# Patient Record
Sex: MALE | Race: WHITE | HISPANIC OR LATINO | Employment: OTHER | URBAN - METROPOLITAN AREA
[De-identification: names, ages, dates, MRNs, and addresses within clinical notes are randomized per-mention and may not be internally consistent; named-entity substitution may affect disease eponyms.]

---

## 2017-01-17 RX ORDER — OMEPRAZOLE 20 MG/1
CAPSULE, DELAYED RELEASE ORAL
Qty: 90 CAPSULE | Refills: 0 | Status: SHIPPED | OUTPATIENT
Start: 2017-01-17 | End: 2017-04-25 | Stop reason: SDUPTHER

## 2017-01-20 DIAGNOSIS — E03.9 HYPOTHYROIDISM, UNSPECIFIED TYPE: ICD-10-CM

## 2017-01-20 RX ORDER — LEVOTHYROXINE SODIUM 50 UG/1
50 TABLET ORAL DAILY
Qty: 90 TABLET | Refills: 0 | Status: SHIPPED | OUTPATIENT
Start: 2017-01-20 | End: 2017-05-18 | Stop reason: SDUPTHER

## 2017-02-27 ENCOUNTER — TELEPHONE (OUTPATIENT)
Dept: NEUROLOGY | Facility: CLINIC | Age: 82
End: 2017-02-27

## 2017-02-27 DIAGNOSIS — G40.209 PARTIAL SYMPTOMATIC EPILEPSY WITH COMPLEX PARTIAL SEIZURES, NOT INTRACTABLE, WITHOUT STATUS EPILEPTICUS: ICD-10-CM

## 2017-02-27 RX ORDER — LEVETIRACETAM 1000 MG/1
1000 TABLET ORAL 2 TIMES DAILY
Qty: 180 TABLET | Refills: 0 | Status: SHIPPED | OUTPATIENT
Start: 2017-02-27 | End: 2017-05-22 | Stop reason: SDUPTHER

## 2017-02-27 NOTE — TELEPHONE ENCOUNTER
Prescription for Levetiracetam 1000 mg has been filled for 3 mo. Supply. Called to scheduled  Appointment, and was advised that they would call back to schedule.

## 2017-03-16 DIAGNOSIS — E78.5 HYPERLIPIDEMIA, UNSPECIFIED HYPERLIPIDEMIA TYPE: Primary | ICD-10-CM

## 2017-03-16 RX ORDER — ATORVASTATIN CALCIUM 10 MG/1
10 TABLET, FILM COATED ORAL DAILY
Qty: 90 TABLET | Refills: 0 | Status: SHIPPED | OUTPATIENT
Start: 2017-03-16 | End: 2017-07-19 | Stop reason: SDUPTHER

## 2017-03-30 ENCOUNTER — TELEPHONE (OUTPATIENT)
Dept: ENDOCRINOLOGY | Facility: CLINIC | Age: 82
End: 2017-03-30

## 2017-03-30 NOTE — TELEPHONE ENCOUNTER
----- Message from Sumit Resendiz sent at 3/30/2017 11:57 AM CDT -----  Contact: Aislinn   Requesting to complete labs piror to 7/20/2017 appointment.    Please call [phone]

## 2017-04-13 RX ORDER — FINASTERIDE 5 MG/1
TABLET, FILM COATED ORAL
Qty: 90 TABLET | Refills: 1 | Status: SHIPPED | OUTPATIENT
Start: 2017-04-13 | End: 2017-07-29 | Stop reason: SDUPTHER

## 2017-04-13 RX ORDER — LISINOPRIL 20 MG/1
TABLET ORAL
Qty: 90 TABLET | Refills: 1 | Status: SHIPPED | OUTPATIENT
Start: 2017-04-13 | End: 2017-07-25 | Stop reason: SDUPTHER

## 2017-04-25 RX ORDER — OMEPRAZOLE 20 MG/1
CAPSULE, DELAYED RELEASE ORAL
Qty: 90 CAPSULE | Refills: 0 | Status: SHIPPED | OUTPATIENT
Start: 2017-04-25 | End: 2017-07-24 | Stop reason: SDUPTHER

## 2017-05-15 ENCOUNTER — TELEPHONE (OUTPATIENT)
Dept: FAMILY MEDICINE | Facility: CLINIC | Age: 82
End: 2017-05-15

## 2017-05-15 DIAGNOSIS — E11.59 HYPERTENSION ASSOCIATED WITH DIABETES: ICD-10-CM

## 2017-05-15 DIAGNOSIS — I15.2 HYPERTENSION ASSOCIATED WITH DIABETES: ICD-10-CM

## 2017-05-15 DIAGNOSIS — I10 ESSENTIAL HYPERTENSION: Primary | ICD-10-CM

## 2017-05-15 NOTE — TELEPHONE ENCOUNTER
----- Message from Michelle Jaime sent at 5/15/2017  4:04 PM CDT -----  Contact: ms collins pt's carol/[phone]  Ms collins states she left a message this morning for patient to do labs, please call to confirm orders for labs

## 2017-05-16 DIAGNOSIS — E11.9 TYPE 2 DIABETES MELLITUS WITHOUT COMPLICATION, WITHOUT LONG-TERM CURRENT USE OF INSULIN: Primary | ICD-10-CM

## 2017-05-17 ENCOUNTER — LAB VISIT (OUTPATIENT)
Dept: LAB | Facility: HOSPITAL | Age: 82
End: 2017-05-17
Attending: FAMILY MEDICINE
Payer: MEDICARE

## 2017-05-17 DIAGNOSIS — E11.59 HYPERTENSION ASSOCIATED WITH DIABETES: ICD-10-CM

## 2017-05-17 DIAGNOSIS — I15.2 HYPERTENSION ASSOCIATED WITH DIABETES: ICD-10-CM

## 2017-05-17 DIAGNOSIS — I10 ESSENTIAL HYPERTENSION: ICD-10-CM

## 2017-05-17 LAB
ALBUMIN SERPL BCP-MCNC: 4.3 G/DL
ALP SERPL-CCNC: 85 U/L
ALT SERPL W/O P-5'-P-CCNC: 26 U/L
ANION GAP SERPL CALC-SCNC: 12 MMOL/L
AST SERPL-CCNC: 26 U/L
BASOPHILS # BLD AUTO: 0.02 K/UL
BASOPHILS NFR BLD: 0.3 %
BILIRUB SERPL-MCNC: 0.5 MG/DL
BUN SERPL-MCNC: 23 MG/DL
CALCIUM SERPL-MCNC: 9.6 MG/DL
CHLORIDE SERPL-SCNC: 104 MMOL/L
CHOLEST/HDLC SERPL: 3.6 {RATIO}
CO2 SERPL-SCNC: 24 MMOL/L
CREAT SERPL-MCNC: 0.8 MG/DL
DIFFERENTIAL METHOD: NORMAL
EOSINOPHIL # BLD AUTO: 0.2 K/UL
EOSINOPHIL NFR BLD: 3.2 %
ERYTHROCYTE [DISTWIDTH] IN BLOOD BY AUTOMATED COUNT: 13.4 %
EST. GFR  (AFRICAN AMERICAN): >60 ML/MIN/1.73 M^2
EST. GFR  (NON AFRICAN AMERICAN): >60 ML/MIN/1.73 M^2
GLUCOSE SERPL-MCNC: 131 MG/DL
HCT VFR BLD AUTO: 42.9 %
HDL/CHOLESTEROL RATIO: 28.1 %
HDLC SERPL-MCNC: 128 MG/DL
HDLC SERPL-MCNC: 36 MG/DL
HGB BLD-MCNC: 14 G/DL
LDLC SERPL CALC-MCNC: 48.2 MG/DL
LYMPHOCYTES # BLD AUTO: 2.5 K/UL
LYMPHOCYTES NFR BLD: 41 %
MCH RBC QN AUTO: 30 PG
MCHC RBC AUTO-ENTMCNC: 32.6 %
MCV RBC AUTO: 92 FL
MONOCYTES # BLD AUTO: 0.5 K/UL
MONOCYTES NFR BLD: 7.5 %
NEUTROPHILS # BLD AUTO: 2.9 K/UL
NEUTROPHILS NFR BLD: 47.8 %
NONHDLC SERPL-MCNC: 92 MG/DL
PLATELET # BLD AUTO: 170 K/UL
PMV BLD AUTO: 11.7 FL
POTASSIUM SERPL-SCNC: 4.2 MMOL/L
PROT SERPL-MCNC: 7.4 G/DL
RBC # BLD AUTO: 4.66 M/UL
SODIUM SERPL-SCNC: 140 MMOL/L
T4 FREE SERPL-MCNC: 1.01 NG/DL
TRIGL SERPL-MCNC: 219 MG/DL
TSH SERPL DL<=0.005 MIU/L-ACNC: 7.03 UIU/ML
WBC # BLD AUTO: 5.97 K/UL

## 2017-05-17 PROCEDURE — 80053 COMPREHEN METABOLIC PANEL: CPT

## 2017-05-17 PROCEDURE — 84443 ASSAY THYROID STIM HORMONE: CPT

## 2017-05-17 PROCEDURE — 80061 LIPID PANEL: CPT

## 2017-05-17 PROCEDURE — 36415 COLL VENOUS BLD VENIPUNCTURE: CPT | Mod: PO

## 2017-05-17 PROCEDURE — 84439 ASSAY OF FREE THYROXINE: CPT

## 2017-05-17 PROCEDURE — 85025 COMPLETE CBC W/AUTO DIFF WBC: CPT

## 2017-05-17 PROCEDURE — 83036 HEMOGLOBIN GLYCOSYLATED A1C: CPT

## 2017-05-17 RX ORDER — METFORMIN HYDROCHLORIDE 1000 MG/1
1000 TABLET ORAL 2 TIMES DAILY WITH MEALS
Qty: 180 TABLET | Refills: 0 | Status: SHIPPED | OUTPATIENT
Start: 2017-05-17 | End: 2017-06-21 | Stop reason: SDUPTHER

## 2017-05-18 ENCOUNTER — TELEPHONE (OUTPATIENT)
Dept: INTERNAL MEDICINE | Facility: CLINIC | Age: 82
End: 2017-05-18

## 2017-05-18 DIAGNOSIS — E03.9 HYPOTHYROIDISM, UNSPECIFIED TYPE: ICD-10-CM

## 2017-05-18 LAB
ESTIMATED AVG GLUCOSE: 154 MG/DL
HBA1C MFR BLD HPLC: 7 %

## 2017-05-18 RX ORDER — LEVOTHYROXINE SODIUM 75 UG/1
75 TABLET ORAL
Qty: 60 TABLET | Refills: 0 | Status: SHIPPED | OUTPATIENT
Start: 2017-05-18 | End: 2017-07-16 | Stop reason: SDUPTHER

## 2017-05-19 NOTE — TELEPHONE ENCOUNTER
Spoke to daughter thyroid was elevated, levothyroxine was increased to 75mcg, repeat lab in 8 weeks

## 2017-05-22 ENCOUNTER — OFFICE VISIT (OUTPATIENT)
Dept: FAMILY MEDICINE | Facility: CLINIC | Age: 82
End: 2017-05-22
Payer: MEDICARE

## 2017-05-22 VITALS
WEIGHT: 121.69 LBS | SYSTOLIC BLOOD PRESSURE: 122 MMHG | HEART RATE: 80 BPM | DIASTOLIC BLOOD PRESSURE: 64 MMHG | BODY MASS INDEX: 22.98 KG/M2 | OXYGEN SATURATION: 95 % | HEIGHT: 61 IN

## 2017-05-22 DIAGNOSIS — R42 DIZZINESS: ICD-10-CM

## 2017-05-22 DIAGNOSIS — I10 ESSENTIAL HYPERTENSION: Primary | ICD-10-CM

## 2017-05-22 DIAGNOSIS — Z23 NEED FOR VACCINATION AGAINST STREPTOCOCCUS PNEUMONIAE: ICD-10-CM

## 2017-05-22 DIAGNOSIS — E11.65 TYPE 2 DIABETES MELLITUS WITH HYPERGLYCEMIA, WITHOUT LONG-TERM CURRENT USE OF INSULIN: ICD-10-CM

## 2017-05-22 PROCEDURE — 1126F AMNT PAIN NOTED NONE PRSNT: CPT | Mod: S$GLB,,, | Performed by: FAMILY MEDICINE

## 2017-05-22 PROCEDURE — 99214 OFFICE O/P EST MOD 30 MIN: CPT | Mod: S$GLB,,, | Performed by: FAMILY MEDICINE

## 2017-05-22 PROCEDURE — 1160F RVW MEDS BY RX/DR IN RCRD: CPT | Mod: S$GLB,,, | Performed by: FAMILY MEDICINE

## 2017-05-22 PROCEDURE — 1159F MED LIST DOCD IN RCRD: CPT | Mod: S$GLB,,, | Performed by: FAMILY MEDICINE

## 2017-05-22 PROCEDURE — 3078F DIAST BP <80 MM HG: CPT | Mod: S$GLB,,, | Performed by: FAMILY MEDICINE

## 2017-05-22 PROCEDURE — 99499 UNLISTED E&M SERVICE: CPT | Mod: S$GLB,,, | Performed by: FAMILY MEDICINE

## 2017-05-22 PROCEDURE — 3074F SYST BP LT 130 MM HG: CPT | Mod: S$GLB,,, | Performed by: FAMILY MEDICINE

## 2017-05-22 PROCEDURE — 99999 PR PBB SHADOW E&M-EST. PATIENT-LVL III: CPT | Mod: PBBFAC,,, | Performed by: FAMILY MEDICINE

## 2017-05-22 RX ORDER — LEVETIRACETAM 500 MG/1
500 TABLET ORAL 2 TIMES DAILY
Qty: 180 TABLET | Refills: 0 | Status: SHIPPED | OUTPATIENT
Start: 2017-05-22 | End: 2017-09-05 | Stop reason: SDUPTHER

## 2017-05-22 NOTE — PROGRESS NOTES
Subjective:       Patient ID: Kang Herbert is a 82 y.o. male.    Chief Complaint: Annual Exam    82 years old male came to the clinic for blood pressure check.  Blood pressure today stable.  No chest pain palpitations orthopnea or PND.  Patient last A1c was stable.  No polyuria polydipsia or polyphagia.      Review of Systems   Constitutional: Negative.    HENT: Negative.    Eyes: Negative.    Respiratory: Negative.    Cardiovascular: Negative.  Negative for chest pain, palpitations and leg swelling.   Gastrointestinal: Negative.    Endocrine: Negative for cold intolerance, heat intolerance, polydipsia, polyphagia and polyuria.   Genitourinary: Negative.    Musculoskeletal: Negative.    Skin: Negative.    Neurological: Positive for dizziness.   Psychiatric/Behavioral: Negative.        Objective:      Physical Exam   Constitutional: He is oriented to person, place, and time. He appears well-developed and well-nourished. No distress.   HENT:   Head: Normocephalic and atraumatic.   Right Ear: External ear normal.   Left Ear: External ear normal.   Nose: Nose normal.   Mouth/Throat: Oropharynx is clear and moist. No oropharyngeal exudate.   Eyes: Conjunctivae and EOM are normal. Pupils are equal, round, and reactive to light. Right eye exhibits no discharge. Left eye exhibits no discharge. No scleral icterus.   Neck: Normal range of motion. Neck supple. No JVD present. No tracheal deviation present. No thyromegaly present.   Cardiovascular: Normal rate, regular rhythm, normal heart sounds and intact distal pulses.  Exam reveals no gallop and no friction rub.    No murmur heard.  Pulmonary/Chest: Effort normal and breath sounds normal. No stridor. No respiratory distress. He has no wheezes. He has no rales. He exhibits no tenderness.   Abdominal: Soft. Bowel sounds are normal. He exhibits no distension and no mass. There is no tenderness. There is no rebound and no guarding.   Musculoskeletal: Normal range of  motion. He exhibits no edema or tenderness.   Feet:   Right Foot:   Protective Sensation: 10 sites tested. 10 sites sensed.   Skin Integrity: Negative for ulcer, blister, skin breakdown, erythema, warmth, callus or dry skin.   Left Foot:   Protective Sensation: 10 sites tested. 10 sites sensed.   Skin Integrity: Negative for ulcer, blister, skin breakdown, erythema, warmth, callus or dry skin.   Lymphadenopathy:     He has no cervical adenopathy.   Neurological: He is alert and oriented to person, place, and time. He has normal reflexes. He displays normal reflexes. No cranial nerve deficit. He exhibits normal muscle tone. Coordination normal.   Skin: Skin is warm and dry. No rash noted. He is not diaphoretic. No erythema. No pallor.   Psychiatric: He has a normal mood and affect. His behavior is normal. Judgment and thought content normal.   Nursing note and vitals reviewed.      Assessment:       1. Essential hypertension    2. Dizziness    3. Type 2 diabetes mellitus with hyperglycemia, without long-term current use of insulin    4. Need for vaccination against Streptococcus pneumoniae        Plan:         Kang TRAN was seen today for annual exam.    Diagnoses and all orders for this visit:    Essential hypertension    Dizziness    Type 2 diabetes mellitus with hyperglycemia, without long-term current use of insulin    Need for vaccination against Streptococcus pneumoniae    Other orders  -     levetiracetam (KEPPRA) 500 MG Tab; Take 1 tablet (500 mg total) by mouth 2 (two) times daily.    Continue monitoring blood pressure at home, low sodium diet.  Continue monitoring blood sugar at home,ADA diet.  Decrease Keppra by 50%.  Patient is going to check if the pneumonia shot was done at the pharmacy.

## 2017-05-22 NOTE — PATIENT INSTRUCTIONS
Dieta: Diabetes [Diabetic Diet]  Los alimentos son umberto herramienta importante que puede utilizar para controlar la diabetes y mantenerse skyla. [place] alimentos john equilibrados en las cantidades adecuadas le ayudará a controlar la concentración de azúcar en la dayan (glucemia) y a evitar las reacciones por falta de azúcar en dayan. También le ayudará a reducir los riesgos de la diabetes para la marie.    Un dietista certificado (STEFAN, registered dietitian) le explicará la dieta para la diabetes y le ayudará a planificar comidas y refrigerios que rashi saludables. Si tiene alguna lucille, consulte al dietista.  Pautas Para [place]:  · Consulte con braxton médico antes de iniciar umberto dieta para la diabetes o un programa para adelgazar. Si todavía no ha consultado a un dietista, pídale a braxton médico que lo transfiera.  · Seleccione alimentos de los seis grupos. Braxton dietista lo asesorará sobre las elecciones de alimentos dentro de cada carin, el tamaño de las porciones y la cantidad de porciones que puede comer en cada comida.  ¨ Granos, frijoles y vegetales con almidón  ¨ Vegetales  ¨ Fruta  ¨ Leche o yogur  ¨ Zenia  ¨ Grasas, dulces y alcohol (solamente umberto pequeña cantidad de shannan carin)  · Vigile el azúcar en braxton dayan según lo indique braxton médico. [place] los medicamentos según le indique braxton médico.  · Aprenda a leer las etiquetas de información nutricional y elija las porciones adecuadas.  · Ingiera solamente la cantidad de alimentos de braxton plan de comidas. Coma más o menos la misma cantidad de alimentos en horarios regulares todos los días. No saltee comidas. Deje un intervalo de 4 a 5 horas entre las comidas, con refrigerios intermedios.  · Limite el consumo de alcohol. Aumenta el azúcar en la dayan. Stephanie agua o bebidas dietéticas sin calorías que utilizan edulcorantes seguros.  · Coma menos grasa para ayudar a reducir braxton riesgo de enfermedad del corazón. Consuma productos lácteos sin grasa o con bajo contenido graso y  stella magras. Evite los alimentos fritos. Use aceites de cocina no saturados.  · Consulte a braxton nutricionista sobre los sustitutos seguros del azúcar.  · Evite la sal adicional. Puede contribuir a la presión abeba (hipertensión), que puede provocar enfermedad del corazón. Las personas con diabetes ya tienen riesgo de hipertensión y enfermedad del corazón.  · Mantenga un peso saludable. Si necesita adelgazar, reduzca el tamaño de hi porciones. No se saltee comidas. El ejercicio es umberto parte importante de un programa para el manejo del peso. Pregúntele a braxton médico si un programa de ejercicios es adecuado para usted.  · Si desea obtener más información acerca del mejor plan para usted, consulte con un dietista certificado. Si desea que lo transfieran a un dietista certificado en braxton melinda, comuníquese con:  ¨ Academy of Nutrition and Dietetics: www.eatright.org  ¨ American Diabetes Association: 286.512.3415 www.diabetes.org  Date Last Reviewed: 11/29/2013  © 3668-9663 [employer]. [address] [place] [id number]. Todos los derechos reservados. Esta información no pretende sustituir la atención médica profesional. Sólo braxton médico puede diagnosticar y tratar un problema de marie.

## 2017-06-01 ENCOUNTER — OFFICE VISIT (OUTPATIENT)
Dept: OPTOMETRY | Facility: CLINIC | Age: 82
End: 2017-06-01
Payer: MEDICARE

## 2017-06-01 DIAGNOSIS — H40.013 OPEN ANGLE WITH BORDERLINE FINDINGS AND LOW GLAUCOMA RISK IN BOTH EYES: ICD-10-CM

## 2017-06-01 DIAGNOSIS — H04.123 DRY EYES, BILATERAL: ICD-10-CM

## 2017-06-01 DIAGNOSIS — H52.4 MYOPIA WITH ASTIGMATISM AND PRESBYOPIA, BILATERAL: ICD-10-CM

## 2017-06-01 DIAGNOSIS — H52.13 MYOPIA WITH ASTIGMATISM AND PRESBYOPIA, BILATERAL: ICD-10-CM

## 2017-06-01 DIAGNOSIS — H53.2 DIPLOPIA: ICD-10-CM

## 2017-06-01 DIAGNOSIS — H52.203 MYOPIA WITH ASTIGMATISM AND PRESBYOPIA, BILATERAL: ICD-10-CM

## 2017-06-01 DIAGNOSIS — E11.9 TYPE 2 DIABETES MELLITUS WITHOUT COMPLICATION, WITHOUT LONG-TERM CURRENT USE OF INSULIN: Primary | ICD-10-CM

## 2017-06-01 PROCEDURE — 92015 DETERMINE REFRACTIVE STATE: CPT | Mod: S$GLB,,, | Performed by: OPTOMETRIST

## 2017-06-01 PROCEDURE — 99499 UNLISTED E&M SERVICE: CPT | Mod: S$GLB,,, | Performed by: OPTOMETRIST

## 2017-06-01 PROCEDURE — 92014 COMPRE OPH EXAM EST PT 1/>: CPT | Mod: S$GLB,,, | Performed by: OPTOMETRIST

## 2017-06-01 PROCEDURE — 99999 PR PBB SHADOW E&M-EST. PATIENT-LVL II: CPT | Mod: PBBFAC,,, | Performed by: OPTOMETRIST

## 2017-06-01 NOTE — PROGRESS NOTES
HPI     Blur os at dist, x mos, no assoc pain or red, no relief over time,   constant  Diabetic eye exam  Using tears with relief    Last edited by Victor M Loya, OD on 6/1/2017  3:05 PM. (History)        ROS     Negative for: Constitutional, Gastrointestinal, Neurological, Skin,   Genitourinary, Musculoskeletal, HENT, Endocrine, Cardiovascular, Eyes,   Respiratory, Psychiatric, Allergic/Imm, Heme/Lymph    Last edited by Victor M Loya, OD on 6/1/2017  2:02 PM. (History)        Assessment /Plan     For exam results, see Encounter Report.    Type 2 diabetes mellitus without complication, without long-term current use of insulin    Open angle with borderline findings and low glaucoma risk in both eyes  -     Mohamud Visual Field - OU - Intermediate - Both Eyes; Future  -     OCT - Optic Nerve; Future    Diplopia    Myopia with astigmatism and presbyopia, bilateral    Dry eyes, bilateral      1. No diabetic retinopathy, no csme. Return in 1 year for dilated eye exam.  2. Low risk based on CD ratio, IOP stable, RTC for HVf(24-2)ridge std and OCt of rnfl.   3,4. Spec Rx given. Different lens options discussed with patient. RTC 1 year full exam. Cont with same prism.   5. Recommend continue artificial tears. 1 drop 1-2x per day. Chronicity of disease and treatment discussed.

## 2017-06-21 DIAGNOSIS — E11.9 TYPE 2 DIABETES MELLITUS WITHOUT COMPLICATION, WITHOUT LONG-TERM CURRENT USE OF INSULIN: ICD-10-CM

## 2017-06-22 RX ORDER — METFORMIN HYDROCHLORIDE 1000 MG/1
1000 TABLET ORAL 2 TIMES DAILY WITH MEALS
Qty: 180 TABLET | Refills: 0 | Status: SHIPPED | OUTPATIENT
Start: 2017-06-22 | End: 2017-10-31 | Stop reason: SDUPTHER

## 2017-07-16 DIAGNOSIS — E03.9 HYPOTHYROIDISM, UNSPECIFIED TYPE: ICD-10-CM

## 2017-07-17 ENCOUNTER — LAB VISIT (OUTPATIENT)
Dept: LAB | Facility: HOSPITAL | Age: 82
End: 2017-07-17
Attending: FAMILY MEDICINE
Payer: MEDICARE

## 2017-07-17 DIAGNOSIS — E03.9 HYPOTHYROIDISM, UNSPECIFIED TYPE: ICD-10-CM

## 2017-07-17 LAB
T3 SERPL-MCNC: 89 NG/DL
T4 FREE SERPL-MCNC: 1.26 NG/DL
TSH SERPL DL<=0.005 MIU/L-ACNC: 1.17 UIU/ML

## 2017-07-17 PROCEDURE — 84480 ASSAY TRIIODOTHYRONINE (T3): CPT

## 2017-07-17 PROCEDURE — 84439 ASSAY OF FREE THYROXINE: CPT

## 2017-07-17 PROCEDURE — 84443 ASSAY THYROID STIM HORMONE: CPT

## 2017-07-17 PROCEDURE — 36415 COLL VENOUS BLD VENIPUNCTURE: CPT | Mod: PO

## 2017-07-17 RX ORDER — LEVOTHYROXINE SODIUM 75 UG/1
75 TABLET ORAL
Qty: 60 TABLET | Refills: 0 | Status: SHIPPED | OUTPATIENT
Start: 2017-07-17 | End: 2017-09-12 | Stop reason: SDUPTHER

## 2017-07-19 DIAGNOSIS — E78.5 HYPERLIPIDEMIA, UNSPECIFIED HYPERLIPIDEMIA TYPE: ICD-10-CM

## 2017-07-20 ENCOUNTER — OFFICE VISIT (OUTPATIENT)
Dept: ENDOCRINOLOGY | Facility: CLINIC | Age: 82
End: 2017-07-20
Payer: MEDICARE

## 2017-07-20 VITALS
WEIGHT: 117.94 LBS | HEIGHT: 62 IN | SYSTOLIC BLOOD PRESSURE: 98 MMHG | DIASTOLIC BLOOD PRESSURE: 59 MMHG | HEART RATE: 89 BPM | BODY MASS INDEX: 21.7 KG/M2

## 2017-07-20 DIAGNOSIS — R20.8 DECREASED PERIPHERAL VIBRATORY SENSE: ICD-10-CM

## 2017-07-20 DIAGNOSIS — E03.9 ACQUIRED HYPOTHYROIDISM: ICD-10-CM

## 2017-07-20 DIAGNOSIS — E11.59 HYPERTENSION ASSOCIATED WITH DIABETES: ICD-10-CM

## 2017-07-20 DIAGNOSIS — E55.9 VITAMIN D DEFICIENCY: ICD-10-CM

## 2017-07-20 DIAGNOSIS — I15.2 HYPERTENSION ASSOCIATED WITH DIABETES: ICD-10-CM

## 2017-07-20 DIAGNOSIS — E11.42 DIABETIC PERIPHERAL NEUROPATHY ASSOCIATED WITH TYPE 2 DIABETES MELLITUS: Primary | ICD-10-CM

## 2017-07-20 DIAGNOSIS — E78.2 MIXED DYSLIPIDEMIA: ICD-10-CM

## 2017-07-20 PROCEDURE — 99999 PR PBB SHADOW E&M-EST. PATIENT-LVL III: CPT | Mod: PBBFAC,GC,, | Performed by: INTERNAL MEDICINE

## 2017-07-20 PROCEDURE — 1126F AMNT PAIN NOTED NONE PRSNT: CPT | Mod: GC,S$GLB,, | Performed by: INTERNAL MEDICINE

## 2017-07-20 PROCEDURE — 3074F SYST BP LT 130 MM HG: CPT | Mod: GC,S$GLB,, | Performed by: INTERNAL MEDICINE

## 2017-07-20 PROCEDURE — 1159F MED LIST DOCD IN RCRD: CPT | Mod: GC,S$GLB,, | Performed by: INTERNAL MEDICINE

## 2017-07-20 PROCEDURE — 3078F DIAST BP <80 MM HG: CPT | Mod: GC,S$GLB,, | Performed by: INTERNAL MEDICINE

## 2017-07-20 PROCEDURE — 99214 OFFICE O/P EST MOD 30 MIN: CPT | Mod: GC,S$GLB,, | Performed by: INTERNAL MEDICINE

## 2017-07-20 PROCEDURE — 3008F BODY MASS INDEX DOCD: CPT | Mod: GC,S$GLB,, | Performed by: INTERNAL MEDICINE

## 2017-07-20 RX ORDER — ATORVASTATIN CALCIUM 10 MG/1
10 TABLET, FILM COATED ORAL DAILY
Qty: 90 TABLET | Refills: 1 | Status: SHIPPED | OUTPATIENT
Start: 2017-07-20 | End: 2017-10-12 | Stop reason: SDUPTHER

## 2017-07-20 NOTE — PATIENT INSTRUCTIONS
Start over counter vitamin D 2000 iu daily   We will check vitamin B12 levels and let you know if its low

## 2017-07-20 NOTE — PROGRESS NOTES
Subjective:      Patient ID: Kang Herbert is a 82 y.o. male.    Chief Complaint:  Hypothyroidism      History of Present Illness    F/u visit for T2DM and hypothyroidism:     Complications: neuropathy. A1c controlled, BS log reviewed, ranging 86-160s and averaging 110s. Denies hypoglycemia. Tolerating metformin 1g bid.   utd mary   Reports paresthesias in feet, told to have sciatica.   On statin and lisinopril.     Hypothyroidism, taking lt4 75mcg daily, dose recently changed by pcp, tsh normal.   Denies wt changes, palps, constipation, increased freq BM, cold/heat intolerance    Vit D def, not taking supplements.     Review of Systems   Constitutional: Negative for unexpected weight change.   Eyes: Negative for visual disturbance.   Cardiovascular: Negative for palpitations.   Gastrointestinal: Negative for abdominal pain.   Endocrine: Negative for polyuria.   Skin: Negative for wound.       Objective:   Physical Exam   Neck: No thyromegaly present.   Cardiovascular: Normal rate.    Pulmonary/Chest: Effort normal.   Abdominal: Soft.   Musculoskeletal:        Right foot: There is no deformity.        Left foot: There is no deformity.   Feet:   Right Foot:   Skin Integrity: Negative for ulcer.   Left Foot:   Skin Integrity: Negative for ulcer.   Neurological:   Feet without lesions   Shoes appropriate  sensation intact to monofilament, decreased vibratory sensation        Vitals reviewed.      Lab Review:   Lab Results   Component Value Date    TSH 1.169 07/17/2017     Lab Results   Component Value Date    HGBA1C 7.0 (H) 05/17/2017         Assessment:     1. Diabetic peripheral neuropathy associated with type 2 diabetes mellitus     2. Acquired hypothyroidism     3. Hypertension associated with diabetes     4. Mixed dyslipidemia     5. Vitamin D deficiency            Plan:     Kang TRAN was seen today for hypothyroidism.    Diagnoses and all orders for this visit:    Diabetic peripheral neuropathy  associated with type 2 diabetes mellitus  A1c controlled, cont metformin 1 g bid.   rec routine yearly surveillance of lipid, umab, ophtho and A1c with daily BS logs   B12 normal in 2016, recheck today    Acquired hypothyroidism  Clinically and biochemically euthyroid   Cont lt4 75mcg daily, yearly tsh surveillance     Hypertension associated with diabetes  Controlled, cont lisinopril     Mixed dyslipidemia  Cont lipitor     Vitamin D deficiency  rec starting otc 2000 iu daily     Plan discussed with pt, wife and daughter on phone as interpretor due to language barrier.   No changes to DM or thyroid regimen.   Discussed that he can follow up endocrine prn, has been managed by PCP and doing well and they are agreeable.     Discussed w Dr Jonas   rtc prn

## 2017-07-24 RX ORDER — OMEPRAZOLE 20 MG/1
CAPSULE, DELAYED RELEASE ORAL
Qty: 90 CAPSULE | Refills: 0 | Status: SHIPPED | OUTPATIENT
Start: 2017-07-24 | End: 2017-07-25

## 2017-07-25 RX ORDER — LISINOPRIL 20 MG/1
TABLET ORAL
Qty: 90 TABLET | Refills: 1 | Status: SHIPPED | OUTPATIENT
Start: 2017-07-25 | End: 2018-04-25 | Stop reason: SDUPTHER

## 2017-07-25 RX ORDER — OMEPRAZOLE 20 MG/1
CAPSULE, DELAYED RELEASE ORAL
Qty: 90 CAPSULE | Refills: 0 | Status: SHIPPED | OUTPATIENT
Start: 2017-07-25 | End: 2017-12-08 | Stop reason: SDUPTHER

## 2017-07-26 ENCOUNTER — TELEPHONE (OUTPATIENT)
Dept: ENDOCRINOLOGY | Facility: CLINIC | Age: 82
End: 2017-07-26

## 2017-07-26 NOTE — TELEPHONE ENCOUNTER
Pt is calling for lab results. Please advise.    ----- Message from Sumit Resendiz sent at 7/25/2017  2:44 PM CDT -----  Contact: Aislinn  Requesting a call back in regards to lab results.    Please call [phone].

## 2017-07-29 RX ORDER — FINASTERIDE 5 MG/1
TABLET, FILM COATED ORAL
Qty: 90 TABLET | Refills: 1 | Status: SHIPPED | OUTPATIENT
Start: 2017-07-29 | End: 2017-07-31 | Stop reason: SDUPTHER

## 2017-07-31 RX ORDER — FINASTERIDE 5 MG/1
TABLET, FILM COATED ORAL
Qty: 90 TABLET | Refills: 1 | Status: SHIPPED | OUTPATIENT
Start: 2017-07-31 | End: 2018-01-25 | Stop reason: SDUPTHER

## 2017-07-31 NOTE — TELEPHONE ENCOUNTER
----- Message from Huyen Tanner sent at 7/31/2017  2:14 PM CDT -----  Contact: Self [phone]  Patient is calling to get refills on his prostate medication. Please advice

## 2017-07-31 NOTE — TELEPHONE ENCOUNTER
----- Message from Huyen Tanner sent at 7/31/2017  2:14 PM CDT -----  Contact: Self [phone]  Patient is calling to get refills on his prostate medication. Please advice

## 2017-08-07 NOTE — PROGRESS NOTES
I have reviewed the notes, assessments,  performed by , I concur with her/his documentation of Kang Herbert.

## 2017-09-05 RX ORDER — LEVETIRACETAM 500 MG/1
500 TABLET ORAL 2 TIMES DAILY
Qty: 180 TABLET | Refills: 3 | Status: SHIPPED | OUTPATIENT
Start: 2017-09-05 | End: 2019-08-14

## 2017-09-12 DIAGNOSIS — E03.9 HYPOTHYROIDISM, UNSPECIFIED TYPE: ICD-10-CM

## 2017-09-13 RX ORDER — LEVOTHYROXINE SODIUM 75 UG/1
75 TABLET ORAL
Qty: 60 TABLET | Refills: 0 | Status: SHIPPED | OUTPATIENT
Start: 2017-09-13 | End: 2017-10-12 | Stop reason: SDUPTHER

## 2017-10-12 DIAGNOSIS — E03.9 HYPOTHYROIDISM, UNSPECIFIED TYPE: ICD-10-CM

## 2017-10-12 DIAGNOSIS — E78.5 HYPERLIPIDEMIA, UNSPECIFIED HYPERLIPIDEMIA TYPE: ICD-10-CM

## 2017-10-12 RX ORDER — ATORVASTATIN CALCIUM 10 MG/1
10 TABLET, FILM COATED ORAL DAILY
Qty: 90 TABLET | Refills: 3 | Status: SHIPPED | OUTPATIENT
Start: 2017-10-12 | End: 2018-10-16 | Stop reason: SDUPTHER

## 2017-10-12 RX ORDER — LEVOTHYROXINE SODIUM 75 UG/1
75 TABLET ORAL
Qty: 90 TABLET | Refills: 3 | Status: SHIPPED | OUTPATIENT
Start: 2017-10-12 | End: 2017-11-03

## 2017-10-30 ENCOUNTER — OFFICE VISIT (OUTPATIENT)
Dept: NEUROLOGY | Facility: CLINIC | Age: 82
End: 2017-10-30
Payer: MEDICARE

## 2017-10-30 VITALS
HEIGHT: 62 IN | WEIGHT: 119 LBS | BODY MASS INDEX: 21.9 KG/M2 | DIASTOLIC BLOOD PRESSURE: 70 MMHG | HEART RATE: 88 BPM | SYSTOLIC BLOOD PRESSURE: 122 MMHG

## 2017-10-30 DIAGNOSIS — G40.209 PARTIAL SYMPTOMATIC EPILEPSY WITH COMPLEX PARTIAL SEIZURES, NOT INTRACTABLE, WITHOUT STATUS EPILEPTICUS: Primary | ICD-10-CM

## 2017-10-30 PROCEDURE — 99213 OFFICE O/P EST LOW 20 MIN: CPT | Mod: S$GLB,,, | Performed by: PSYCHIATRY & NEUROLOGY

## 2017-10-30 PROCEDURE — 99999 PR PBB SHADOW E&M-EST. PATIENT-LVL III: CPT | Mod: PBBFAC,,, | Performed by: PSYCHIATRY & NEUROLOGY

## 2017-10-30 PROCEDURE — 99499 UNLISTED E&M SERVICE: CPT | Mod: S$GLB,,, | Performed by: PSYCHIATRY & NEUROLOGY

## 2017-10-30 NOTE — PROGRESS NOTES
[facility] NEUROLOGY  [facility] [place]    Date: October 30, 2017   Patient Name: Kang Herbert   MRN: [id number]   PCP: Zeke Marrufo  Referring Provider: Self, Aaareferral    Assessment:      This is Kang Herbert, 82 y.o. male with HTN, HLD, DM presents for follow up for mild memory troubles consistent with normal aging, MRI brain normal for age (images reviewed), not likely to benefit from cholinergic medication.  He has no clear history of seizures.     Plan:      -  Stop keppra and check EEG, will call with results.       I discussed side effects of the medications. I asked the patient to  stop the medication if he notices serious adverse effects as we discussed and to seek immediate medical attention at an ER.     Chacorta Shrestha MD  [facility]   Department of Neurology    Subjective:      HPI:   Mr. Kang Herbert is a 82 y.o. male who presents with a chief complaint of follow up for MRI     Patient presents today with his wife who follows with me for mild Alzheimer type dementia and his daughter who acts as .  He was evaluated for memory loss 9/2016 with MRI brain at that time showing only age related atrophy and mild vascular disease.  He remains fully independent.  He spends his time caring for his wife, gardening, walking, playing cards with friends.  He continues to drive without difficulty and do his own shopping.  He has been on keppra since 2009 when he had an episode of decreased responsiveness in the setting of hyperglycemia.    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  1    atorvastatin (LIPITOR) 10 MG tablet Take 1 tablet (10 mg total) by mouth once daily. 90 tablet 3    blood sugar diagnostic (ACCU-CHEK ARUNA PLUS TEST STRP) Strp 1 strip by Other route 2 (two) times daily. 200 strip 3    finasteride (PROSCAR) 5 mg tablet TAKE 1 TABLET (5 MG TOTAL) BY MOUTH ONCE DAILY. 90 tablet 1    gabapentin (NEURONTIN) 800 MG tablet TAKE 1 TABLET IN THE MORNING AND EVENING AND 2 TABLETS AT BEDTIME 360 tablet 1    levetiracetam (KEPPRA) 500 MG Tab Take 1 tablet (500 mg total) by mouth 2 (two) times daily. 180 tablet 3    levothyroxine (SYNTHROID) 75 MCG tablet Take 1 tablet (75 mcg total) by mouth before breakfast. 90 tablet 3    lidocaine HCl 2% (XYLOCAINE) 2 % Soln 5ml every 4 hours as needed for pain 120 mL 0    lisinopril (PRINIVIL,ZESTRIL) 20 MG tablet TAKE 1 TABLET BY MOUTH EVERY DAY 90 tablet 1    meloxicam (MOBIC) 15 MG tablet TAKE 1 TABLET EVERY DAY AS NEEDED FOR PAIN MAY TAKE 1/2 TABLET 2 TIMES A DAY 90 tablet 2    metformin (GLUCOPHAGE) 1000 MG tablet Take 1 tablet (1,000 mg total) by mouth 2 (two) times daily with meals. 180 tablet 0    omeprazole (PRILOSEC) 20 MG capsule TAKE 1 CAPSULE BY MOUTH EVERY DAY 90 capsule 0     No current facility-administered medications for this visit.        ALLERGIES:  Review of patient's allergies indicates:  No Known Allergies    FAMILY HISTORY:  Family History   Problem Relation Age of Onset    No Known Problems Mother     No Known Problems Father     Glaucoma Sister     Diabetes Sister     Diabetes Brother     No Known Problems Maternal Aunt     No Known Problems Maternal Uncle     No Known Problems Paternal Aunt     No Known Problems Paternal Uncle     No Known Problems Maternal  "Grandmother     No Known Problems Maternal Grandfather     No Known Problems Paternal Grandmother     No Known Problems Paternal Grandfather     Amblyopia Neg Hx     Blindness Neg Hx     Cancer Neg Hx     Cataracts Neg Hx     Hypertension Neg Hx     Macular degeneration Neg Hx     Retinal detachment Neg Hx     Strabismus Neg Hx     Stroke Neg Hx     Thyroid disease Neg Hx        SOCIAL HISTORY:  Social History   Substance Use Topics    Smoking status: Never Smoker    Smokeless tobacco: Never Used    Alcohol use No       Review of Systems:  12 review of systems is negative except for the symptoms mentioned in HPI.        Objective:     Vitals:    10/30/17 1221   BP: 122/70   Pulse: 88   Weight: 54 kg (119 lb)   Height: 5' 2" (1.575 m)       General: NAD, well nourished   Eyes: no tearing, discharge, no erythema   ENT: moist mucous membranes of the oral cavity, nares patent    Neck: Supple, full range of motion  Cardiovascular: Warm and well perfused, pulses equal and symmetrical  Lungs: Normal work of breathing, normal chest wall excursions  Skin: No rash, lesions, or breakdown on exposed skin  Psychiatry: Mood and affect are appropriate   Abdomen: soft, non tender, non distended  Extremeties: No cyanosis, clubbing or edema.    Neurological   MENTAL STATUS: Alert and oriented to person, place, and time. Attention and concentration within normal limits. Speech without dysarthria, able to name and repeat without difficulty. Recent and remote memory within normal limits   CRANIAL NERVES: Visual fields intact. PERRL. EOMI. Facial sensation intact. Face symmetrical. Hearing grossly intact. Full shoulder shrug bilaterally. Tongue protrudes midline   MOTOR: Normal bulk and tone. No pronator drift.  5/5 deltoid, biceps, triceps, interosseous, hand  bilaterally. 5/5 iliopsoas, knee extension/flexion, foot dorsi/plantarflexion bilaterally.    CEREBELLAR/COORDINATION/GAIT: Gait steady with normal arm swing " and stride length.

## 2017-10-31 DIAGNOSIS — E03.9 HYPOTHYROIDISM, UNSPECIFIED TYPE: ICD-10-CM

## 2017-10-31 DIAGNOSIS — E11.9 TYPE 2 DIABETES MELLITUS WITHOUT COMPLICATION, WITHOUT LONG-TERM CURRENT USE OF INSULIN: ICD-10-CM

## 2017-11-03 RX ORDER — LEVOTHYROXINE SODIUM 75 UG/1
75 TABLET ORAL
Qty: 60 TABLET | Refills: 0 | Status: SHIPPED | OUTPATIENT
Start: 2017-11-03 | End: 2018-07-30 | Stop reason: SDUPTHER

## 2017-11-06 RX ORDER — METFORMIN HYDROCHLORIDE 1000 MG/1
1000 TABLET ORAL 2 TIMES DAILY WITH MEALS
Qty: 180 TABLET | Refills: 0 | Status: SHIPPED | OUTPATIENT
Start: 2017-11-06 | End: 2018-04-01 | Stop reason: SDUPTHER

## 2017-11-08 ENCOUNTER — HOSPITAL ENCOUNTER (OUTPATIENT)
Dept: NEUROLOGY | Facility: CLINIC | Age: 82
Discharge: HOME | End: 2017-11-08
Payer: MEDICARE

## 2017-11-08 DIAGNOSIS — G40.209 PARTIAL SYMPTOMATIC EPILEPSY WITH COMPLEX PARTIAL SEIZURES, NOT INTRACTABLE, WITHOUT STATUS EPILEPTICUS: Primary | ICD-10-CM

## 2017-11-08 PROCEDURE — 95819 PR EEG,W/AWAKE & ASLEEP RECORD: ICD-10-PCS | Mod: S$GLB,,, | Performed by: PSYCHIATRY & NEUROLOGY

## 2017-11-08 PROCEDURE — 95819 EEG AWAKE AND ASLEEP: CPT | Mod: S$GLB,,, | Performed by: PSYCHIATRY & NEUROLOGY

## 2017-11-09 NOTE — PROCEDURES
DATE OF SERVICE:  11/08/2017    EEG NUMBER:  OC-.    REFERRING PHYSICIAN:  Dr. Shrestha.    This EEG was performed to assess for evidence of underlying epilepsy.    ELECTROENCEPHALOGRAM REPORT    METHODOLOGY:  Electroencephalographic (EEG) recording is recorded with   electrodes placed according to the International 10-20 placement system.  Thirty   two (32) channels of digital signal (sampling rate of 512/sec), including T1   and T2, were simultaneously recorded from the scalp and may include EKG, EMG,   and/or eye monitors.  Recording band pass was 0.1 to 512 Hz.  Digital video   recording of the patient is simultaneously recorded with the EEG.  The patient   is instructed to report clinical symptoms which may occur during the recording   session.  EEG and video recording are stored and archived in digital format.    Activation procedures, which include photic stimulation, hyperventilation and   instructing patients to perform simple tasks, are done in selected patients.    The EEG is displayed on a monitor screen and can be reviewed using different   montages.  Computer assisted-analysis is employed to detect spike and   electrographic seizure activity.  The entire record is submitted for computer   analysis.  The entire recording is visually reviewed, and the times identified   by computer analysis as being spikes or seizures are reviewed again.    Compressed spectral analysis (CSA) is also performed on the activity recorded   from each individual channel.  This is displayed as a power display of   frequencies from 0 to 30 Hz over time.  The CSA is reviewed looking for   asymmetries in power between homologous areas of the scalp, then compared with   the original EEG recording.    TalkSession software was also utilized in the review of this study.  This software   suite analyzes the EEG recording in multiple domains.  Coherence and rhythmicity   are computed to identify EEG sections which may contain organized  seizures.    Each channel undergoes analysis to detect the presence of spike and sharp waves   which have special and morphological characteristics of epileptic activity.  The   routine EEG recording is converted from special into frequency domain.  This is   then displayed comparing homologous areas to identify areas of significant   asymmetry.  Algorithm to identify non-cortically generated artifact is used to   separate artifact from the EEG.    EEG FINDINGS:  The recording was obtained with a number of standard bipolar and   referential montages during wakefulness, drowsiness and sleep.  In the alert   state, the posterior background rhythm was a symmetric, well-modulated 8.5 to 9   Hz alpha rhythm, which reacted symmetrically to eye opening.  Intermittent   photic stimulation failed to evoke symmetric driving responses.    Hyperventilation was not performed.  During drowsiness, the background rhythm   waxed and waned and there were periods of slowing.  During stage II sleep,   symmetric V waves and sleep spindles were noted.  There were no focal   abnormalities.  There were no interictal epileptiform abnormalities and no   clinical or electrographic seizures were recorded.    The EKG channel revealed a sinus rhythm.    IMPRESSION:  This is a normal EEG during wakefulness, drowsiness and sleep.    CLINICAL CORRELATION:  The patient is an 82-year-old male with a history of   memory dysfunction who is currently not maintained on any anti-seizure   medications.  This is a normal EEG during wakefulness, drowsiness and sleep.    There is no evidence for either cortical dysfunction nor an epileptic process on   this recording.  No seizures were recorded during this study.      FAK/HN  dd: 11/08/2017 13:01:49 (CST)  td: 11/09/2017 00:17:16 (CST)  Doc ID   #[id number]  Job ID #[id number]    CC:

## 2017-11-15 ENCOUNTER — TELEPHONE (OUTPATIENT)
Dept: NEUROLOGY | Facility: CLINIC | Age: 82
End: 2017-11-15

## 2017-11-15 NOTE — TELEPHONE ENCOUNTER
Called [phone] and left message regarding results of EEG.  Will plan to remain off LEV.    ----- Message from Mayra Winters sent at 11/15/2017  1:26 PM CST -----  Contact: Pt can be reached at [phone]    Pt is calling for lab test  results.      Thank you!

## 2017-12-08 RX ORDER — OMEPRAZOLE 20 MG/1
20 CAPSULE, DELAYED RELEASE ORAL DAILY
Qty: 90 CAPSULE | Refills: 1 | Status: SHIPPED | OUTPATIENT
Start: 2017-12-08 | End: 2018-03-18 | Stop reason: SDUPTHER

## 2017-12-20 ENCOUNTER — OFFICE VISIT (OUTPATIENT)
Dept: FAMILY MEDICINE | Facility: CLINIC | Age: 82
End: 2017-12-20
Payer: MEDICARE

## 2017-12-20 ENCOUNTER — LAB VISIT (OUTPATIENT)
Dept: LAB | Facility: HOSPITAL | Age: 82
End: 2017-12-20
Attending: FAMILY MEDICINE
Payer: MEDICARE

## 2017-12-20 VITALS
OXYGEN SATURATION: 97 % | DIASTOLIC BLOOD PRESSURE: 60 MMHG | WEIGHT: 119.25 LBS | SYSTOLIC BLOOD PRESSURE: 130 MMHG | HEIGHT: 62 IN | HEART RATE: 71 BPM | BODY MASS INDEX: 21.94 KG/M2

## 2017-12-20 DIAGNOSIS — E11.42 DIABETIC PERIPHERAL NEUROPATHY ASSOCIATED WITH TYPE 2 DIABETES MELLITUS: ICD-10-CM

## 2017-12-20 DIAGNOSIS — F43.20 ADJUSTMENT DISORDER, UNSPECIFIED TYPE: ICD-10-CM

## 2017-12-20 DIAGNOSIS — R30.0 DYSURIA: Primary | ICD-10-CM

## 2017-12-20 LAB
ALBUMIN SERPL BCP-MCNC: 3.9 G/DL
ALP SERPL-CCNC: 80 U/L
ALT SERPL W/O P-5'-P-CCNC: 23 U/L
ANION GAP SERPL CALC-SCNC: 7 MMOL/L
AST SERPL-CCNC: 27 U/L
BILIRUB SERPL-MCNC: 0.3 MG/DL
BILIRUB UR QL STRIP: NEGATIVE
BUN SERPL-MCNC: 19 MG/DL
CALCIUM SERPL-MCNC: 9.2 MG/DL
CHLORIDE SERPL-SCNC: 102 MMOL/L
CLARITY UR: CLEAR
CO2 SERPL-SCNC: 29 MMOL/L
COLOR UR: YELLOW
CREAT SERPL-MCNC: 0.8 MG/DL
EST. GFR  (AFRICAN AMERICAN): >60 ML/MIN/1.73 M^2
EST. GFR  (NON AFRICAN AMERICAN): >60 ML/MIN/1.73 M^2
ESTIMATED AVG GLUCOSE: 134 MG/DL
GLUCOSE SERPL-MCNC: 161 MG/DL
GLUCOSE UR QL STRIP: NEGATIVE
HBA1C MFR BLD HPLC: 6.3 %
HGB UR QL STRIP: ABNORMAL
KETONES UR QL STRIP: NEGATIVE
LEUKOCYTE ESTERASE UR QL STRIP: NEGATIVE
NITRITE UR QL STRIP: NEGATIVE
PH UR STRIP: 6 [PH] (ref 5–8)
POTASSIUM SERPL-SCNC: 4.4 MMOL/L
PROT SERPL-MCNC: 7.1 G/DL
PROT UR QL STRIP: NEGATIVE
SODIUM SERPL-SCNC: 138 MMOL/L
SP GR UR STRIP: 1.01 (ref 1–1.03)
URN SPEC COLLECT METH UR: ABNORMAL
UROBILINOGEN UR STRIP-ACNC: NEGATIVE EU/DL

## 2017-12-20 PROCEDURE — 99214 OFFICE O/P EST MOD 30 MIN: CPT | Mod: S$GLB,,, | Performed by: FAMILY MEDICINE

## 2017-12-20 PROCEDURE — 99499 UNLISTED E&M SERVICE: CPT | Mod: S$GLB,,, | Performed by: FAMILY MEDICINE

## 2017-12-20 PROCEDURE — 80053 COMPREHEN METABOLIC PANEL: CPT

## 2017-12-20 PROCEDURE — 81002 URINALYSIS NONAUTO W/O SCOPE: CPT | Mod: PO

## 2017-12-20 PROCEDURE — 99999 PR PBB SHADOW E&M-EST. PATIENT-LVL III: CPT | Mod: PBBFAC,,, | Performed by: FAMILY MEDICINE

## 2017-12-20 PROCEDURE — 83036 HEMOGLOBIN GLYCOSYLATED A1C: CPT

## 2017-12-20 PROCEDURE — 36415 COLL VENOUS BLD VENIPUNCTURE: CPT | Mod: PO

## 2017-12-20 NOTE — PROGRESS NOTES
Subjective:       Patient ID: Kang Herbert is a 82 y.o. male.    Chief Complaint: Urinary Tract Infection    HPI 81 yo Panamanian-speaking male patient of Dr. Pritchett with DM, Type 2, hyperlipidemia, and HTN presents for urinary complaints. Pt c/o penile irritation x 2 days. Pt denies urinary frequency or urinary urgency. No discharge from the penis or hematuria.  Pt denies fever or chills. Denies abdominal pain. Pt is accompanied to the visit by his visit. Spoke with pt's daughter by phone per pt's request. Daughter states that pt notes one episode of urinary incontinence. No enuresis.  Pt notes that he has some stress from caring for his wife. Denies persistent depressed mood. No SI or HI.  Review of Systems   Constitutional: Negative for chills and fever.   Gastrointestinal: Negative for abdominal pain.   Genitourinary:        See HPI   Musculoskeletal: Negative for back pain.   Skin: Negative for rash.       Objective:      Physical Exam   Constitutional: He appears well-developed and well-nourished.   HENT:   Head: Normocephalic and atraumatic.   Neck: Normal range of motion. Neck supple. No JVD present. No thyromegaly present.   Cardiovascular: Normal rate, regular rhythm and normal heart sounds.    Pulmonary/Chest: Effort normal and breath sounds normal. He has no wheezes. He has no rales.   Abdominal: Soft. Bowel sounds are normal. He exhibits no mass. There is no tenderness.   Musculoskeletal:        Cervical back: He exhibits no tenderness and no bony tenderness.        Thoracic back: He exhibits no tenderness and no bony tenderness.        Lumbar back: He exhibits no tenderness and no bony tenderness.   Lymphadenopathy:     He has no cervical adenopathy.   Skin: Skin is warm and dry.   Vitals reviewed.      Assessment:         See plan  Plan:       Kang TRAN was seen today for urinary tract infection.    Diagnoses and all orders for this visit:    Dysuria  -     URINALYSIS: Negative for infection  -      Ambulatory referral to Urology    Diabetic peripheral neuropathy associated with type 2 diabetes mellitus: Stable  -     Comprehensive metabolic panel; Future  -     Hemoglobin A1c; Future    Adjustment disorder, unspecified type: Stable    F/U in 4 weeks with Dr. Pritchett.

## 2017-12-21 ENCOUNTER — TELEPHONE (OUTPATIENT)
Dept: UROLOGY | Facility: CLINIC | Age: 82
End: 2017-12-21

## 2017-12-21 ENCOUNTER — OFFICE VISIT (OUTPATIENT)
Dept: UROLOGY | Facility: CLINIC | Age: 82
End: 2017-12-21
Payer: MEDICARE

## 2017-12-21 VITALS
HEIGHT: 62 IN | HEART RATE: 82 BPM | SYSTOLIC BLOOD PRESSURE: 152 MMHG | DIASTOLIC BLOOD PRESSURE: 84 MMHG | BODY MASS INDEX: 21.9 KG/M2 | WEIGHT: 119 LBS

## 2017-12-21 DIAGNOSIS — N13.8 BPH WITH OBSTRUCTION/LOWER URINARY TRACT SYMPTOMS: ICD-10-CM

## 2017-12-21 DIAGNOSIS — N28.1 RENAL CYST: ICD-10-CM

## 2017-12-21 DIAGNOSIS — I15.2 HYPERTENSION ASSOCIATED WITH DIABETES: ICD-10-CM

## 2017-12-21 DIAGNOSIS — N50.0 TESTICULAR ATROPHY: ICD-10-CM

## 2017-12-21 DIAGNOSIS — E11.59 HYPERTENSION ASSOCIATED WITH DIABETES: ICD-10-CM

## 2017-12-21 DIAGNOSIS — N40.1 BPH WITH OBSTRUCTION/LOWER URINARY TRACT SYMPTOMS: ICD-10-CM

## 2017-12-21 DIAGNOSIS — Z80.42 FAMILY HISTORY OF PROSTATE CANCER: ICD-10-CM

## 2017-12-21 DIAGNOSIS — R30.0 DYSURIA: Primary | ICD-10-CM

## 2017-12-21 PROCEDURE — 99999 PR PBB SHADOW E&M-EST. PATIENT-LVL III: CPT | Mod: PBBFAC,,, | Performed by: UROLOGY

## 2017-12-21 PROCEDURE — 99499 UNLISTED E&M SERVICE: CPT | Mod: S$GLB,,, | Performed by: UROLOGY

## 2017-12-21 PROCEDURE — 81001 URINALYSIS AUTO W/SCOPE: CPT | Mod: S$GLB,,, | Performed by: UROLOGY

## 2017-12-21 PROCEDURE — 99204 OFFICE O/P NEW MOD 45 MIN: CPT | Mod: S$GLB,,, | Performed by: UROLOGY

## 2017-12-21 PROCEDURE — 87086 URINE CULTURE/COLONY COUNT: CPT

## 2017-12-21 RX ORDER — PHENAZOPYRIDINE HYDROCHLORIDE 200 MG/1
200 TABLET, FILM COATED ORAL 3 TIMES DAILY PRN
Qty: 30 TABLET | Refills: 1 | Status: SHIPPED | OUTPATIENT
Start: 2017-12-21 | End: 2017-12-31

## 2017-12-21 NOTE — PROGRESS NOTES
HPI:  Kang Herbert is a 82 y.o. year old male that  presents with No chief complaint on file.  .  This patient referred by his PCP for dysuria.  Patient states this been going on for 3 months.  No associated fever.  Patient does have chronic low back pain and has no nausea vomiting.    Patient has an okay strength of stream with nocturia ×2-3 and no straining to void.  He is been on finasteride for BPH for many years.    Patient speaks minimal English and medication done with the help of  Elida Garcia, [facility] .    Patient's brother had prostate cancer.  There is no family history of kidney or bladder cancer.  Patient is never had urologic surgery.    Chart review shows no from PCP yesterday showing possible urinary tract infection with referral to urology.  Yesterday GFR is greater than 60 and DIP urine was negative.  In 2008 serum PSA was 0.75.  In 2015 CT scan shows no kidney stones.  Renal cysts seen.  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     Social History     Social History    Marital status:      Spouse name: N/A    Number of children: N/A    Years of education: N/A     Occupational History    retired      Social History Main Topics    Smoking status: Never Smoker    Smokeless tobacco: Never Used    Alcohol use No    Drug use: No    Sexual activity: Not on file     Other Topics Concern    Not on file     Social History Narrative    No narrative on file     Past Surgical History:   Procedure Laterality Date    CATARACT EXTRACTION  11-/    od/os    CHOLECYSTECTOMY      HERNIA REPAIR       Family History   Problem  "Relation Age of Onset    No Known Problems Mother     No Known Problems Father     Glaucoma Sister     Diabetes Sister     Diabetes Brother     No Known Problems Maternal Aunt     No Known Problems Maternal Uncle     No Known Problems Paternal Aunt     No Known Problems Paternal Uncle     No Known Problems Maternal Grandmother     No Known Problems Maternal Grandfather     No Known Problems Paternal Grandmother     No Known Problems Paternal Grandfather     Amblyopia Neg Hx     Blindness Neg Hx     Cancer Neg Hx     Cataracts Neg Hx     Hypertension Neg Hx     Macular degeneration Neg Hx     Retinal detachment Neg Hx     Strabismus Neg Hx     Stroke Neg Hx     Thyroid disease Neg Hx     Prostate cancer Neg Hx     Kidney disease Neg Hx            Review of Systems  The patient has no chest pains.  The patient has no shortness of breath  Patient wears        glasses.  All other review of systems are negative.      Physical Exam:  BP (!) 152/84   Pulse 82   Ht 5' 2" (1.575 m)   Wt 54 kg (119 lb)   BMI 21.77 kg/m²   General appearance: alert, cooperative, no distress  Constitutional:Oriented to person, place, and time.appears well-developed and well-nourished.   HEENT: Normocephalic, atraumatic, neck symmetrical, no nasal discharge   Eyes: conjunctivae/corneas clear, PERRL, EOM's intact  Lungs: clear to auscultation bilaterally, no dullness to percussion bilaterally  Heart: regular rate and rhythm without rub; no displacement of the PMI   Abdomen: soft, non-tender; bowel sounds normoactive; no organomegaly  :Penis/perineum without lesions, scrotum without rash/cysts, epididymis nontender bilaterally, urethral meatus in normal location normal size, no penile plaques palpated, prostate:    Smooth and atrophic feeling                   seminal vesicles not palpated.  No rectal masses, sphincter tone normal.  Testes equal in size without masses  Extremities: extremities symmetric; no clubbing, " cyanosis, or edema  Integument: Skin color, texture, turgor normal; no rashes; hair distrubution normal  Neurologic: Alert and oriented X 3, normal strength, normal coordination and gait  Psychiatric: no pressured speech; normal affect; no evidence of impaired cognition     LABS:    Complete Blood Count  Lab Results   Component Value Date    RBC 4.66 05/17/2017    HGB 14.0 05/17/2017    HCT 42.9 05/17/2017    MCV 92 05/17/2017    MCH 30.0 05/17/2017    MCHC 32.6 05/17/2017    RDW 13.4 05/17/2017     05/17/2017    MPV 11.7 05/17/2017    GRAN 2.9 05/17/2017    GRAN 47.8 05/17/2017    LYMPH 2.5 05/17/2017    LYMPH 41.0 05/17/2017    MONO 0.5 05/17/2017    MONO 7.5 05/17/2017    EOS 0.2 05/17/2017    BASO 0.02 05/17/2017    EOSINOPHIL 3.2 05/17/2017    BASOPHIL 0.3 05/17/2017    DIFFMETHOD Automated 05/17/2017       Comprehensive Metabolic Panel  Lab Results   Component Value Date     (H) 12/20/2017    BUN 19 12/20/2017    CREATININE 0.8 12/20/2017     12/20/2017    K 4.4 12/20/2017     12/20/2017    PROT 7.1 12/20/2017    ALBUMIN 3.9 12/20/2017    BILITOT 0.3 12/20/2017    AST 27 12/20/2017    ALKPHOS 80 12/20/2017    CO2 29 12/20/2017    ALT 23 12/20/2017    ANIONGAP 7 (L) 12/20/2017    EGFRNONAA >60.0 12/20/2017    ESTGFRAFRICA >60.0 12/20/2017       PSA  Lab Results   Component Value Date    PSA 0.75 12/09/2008         Assessment:    ICD-10-CM ICD-9-CM    1. Dysuria R30.0 788.1 POCT urinalysis, dipstick or tablet reag      Urine culture   2. BPH with obstruction/lower urinary tract symptoms N40.1 600.01 Cystoscopy    N13.8 599.69    3. Hypertension associated with diabetes E11.59 250.80     I10 401.9    4. Testicular atrophy N50.0 608.3    5. Renal cyst N28.1 753.10    6. Family history of prostate cancer Z80.42 V16.42      The primary encounter diagnosis was Dysuria. Diagnoses of BPH with obstruction/lower urinary tract symptoms, Hypertension associated with diabetes, Testicular atrophy,  Renal cyst, and Family history of prostate cancer were also pertinent to this visit.      Plan: 1.Dysuria.  We will culture the patient's urine and contact the patient if antibiotics are indicated.  Prescription for Pyridium written.  Given persistent symptoms, I recommend cystoscopy for further evaluation to which the patient agrees.    #2.  BPH.  Plan.  Continue finasteride.  Further evaluation with cystoscopy as detailed above    #3.   Elevated blood pressure.  Plan.  This finding pointed out to the patient.  I recommend that the patient follow up with the patient's PCP for this.      #4.  Testicular atrophy.  Plan.  Related to advanced age and no therapy or evaluation needed    #5.Renal cyst.  I discussed the benign nature of renal cysts and that no intervention and no further evaluation is needed.    #6.  .  Family history of prostate cancer.  I discussed with the patient that this places him at increased risk for developing prostate cancer.  Once urine tract infections rules out, I think it reasonable to check serum PSA.  Patient and wife voice understanding and agree  Orders Placed This Encounter   Procedures    Cystoscopy    Urine culture    POCT urinalysis, dipstick or tablet reag           Naeem Desai MD    PLEASE NOTE:  Please be advised that portions of this note were dictated using voice recognition software and may contain dictation related errors in spelling/grammar/appropriate pronouns/syntax or other errors that might have not been found and or corrected on text review.

## 2017-12-21 NOTE — LETTER
December 21, 2017      Makeda Roberts MD  [address]  [place] [address]           [facility]  [address]  [place] [address]  Phone: [phone]          Patient: Kang Herbert   MR Number: [id number]   YOB: 1935   Date of Visit: 12/21/2017       Dear Dr. Makeda Roberts:    Thank you for referring Kang Herbert to me for evaluation. Attached you will find relevant portions of my assessment and plan of care.    If you have questions, please do not hesitate to call me. I look forward to following Kang Herbert along with you.    Sincerely,    Naeem Desai MD    Enclosure  CC:  No Recipients    If you would like to receive this communication electronically, please contact [email or web address] or [phone] to request more information on [employer] access.    For providers and/or their staff who would like to refer a patient to [facility], please contact us through our one-stop-shop provider referral line, [facility] , at [phone].    If you feel you have received this communication in error or would no longer like to receive these types of communications, please e-mail [email or web address]

## 2017-12-21 NOTE — TELEPHONE ENCOUNTER
----- Message from Ebony Solorio sent at 12/21/2017  3:48 PM CST -----  Contact: daughter Aislinn [phone]  Patient's daughter requesting to speak with you regarding father's doctor's visit today. Please advise.

## 2017-12-21 NOTE — TELEPHONE ENCOUNTER
Spoke with patient's daughter, Aislinn.  Informed of visit and recommendations.  Reiterated pyridium was sent to pharmacy.  Voiced understanding.  Informed of involvement of care to be signed by patient for continuance of communication due to patient's privacy, she understood.  Will mail to daughter at [address] [place] [address] [place] [address], will in return bring with him on date of procedure.   Daughter had no further questions.

## 2017-12-23 LAB — BACTERIA UR CULT: NO GROWTH

## 2017-12-26 ENCOUNTER — TELEPHONE (OUTPATIENT)
Dept: UROLOGY | Facility: CLINIC | Age: 82
End: 2017-12-26

## 2017-12-26 NOTE — TELEPHONE ENCOUNTER
----- Message from Aliyah Majano MD sent at 12/26/2017  8:06 AM CST -----  I am checking messages on behalf of Dr. Desai who is out of the office.   Please call patient and notify of negative urine culture. Thank you.

## 2018-01-05 DIAGNOSIS — M15.8 OTHER OSTEOARTHRITIS INVOLVING MULTIPLE JOINTS: ICD-10-CM

## 2018-01-05 RX ORDER — GABAPENTIN 800 MG/1
800 TABLET ORAL 2 TIMES DAILY
Qty: 180 TABLET | Refills: 1 | Status: SHIPPED | OUTPATIENT
Start: 2018-01-05 | End: 2018-06-24 | Stop reason: SDUPTHER

## 2018-01-05 RX ORDER — MELOXICAM 15 MG/1
TABLET ORAL
Qty: 90 TABLET | Refills: 0 | Status: SHIPPED | OUTPATIENT
Start: 2018-01-05 | End: 2018-03-14 | Stop reason: SDUPTHER

## 2018-01-08 ENCOUNTER — TELEPHONE (OUTPATIENT)
Dept: UROLOGY | Facility: CLINIC | Age: 83
End: 2018-01-08

## 2018-01-08 ENCOUNTER — PROCEDURE VISIT (OUTPATIENT)
Dept: UROLOGY | Facility: CLINIC | Age: 83
End: 2018-01-08
Payer: MEDICARE

## 2018-01-08 VITALS
WEIGHT: 119 LBS | BODY MASS INDEX: 21.9 KG/M2 | DIASTOLIC BLOOD PRESSURE: 75 MMHG | SYSTOLIC BLOOD PRESSURE: 138 MMHG | HEART RATE: 112 BPM | HEIGHT: 62 IN

## 2018-01-08 DIAGNOSIS — Z80.42 FAMILY HISTORY OF PROSTATE CANCER: ICD-10-CM

## 2018-01-08 DIAGNOSIS — Z12.5 PROSTATE CANCER SCREENING: ICD-10-CM

## 2018-01-08 DIAGNOSIS — N40.1 BPH WITH OBSTRUCTION/LOWER URINARY TRACT SYMPTOMS: Primary | ICD-10-CM

## 2018-01-08 DIAGNOSIS — R30.0 DYSURIA: ICD-10-CM

## 2018-01-08 DIAGNOSIS — N13.8 BPH WITH OBSTRUCTION/LOWER URINARY TRACT SYMPTOMS: Primary | ICD-10-CM

## 2018-01-08 LAB
BILIRUB SERPL-MCNC: ABNORMAL MG/DL
BILIRUB SERPL-MCNC: NORMAL MG/DL
BLOOD URINE, POC: ABNORMAL
BLOOD URINE, POC: NORMAL
COLOR, POC UA: ABNORMAL
COLOR, POC UA: YELLOW
GLUCOSE UR QL STRIP: 100
GLUCOSE UR QL STRIP: NORMAL
KETONES UR QL STRIP: ABNORMAL
KETONES UR QL STRIP: NORMAL
LEUKOCYTE ESTERASE URINE, POC: ABNORMAL
LEUKOCYTE ESTERASE URINE, POC: NORMAL
NITRITE, POC UA: ABNORMAL
NITRITE, POC UA: NORMAL
PH, POC UA: 5
PH, POC UA: 6
PROTEIN, POC: ABNORMAL
PROTEIN, POC: NORMAL
SPECIFIC GRAVITY, POC UA: 1.01
SPECIFIC GRAVITY, POC UA: 1.01
UROBILINOGEN, POC UA: ABNORMAL
UROBILINOGEN, POC UA: NORMAL

## 2018-01-08 PROCEDURE — 99213 OFFICE O/P EST LOW 20 MIN: CPT | Mod: 25,S$GLB,, | Performed by: UROLOGY

## 2018-01-08 PROCEDURE — 81001 URINALYSIS AUTO W/SCOPE: CPT | Mod: S$GLB,,, | Performed by: UROLOGY

## 2018-01-08 PROCEDURE — 52000 CYSTOURETHROSCOPY: CPT | Mod: S$GLB,,, | Performed by: UROLOGY

## 2018-01-08 NOTE — PROCEDURES
Procedures     PLEASE NOTE:  Please be advised that portions of this note were dictated using voice recognition software and may contain dictation related errors in spelling/grammar/appropriate pronouns/syntax or other errors that might have not been found and or corrected on text review.      Procedures: Flexible cystourethroscopy    Pre Procedure Diagnosis: BPH and dysuria    Post Procedure Diagnosis: Same    Date of Procedure. 01/08/2018    Indications.  This gentleman with BPH and dysuria who presents now for evaluation.  Recent urine culture was negative.    Surgeon: Naeem Desai MD    Anesthesia: 2% uro-jet lidocaine jelly for local analgesia    Flexible cysto-urethroscopy was performed after consent was obtained.    2% lidocaine urojet was used for local analgesia.  The genitalia were prepped and draped in the usual sterile fashion.    The flexible scope was advanced into the urethra and into the bladder.  Bilateral ureteral orifices were evaluated and noted to be normal with clear efflux.  The bladder was completely surveyed in a systematic fashion.   No bladder tumors or lesions were seen.  No strictures were noted.  The prostate showed 10-15 g of bilobar hyperplasia    The patient tolerated the procedure well without complication.      Patient speaks minimal English and communication is done with the help of Elida Garcia, [facility]     Impression: 1.  BPH.  Plan.  Patient currently satisfied with his voiding pattern on finasteride which he should continue.    Numbers 2 and 3.  Family history of prostate cancer and prostate cancer screening.  Plan.  Order written for screening PSA.  We will contact the patient with results.    #4.  Dysuria.  Plan.  Patient states that this symptom has resolved.  Recent urine culture negative    Physical exam reveals reveals a well-developed well-nourished patient  in no acute distress.  Patient is alert and oriented ×3 with normal mood and affect.   "Respiratory effort is normal and there is no peripheral edema.  Skin is normal to inspection and palpation.Penis/perineum without lesions, scrotum without rash/cysts, epididymis nontender bilaterally, urethral meatus in normal location normal size, no penile plaques palpated.   Testes equal in size without masses    /75   Pulse (!) 112   Ht 5' 2" (1.575 m)   Wt 54 kg (119 lb)   BMI 21.77 kg/m²   Review of Systems  General ROS: negative for chills, fever or weight loss  Respiratory ROS: no cough, shortness of breath, or wheezing  Cardiovascular ROS: no chest pain or dyspnea on exertion  Musculoskeletal ROS: negative for gait disturbance or muscular weakness    Family History   Problem Relation Age of Onset    No Known Problems Mother     No Known Problems Father     Glaucoma Sister     Diabetes Sister     Diabetes Brother     No Known Problems Maternal Aunt     No Known Problems Maternal Uncle     No Known Problems Paternal Aunt     No Known Problems Paternal Uncle     No Known Problems Maternal Grandmother     No Known Problems Maternal Grandfather     No Known Problems Paternal Grandmother     No Known Problems Paternal Grandfather     Amblyopia Neg Hx     Blindness Neg Hx     Cancer Neg Hx     Cataracts Neg Hx     Hypertension Neg Hx     Macular degeneration Neg Hx     Retinal detachment Neg Hx     Strabismus Neg Hx     Stroke Neg Hx     Thyroid disease Neg Hx     Prostate cancer Neg Hx     Kidney disease Neg Hx      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     Family History   Problem Relation Age of Onset    No Known Problems Mother     No Known Problems Father     Glaucoma Sister     Diabetes Sister     Diabetes Brother  "    No Known Problems Maternal Aunt     No Known Problems Maternal Uncle     No Known Problems Paternal Aunt     No Known Problems Paternal Uncle     No Known Problems Maternal Grandmother     No Known Problems Maternal Grandfather     No Known Problems Paternal Grandmother     No Known Problems Paternal Grandfather     Amblyopia Neg Hx     Blindness Neg Hx     Cancer Neg Hx     Cataracts Neg Hx     Hypertension Neg Hx     Macular degeneration Neg Hx     Retinal detachment Neg Hx     Strabismus Neg Hx     Stroke Neg Hx     Thyroid disease Neg Hx     Prostate cancer Neg Hx     Kidney disease Neg Hx      Social History   Substance Use Topics    Smoking status: Never Smoker    Smokeless tobacco: Never Used    Alcohol use No       Impression:      ICD-10-CM ICD-9-CM    1. BPH with obstruction/lower urinary tract symptoms N40.1 600.01 Cystoscopy    N13.8 599.69 POCT urinalysis, dipstick or tablet reag   2. Family history of prostate cancer Z80.42 V16.42    3. Prostate cancer screening Z12.5 V76.44 PSA, Screening   4. Dysuria R30.0 788.1

## 2018-01-08 NOTE — TELEPHONE ENCOUNTER
----- Message from Lelia Carrillo sent at 1/8/2018  2:49 PM CST -----  Contact: daughter/Aislinn  [phone]  Pt daughter requesting to speak with you concerning the pt previous appointment.  Please call and advise

## 2018-01-08 NOTE — TELEPHONE ENCOUNTER
Returned call, spoke with Aislinn, asked if involvement of care was signed.  She informed me she did not get to it as of today due to her being on vacation.  She is questioning if patient needs an appointment in 2 weeks, informed follow up was not recommended.  At time of call note was not completed.  If it is recommended for follow up will contact patient to schedule.

## 2018-01-08 NOTE — TELEPHONE ENCOUNTER
----- Message from Ava Kohler sent at 1/8/2018 11:45 AM CST -----  Contact: Aislinn, daughter, [phone]  Called in regards to today's testing and what you recommend.

## 2018-01-11 ENCOUNTER — TELEPHONE (OUTPATIENT)
Dept: FAMILY MEDICINE | Facility: CLINIC | Age: 83
End: 2018-01-11

## 2018-01-11 NOTE — TELEPHONE ENCOUNTER
----- Message from Huyen Tanner sent at 1/11/2018  1:44 PM CST -----  Contact: Anna [phone]  Patient's daughter is calling to talk to you concerning his visit with Dr. Desai. Please advice

## 2018-01-22 ENCOUNTER — LAB VISIT (OUTPATIENT)
Dept: LAB | Facility: HOSPITAL | Age: 83
End: 2018-01-22
Attending: UROLOGY
Payer: MEDICARE

## 2018-01-22 DIAGNOSIS — Z12.5 PROSTATE CANCER SCREENING: ICD-10-CM

## 2018-01-22 LAB — COMPLEXED PSA SERPL-MCNC: 0.13 NG/ML

## 2018-01-22 PROCEDURE — 36415 COLL VENOUS BLD VENIPUNCTURE: CPT

## 2018-01-22 PROCEDURE — 84153 ASSAY OF PSA TOTAL: CPT

## 2018-01-24 ENCOUNTER — TELEPHONE (OUTPATIENT)
Dept: UROLOGY | Facility: CLINIC | Age: 83
End: 2018-01-24

## 2018-01-24 NOTE — TELEPHONE ENCOUNTER
----- Message from Naeem Desai MD sent at 1/24/2018  1:18 PM CST -----  Please contact the patient and let him know that his serum PSA was normal.

## 2018-01-24 NOTE — TELEPHONE ENCOUNTER
Call pt.to give test result,pt.ask to call his daughter(Aislinn)to give results ,cause he don't understanding English that well.Call pt.daughter left message for her to call office for test result.

## 2018-01-24 NOTE — TELEPHONE ENCOUNTER
----- Message from Ava Ediat sent at 1/24/2018  1:48 PM CST -----  Contact: Aislinn, daughter, [phone]  Called in returning your call. Please advise.

## 2018-01-25 RX ORDER — FINASTERIDE 5 MG/1
TABLET, FILM COATED ORAL
Qty: 90 TABLET | Refills: 1 | Status: SHIPPED | OUTPATIENT
Start: 2018-01-25 | End: 2018-10-16 | Stop reason: SDUPTHER

## 2018-01-26 ENCOUNTER — OFFICE VISIT (OUTPATIENT)
Dept: OTOLARYNGOLOGY | Facility: CLINIC | Age: 83
End: 2018-01-26
Payer: MEDICARE

## 2018-01-26 ENCOUNTER — CLINICAL SUPPORT (OUTPATIENT)
Dept: OTOLARYNGOLOGY | Facility: CLINIC | Age: 83
End: 2018-01-26
Payer: MEDICARE

## 2018-01-26 VITALS
WEIGHT: 119.69 LBS | HEART RATE: 86 BPM | TEMPERATURE: 98 F | DIASTOLIC BLOOD PRESSURE: 71 MMHG | SYSTOLIC BLOOD PRESSURE: 130 MMHG | HEIGHT: 62 IN | BODY MASS INDEX: 22.03 KG/M2

## 2018-01-26 DIAGNOSIS — H69.93 ETD (EUSTACHIAN TUBE DYSFUNCTION), BILATERAL: ICD-10-CM

## 2018-01-26 DIAGNOSIS — H90.3 SENSORINEURAL HEARING LOSS, BILATERAL: Primary | ICD-10-CM

## 2018-01-26 DIAGNOSIS — H93.13 TINNITUS AURIUM, BILATERAL: ICD-10-CM

## 2018-01-26 DIAGNOSIS — H90.3 SENSORINEURAL HEARING LOSS (SNHL), BILATERAL: Primary | ICD-10-CM

## 2018-01-26 PROCEDURE — 99999 PR PBB SHADOW E&M-EST. PATIENT-LVL I: CPT | Mod: PBBFAC,,, | Performed by: AUDIOLOGIST-HEARING AID FITTER

## 2018-01-26 PROCEDURE — 92553 AUDIOMETRY AIR & BONE: CPT | Mod: S$GLB,,, | Performed by: AUDIOLOGIST-HEARING AID FITTER

## 2018-01-26 PROCEDURE — 92567 TYMPANOMETRY: CPT | Mod: S$GLB,,, | Performed by: AUDIOLOGIST-HEARING AID FITTER

## 2018-01-26 PROCEDURE — 99214 OFFICE O/P EST MOD 30 MIN: CPT | Mod: S$GLB,,, | Performed by: OTOLARYNGOLOGY

## 2018-01-26 PROCEDURE — 99999 PR PBB SHADOW E&M-EST. PATIENT-LVL III: CPT | Mod: PBBFAC,,, | Performed by: OTOLARYNGOLOGY

## 2018-01-26 NOTE — PROGRESS NOTES
Chief Complaint   Patient presents with    Hearing Loss     bilateral    Cerumen Impaction     bilateral    .     HPI:  Kang Herbert is a very pleasant 82 y.o. male here to see me today for the first time for evaluation of hearing loss.  He reports hearing loss that has been gradually progressing over the last several years.  He has not noted any difference in hearing between the ears, with either ear being the worse hearing ear.  He has noted any tinnitus in both ears but worse in the left.  He has not had any recent issues with ear pain or ear drainage.  He denies a family history of hearing loss, and has not had any previous otologic surgery.  He denies any history of significant loud noise exposure.He denies issues with dizziness. He denies any alleviating factors.         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     Social History     Social History    Marital status:      Spouse name: N/A    Number of children: N/A    Years of education: N/A     Occupational History    retired      Social History Main Topics    Smoking status: Never Smoker    Smokeless tobacco: Never Used    Alcohol use No    Drug use: No    Sexual activity: Not on file     Other Topics Concern    Not on file     Social History Narrative    No narrative on file     Past Surgical History:   Procedure Laterality Date    CATARACT EXTRACTION  11-/    od/os    CHOLECYSTECTOMY      HERNIA REPAIR       Family History   Problem Relation Age of Onset    No Known Problems Mother     No Known Problems Father     Glaucoma Sister     Diabetes Sister     Diabetes Brother     No Known Problems Maternal Aunt     No Known Problems Maternal Uncle     No Known  Problems Paternal Aunt     No Known Problems Paternal Uncle     No Known Problems Maternal Grandmother     No Known Problems Maternal Grandfather     No Known Problems Paternal Grandmother     No Known Problems Paternal Grandfather     Amblyopia Neg Hx     Blindness Neg Hx     Cancer Neg Hx     Cataracts Neg Hx     Hypertension Neg Hx     Macular degeneration Neg Hx     Retinal detachment Neg Hx     Strabismus Neg Hx     Stroke Neg Hx     Thyroid disease Neg Hx     Prostate cancer Neg Hx     Kidney disease Neg Hx          Review of Systems  General: negative for chills, fever or weight loss  Psychological: negative for mood changes or depression  Ophthalmic: negative for blurry vision, photophobia or eye pain  ENT: see HPI  Respiratory: no cough, shortness of breath, or wheezing  Cardiovascular: no chest pain or dyspnea on exertion  Gastrointestinal: no abdominal pain, change in bowel habits, or black/ bloody stools  Musculoskeletal: negative for gait disturbance or muscular weakness  Neurological: no syncope or seizures; no ataxia  Dermatological: negative for puritis,  rash and jaundice  Hematologic/lymphatic: no easy bruising, no new lumps or bumps      Physical Exam:    Vitals:    01/26/18 1458   BP: 130/71   Pulse: 86   Temp: 97.5 °F (36.4 °C)       Constitutional: Well appearing / communicating without difficutly.  NAD.  Eyes: EOM I Bilaterally  Head/Face: Normocephalic.  Negative paranasal sinus pressure/tenderness.  Salivary glands WNL.  House Brackmann I Bilaterally.    Right Ear: Auricle normal appearance. External Auditory Canal within normal limits no lesions or masses,TM w/o masses/lesions/perforations. TM mobility noted.   Left Ear: Auricle normal appearance. External Auditory Canal within normal limits no lesions or masses,TM w/o masses/lesions/perforations. TM mobility noted.  Rinne Air conduction >bone conduction bilaterally, Torres midline.   Nose: No gross nasal septal deviation.  Inferior Turbinates 3+ bilaterally. No septal perforation. No masses/lesions. External nasal skin appears normal without masses/lesions.  Oral Cavity: Gingiva/lips within normal limits.  Dentition/gingiva healthy appearing. Mucus membranes moist. Floor of mouth soft, no masses palpated. Oral Tongue mobile. Hard Palate appears normal.    Oropharynx: Base of tongue appears normal. No masses/lesions noted. Tonsillar fossa/pharyngeal wall without lesions. Posterior oropharynx WNL.  Soft palate without masses. Midline uvula.   Neck/Lymphatic: No LAD I-VI bilaterally.  No thyromegaly.  No masses noted on exam.    Mirror laryngoscopy/nasopharyngoscopy: Active gag reflex.  Unable to perform.    Neuro/Psychiatric: AOx3.  Normal mood and affect.   Cardiovascular: Normal carotid pulses bilaterally, no increasing jugular venous distention noted at cervical region bilaterally.    Respiratory: Normal respiratory effort, no stridor, no retractions noted.    Diagnostic studies:  Audiogram reviewed personally by myself and in detail with the patient today. Findings: mild sloping to severe sensorineural hearing loss in both ears.         Assessment:    ICD-10-CM ICD-9-CM    1. Sensorineural hearing loss (SNHL), bilateral H90.3 389.18    2. Tinnitus aurium, bilateral H93.13 388.31    3. ETD (Eustachian tube dysfunction), bilateral H69.83 381.81      The primary encounter diagnosis was Sensorineural hearing loss (SNHL), bilateral. Diagnoses of Tinnitus aurium, bilateral and ETD (Eustachian tube dysfunction), bilateral were also pertinent to this visit.      Plan:  No orders of the defined types were placed in this encounter.      1.  I discussed the documented hearing loss in this setting, as well as candidacy for amplification.   2.  The patient is interested in discussing with audiologist and appointment as well as contact information was given.  3.   Annual audiograms recommended, as well as ear protection when exposed to loud  sounds.  4. We also discussed that tinnitus is most often caused by a hearing loss, and that as the hair cells are damaged, either genetic or as a result of loud noise exposure, they then cause tinnitus.  Some patients find that restricting the salt or caffeine in their diet helps.  Tinnitus tends to be louder in times of stress and fatigue, and may decrease with time.  Sound machines may also be an effective masking technique if needed at night.      Thank you kindly for allowing me to participate in the patient's care.       Oly Michelle MD

## 2018-01-26 NOTE — PROGRESS NOTES
Alvin Ch, F-AAA  [facility] [address]  [address]  [place] [address]      Patient: aKng Herbert   MRN: [id number]  : 1935  AVENDANO: 2018       AUDIOLOGICAL EVALUATION    CASE HISTORY:    Kang Herbert, 82 y.o., was seen on the above date for an audiological evaluation. Patient denied difficulties hearing. No further history significant to hearing loss was reported.    TEST RESULTS:    Otoscopy was unremarkable for both ears. Imittance testing revealed stiff middle ear compliance (Type B) in both ears. Could not test speech as only speaks Armenian.    IMPRESSION:   Pure tone testing indicated that Kang Herbert has a mild sloping to severe sensorineural hearing loss in both ears.    RECOMMENDATIONS:   It is recommended that he:  Continue to receive audiological monitoring annually.  Receive binaural hearing aids to improve speech understanding.    If you should have any questions or concerns regarding the above information, please do not hesitate to contact me at [phone].      _______________________________  Maddi Ch, CCC-A  Clinical Audiologist    enclosure: audiogram

## 2018-02-12 ENCOUNTER — TELEPHONE (OUTPATIENT)
Dept: OTOLARYNGOLOGY | Facility: CLINIC | Age: 83
End: 2018-02-12

## 2018-02-14 NOTE — NURSING
Spoke to patient's daughter. She would like to reschedule once she gets back from being out of town.

## 2018-03-14 DIAGNOSIS — M15.8 OTHER OSTEOARTHRITIS INVOLVING MULTIPLE JOINTS: ICD-10-CM

## 2018-03-14 RX ORDER — MELOXICAM 15 MG/1
TABLET ORAL
Qty: 90 TABLET | Refills: 0 | Status: SHIPPED | OUTPATIENT
Start: 2018-03-14 | End: 2018-06-23 | Stop reason: SDUPTHER

## 2018-03-19 RX ORDER — OMEPRAZOLE 20 MG/1
20 CAPSULE, DELAYED RELEASE ORAL DAILY
Qty: 90 CAPSULE | Refills: 1 | Status: SHIPPED | OUTPATIENT
Start: 2018-03-19 | End: 2018-06-18

## 2018-04-01 DIAGNOSIS — E11.9 TYPE 2 DIABETES MELLITUS WITHOUT COMPLICATION, WITHOUT LONG-TERM CURRENT USE OF INSULIN: ICD-10-CM

## 2018-04-02 RX ORDER — METFORMIN HYDROCHLORIDE 1000 MG/1
1000 TABLET ORAL 2 TIMES DAILY WITH MEALS
Qty: 180 TABLET | Refills: 0 | Status: SHIPPED | OUTPATIENT
Start: 2018-04-02 | End: 2018-06-25

## 2018-04-12 ENCOUNTER — TELEPHONE (OUTPATIENT)
Dept: ENDOCRINOLOGY | Facility: CLINIC | Age: 83
End: 2018-04-12

## 2018-04-12 NOTE — TELEPHONE ENCOUNTER
----- Message from Torie Morales sent at 4/9/2018 11:21 AM CDT -----  Contact: PTs Daughter  Daughter is calling regarding a letter stating patient is due for his annual endo appointment  Advised daughter the last visit was 7/20/17 July Scheduling isn't opened yet, advised daughter  Daughter is asking for someone to call her once it opens.     Callback: [phone]

## 2018-04-12 NOTE — TELEPHONE ENCOUNTER
Attempted to return call to daughterAislinn.  No answer, left detailed voice message asking for a return call and see if we can get this patient scheduled for an appointment.

## 2018-04-25 RX ORDER — LISINOPRIL 20 MG/1
TABLET ORAL
Qty: 90 TABLET | Refills: 1 | Status: SHIPPED | OUTPATIENT
Start: 2018-04-25 | End: 2018-10-22 | Stop reason: SDUPTHER

## 2018-05-14 DIAGNOSIS — E11.9 TYPE 2 DIABETES MELLITUS WITHOUT COMPLICATION, WITHOUT LONG-TERM CURRENT USE OF INSULIN: ICD-10-CM

## 2018-05-14 RX ORDER — METFORMIN HYDROCHLORIDE 1000 MG/1
1000 TABLET ORAL 2 TIMES DAILY WITH MEALS
Qty: 180 TABLET | Refills: 0 | Status: SHIPPED | OUTPATIENT
Start: 2018-05-14 | End: 2018-07-30 | Stop reason: SDUPTHER

## 2018-05-23 ENCOUNTER — TELEPHONE (OUTPATIENT)
Dept: ENDOCRINOLOGY | Facility: CLINIC | Age: 83
End: 2018-05-23

## 2018-05-23 ENCOUNTER — TELEPHONE (OUTPATIENT)
Dept: FAMILY MEDICINE | Facility: CLINIC | Age: 83
End: 2018-05-23

## 2018-05-23 DIAGNOSIS — E03.9 HYPOTHYROIDISM, UNSPECIFIED TYPE: ICD-10-CM

## 2018-05-23 DIAGNOSIS — I10 ESSENTIAL HYPERTENSION: Primary | ICD-10-CM

## 2018-05-23 DIAGNOSIS — E11.65 TYPE 2 DIABETES MELLITUS WITH HYPERGLYCEMIA, WITHOUT LONG-TERM CURRENT USE OF INSULIN: ICD-10-CM

## 2018-05-23 NOTE — TELEPHONE ENCOUNTER
----- Message from Kami Andrea sent at 5/23/2018  1:16 PM CDT -----  Contact: [phone]  Patient is requesting orders for lab work. Please advise.

## 2018-05-23 NOTE — TELEPHONE ENCOUNTER
----- Message from Keiry Gutierrez sent at 5/23/2018  2:14 PM CDT -----  Contact: Pt's daughter Aislinn   Pt's daughter is calling in regards to scheduling an annual appt. Books aren't open to schedule this appt, would llikke a call back to do so.    Aislinn can be reached at [phone].    Thank you

## 2018-05-25 ENCOUNTER — TELEPHONE (OUTPATIENT)
Dept: ENDOCRINOLOGY | Facility: CLINIC | Age: 83
End: 2018-05-25

## 2018-05-25 NOTE — TELEPHONE ENCOUNTER
Called and spoke with patient's daughter.  Follow up appointment scheduled with  for 7/30/2018.  Appointment reminder letter mailed as per daughter's request.

## 2018-06-11 RX ORDER — INSULIN PUMP SYRINGE, 3 ML
1 EACH MISCELLANEOUS DAILY
Qty: 1 EACH | Refills: 0 | Status: SHIPPED | OUTPATIENT
Start: 2018-06-11 | End: 2018-06-18 | Stop reason: SDUPTHER

## 2018-06-12 DIAGNOSIS — E11.42 DIABETIC POLYNEUROPATHY ASSOCIATED WITH TYPE 2 DIABETES MELLITUS: Primary | ICD-10-CM

## 2018-06-12 NOTE — TELEPHONE ENCOUNTER
----- Message from Yudelka Esteves sent at 6/12/2018  4:18 PM CDT -----  Contact: Daughter - Aislinn  [phone]  Patient  Daughter Aislinn  is requesting a call back regarding,    needs a new prescription for the below because it was missing a diagnosis code.    blood sugar diagnostic (BLOOD GLUCOSE TEST) [facility]      Pharmacy       [id number] - [place], [place] - [address] AT [facility]

## 2018-06-15 ENCOUNTER — LAB VISIT (OUTPATIENT)
Dept: LAB | Facility: HOSPITAL | Age: 83
End: 2018-06-15
Attending: FAMILY MEDICINE
Payer: MEDICARE

## 2018-06-15 DIAGNOSIS — E03.9 HYPOTHYROIDISM, UNSPECIFIED TYPE: ICD-10-CM

## 2018-06-15 DIAGNOSIS — E11.65 TYPE 2 DIABETES MELLITUS WITH HYPERGLYCEMIA, WITHOUT LONG-TERM CURRENT USE OF INSULIN: ICD-10-CM

## 2018-06-15 DIAGNOSIS — I10 ESSENTIAL HYPERTENSION: ICD-10-CM

## 2018-06-15 LAB
ALBUMIN SERPL BCP-MCNC: 4.1 G/DL
ALP SERPL-CCNC: 77 U/L
ALT SERPL W/O P-5'-P-CCNC: 22 U/L
ANION GAP SERPL CALC-SCNC: 9 MMOL/L
AST SERPL-CCNC: 23 U/L
BASOPHILS # BLD AUTO: 0.02 K/UL
BASOPHILS NFR BLD: 0.4 %
BILIRUB SERPL-MCNC: 0.6 MG/DL
BUN SERPL-MCNC: 17 MG/DL
CALCIUM SERPL-MCNC: 9.2 MG/DL
CHLORIDE SERPL-SCNC: 104 MMOL/L
CHOLEST SERPL-MCNC: 161 MG/DL
CHOLEST/HDLC SERPL: 5 {RATIO}
CO2 SERPL-SCNC: 26 MMOL/L
CREAT SERPL-MCNC: 0.7 MG/DL
DIFFERENTIAL METHOD: ABNORMAL
EOSINOPHIL # BLD AUTO: 0.2 K/UL
EOSINOPHIL NFR BLD: 3.1 %
ERYTHROCYTE [DISTWIDTH] IN BLOOD BY AUTOMATED COUNT: 13.1 %
EST. GFR  (AFRICAN AMERICAN): >60 ML/MIN/1.73 M^2
EST. GFR  (NON AFRICAN AMERICAN): >60 ML/MIN/1.73 M^2
ESTIMATED AVG GLUCOSE: 157 MG/DL
GLUCOSE SERPL-MCNC: 120 MG/DL
HBA1C MFR BLD HPLC: 7.1 %
HCT VFR BLD AUTO: 41.1 %
HDLC SERPL-MCNC: 32 MG/DL
HDLC SERPL: 19.9 %
HGB BLD-MCNC: 13.6 G/DL
IMM GRANULOCYTES # BLD AUTO: 0.02 K/UL
IMM GRANULOCYTES NFR BLD AUTO: 0.4 %
LDLC SERPL CALC-MCNC: 49.2 MG/DL
LYMPHOCYTES # BLD AUTO: 2.1 K/UL
LYMPHOCYTES NFR BLD: 38 %
MCH RBC QN AUTO: 30.3 PG
MCHC RBC AUTO-ENTMCNC: 33.1 G/DL
MCV RBC AUTO: 92 FL
MONOCYTES # BLD AUTO: 0.5 K/UL
MONOCYTES NFR BLD: 8.9 %
NEUTROPHILS # BLD AUTO: 2.7 K/UL
NEUTROPHILS NFR BLD: 49.2 %
NONHDLC SERPL-MCNC: 129 MG/DL
NRBC BLD-RTO: 0 /100 WBC
PLATELET # BLD AUTO: 153 K/UL
PMV BLD AUTO: 12 FL
POTASSIUM SERPL-SCNC: 3.8 MMOL/L
PROT SERPL-MCNC: 6.7 G/DL
RBC # BLD AUTO: 4.49 M/UL
SODIUM SERPL-SCNC: 139 MMOL/L
TRIGL SERPL-MCNC: 399 MG/DL
TSH SERPL DL<=0.005 MIU/L-ACNC: 1.93 UIU/ML
WBC # BLD AUTO: 5.52 K/UL

## 2018-06-15 PROCEDURE — 80053 COMPREHEN METABOLIC PANEL: CPT

## 2018-06-15 PROCEDURE — 85025 COMPLETE CBC W/AUTO DIFF WBC: CPT

## 2018-06-15 PROCEDURE — 36415 COLL VENOUS BLD VENIPUNCTURE: CPT | Mod: PO

## 2018-06-15 PROCEDURE — 84443 ASSAY THYROID STIM HORMONE: CPT

## 2018-06-15 PROCEDURE — 83036 HEMOGLOBIN GLYCOSYLATED A1C: CPT

## 2018-06-15 PROCEDURE — 80061 LIPID PANEL: CPT

## 2018-06-18 RX ORDER — BLOOD SUGAR DIAGNOSTIC
STRIP MISCELLANEOUS
Qty: 200 STRIP | Refills: 3 | Status: SHIPPED | OUTPATIENT
Start: 2018-06-18 | End: 2019-09-03

## 2018-06-18 RX ORDER — OMEPRAZOLE 20 MG/1
20 CAPSULE, DELAYED RELEASE ORAL DAILY
Qty: 90 CAPSULE | Refills: 1 | Status: SHIPPED | OUTPATIENT
Start: 2018-06-18 | End: 2019-03-16 | Stop reason: SDUPTHER

## 2018-06-18 RX ORDER — INSULIN PUMP SYRINGE, 3 ML
1 EACH MISCELLANEOUS DAILY
Qty: 1 EACH | Refills: 0 | Status: SHIPPED | OUTPATIENT
Start: 2018-06-18 | End: 2019-08-27 | Stop reason: SDUPTHER

## 2018-06-23 DIAGNOSIS — M15.8 OTHER OSTEOARTHRITIS INVOLVING MULTIPLE JOINTS: ICD-10-CM

## 2018-06-25 ENCOUNTER — OFFICE VISIT (OUTPATIENT)
Dept: FAMILY MEDICINE | Facility: CLINIC | Age: 83
End: 2018-06-25
Payer: MEDICARE

## 2018-06-25 VITALS
BODY MASS INDEX: 22.23 KG/M2 | DIASTOLIC BLOOD PRESSURE: 60 MMHG | SYSTOLIC BLOOD PRESSURE: 124 MMHG | HEIGHT: 62 IN | WEIGHT: 120.81 LBS | OXYGEN SATURATION: 95 % | HEART RATE: 92 BPM

## 2018-06-25 DIAGNOSIS — I10 ESSENTIAL HYPERTENSION: ICD-10-CM

## 2018-06-25 DIAGNOSIS — R26.89 BALANCE DISORDER: ICD-10-CM

## 2018-06-25 DIAGNOSIS — Z23 NEED FOR VACCINATION AGAINST STREPTOCOCCUS PNEUMONIAE: ICD-10-CM

## 2018-06-25 DIAGNOSIS — F51.01 PRIMARY INSOMNIA: ICD-10-CM

## 2018-06-25 DIAGNOSIS — I70.0 AORTIC ATHEROSCLEROSIS: ICD-10-CM

## 2018-06-25 DIAGNOSIS — H81.10 BENIGN PAROXYSMAL POSITIONAL VERTIGO, UNSPECIFIED LATERALITY: ICD-10-CM

## 2018-06-25 DIAGNOSIS — E11.43 TYPE 2 DIABETES MELLITUS WITH DIABETIC AUTONOMIC NEUROPATHY, WITHOUT LONG-TERM CURRENT USE OF INSULIN: ICD-10-CM

## 2018-06-25 DIAGNOSIS — Z00.00 ANNUAL PHYSICAL EXAM: Primary | ICD-10-CM

## 2018-06-25 DIAGNOSIS — E11.65 TYPE 2 DIABETES MELLITUS WITH HYPERGLYCEMIA, WITHOUT LONG-TERM CURRENT USE OF INSULIN: ICD-10-CM

## 2018-06-25 DIAGNOSIS — E03.9 HYPOTHYROIDISM, UNSPECIFIED TYPE: ICD-10-CM

## 2018-06-25 DIAGNOSIS — F33.0 MILD EPISODE OF RECURRENT MAJOR DEPRESSIVE DISORDER: ICD-10-CM

## 2018-06-25 PROCEDURE — 90732 PPSV23 VACC 2 YRS+ SUBQ/IM: CPT | Mod: S$GLB,,, | Performed by: FAMILY MEDICINE

## 2018-06-25 PROCEDURE — G0009 ADMIN PNEUMOCOCCAL VACCINE: HCPCS | Mod: S$GLB,,, | Performed by: FAMILY MEDICINE

## 2018-06-25 PROCEDURE — 99499 UNLISTED E&M SERVICE: CPT | Mod: S$GLB,,, | Performed by: FAMILY MEDICINE

## 2018-06-25 PROCEDURE — 3078F DIAST BP <80 MM HG: CPT | Mod: CPTII,S$GLB,, | Performed by: FAMILY MEDICINE

## 2018-06-25 PROCEDURE — 99999 PR PBB SHADOW E&M-EST. PATIENT-LVL IV: CPT | Mod: PBBFAC,,, | Performed by: FAMILY MEDICINE

## 2018-06-25 PROCEDURE — 3074F SYST BP LT 130 MM HG: CPT | Mod: CPTII,S$GLB,, | Performed by: FAMILY MEDICINE

## 2018-06-25 PROCEDURE — 99214 OFFICE O/P EST MOD 30 MIN: CPT | Mod: S$GLB,,, | Performed by: FAMILY MEDICINE

## 2018-06-25 RX ORDER — MELOXICAM 15 MG/1
TABLET ORAL
Qty: 90 TABLET | Refills: 0 | Status: SHIPPED | OUTPATIENT
Start: 2018-06-25 | End: 2018-09-22 | Stop reason: SDUPTHER

## 2018-06-25 RX ORDER — ESCITALOPRAM OXALATE 10 MG/1
10 TABLET ORAL DAILY
Qty: 90 TABLET | Refills: 3 | Status: SHIPPED | OUTPATIENT
Start: 2018-06-25 | End: 2019-07-04 | Stop reason: SDUPTHER

## 2018-06-25 RX ORDER — HYDROXYZINE PAMOATE 50 MG/1
50 CAPSULE ORAL NIGHTLY PRN
Qty: 90 CAPSULE | Refills: 3 | Status: SHIPPED | OUTPATIENT
Start: 2018-06-25 | End: 2019-06-25

## 2018-06-25 RX ORDER — GABAPENTIN 800 MG/1
800 TABLET ORAL 2 TIMES DAILY
Qty: 180 TABLET | Refills: 1 | Status: SHIPPED | OUTPATIENT
Start: 2018-06-25 | End: 2019-01-15 | Stop reason: SDUPTHER

## 2018-06-25 NOTE — PROGRESS NOTES
Subjective:       Patient ID: Kang Herebrt is a 83 y.o. male.    Chief Complaint: Annual Exam    83 years old male came to the clinic for his physical examination.  Patient blood pressure today stable.  No chest pain, palpitation orthopnea or PND.  Patient with mild depression related with personal problems with her wife.  No suicidal or homicidal ideations.  Patient requests some medicine to help him to sleep and improve his depression.  Patient sometimes with balance problems related to benign positional vertigo.  No recent flare-ups.  Patient with good compliance with cholesterol medicine.  Patient previously diagnosed with aortic atherosclerosis.  Last cholesterol was normal.  Elevated triglycerides.  Patient was advised to use Omega 3 acids twice a day.      Review of Systems   Constitutional: Negative.    HENT: Negative.    Eyes: Negative.    Respiratory: Negative.    Cardiovascular: Negative.  Negative for chest pain, palpitations and leg swelling.   Gastrointestinal: Negative.    Genitourinary: Negative.    Musculoskeletal: Negative.    Skin: Negative.    Neurological: Positive for dizziness.   Psychiatric/Behavioral: The patient is nervous/anxious.        Objective:      Physical Exam   Constitutional: He is oriented to person, place, and time. He appears well-developed and well-nourished. No distress.   HENT:   Head: Normocephalic and atraumatic.   Right Ear: External ear normal.   Left Ear: External ear normal.   Nose: Nose normal.   Mouth/Throat: Oropharynx is clear and moist. No oropharyngeal exudate.   Eyes: Conjunctivae and EOM are normal. Pupils are equal, round, and reactive to light. Right eye exhibits no discharge. Left eye exhibits no discharge. No scleral icterus.   Neck: Normal range of motion. Neck supple. No JVD present. No tracheal deviation present. No thyromegaly present.   Cardiovascular: Normal rate, regular rhythm, normal heart sounds and intact distal pulses.  Exam reveals no  gallop and no friction rub.    No murmur heard.  Pulmonary/Chest: Effort normal and breath sounds normal. No stridor. No respiratory distress. He has no wheezes. He has no rales. He exhibits no tenderness.   Abdominal: Soft. Bowel sounds are normal. He exhibits no distension and no mass. There is no tenderness. There is no rebound and no guarding.   Musculoskeletal: Normal range of motion. He exhibits no edema or tenderness.   Lymphadenopathy:     He has no cervical adenopathy.   Neurological: He is alert and oriented to person, place, and time. He has normal reflexes. He displays normal reflexes. No cranial nerve deficit. He exhibits normal muscle tone. Coordination normal.   Skin: Skin is warm and dry. No rash noted. He is not diaphoretic. No erythema. No pallor.   Psychiatric: He has a normal mood and affect. His behavior is normal. Judgment and thought content normal.   Nursing note and vitals reviewed.      Assessment:       1. Annual physical exam    2. Essential hypertension    3. Type 2 diabetes mellitus with hyperglycemia, without long-term current use of insulin    4. Hypothyroidism, unspecified type    5. Balance disorder    6. Benign paroxysmal positional vertigo, unspecified laterality    7. Aortic atherosclerosis    8. Need for vaccination against Streptococcus pneumoniae    9. Mild episode of recurrent major depressive disorder    10. Type 2 diabetes mellitus with diabetic autonomic neuropathy, without long-term current use of insulin    11. Primary insomnia        Plan:         Kang TRAN was seen today for annual exam.    Diagnoses and all orders for this visit:    Annual physical exam    Essential hypertension  -     TSH; Future  -     Comprehensive metabolic panel; Future  -     Lipid panel; Future    Type 2 diabetes mellitus with hyperglycemia, without long-term current use of insulin  -     Ambulatory referral to Optometry  -     Comprehensive metabolic panel; Future  -     Lipid panel;  Future  -     Hemoglobin A1c; Future  -     Microalbumin/creatinine urine ratio; Future    Hypothyroidism, unspecified type  -     TSH; Future    Balance disorder  -     TSH; Future    Benign paroxysmal positional vertigo, unspecified laterality  -     TSH; Future    Aortic atherosclerosis    Need for vaccination against Streptococcus pneumoniae  -     Pneumococcal Polysaccharide Vaccine (23 Valent) (SQ/IM)    Mild episode of recurrent major depressive disorder  -     escitalopram oxalate (LEXAPRO) 10 MG tablet; Take 1 tablet (10 mg total) by mouth once daily.  -     TSH; Future    Type 2 diabetes mellitus with diabetic autonomic neuropathy, without long-term current use of insulin    Primary insomnia  -     hydrOXYzine pamoate (VISTARIL) 50 MG Cap; Take 1 capsule (50 mg total) by mouth nightly as needed (insomnia).     Sleep hygiene.

## 2018-06-25 NOTE — PATIENT INSTRUCTIONS
La diabetes: Consejos para hacer actividades físicas    Aumentar braxton nivel de actividad física puede ayudarle a controlar la diabetes. Los consejos de esta página le ayudarán a aprovechar braxton ejercicio al ashlee y a proteger braxton seguridad.  Benefíciese con brío  Las actividades enérgicas aceleran el ritmo cardíaco, lo que puede ayudarle a mejorar braxton forma física, perder el peso excesivo y controlar braxton nivel de azúcar en la dayan. Pruebe a caminar con energía y brío. Si tiene problemas en los pies o las piernas, pruebe a hacer natación o montar en bicicleta. Puede dividir hi sesiones de ejercicio en varios intervalos a lo nimco del día. Avance gradualmente hasta hacer por lo menos 30 minutos de ejercicio continuo y enérgico azalia todos los richard.  Juvencio ejercicios de calentamiento y enfriamiento  Los ejercicios de calentamiento y enfriamiento reducen el riesgo de lesiones y el dolor muscular. Antes y después de braxton rutina de ejercicios, juvencio umberto versión suave de braxton actividad benito 5 minutos. También puede aprender a hacer estiramientos para mantener los músculos relajados. Braxton proveedor de atención médica puede enseñarle buenos ejercicios de calentamiento y estiramiento.  Juvencio la prueba del habla-josafat  La prueba del habla-josafat es umberto manera sencilla de medir el esfuerzo que usted hace benito braxton ejercicio. Si puede hablar mientras hace ejercicios, significa que braxton actividad tiene el ritmo adecuado; bakari si le falta el aire, disminuya la marcha. Si puede tararear umberto canción, significa que debe acelerar el ritmo. Suba umberto jorge a, aumente la resistencia de braxton bicicleta estacionaria o nade más rápido.  ¿Qué whitley hacer respecto a las comidas?  Es posible que le indiquen que planifique braxton actividad física para 1-2 horas después de umberto comida. En la mayoría de los casos, no es necesario que coma mientras hace ejercicio. Si usted se pone insulina o lucinda medicamentos que pueden provocar la disminución de los niveles de  azúcar en la dayan, hágase el examen antes de hacer ejercicios. Lleve consigo un azúcar de acción rápida, mae tabletas de glucosa o caramelos duros, que le elevará rápidamente el nivel de azúcar en la dayan. Si tiene síntomas de hipoglucemia (bajo nivel de azúcar en la dayan), use shannan azúcar.  Consejos de seguridad  Estos consejos le ayudarán a mantener braxton seguridad mientras mejora braxton forma física:  · Juvencio ejercicios con un amigo o lleve consigo un teléfono celular, si tiene gonzalo.  · Lleve o póngase umberto identificación, mae umberto pulsera o umberto el, que indique que usted tiene diabetes.  · Use zapatos y equipo de seguridad apropiados para braxton actividad.  · [place] agua antes, benito y después del ejercicio.  · Póngase ropa adecuada para el clima.  · No juvencio ejercicio a la intemperie si hace demasiado calor o demasiado frío.  · No juvencio ejercicio si está enfermo.  · Si le indican que lo juvencio, mídase el azúcar en la dayan antes y después de hacer ejercicio. Coma un bocadillo de carbohidrato si braxton nivel de azúcar es bajo antes de iniciar el ejercicio.  Cuándo debe parar y llamar al proveedor de atención médica  Deje de hacer ejercicios y llame a braxton proveedor de atención médica de inmediato si tiene cualquiera de estos síntomas:  · Dolor, opresión, sensación de tirantez o pesadez en el pecho.  · Dolor o pesadez en el paola, los hombros, la espalda, los brazos, las piernas o los pies  · Falta de aire excesiva  · Mareos o aturdimiento  · Pulso excesivamente rápido o lento  · Mayor dolor en las articulaciones o los músculos  · Náuseas o vómito  Date Last Reviewed: 5/1/2016  © 1717-1140 [email or web address]. [address] [place] [id number]. Todos los derechos reservados. Esta información no pretende sustituir la atención médica profesional. Sólo braxton médico puede diagnosticar y tratar un problema de marie.

## 2018-07-30 ENCOUNTER — OFFICE VISIT (OUTPATIENT)
Dept: ENDOCRINOLOGY | Facility: CLINIC | Age: 83
End: 2018-07-30
Payer: MEDICARE

## 2018-07-30 VITALS
WEIGHT: 120.81 LBS | BODY MASS INDEX: 22.23 KG/M2 | HEIGHT: 62 IN | HEART RATE: 73 BPM | DIASTOLIC BLOOD PRESSURE: 58 MMHG | SYSTOLIC BLOOD PRESSURE: 110 MMHG

## 2018-07-30 DIAGNOSIS — E11.9 TYPE 2 DIABETES MELLITUS WITHOUT COMPLICATION, WITHOUT LONG-TERM CURRENT USE OF INSULIN: Primary | ICD-10-CM

## 2018-07-30 DIAGNOSIS — E78.5 HYPERLIPIDEMIA, UNSPECIFIED HYPERLIPIDEMIA TYPE: ICD-10-CM

## 2018-07-30 DIAGNOSIS — E11.8 CONTROLLED TYPE 2 DIABETES MELLITUS WITH COMPLICATION, WITHOUT LONG-TERM CURRENT USE OF INSULIN: ICD-10-CM

## 2018-07-30 DIAGNOSIS — E03.9 HYPOTHYROIDISM, UNSPECIFIED TYPE: ICD-10-CM

## 2018-07-30 PROCEDURE — 3078F DIAST BP <80 MM HG: CPT | Mod: CPTII,S$GLB,, | Performed by: INTERNAL MEDICINE

## 2018-07-30 PROCEDURE — 3074F SYST BP LT 130 MM HG: CPT | Mod: CPTII,S$GLB,, | Performed by: INTERNAL MEDICINE

## 2018-07-30 PROCEDURE — 99499 UNLISTED E&M SERVICE: CPT | Mod: S$GLB,,, | Performed by: INTERNAL MEDICINE

## 2018-07-30 PROCEDURE — 99214 OFFICE O/P EST MOD 30 MIN: CPT | Mod: S$GLB,,, | Performed by: INTERNAL MEDICINE

## 2018-07-30 PROCEDURE — 99999 PR PBB SHADOW E&M-EST. PATIENT-LVL III: CPT | Mod: PBBFAC,,, | Performed by: INTERNAL MEDICINE

## 2018-07-30 RX ORDER — LEVOTHYROXINE SODIUM 75 UG/1
75 TABLET ORAL
Qty: 90 TABLET | Refills: 2 | Status: SHIPPED | OUTPATIENT
Start: 2018-07-30 | End: 2019-02-06 | Stop reason: SDUPTHER

## 2018-07-30 RX ORDER — METFORMIN HYDROCHLORIDE 1000 MG/1
1000 TABLET ORAL 2 TIMES DAILY WITH MEALS
Qty: 180 TABLET | Refills: 3 | Status: SHIPPED | OUTPATIENT
Start: 2018-07-30 | End: 2018-08-22 | Stop reason: SDUPTHER

## 2018-07-30 NOTE — PROGRESS NOTES
Mr. Herbert is a 83 y.o. M with history of DM2, hypothyroidism, hyperlipidemia, here for followup.    Pt is new to me, previously seen by other providers in our clinic.     Forgot log, but relays fasting FS in high 100s, later in the day 100s.     On metformin 1g BID.     A1c June 2018 7.1, stable as in the past.     Reports paresthesias in feet, told to have sciatica.     On statin and lisinopril.      Ophtho coming up.     Regarding Hypothyroidism, taking lt4 75mcg daily.     No palpitations or tremors or diarrhea.     No alcohol use.       Assessment and Plan:  Mr. Herbert is a 83 y.o. M  M with history of DM2, hypothyroidism, hyperlipidemia, here for followup.    DM2: controlled, continue metformin; pt is elderly however functional and without significant heart or kidney disease    Hypothyroidism: euthyroid, continue 75mcg lt4    HL: TG somewhat elevated compared with past, LDL at goal, continue atorvastatin, repeat w next set of labs, consider fibrate if continues to elevate to keep <400    RTC in 1 year  Component      Latest Ref Rng & Units 6/15/2018   Cholesterol      120 - 199 mg/dL 161   Triglycerides      30 - 150 mg/dL 399 (H)   HDL      40 - 75 mg/dL 32 (L)   LDL Cholesterol      63.0 - 159.0 mg/dL 49.2 (L)   HDL/Chol Ratio      20.0 - 50.0 % 19.9 (L)   Total Cholesterol/HDL Ratio      2.0 - 5.0 5.0   Non-HDL Cholesterol      mg/dL 129   Hemoglobin A1C      4.0 - 5.6 % 7.1 (H)   Estimated Avg Glucose      68 - 131 mg/dL 157 (H)   TSH      0.400 - 4.000 uIU/mL 1.931       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 He has never used smokeless tobacco. He reports that he does not drink alcohol or use drugs.    Family  History   Problem Relation Age of Onset    No Known Problems Mother     No Known Problems Father     Glaucoma Sister     Diabetes Sister     Diabetes Brother     No Known Problems Maternal Aunt     No Known Problems Maternal Uncle     No Known Problems Paternal Aunt     No Known Problems Paternal Uncle     No Known Problems Maternal Grandmother     No Known Problems Maternal Grandfather     No Known Problems Paternal Grandmother     No Known Problems Paternal Grandfather     Amblyopia Neg Hx     Blindness Neg Hx     Cancer Neg Hx     Cataracts Neg Hx     Hypertension Neg Hx     Macular degeneration Neg Hx     Retinal detachment Neg Hx     Strabismus Neg Hx     Stroke Neg Hx     Thyroid disease Neg Hx     Prostate cancer Neg Hx     Kidney disease Neg Hx        Past Surgical History:   Procedure Laterality Date    CATARACT EXTRACTION  11-/    od/os    CHOLECYSTECTOMY      HERNIA REPAIR         Patient's Medications   New Prescriptions    No medications on file   Previous Medications    ACCU-CHEK ARUNA PLUS TEST STRP STRP    TEST TWICE A DAY    ATORVASTATIN (LIPITOR) 10 MG TABLET    Take 1 tablet (10 mg total) by mouth once daily.    BLOOD-GLUCOSE METER KIT    1 each by Other route once daily. Dx code e11.65    ESCITALOPRAM OXALATE (LEXAPRO) 10 MG TABLET    Take 1 tablet (10 mg total) by mouth once daily.    FINASTERIDE (PROSCAR) 5 MG TABLET    TAKE 1 TABLET (5 MG TOTAL) BY MOUTH ONCE DAILY.    GABAPENTIN (NEURONTIN) 800 MG TABLET    TAKE 1 TABLET (800 MG TOTAL) BY MOUTH 2 (TWO) TIMES DAILY.    HYDROXYZINE PAMOATE (VISTARIL) 50 MG CAP    Take 1 capsule (50 mg total) by mouth nightly as needed (insomnia).    LEVETIRACETAM (KEPPRA) 500 MG TAB    Take 1 tablet (500 mg total) by mouth 2 (two) times daily.    LEVOTHYROXINE (SYNTHROID) 75 MCG TABLET    TAKE 1 TABLET (75 MCG TOTAL) BY MOUTH BEFORE BREAKFAST.    LIDOCAINE HCL 2% (XYLOCAINE) 2 % SOLN    5ml every 4 hours as needed for pain     "LISINOPRIL (PRINIVIL,ZESTRIL) 20 MG TABLET    TAKE 1 TABLET BY MOUTH EVERY DAY    MELOXICAM (MOBIC) 15 MG TABLET    TAKE 1 TABLET EVERY DAY AS NEEDED FOR PAIN MAY TAKE 1/2 TABLET 2 TIMES A DAY    METFORMIN (GLUCOPHAGE) 1000 MG TABLET    TAKE 1 TABLET (1,000 MG TOTAL) BY MOUTH 2 (TWO) TIMES DAILY WITH MEALS.    OMEPRAZOLE (PRILOSEC) 20 MG CAPSULE    TAKE 1 CAPSULE (20 MG TOTAL) BY MOUTH ONCE DAILY.   Modified Medications    No medications on file   Discontinued Medications    No medications on file         Review of Systems:  General: no fevers  Eyes: no vision changes  ENT: no sore throat  Lung: no sob  CV: no palpitations  GI: no nausea   : no dysuria   Endo: no heat intolerance  Heme: no easy bruising   MSK: no joint swelling        BP (!) 110/58   Pulse 73   Ht 5' 2" (1.575 m)   Wt 54.8 kg (120 lb 13 oz)   BMI 22.10 kg/m²     Physical Exam:  General: normal body habitus, not in acute distress   Eyes: anicteric, no proptosis   ENT: no facial lesions, no oral lesions   Neck: no masses, no LAD   Lung; ctabl, no wheezing or stridor   GI: normal bowel sounds, nondistended   CV: RRR, no rubs or murmurs   Skin: exposed skin without bruising or bleeding   Ext: no peripheral edema or erythema, monofilament bilateral mildly dec  Neuro: AOx3, moving all extremities, normal gait     Labs:    Chemistry        Component Value Date/Time     06/15/2018 0800    K 3.8 06/15/2018 0800     06/15/2018 0800    CO2 26 06/15/2018 0800    BUN 17 06/15/2018 0800    CREATININE 0.7 06/15/2018 0800     (H) 06/15/2018 0800        Component Value Date/Time    CALCIUM 9.2 06/15/2018 0800    ALKPHOS 77 06/15/2018 0800    AST 23 06/15/2018 0800    ALT 22 06/15/2018 0800    BILITOT 0.6 06/15/2018 0800    ESTGFRAFRICA >60.0 06/15/2018 0800    EGFRNONAA >60.0 06/15/2018 0800          Lab Results   Component Value Date    WBC 5.52 06/15/2018    HGB 13.6 (L) 06/15/2018    HCT 41.1 06/15/2018    MCV 92 06/15/2018     " 06/15/2018        Lab Results   Component Value Date    HDL 32 (L) 06/15/2018    HDL 36 (L) 05/17/2017    HDL 43 09/27/2016     Lab Results   Component Value Date    LDLCALC 49.2 (L) 06/15/2018    LDLCALC 48.2 (L) 05/17/2017    LDLCALC 56.8 (L) 09/27/2016     Lab Results   Component Value Date    TRIG 399 (H) 06/15/2018    TRIG 219 (H) 05/17/2017    TRIG 246 (H) 09/27/2016     Lab Results   Component Value Date    CHOLHDL 19.9 (L) 06/15/2018    CHOLHDL 28.1 05/17/2017    CHOLHDL 28.9 09/27/2016       Hemoglobin A1C   Date Value Ref Range Status   06/15/2018 7.1 (H) 4.0 - 5.6 % Final     Comment:     ADA Screening Guidelines:  5.7-6.4%  Consistent with prediabetes  >or=6.5%  Consistent with diabetes  High levels of fetal hemoglobin interfere with the HbA1C  assay. Heterozygous hemoglobin variants (HbS, HgC, etc)do  not significantly interfere with this assay.   However, presence of multiple variants may affect accuracy.     12/20/2017 6.3 (H) 4.0 - 5.6 % Final     Comment:     According to ADA guidelines, hemoglobin A1c <7.0% represents  optimal control in non-pregnant diabetic patients. Different  metrics may apply to specific patient populations.   Standards of Medical Care in Diabetes-2016.  For the purpose of screening for the presence of diabetes:  <5.7%     Consistent with the absence of diabetes  5.7-6.4%  Consistent with increasing risk for diabetes   (prediabetes)  >or=6.5%  Consistent with diabetes  Currently, no consensus exists for use of hemoglobin A1c  for diagnosis of diabetes for children.  This Hemoglobin A1c assay has significant interference with fetal   hemoglobin   (HbF). The results are invalid for patients with abnormal amounts of   HbF,   including those with known Hereditary Persistence   of Fetal Hemoglobin. Heterozygous hemoglobin variants (HbAS, HbAC,   HbAD, HbAE, HbA2) do not significantly interfere with this assay;   however, presence of multiple variants in a sample may impact the %    interference.     05/17/2017 7.0 (H) 4.5 - 6.2 % Final     Comment:     According to ADA guidelines, hemoglobin A1C <7.0% represents  optimal control in non-pregnant diabetic patients.  Different  metrics may apply to specific populations.   Standards of Medical Care in Diabetes - 2016.  For the purpose of screening for the presence of diabetes:  <5.7%     Consistent with the absence of diabetes  5.7-6.4%  Consistent with increasing risk for diabetes   (prediabetes)  >or=6.5%  Consistent with diabetes  Currently no consensus exists for use of hemoglobin A1C  for diagnosis of diabetes for children.         Lab Results   Component Value Date    TSH 1.931 06/15/2018    R1YREHQ 89 07/17/2017

## 2018-08-03 ENCOUNTER — LAB VISIT (OUTPATIENT)
Dept: LAB | Facility: HOSPITAL | Age: 83
End: 2018-08-03
Attending: INTERNAL MEDICINE
Payer: MEDICARE

## 2018-08-03 DIAGNOSIS — E11.8 CONTROLLED TYPE 2 DIABETES MELLITUS WITH COMPLICATION, WITHOUT LONG-TERM CURRENT USE OF INSULIN: ICD-10-CM

## 2018-08-03 DIAGNOSIS — E78.5 HYPERLIPIDEMIA, UNSPECIFIED HYPERLIPIDEMIA TYPE: ICD-10-CM

## 2018-08-03 LAB
CHOLEST SERPL-MCNC: 145 MG/DL
CHOLEST/HDLC SERPL: 3.9 {RATIO}
HDLC SERPL-MCNC: 37 MG/DL
HDLC SERPL: 25.5 %
LDLC SERPL CALC-MCNC: 53.2 MG/DL
NONHDLC SERPL-MCNC: 108 MG/DL
TRIGL SERPL-MCNC: 274 MG/DL

## 2018-08-03 PROCEDURE — 36415 COLL VENOUS BLD VENIPUNCTURE: CPT | Mod: PO

## 2018-08-03 PROCEDURE — 80061 LIPID PANEL: CPT

## 2018-08-06 ENCOUNTER — TELEPHONE (OUTPATIENT)
Dept: ENDOCRINOLOGY | Facility: CLINIC | Age: 83
End: 2018-08-06

## 2018-08-06 NOTE — TELEPHONE ENCOUNTER
Talk to pt daughter she understand her dad labs results. She ask for the different ranges Triglycerides and cholesterol.

## 2018-08-06 NOTE — TELEPHONE ENCOUNTER
Left message for pt daughter to discuss his lab results can continue his atorvastin dose. Gave her clinic number to call back.

## 2018-08-06 NOTE — TELEPHONE ENCOUNTER
**Hi - could we let pt's daughter know: repeat cholesterol is better - Triglycerides only 274.  Can stay on current dose of atorvastatin for now.  Does not need additional medication.     Thank you.     Component      Latest Ref Rng & Units 8/3/2018   Cholesterol      120 - 199 mg/dL 145   Triglycerides      30 - 150 mg/dL 274 (H)   HDL      40 - 75 mg/dL 37 (L)   LDL Cholesterol      63.0 - 159.0 mg/dL 53.2 (L)   HDL/Chol Ratio      20.0 - 50.0 % 25.5   Total Cholesterol/HDL Ratio      2.0 - 5.0 3.9   Non-HDL Cholesterol      mg/dL 108

## 2018-08-15 ENCOUNTER — OFFICE VISIT (OUTPATIENT)
Dept: FAMILY MEDICINE | Facility: CLINIC | Age: 83
End: 2018-08-15
Payer: MEDICARE

## 2018-08-15 VITALS
HEART RATE: 80 BPM | HEIGHT: 62 IN | BODY MASS INDEX: 21.91 KG/M2 | WEIGHT: 119.06 LBS | DIASTOLIC BLOOD PRESSURE: 60 MMHG | SYSTOLIC BLOOD PRESSURE: 110 MMHG

## 2018-08-15 DIAGNOSIS — I10 ESSENTIAL HYPERTENSION: Primary | ICD-10-CM

## 2018-08-15 DIAGNOSIS — G89.29 CHRONIC BILATERAL LOW BACK PAIN WITHOUT SCIATICA: ICD-10-CM

## 2018-08-15 DIAGNOSIS — M54.50 CHRONIC BILATERAL LOW BACK PAIN WITHOUT SCIATICA: ICD-10-CM

## 2018-08-15 DIAGNOSIS — B00.2 ORAL HERPES: ICD-10-CM

## 2018-08-15 DIAGNOSIS — L29.9 SCALP ITCH: ICD-10-CM

## 2018-08-15 PROCEDURE — 3074F SYST BP LT 130 MM HG: CPT | Mod: CPTII,S$GLB,, | Performed by: FAMILY MEDICINE

## 2018-08-15 PROCEDURE — 3078F DIAST BP <80 MM HG: CPT | Mod: CPTII,S$GLB,, | Performed by: FAMILY MEDICINE

## 2018-08-15 PROCEDURE — 99214 OFFICE O/P EST MOD 30 MIN: CPT | Mod: S$GLB,,, | Performed by: FAMILY MEDICINE

## 2018-08-15 PROCEDURE — 99999 PR PBB SHADOW E&M-EST. PATIENT-LVL III: CPT | Mod: PBBFAC,,, | Performed by: FAMILY MEDICINE

## 2018-08-15 RX ORDER — VALACYCLOVIR HYDROCHLORIDE 500 MG/1
500 TABLET, FILM COATED ORAL 2 TIMES DAILY
Qty: 14 TABLET | Refills: 0 | Status: SHIPPED | OUTPATIENT
Start: 2018-08-15 | End: 2019-08-14

## 2018-08-15 RX ORDER — TIZANIDINE 2 MG/1
2 TABLET ORAL EVERY 8 HOURS PRN
Qty: 30 TABLET | Refills: 0 | Status: SHIPPED | OUTPATIENT
Start: 2018-08-15 | End: 2018-08-25

## 2018-08-15 RX ORDER — BETAMETHASONE VALERATE 0.1 %
LOTION (ML) TOPICAL 2 TIMES DAILY
Qty: 60 ML | Refills: 0 | Status: SHIPPED | OUTPATIENT
Start: 2018-08-15 | End: 2019-02-20 | Stop reason: SDUPTHER

## 2018-08-15 NOTE — PATIENT INSTRUCTIONS
Consejos Para El Cuidado De La Espalda [Back Care Tips]    Hay ciertas cosas que usted puede hacer para prevenir la recurrencia del dolor de espalda jono y reducir los síntomas del dolor de espalda crónico:  · Mantenga un peso saludable. Si tiene sobrepeso, perder peso le ayudará a aliviar la mayoría de los mariama de espalda.  · El ejercicio es umberto parte importante de la recuperación de los mariama de espalda. La espalda está soportada por los músculos que se encuentran detrás y brenda de la columna vertebral. [place] significa que los músculos de la espalda y del abdomen deben fortalecerse para gavino mejor soporte a la columna vertebral.   · Nadar y caminar a paso rápido son buenas formas de ejercicio para mejorar braxton nivel de forma física.  · Practique técnicas seguras para levantar objetos (david la información que se presenta más abajo).  · Adopte posturas correctas al estar sentado, parado o caminando. Evite permanecer sentado benito períodos largos, ya que esto implica más esfuerzo en la parte inferior de la espalda que cuando está de pie o caminando.  · Póngase zapatos de buena calidad con suficiente soporte del arco. La alineación de los pies y de los tobillos puede afectar los síntomas de la espalda. Las mujeres deben evitar los zapatos de tacón alto.  · Los masajes terapéuticos pueden ayudar a aliviar el dolor de espalda crónico.  · Benito los primeros dos días después de umberto lesión severa o un ataque de dolor crónico de espalda, aplique umberto bolsa de hielo sobre la melinda dolorida benito 20 minutos cada 2-4 horas. [place] reducirá la hinchazón y el dolor. El calor (duchas o shanelle en [place] o umberto almohadilla calefactora) ayudan a reducir los espasmos musculares. Puede comenzar aplicando hielo y luego aplicar calor al cabo de dos días. Algunos pacientes se sienten mejor alternando los tratamientos con hielo y con calor. Utilice el método que le dé mejor resultado.  · Puede usar acetaminofén (Tylenol) o  ibuprofeno (Motrin o Advil) para controlar el dolor, a menos que le hayan recetado otro medicamento.[NOTA: Si tiene umberto enfermedad hepática o renal crónica, o ha tenido alguna vez umberto úlcera estomacal o sangrado gastrointestinal, consulte con braxton médico antes de mercedes estos medicamentos.]     Estiramiento Lumbar  Eula es un ejercicio simple de estiramiento que le ayudará a relajar los espasmos musculares y a mantener la espalda más flexible. Si el ejercicio le empeora el dolor de espalda, deje de hacerlo.  · Acuéstese boca arriba con las rodillas flexionadas y ambos pies apoyados en el piso.  · Levante lentamente la rodilla izquierda hacia el pecho, con la espalda plana contra el piso. Sostenga la posición benito 5 segundos.  · Relájese y repita el ejercicio con la rodilla derecha.  · Repita el ejercicio 10 veces con cada pierna.  Técnica Para Levantar Objetos De Forma Hi  · No doble la cintura para levantar objetos del suelo. En vez de hacer eso, agáchese doblando las rodillas y las caderas.   · Mantenga la espalda recta y la josephine erguida.   · Agarre el objeto cerca del cuerpo, directamente frente a usted.  · Enderece las piernas para levantar el objeto.   · Para bajar el objeto, siga la misma técnica en orden inverso.  · Si tiene que arrastrar un objeto por el suelo, empújelo.  Consejos Para Mantener Umberto [place] Postura  SENTADO  Siéntese en chi con respaldos rectos o con soporte para la parte inferior de la espalda. Mantenga las rodillas un poco más elevadas que las caderas. En jossie necesario, use un banquito o taburete bajo para mantener los pies apoyados sobre umberto superficie sólida.  Siéntese con la espalda recta mientras maneja. Ajuste el asiento hacia adelante para no tener que inclinarse hacia el volante. Umberto almohada pequeña o umberto toalla enrollada detrás de la parte inferior de la espalda podría ser útil para conducir en viajes largos.   DE PIE  Cuando tenga que estar parado (de pie) benito largos  períodos apoye la mayor parte del peso sobre umberto pierna, cambiando de pierna cada pocos minutos.   AL DORMIR  La mejor manera de dormir es de lado, con las rodillas dobladas. Apoye la josephine en umberto almohada baja para gavino soporte al paola de forma que la columna vertebral quede en posición neutral. Evite las almohadas demasiado gruesas que doblan el paola hacia un lado. Ponga umberto almohada entre las piernas para relajar más la parte inferior de la espalda. Si duerme boca arriba, ponga almohadas debajo de las rodillas para mantener las piernas ligeramente flexionadas. Use un colchón firme. Si braxton colchón se hunde en la parte donde duerme, cámbielo o use umberto tabla de pearson contrachapada de media pulgada de espesor debajo del colchón para darle más soporte.  Programe umberto VISITA DE CONTROL con braxton médico, o según le indique nuestro personal médico.  [NOTA: Si le hicieron radiografías, tomografías computarizadas o resonancias magnéticas, estas serán examinadas por un radiólogo y le informarán de los nuevos hallazgos que puedan afectar la atención médica que necesita.]  Regrese Sin Demora  o llame al médico si nota alguno de los siguientes síntomas:  · El dolor empeora o se extiende a los brazos o a las piernas  · Debilidad o entumecimiento en gonzalo o ambos brazos o piernas.  · Pérdida de control del intestino o de la vejiga  · Entumecimiento en la melinda de las ingles  Date Last Reviewed: 6/1/2016  © 7534-0389 [employer]. [address] [place] [address], [place] [address]. Todos los derechos reservados. Esta información no pretende sustituir la atención médica profesional. Sólo braxton médico puede diagnosticar y tratar un problema de amrie.        El virus del herpes  El herpes es un virus que puede producir lesiones (llagas) en la piel. Existen dos tipos de shannan virus. Según la manera mae usted entre en contacto con el virus, ambos tipos pueden provocar brotes cerca de la boca o en los órganos genitales.  Entienda el  virus del herpes  El herpes se reproduce sólo cuando se encuentra dentro del cuerpo. [place] lo logra haciendo que umberto célula jaqui produzca copias del virus del herpes. Cada copia puede infectar las células cercanas. Sin embargo, muy pronto las defensas del cuerpo se juntan para detener el ataque. El sistema inmunológico obliga al virus a retirarse. En algunos casos, nunca vuelve a producirse un brote jono. En otros casos, la menstruación, las enfermedades, umberto dieta deficiente, la fatiga o el estrés aumentan la probabilidad de que vuelvan a producirse brotes.    Cómo ataca el virus del herpes  1. El virus del herpes entra en el cuerpo a través de umberto pequeña abertura de la piel. También puede entrar mediante el contacto directo con las membranas mucosas, tales mae las de los labios, la vagina o el ano.  2. Umberto vez en el cuerpo, el herpes se aferra a un sitio especial de umberto célula de la piel. Luego, parte del virus se aloja en la célula.  3. Dentro de la célula de la piel, el virus emite umberto serie de instrucciones. Estas órdenes hacen que la célula comience a crear copias del virus del herpes.  4. Las ampollas del herpes aparecen en la piel. Estas ampollas también pueden aparecer en las membranas mucosas que se encuentran alrededor de la boca, la vagina o el ano.  Date Last Reviewed: 10/27/2014  © 2072-7149 [employer]. [address] [place] [address], [place] [address]. Todos los derechos reservados. Esta información no pretende sustituir la atención médica profesional. Sólo braxton médico puede diagnosticar y tratar un problema de marie.        Vivir con herpes  A fin de acelerar el proceso de curación, cuide john las lesiones del herpes que estén abiertas. Para reducir los brotes, cuide braxton marie; y para evitar contagiar a las demás personas, aprenda a limitar la propagación del virus del herpes.  Para aliviar los síntomas  · Comience el tratamiento de cada brote tan pronto mae presente los primeros síntomas,  tales mae picazón u hormigueo.  · [place] iboprufeno (acetaminofén) para limitar cualquier dolor que presente.  · Siéntese en umberto bañera llena de agua tibia o fría. También puede utilizar umberto compresa húmeda para aliviar la picazón de las lesiones. En algunas mujeres, los brotes genitales provocan ardor al orinar. En estos casos, orinar en umberto bañera llena de agua tibia ayuda a disminuir el ardor.  · Póngase ropa interior de algodón joel y ropa holgada benito los brotes. No se ponga ropa interior de nailon ni ropa ajustada ya que éstas pueden impedir que sanen las lesiones.  Para acelerar la curación  · Lávese las lesiones con jabón suave y agua. Séquese las llagas dando palmaditas hasta que estén completamente secas.  · Lávese siempre las geno después de tocar umberto lesión.  · No se cubra las lesiones con vendas. El aire las ayuda a sanar.  · Evite utilizar cualquier ungüento (crema) a menos que se le prescriba. Aplicar umberto crema o ungüento equivocado puede mantener la humedad y lentificar el proceso de curación.  · No se rasque las lesiones. [place] puede disminuir el proceso de curación y hacer que se le infecten las lesiones.  · Si utiliza lentes de contacto, lávese john las geno antes de ponérselos.  Para reducir los brotes  · Siga umberto dieta equilibrada. Paulo vez braxton proveedor de atención médica le sugiera que tome suplementos alimenticios. Estos ayudan a asegurar que reciba todos los nutrientes que necesita.  · Duerma john. [place] hará que braxton sistema inmunitario funcione al ashlee.  · Limite el estrés y la tensión. Ambos pueden debilitar las defensas del cuerpo.  · Limite la exposición al sol, al viento y a las condiciones de calor o frío extremas. Utilice bloqueador solar y bálsamo labial para evitar los brotes.  Para proteger a los demás  · Dígale a braxton dre sexual actual y a todas las parejas futuras que usted tiene herpes. Si no sabe qué decir, pídale a braxton proveedor de atención médica que le ayude.  · Utilice  condones de látex que cubran las zonas afectadas todas las veces que tenga relaciones sexuales. [place] reduce el riesgo de transmitir el herpes a braxton dre.  · Evite gavino besos cuando tenga umberto lesión oral.  · No tenga relaciones sexuales cuando tenga lesiones genitales. Asimismo, tenga presente que el herpes puede transmitirse benito el sexo oral y el contacto anal.  · No comparta hi toallas, cepillos dentales, bálsamo labial ni lápiz de labios cuando tenga umberto llaga.  · Los resultados de algunas investigaciones indican que mercedes todos los días ciertos tipos de medicamentos contra virus puede ayudar a reducir la probabilidad de contagiar el virus a braxton dre.  Date Last Reviewed: 10/28/2014  © 3808-1541 [employer]. [address] [place] [address]. Todos los derechos reservados. Esta información no pretende sustituir la atención médica profesional. Sólo braxton médico puede diagnosticar y tratar un problema de marie.

## 2018-08-15 NOTE — PROGRESS NOTES
Subjective:       Patient ID: Kang Herbert is a 83 y.o. male.    Chief Complaint: Rash    83 years old male came to the clinic for blood pressure check.  Blood pressure today stable .  No chest pain, palpitation, orthopnea or PND.  Patient with good compliance with medical regimen.  Patient also with rash over the lower lip for the last week.  No fever or chills.  Patient also reports itching around the scalp with no visible rash.  Patient with lower back pain for the last week.  The pain is 3/10 of intensity on and off aggravated with activity and better with rest.      Review of Systems   Constitutional: Negative.    HENT: Negative.    Eyes: Negative.    Respiratory: Negative.    Cardiovascular: Negative.    Gastrointestinal: Negative.    Genitourinary: Negative.    Musculoskeletal: Positive for back pain.   Skin: Positive for rash.   Neurological: Negative.    Psychiatric/Behavioral: Negative.        Objective:      Physical Exam   Constitutional: He is oriented to person, place, and time. He appears well-developed and well-nourished. No distress.   HENT:   Head: Normocephalic and atraumatic.   Right Ear: External ear normal.   Left Ear: External ear normal.   Nose: Nose normal.   Mouth/Throat: Oropharynx is clear and moist. No oropharyngeal exudate.   Eyes: Conjunctivae and EOM are normal. Pupils are equal, round, and reactive to light. Right eye exhibits no discharge. Left eye exhibits no discharge. No scleral icterus.   Neck: Normal range of motion. Neck supple. No JVD present. No tracheal deviation present. No thyromegaly present.   Cardiovascular: Normal rate, regular rhythm, normal heart sounds and intact distal pulses. Exam reveals no gallop and no friction rub.   No murmur heard.  Pulmonary/Chest: Effort normal and breath sounds normal. No stridor. No respiratory distress. He has no wheezes. He has no rales. He exhibits no tenderness.   Abdominal: Soft. Bowel sounds are normal. He exhibits no  distension and no mass. There is no tenderness. There is no rebound and no guarding.   Musculoskeletal: He exhibits no edema.        Lumbar back: He exhibits decreased range of motion, tenderness, pain and spasm.   Lymphadenopathy:     He has no cervical adenopathy.   Neurological: He is alert and oriented to person, place, and time. He has normal reflexes. He displays normal reflexes. No cranial nerve deficit. He exhibits normal muscle tone. Coordination normal.   Skin: Skin is warm and dry. Rash noted. Rash is vesicular (Left lower lip). He is not diaphoretic. No erythema. No pallor.   Psychiatric: He has a normal mood and affect. His behavior is normal. Judgment and thought content normal.   Nursing note and vitals reviewed.      Assessment:       1. Essential hypertension    2. Scalp itch    3. Oral herpes    4. Chronic bilateral low back pain without sciatica        Plan:         Kang TRAN was seen today for rash.    Diagnoses and all orders for this visit:    Essential hypertension    Scalp itch  -     betamethasone valerate 0.1% (VALISONE) 0.1 % Lotn; Apply topically 2 (two) times daily. for 10 days    Oral herpes  -     valACYclovir (VALTREX) 500 MG tablet; Take 1 tablet (500 mg total) by mouth 2 (two) times daily. for 7 days    Chronic bilateral low back pain without sciatica  -     tiZANidine (ZANAFLEX) 2 MG tablet; Take 1 tablet (2 mg total) by mouth every 8 (eight) hours as needed.    Continue monitoring blood pressure at home, low sodium diet.

## 2018-08-20 RX ORDER — BETAMETHASONE DIPROPIONATE 0.5 MG/ML
LOTION, AUGMENTED TOPICAL 2 TIMES DAILY
Qty: 60 ML | Refills: 1 | Status: SHIPPED | OUTPATIENT
Start: 2018-08-20 | End: 2019-02-19 | Stop reason: SDUPTHER

## 2018-08-22 DIAGNOSIS — E11.9 TYPE 2 DIABETES MELLITUS WITHOUT COMPLICATION, WITHOUT LONG-TERM CURRENT USE OF INSULIN: Primary | ICD-10-CM

## 2018-08-22 RX ORDER — METFORMIN HYDROCHLORIDE 1000 MG/1
1000 TABLET ORAL 2 TIMES DAILY WITH MEALS
Qty: 180 TABLET | Refills: 3 | Status: SHIPPED | OUTPATIENT
Start: 2018-08-22 | End: 2019-10-31 | Stop reason: SDUPTHER

## 2018-08-27 ENCOUNTER — OFFICE VISIT (OUTPATIENT)
Dept: OPTOMETRY | Facility: CLINIC | Age: 83
End: 2018-08-27
Payer: MEDICARE

## 2018-08-27 DIAGNOSIS — H40.013 OPEN ANGLE WITH BORDERLINE FINDINGS AND LOW GLAUCOMA RISK IN BOTH EYES: ICD-10-CM

## 2018-08-27 DIAGNOSIS — E11.9 TYPE 2 DIABETES MELLITUS WITHOUT COMPLICATION, WITHOUT LONG-TERM CURRENT USE OF INSULIN: Primary | ICD-10-CM

## 2018-08-27 DIAGNOSIS — H52.4 MYOPIA WITH ASTIGMATISM AND PRESBYOPIA, BILATERAL: ICD-10-CM

## 2018-08-27 DIAGNOSIS — H53.2 DIPLOPIA: ICD-10-CM

## 2018-08-27 DIAGNOSIS — H04.123 DRY EYES, BILATERAL: ICD-10-CM

## 2018-08-27 DIAGNOSIS — H52.203 MYOPIA WITH ASTIGMATISM AND PRESBYOPIA, BILATERAL: ICD-10-CM

## 2018-08-27 DIAGNOSIS — H52.13 MYOPIA WITH ASTIGMATISM AND PRESBYOPIA, BILATERAL: ICD-10-CM

## 2018-08-27 PROCEDURE — 92015 DETERMINE REFRACTIVE STATE: CPT | Mod: S$GLB,,, | Performed by: OPTOMETRIST

## 2018-08-27 PROCEDURE — 92014 COMPRE OPH EXAM EST PT 1/>: CPT | Mod: S$GLB,,, | Performed by: OPTOMETRIST

## 2018-08-27 PROCEDURE — 92133 CPTRZD OPH DX IMG PST SGM ON: CPT | Mod: S$GLB,,, | Performed by: OPTOMETRIST

## 2018-08-27 PROCEDURE — 99999 PR PBB SHADOW E&M-EST. PATIENT-LVL II: CPT | Mod: PBBFAC,,, | Performed by: OPTOMETRIST

## 2018-08-27 NOTE — PROGRESS NOTES
HPI     No problems or concerns  No changes  Uses tear drops    Last edited by Victor M Loya, OD on 8/27/2018  1:57 PM. (History)            Assessment /Plan     For exam results, see Encounter Report.    Type 2 diabetes mellitus without complication, without long-term current use of insulin    Open angle with borderline findings and low glaucoma risk in both eyes    Diplopia    Myopia with astigmatism and presbyopia, bilateral    Dry eyes, bilateral      1. No diabetic retinopathy, no csme. Return in 1 year for dilated eye exam.  2. IOP low, nerve eval stable, OCT with thinning, RTC for HVf(24-2)ridge std amnd OCT, gonio.   3. Prism in specs working well.  4. Spec Rx given. Different lens options discussed with patient. RTC 1 year full exam.       5. Recommend artificial tears. 1 drop 1-2x per day. Chronicity of disease and treatment discussed.

## 2018-08-27 NOTE — LETTER
August 27, 2018      Zeke Marrufo MD  [address] [facility] [place] [address]           [place] [facility]  [address] [facility]  [place] [address]  Phone: [phone]  Fax: [phone]          Patient: Kang Herbert   MR Number: [id number]   YOB: 1935   Date of Visit: 8/27/2018       Dear Dr. Zeke Marrufo:    Thank you for referring Kang Herbert to me for evaluation. Attached you will find relevant portions of my assessment and plan of care.    If you have questions, please do not hesitate to call me. I look forward to following Kang Herbert along with you.    Sincerely,    Victor M Loya, OD    Enclosure  CC:  No Recipients    If you would like to receive this communication electronically, please contact [email or web address] or [phone] to request more information on [employer] access.    For providers and/or their staff who would like to refer a patient to [facility], please contact us through our one-stop-shop provider referral line, [facility] , at [phone].    If you feel you have received this communication in error or would no longer like to receive these types of communications, please e-mail [email or web address]

## 2018-09-17 ENCOUNTER — CLINICAL SUPPORT (OUTPATIENT)
Dept: OPHTHALMOLOGY | Facility: CLINIC | Age: 83
End: 2018-09-17
Payer: MEDICARE

## 2018-09-17 ENCOUNTER — OFFICE VISIT (OUTPATIENT)
Dept: OPTOMETRY | Facility: CLINIC | Age: 83
End: 2018-09-17
Payer: MEDICARE

## 2018-09-17 DIAGNOSIS — H40.013 OPEN ANGLE WITH BORDERLINE FINDINGS AND LOW GLAUCOMA RISK IN BOTH EYES: Primary | ICD-10-CM

## 2018-09-17 PROCEDURE — 92012 INTRM OPH EXAM EST PATIENT: CPT | Mod: S$PBB,,, | Performed by: OPTOMETRIST

## 2018-09-17 PROCEDURE — 92020 GONIOSCOPY: CPT | Mod: S$PBB,,, | Performed by: OPTOMETRIST

## 2018-09-17 PROCEDURE — 92082 INTERMEDIATE VISUAL FIELD XM: CPT | Mod: PBBFAC,PO | Performed by: OPTOMETRIST

## 2018-09-17 PROCEDURE — 99999 PR PBB SHADOW E&M-EST. PATIENT-LVL II: CPT | Mod: PBBFAC,,, | Performed by: OPTOMETRIST

## 2018-09-17 PROCEDURE — 92020 GONIOSCOPY: CPT | Mod: PBBFAC,PO | Performed by: OPTOMETRIST

## 2018-09-17 PROCEDURE — 99212 OFFICE O/P EST SF 10 MIN: CPT | Mod: PBBFAC,PO | Performed by: OPTOMETRIST

## 2018-09-17 NOTE — PROGRESS NOTES
HPI     Here for IOp ck, gonio and Vf review  Suspect based on nerve and fam history    Last edited by Victor M Loya, OD on 9/17/2018  4:16 PM. (History)            Assessment /Plan     For exam results, see Encounter Report.    Open angle with borderline findings and low glaucoma risk in both eyes  -     [facility] - OU - Intermediate - Both Eyes      1. Low tension suspect, IOP low, OCt with thinning Ou, Hvf with some sup defects Ou, may be lid elated, pachy thick, gonio open, monitor and repeat OCt in 6 mos. With IOP ck. Discussed with daughter on phone.

## 2018-09-21 ENCOUNTER — PES CALL (OUTPATIENT)
Dept: ADMINISTRATIVE | Facility: CLINIC | Age: 83
End: 2018-09-21

## 2018-09-22 DIAGNOSIS — M15.8 OTHER OSTEOARTHRITIS INVOLVING MULTIPLE JOINTS: ICD-10-CM

## 2018-09-24 RX ORDER — MELOXICAM 15 MG/1
TABLET ORAL
Qty: 90 TABLET | Refills: 0 | Status: SHIPPED | OUTPATIENT
Start: 2018-09-24 | End: 2018-12-17 | Stop reason: SDUPTHER

## 2018-10-16 DIAGNOSIS — E78.5 HYPERLIPIDEMIA, UNSPECIFIED HYPERLIPIDEMIA TYPE: ICD-10-CM

## 2018-10-16 RX ORDER — ATORVASTATIN CALCIUM 10 MG/1
10 TABLET, FILM COATED ORAL DAILY
Qty: 90 TABLET | Refills: 3 | Status: SHIPPED | OUTPATIENT
Start: 2018-10-16 | End: 2019-08-22 | Stop reason: SDUPTHER

## 2018-10-16 RX ORDER — FINASTERIDE 5 MG/1
TABLET, FILM COATED ORAL
Qty: 90 TABLET | Refills: 3 | Status: SHIPPED | OUTPATIENT
Start: 2018-10-16 | End: 2019-10-17 | Stop reason: SDUPTHER

## 2018-10-22 RX ORDER — LISINOPRIL 20 MG/1
TABLET ORAL
Qty: 90 TABLET | Refills: 3 | Status: SHIPPED | OUTPATIENT
Start: 2018-10-22 | End: 2018-11-05

## 2018-11-05 RX ORDER — LISINOPRIL 20 MG/1
TABLET ORAL
Qty: 90 TABLET | Refills: 3 | Status: SHIPPED | OUTPATIENT
Start: 2018-11-05 | End: 2019-04-05

## 2018-12-17 DIAGNOSIS — M15.8 OTHER OSTEOARTHRITIS INVOLVING MULTIPLE JOINTS: ICD-10-CM

## 2018-12-17 RX ORDER — MELOXICAM 15 MG/1
TABLET ORAL
Qty: 90 TABLET | Refills: 0 | Status: SHIPPED | OUTPATIENT
Start: 2018-12-17 | End: 2019-03-16 | Stop reason: SDUPTHER

## 2019-01-15 RX ORDER — GABAPENTIN 800 MG/1
800 TABLET ORAL 2 TIMES DAILY
Qty: 180 TABLET | Refills: 3 | Status: SHIPPED | OUTPATIENT
Start: 2019-01-15 | End: 2020-06-09 | Stop reason: SDUPTHER

## 2019-02-06 DIAGNOSIS — E03.9 HYPOTHYROIDISM, UNSPECIFIED TYPE: ICD-10-CM

## 2019-02-06 RX ORDER — LEVOTHYROXINE SODIUM 75 UG/1
75 TABLET ORAL
Qty: 90 TABLET | Refills: 3 | Status: SHIPPED | OUTPATIENT
Start: 2019-02-06 | End: 2020-05-04

## 2019-02-19 DIAGNOSIS — L29.9 SCALP ITCH: ICD-10-CM

## 2019-02-19 RX ORDER — BETAMETHASONE DIPROPIONATE 0.5 MG/ML
LOTION, AUGMENTED TOPICAL 2 TIMES DAILY
Qty: 60 ML | Refills: 1 | Status: SHIPPED | OUTPATIENT
Start: 2019-02-19 | End: 2020-06-01

## 2019-02-20 RX ORDER — BETAMETHASONE VALERATE 0.1 %
LOTION (ML) TOPICAL 2 TIMES DAILY
Qty: 60 ML | Refills: 0 | Status: SHIPPED | OUTPATIENT
Start: 2019-02-20 | End: 2020-06-01

## 2019-02-20 NOTE — TELEPHONE ENCOUNTER
----- Message from Ofelia Marrero sent at 2/20/2019 12:47 PM CST -----  Contact: Daughter [phone]  Patient would like to speak with you about needing stronger medication for itchy scalp. Please advise

## 2019-02-26 ENCOUNTER — TELEPHONE (OUTPATIENT)
Dept: OTOLARYNGOLOGY | Facility: CLINIC | Age: 84
End: 2019-02-26

## 2019-02-26 NOTE — TELEPHONE ENCOUNTER
Called patient's daughter and set up HAC for 3/18.    ----- Message from Amanda Covarrubias LPN sent at 2/25/2019 11:33 AM CST -----  Contact: Daughter [phone]      ----- Message -----  From: Ofelia Marrero  Sent: 2/25/2019  11:18 AM  To: [facility] Staff    Patient would like to speak with you about checking his hearing aid. Please advise

## 2019-03-14 ENCOUNTER — TELEPHONE (OUTPATIENT)
Dept: FAMILY MEDICINE | Facility: CLINIC | Age: 84
End: 2019-03-14

## 2019-03-14 DIAGNOSIS — E11.65 TYPE 2 DIABETES MELLITUS WITH HYPERGLYCEMIA, WITHOUT LONG-TERM CURRENT USE OF INSULIN: ICD-10-CM

## 2019-03-14 DIAGNOSIS — I10 ESSENTIAL HYPERTENSION: Primary | ICD-10-CM

## 2019-03-14 NOTE — TELEPHONE ENCOUNTER
----- Message from Ava Kohler sent at 3/14/2019 10:00 AM CDT -----  Contact: Aislinn, daughter, [phone]  Called to cancel today's appointment. Requests to reschedule along with labs that were not scheduled. Please advise.

## 2019-03-16 DIAGNOSIS — M15.8 OTHER OSTEOARTHRITIS INVOLVING MULTIPLE JOINTS: ICD-10-CM

## 2019-03-18 ENCOUNTER — CLINICAL SUPPORT (OUTPATIENT)
Dept: OTOLARYNGOLOGY | Facility: CLINIC | Age: 84
End: 2019-03-18
Payer: MEDICARE

## 2019-03-18 ENCOUNTER — TELEPHONE (OUTPATIENT)
Dept: OTOLARYNGOLOGY | Facility: CLINIC | Age: 84
End: 2019-03-18

## 2019-03-18 DIAGNOSIS — H90.3 SENSORINEURAL HEARING LOSS (SNHL), BILATERAL: Primary | ICD-10-CM

## 2019-03-18 PROCEDURE — 99499 UNLISTED E&M SERVICE: CPT | Mod: HCNC,S$GLB,, | Performed by: AUDIOLOGIST-HEARING AID FITTER

## 2019-03-18 PROCEDURE — 99499 NO LOS: ICD-10-PCS | Mod: HCNC,S$GLB,, | Performed by: AUDIOLOGIST-HEARING AID FITTER

## 2019-03-18 RX ORDER — OMEPRAZOLE 20 MG/1
CAPSULE, DELAYED RELEASE ORAL
Qty: 90 CAPSULE | Refills: 1 | Status: SHIPPED | OUTPATIENT
Start: 2019-03-18 | End: 2019-12-16 | Stop reason: SDUPTHER

## 2019-03-18 RX ORDER — MELOXICAM 15 MG/1
TABLET ORAL
Qty: 90 TABLET | Refills: 0 | Status: SHIPPED | OUTPATIENT
Start: 2019-03-18 | End: 2019-07-04 | Stop reason: SDUPTHER

## 2019-03-18 NOTE — TELEPHONE ENCOUNTER
----- Message from Ava Kohler sent at 3/18/2019  3:53 PM CDT -----  Contact: Aislinn, daughter, [phone]  Requests for patient to be seen sooner than the next available appointment on 4/5. States he has wax in ears and MARIAM Black suggested he sees you. Please advise.

## 2019-03-19 NOTE — PROGRESS NOTES
Alvin Ch, CCC-A  [facility] [address]  [address]  [place] [address]      Patient: Kang Herbert   MRN: [id number]    : 1935  AVENDANO: 2019    HEARING AID CONSULTATION      Kang Herbert was seen on the above date for a hearing aid consultation. At today's visit, hearing aid styles and pricing were discussed with the patient. He would like to [facility] to see what his benefits are and if it is worth going to one of their vendors.    If you should have any questions or concerns regarding the above information, please do not hesitate to contact me at [phone].      _______________________________  Maddi Ch, CCC-A  Clinical Audiologist

## 2019-04-02 ENCOUNTER — LAB VISIT (OUTPATIENT)
Dept: LAB | Facility: HOSPITAL | Age: 84
End: 2019-04-02
Attending: FAMILY MEDICINE
Payer: MEDICARE

## 2019-04-02 DIAGNOSIS — I10 ESSENTIAL HYPERTENSION: ICD-10-CM

## 2019-04-02 DIAGNOSIS — E11.65 TYPE 2 DIABETES MELLITUS WITH HYPERGLYCEMIA, WITHOUT LONG-TERM CURRENT USE OF INSULIN: ICD-10-CM

## 2019-04-02 LAB
ALBUMIN SERPL BCP-MCNC: 3.9 G/DL (ref 3.5–5.2)
ALP SERPL-CCNC: 97 U/L (ref 55–135)
ALT SERPL W/O P-5'-P-CCNC: 19 U/L (ref 10–44)
ANION GAP SERPL CALC-SCNC: 6 MMOL/L (ref 8–16)
AST SERPL-CCNC: 20 U/L (ref 10–40)
BASOPHILS # BLD AUTO: 0.03 K/UL (ref 0–0.2)
BASOPHILS NFR BLD: 0.5 % (ref 0–1.9)
BILIRUB SERPL-MCNC: 0.4 MG/DL (ref 0.1–1)
BUN SERPL-MCNC: 23 MG/DL (ref 8–23)
CALCIUM SERPL-MCNC: 9.1 MG/DL (ref 8.7–10.5)
CHLORIDE SERPL-SCNC: 104 MMOL/L (ref 95–110)
CHOLEST SERPL-MCNC: 148 MG/DL (ref 120–199)
CHOLEST/HDLC SERPL: 4.6 {RATIO} (ref 2–5)
CO2 SERPL-SCNC: 29 MMOL/L (ref 23–29)
CREAT SERPL-MCNC: 0.7 MG/DL (ref 0.5–1.4)
DIFFERENTIAL METHOD: ABNORMAL
EOSINOPHIL # BLD AUTO: 0.2 K/UL (ref 0–0.5)
EOSINOPHIL NFR BLD: 3.7 % (ref 0–8)
ERYTHROCYTE [DISTWIDTH] IN BLOOD BY AUTOMATED COUNT: 13.1 % (ref 11.5–14.5)
EST. GFR  (AFRICAN AMERICAN): >60 ML/MIN/1.73 M^2
EST. GFR  (NON AFRICAN AMERICAN): >60 ML/MIN/1.73 M^2
ESTIMATED AVG GLUCOSE: 154 MG/DL (ref 68–131)
GLUCOSE SERPL-MCNC: 123 MG/DL (ref 70–110)
HBA1C MFR BLD HPLC: 7 % (ref 4–5.6)
HCT VFR BLD AUTO: 39.4 % (ref 40–54)
HDLC SERPL-MCNC: 32 MG/DL (ref 40–75)
HDLC SERPL: 21.6 % (ref 20–50)
HGB BLD-MCNC: 13.1 G/DL (ref 14–18)
IMM GRANULOCYTES # BLD AUTO: 0.01 K/UL (ref 0–0.04)
IMM GRANULOCYTES NFR BLD AUTO: 0.2 % (ref 0–0.5)
LDLC SERPL CALC-MCNC: ABNORMAL MG/DL (ref 63–159)
LYMPHOCYTES # BLD AUTO: 2.1 K/UL (ref 1–4.8)
LYMPHOCYTES NFR BLD: 33.5 % (ref 18–48)
MCH RBC QN AUTO: 30.5 PG (ref 27–31)
MCHC RBC AUTO-ENTMCNC: 33.2 G/DL (ref 32–36)
MCV RBC AUTO: 92 FL (ref 82–98)
MONOCYTES # BLD AUTO: 0.6 K/UL (ref 0.3–1)
MONOCYTES NFR BLD: 9 % (ref 4–15)
NEUTROPHILS # BLD AUTO: 3.3 K/UL (ref 1.8–7.7)
NEUTROPHILS NFR BLD: 53.1 % (ref 38–73)
NONHDLC SERPL-MCNC: 116 MG/DL
NRBC BLD-RTO: 0 /100 WBC
PLATELET # BLD AUTO: 146 K/UL (ref 150–350)
PMV BLD AUTO: 12.2 FL (ref 9.2–12.9)
POTASSIUM SERPL-SCNC: 4 MMOL/L (ref 3.5–5.1)
PROT SERPL-MCNC: 6.9 G/DL (ref 6–8.4)
RBC # BLD AUTO: 4.29 M/UL (ref 4.6–6.2)
SODIUM SERPL-SCNC: 139 MMOL/L (ref 136–145)
TRIGL SERPL-MCNC: 416 MG/DL (ref 30–150)
TSH SERPL DL<=0.005 MIU/L-ACNC: 0.86 UIU/ML (ref 0.4–4)
WBC # BLD AUTO: 6.2 K/UL (ref 3.9–12.7)

## 2019-04-02 PROCEDURE — 83036 HEMOGLOBIN GLYCOSYLATED A1C: CPT | Mod: HCNC

## 2019-04-02 PROCEDURE — 36415 COLL VENOUS BLD VENIPUNCTURE: CPT | Mod: HCNC,PO

## 2019-04-02 PROCEDURE — 84443 ASSAY THYROID STIM HORMONE: CPT | Mod: HCNC

## 2019-04-02 PROCEDURE — 80053 COMPREHEN METABOLIC PANEL: CPT | Mod: HCNC

## 2019-04-02 PROCEDURE — 85025 COMPLETE CBC W/AUTO DIFF WBC: CPT | Mod: HCNC

## 2019-04-02 PROCEDURE — 80061 LIPID PANEL: CPT | Mod: HCNC

## 2019-04-05 ENCOUNTER — OFFICE VISIT (OUTPATIENT)
Dept: FAMILY MEDICINE | Facility: CLINIC | Age: 84
End: 2019-04-05
Payer: MEDICARE

## 2019-04-05 VITALS
WEIGHT: 119.69 LBS | HEIGHT: 61 IN | DIASTOLIC BLOOD PRESSURE: 64 MMHG | BODY MASS INDEX: 22.6 KG/M2 | HEART RATE: 93 BPM | OXYGEN SATURATION: 98 % | SYSTOLIC BLOOD PRESSURE: 120 MMHG

## 2019-04-05 DIAGNOSIS — I10 ESSENTIAL HYPERTENSION: Primary | ICD-10-CM

## 2019-04-05 DIAGNOSIS — E03.9 HYPOTHYROIDISM, UNSPECIFIED TYPE: ICD-10-CM

## 2019-04-05 DIAGNOSIS — E78.5 DYSLIPIDEMIA: ICD-10-CM

## 2019-04-05 DIAGNOSIS — E11.65 TYPE 2 DIABETES MELLITUS WITH HYPERGLYCEMIA, WITHOUT LONG-TERM CURRENT USE OF INSULIN: ICD-10-CM

## 2019-04-05 PROCEDURE — 99214 OFFICE O/P EST MOD 30 MIN: CPT | Mod: HCNC,S$GLB,, | Performed by: FAMILY MEDICINE

## 2019-04-05 PROCEDURE — 1101F PR PT FALLS ASSESS DOC 0-1 FALLS W/OUT INJ PAST YR: ICD-10-PCS | Mod: HCNC,CPTII,S$GLB, | Performed by: FAMILY MEDICINE

## 2019-04-05 PROCEDURE — 1101F PT FALLS ASSESS-DOCD LE1/YR: CPT | Mod: HCNC,CPTII,S$GLB, | Performed by: FAMILY MEDICINE

## 2019-04-05 PROCEDURE — 99214 PR OFFICE/OUTPT VISIT, EST, LEVL IV, 30-39 MIN: ICD-10-PCS | Mod: HCNC,S$GLB,, | Performed by: FAMILY MEDICINE

## 2019-04-05 PROCEDURE — 3078F PR MOST RECENT DIASTOLIC BLOOD PRESSURE < 80 MM HG: ICD-10-PCS | Mod: HCNC,CPTII,S$GLB, | Performed by: FAMILY MEDICINE

## 2019-04-05 PROCEDURE — 99999 PR PBB SHADOW E&M-EST. PATIENT-LVL III: ICD-10-PCS | Mod: PBBFAC,HCNC,, | Performed by: FAMILY MEDICINE

## 2019-04-05 PROCEDURE — 3074F SYST BP LT 130 MM HG: CPT | Mod: HCNC,CPTII,S$GLB, | Performed by: FAMILY MEDICINE

## 2019-04-05 PROCEDURE — 3078F DIAST BP <80 MM HG: CPT | Mod: HCNC,CPTII,S$GLB, | Performed by: FAMILY MEDICINE

## 2019-04-05 PROCEDURE — 3074F PR MOST RECENT SYSTOLIC BLOOD PRESSURE < 130 MM HG: ICD-10-PCS | Mod: HCNC,CPTII,S$GLB, | Performed by: FAMILY MEDICINE

## 2019-04-05 PROCEDURE — 99999 PR PBB SHADOW E&M-EST. PATIENT-LVL III: CPT | Mod: PBBFAC,HCNC,, | Performed by: FAMILY MEDICINE

## 2019-04-05 RX ORDER — LOSARTAN POTASSIUM 50 MG/1
50 TABLET ORAL DAILY
Qty: 90 TABLET | Refills: 3 | Status: SHIPPED | OUTPATIENT
Start: 2019-04-05 | End: 2019-08-14 | Stop reason: SDUPTHER

## 2019-04-05 NOTE — PATIENT INSTRUCTIONS
La diabetes: Consejos para hacer actividades físicas    Aumentar braxton nivel de actividad física puede ayudarle a controlar la diabetes. Los consejos de esta página le ayudarán a aprovechar braxton ejercicio al ashlee y a proteger braxton seguridad.  Benefíciese con brío  Las actividades enérgicas aceleran el ritmo cardíaco, lo que puede ayudarle a mejorar braxton forma física, perder el peso excesivo y controlar braxton nivel de azúcar en la dayan. Pruebe a caminar con energía y brío. Si tiene problemas en los pies o las piernas, pruebe a hacer natación o montar en bicicleta. Puede dividir hi sesiones de ejercicio en varios intervalos a lo nimco del día. Avance gradualmente hasta hacer por lo menos 30 minutos de ejercicio continuo y enérgico azalia todos los richard.  Juvencio ejercicios de calentamiento y enfriamiento  Los ejercicios de calentamiento y enfriamiento reducen el riesgo de lesiones y el dolor muscular. Antes y después de braxton rutina de ejercicios, juvencio umberto versión suave de braxton actividad benito 5 minutos. También puede aprender a hacer estiramientos para mantener los músculos relajados. Braxton proveedor de atención médica puede enseñarle buenos ejercicios de calentamiento y estiramiento.  Juvencio la prueba del habla-josafat  La prueba del habla-josafat es umberto manera sencilla de medir el esfuerzo que usted hace benito braxton ejercicio. Si puede hablar mientras hace ejercicios, significa que braxton actividad tiene el ritmo adecuado; bakari si le falta el aire, disminuya la marcha. Si puede tararear umberto canción, significa que debe acelerar el ritmo. Suba umberto jorge a, aumente la resistencia de braxton bicicleta estacionaria o nade más rápido.  ¿Qué whitley hacer respecto a las comidas?  Es posible que le indiquen que planifique braxton actividad física para 1-2 horas después de umberto comida. En la mayoría de los casos, no es necesario que coma mientras hace ejercicio. Si usted se pone insulina o lucinda medicamentos que pueden provocar la disminución de los niveles de  azúcar en la dayan, hágase el examen antes de hacer ejercicios. Lleve consigo un azúcar de acción rápida, mae tabletas de glucosa o caramelos duros, que le elevará rápidamente el nivel de azúcar en la dayan. Si tiene síntomas de hipoglucemia (bajo nivel de azúcar en la dayan), use shannan azúcar.  Consejos de seguridad  Estos consejos le ayudarán a mantener braxton seguridad mientras mejora braxton forma física:  · Juvencio ejercicios con un amigo o lleve consigo un teléfono celular, si tiene gonzalo.  · Lleve o póngase umberto identificación, mae umberto pulsera o umberto el, que indique que usted tiene diabetes.  · Use zapatos y equipo de seguridad apropiados para braxton actividad.  · Seadrift agua antes, benito y después del ejercicio.  · Póngase ropa adecuada para el clima.  · No juvencio ejercicio a la intemperie si hace demasiado calor o demasiado frío.  · No juvencio ejercicio si está enfermo.  · Si le indican que lo juvencio, mídase el azúcar en la dayan antes y después de hacer ejercicio. Coma un bocadillo de carbohidrato si braxton nivel de azúcar es bajo antes de iniciar el ejercicio.  Cuándo debe parar y llamar al proveedor de atención médica  Deje de hacer ejercicios y llame a braxton proveedor de atención médica de inmediato si tiene cualquiera de estos síntomas:  · Dolor, opresión, sensación de tirantez o pesadez en el pecho.  · Dolor o pesadez en el paola, los hombros, la espalda, los brazos, las piernas o los pies  · Falta de aire excesiva  · Mareos o aturdimiento  · Pulso excesivamente rápido o lento  · Mayor dolor en las articulaciones o los músculos  · Náuseas o vómito  Date Last Reviewed: 5/1/2016  © 9757-7601 [employer]. [address] [place] [address]. Todos los derechos reservados. Esta información no pretende sustituir la atención médica profesional. Sólo braxton médico puede diagnosticar y tratar un problema de marie.

## 2019-04-05 NOTE — PROGRESS NOTES
Subjective:       Patient ID: Kang Herbert is a 83 y.o. male.    Chief Complaint: Follow-up and Diabetes    83 years old male came to the clinic for blood pressure check.  Blood pressure today stable.  No chest pain, palpitation orthopnea or PND.  Last A1c was slightly elevated.  No polyuria, polydipsia or polyphagia.  Last thyroid test was normal.  Patient with good compliance with medical regimen.    Review of Systems   Constitutional: Negative.    HENT: Negative.    Eyes: Negative.    Respiratory: Negative.    Cardiovascular: Negative.  Negative for chest pain, palpitations and leg swelling.   Gastrointestinal: Negative.    Endocrine: Negative for polydipsia, polyphagia and polyuria.   Genitourinary: Negative.    Musculoskeletal: Negative.    Skin: Negative.    Neurological: Negative.    Psychiatric/Behavioral: Negative.        Objective:      Physical Exam   Constitutional: He is oriented to person, place, and time. He appears well-developed and well-nourished. No distress.   HENT:   Head: Normocephalic and atraumatic.   Right Ear: External ear normal.   Left Ear: External ear normal.   Nose: Nose normal.   Mouth/Throat: Oropharynx is clear and moist. No oropharyngeal exudate.   Eyes: Pupils are equal, round, and reactive to light. Conjunctivae and EOM are normal. Right eye exhibits no discharge. Left eye exhibits no discharge. No scleral icterus.   Neck: Normal range of motion. Neck supple. No JVD present. No tracheal deviation present. No thyromegaly present.   Cardiovascular: Normal rate, regular rhythm, normal heart sounds and intact distal pulses. Exam reveals no gallop and no friction rub.   No murmur heard.  Pulmonary/Chest: Effort normal and breath sounds normal. No stridor. No respiratory distress. He has no wheezes. He has no rales. He exhibits no tenderness.   Abdominal: Soft. Bowel sounds are normal. He exhibits no distension and no mass. There is no tenderness. There is no rebound and no  guarding.   Musculoskeletal: Normal range of motion. He exhibits no edema or tenderness.   Lymphadenopathy:     He has no cervical adenopathy.   Neurological: He is alert and oriented to person, place, and time. He has normal reflexes. He displays normal reflexes. No cranial nerve deficit. He exhibits normal muscle tone. Coordination normal.   Skin: Skin is warm and dry. No rash noted. He is not diaphoretic. No erythema. No pallor.   Psychiatric: He has a normal mood and affect. His behavior is normal. Judgment and thought content normal.   Nursing note and vitals reviewed.      Assessment:       1. Essential hypertension    2. Type 2 diabetes mellitus with hyperglycemia, without long-term current use of insulin    3. Dyslipidemia    4. Hypothyroidism, unspecified type        Plan:         Kang TRAN was seen today for follow-up and diabetes.    Diagnoses and all orders for this visit:    Essential hypertension  -     losartan (COZAAR) 50 MG tablet; Take 1 tablet (50 mg total) by mouth once daily.  -     TSH; Future  -     Comprehensive metabolic panel; Future  -     Lipid panel; Future    Type 2 diabetes mellitus with hyperglycemia, without long-term current use of insulin  -     Comprehensive metabolic panel; Future  -     Hemoglobin A1c; Future    Dyslipidemia  -     Lipid panel; Future    Hypothyroidism, unspecified type  -     TSH; Future    Continue monitoring blood pressure at home, low sodium diet.  Continue monitoring blood sugar at home,ADA diet.

## 2019-07-04 DIAGNOSIS — M15.8 OTHER OSTEOARTHRITIS INVOLVING MULTIPLE JOINTS: ICD-10-CM

## 2019-07-04 DIAGNOSIS — F33.0 MILD EPISODE OF RECURRENT MAJOR DEPRESSIVE DISORDER: ICD-10-CM

## 2019-07-05 RX ORDER — ESCITALOPRAM OXALATE 10 MG/1
TABLET ORAL
Qty: 90 TABLET | Refills: 3 | Status: SHIPPED | OUTPATIENT
Start: 2019-07-05 | End: 2020-08-02

## 2019-07-05 RX ORDER — MELOXICAM 15 MG/1
TABLET ORAL
Qty: 90 TABLET | Refills: 0 | Status: SHIPPED | OUTPATIENT
Start: 2019-07-05 | End: 2020-02-06

## 2019-07-15 ENCOUNTER — TELEPHONE (OUTPATIENT)
Dept: OTOLARYNGOLOGY | Facility: CLINIC | Age: 84
End: 2019-07-15

## 2019-08-14 ENCOUNTER — LAB VISIT (OUTPATIENT)
Dept: LAB | Facility: HOSPITAL | Age: 84
End: 2019-08-14
Attending: FAMILY MEDICINE
Payer: MEDICARE

## 2019-08-14 ENCOUNTER — OFFICE VISIT (OUTPATIENT)
Dept: FAMILY MEDICINE | Facility: CLINIC | Age: 84
End: 2019-08-14
Payer: MEDICARE

## 2019-08-14 VITALS
SYSTOLIC BLOOD PRESSURE: 100 MMHG | OXYGEN SATURATION: 97 % | HEART RATE: 70 BPM | WEIGHT: 120.81 LBS | BODY MASS INDEX: 22.81 KG/M2 | DIASTOLIC BLOOD PRESSURE: 60 MMHG | HEIGHT: 61 IN

## 2019-08-14 DIAGNOSIS — D63.8 CHRONIC DISEASE ANEMIA: ICD-10-CM

## 2019-08-14 DIAGNOSIS — I10 ESSENTIAL HYPERTENSION: Primary | ICD-10-CM

## 2019-08-14 DIAGNOSIS — E11.65 TYPE 2 DIABETES MELLITUS WITH HYPERGLYCEMIA, WITHOUT LONG-TERM CURRENT USE OF INSULIN: ICD-10-CM

## 2019-08-14 DIAGNOSIS — I95.9 HYPOTENSION, UNSPECIFIED HYPOTENSION TYPE: ICD-10-CM

## 2019-08-14 DIAGNOSIS — I10 ESSENTIAL HYPERTENSION: ICD-10-CM

## 2019-08-14 LAB
ANION GAP SERPL CALC-SCNC: 8 MMOL/L (ref 8–16)
BASOPHILS # BLD AUTO: 0.03 K/UL (ref 0–0.2)
BASOPHILS NFR BLD: 0.5 % (ref 0–1.9)
BUN SERPL-MCNC: 20 MG/DL (ref 8–23)
CALCIUM SERPL-MCNC: 8.7 MG/DL (ref 8.7–10.5)
CHLORIDE SERPL-SCNC: 103 MMOL/L (ref 95–110)
CO2 SERPL-SCNC: 26 MMOL/L (ref 23–29)
CREAT SERPL-MCNC: 0.7 MG/DL (ref 0.5–1.4)
DIFFERENTIAL METHOD: ABNORMAL
EOSINOPHIL # BLD AUTO: 0.2 K/UL (ref 0–0.5)
EOSINOPHIL NFR BLD: 2.6 % (ref 0–8)
ERYTHROCYTE [DISTWIDTH] IN BLOOD BY AUTOMATED COUNT: 13.2 % (ref 11.5–14.5)
EST. GFR  (AFRICAN AMERICAN): >60 ML/MIN/1.73 M^2
EST. GFR  (NON AFRICAN AMERICAN): >60 ML/MIN/1.73 M^2
GLUCOSE SERPL-MCNC: 116 MG/DL (ref 70–110)
HCT VFR BLD AUTO: 36.3 % (ref 40–54)
HGB BLD-MCNC: 11.9 G/DL (ref 14–18)
IMM GRANULOCYTES # BLD AUTO: 0.01 K/UL (ref 0–0.04)
IMM GRANULOCYTES NFR BLD AUTO: 0.2 % (ref 0–0.5)
LYMPHOCYTES # BLD AUTO: 2.1 K/UL (ref 1–4.8)
LYMPHOCYTES NFR BLD: 34.5 % (ref 18–48)
MCH RBC QN AUTO: 30.3 PG (ref 27–31)
MCHC RBC AUTO-ENTMCNC: 32.8 G/DL (ref 32–36)
MCV RBC AUTO: 92 FL (ref 82–98)
MONOCYTES # BLD AUTO: 0.5 K/UL (ref 0.3–1)
MONOCYTES NFR BLD: 8 % (ref 4–15)
NEUTROPHILS # BLD AUTO: 3.3 K/UL (ref 1.8–7.7)
NEUTROPHILS NFR BLD: 54.2 % (ref 38–73)
NRBC BLD-RTO: 0 /100 WBC
PLATELET # BLD AUTO: 155 K/UL (ref 150–350)
PMV BLD AUTO: 12 FL (ref 9.2–12.9)
POTASSIUM SERPL-SCNC: 4.2 MMOL/L (ref 3.5–5.1)
RBC # BLD AUTO: 3.93 M/UL (ref 4.6–6.2)
SODIUM SERPL-SCNC: 137 MMOL/L (ref 136–145)
WBC # BLD AUTO: 6.09 K/UL (ref 3.9–12.7)

## 2019-08-14 PROCEDURE — 99499 RISK ADDL DX/OHS AUDIT: ICD-10-PCS | Mod: HCNC,S$GLB,, | Performed by: FAMILY MEDICINE

## 2019-08-14 PROCEDURE — 80048 BASIC METABOLIC PNL TOTAL CA: CPT | Mod: HCNC

## 2019-08-14 PROCEDURE — 99214 OFFICE O/P EST MOD 30 MIN: CPT | Mod: HCNC,S$GLB,, | Performed by: FAMILY MEDICINE

## 2019-08-14 PROCEDURE — 1101F PR PT FALLS ASSESS DOC 0-1 FALLS W/OUT INJ PAST YR: ICD-10-PCS | Mod: HCNC,CPTII,S$GLB, | Performed by: FAMILY MEDICINE

## 2019-08-14 PROCEDURE — 99999 PR PBB SHADOW E&M-EST. PATIENT-LVL III: CPT | Mod: PBBFAC,HCNC,, | Performed by: FAMILY MEDICINE

## 2019-08-14 PROCEDURE — 1101F PT FALLS ASSESS-DOCD LE1/YR: CPT | Mod: HCNC,CPTII,S$GLB, | Performed by: FAMILY MEDICINE

## 2019-08-14 PROCEDURE — 99499 UNLISTED E&M SERVICE: CPT | Mod: HCNC,S$GLB,, | Performed by: FAMILY MEDICINE

## 2019-08-14 PROCEDURE — 3078F PR MOST RECENT DIASTOLIC BLOOD PRESSURE < 80 MM HG: ICD-10-PCS | Mod: HCNC,CPTII,S$GLB, | Performed by: FAMILY MEDICINE

## 2019-08-14 PROCEDURE — 99214 PR OFFICE/OUTPT VISIT, EST, LEVL IV, 30-39 MIN: ICD-10-PCS | Mod: HCNC,S$GLB,, | Performed by: FAMILY MEDICINE

## 2019-08-14 PROCEDURE — 3074F PR MOST RECENT SYSTOLIC BLOOD PRESSURE < 130 MM HG: ICD-10-PCS | Mod: HCNC,CPTII,S$GLB, | Performed by: FAMILY MEDICINE

## 2019-08-14 PROCEDURE — 83036 HEMOGLOBIN GLYCOSYLATED A1C: CPT | Mod: HCNC

## 2019-08-14 PROCEDURE — 3074F SYST BP LT 130 MM HG: CPT | Mod: HCNC,CPTII,S$GLB, | Performed by: FAMILY MEDICINE

## 2019-08-14 PROCEDURE — 85025 COMPLETE CBC W/AUTO DIFF WBC: CPT | Mod: HCNC

## 2019-08-14 PROCEDURE — 3078F DIAST BP <80 MM HG: CPT | Mod: HCNC,CPTII,S$GLB, | Performed by: FAMILY MEDICINE

## 2019-08-14 PROCEDURE — 99999 PR PBB SHADOW E&M-EST. PATIENT-LVL III: ICD-10-PCS | Mod: PBBFAC,HCNC,, | Performed by: FAMILY MEDICINE

## 2019-08-14 PROCEDURE — 36415 COLL VENOUS BLD VENIPUNCTURE: CPT | Mod: HCNC,PO

## 2019-08-14 RX ORDER — LOSARTAN POTASSIUM 25 MG/1
25 TABLET ORAL DAILY
Qty: 90 TABLET | Refills: 3 | Status: SHIPPED | OUTPATIENT
Start: 2019-08-14 | End: 2020-09-23

## 2019-08-14 NOTE — PROGRESS NOTES
Subjective:       Patient ID: Kang Herbert is a 84 y.o. male.    Chief Complaint: Dizziness    84 years old male came to the clinic only poor balance for the last month.  Patient was evaluated with ENT with normal workup.  ENT recommends possible MRI of the brain.  Patient was taking lisinopril together with losartan.  Patient with low blood pressure sometimes.  Blood pressure today stable.  No chest pain, palpitation, orthopnea or PND.  Patient with mild anemia but stable in comparison with previous reports .    Review of Systems   Constitutional: Negative.    HENT: Negative.    Eyes: Negative.    Respiratory: Negative.    Cardiovascular: Negative.  Negative for chest pain, palpitations and leg swelling.   Gastrointestinal: Negative.    Genitourinary: Negative.    Musculoskeletal: Negative.    Skin: Negative.    Neurological: Positive for light-headedness.   Psychiatric/Behavioral: Negative.        Objective:      Physical Exam   Constitutional: He is oriented to person, place, and time. He appears well-developed and well-nourished. No distress.   HENT:   Head: Normocephalic and atraumatic.   Right Ear: External ear normal.   Left Ear: External ear normal.   Nose: Nose normal.   Mouth/Throat: Oropharynx is clear and moist. No oropharyngeal exudate.   Eyes: Pupils are equal, round, and reactive to light. Conjunctivae and EOM are normal. Right eye exhibits no discharge. Left eye exhibits no discharge. No scleral icterus.   Neck: Normal range of motion. Neck supple. No JVD present. No tracheal deviation present. No thyromegaly present.   Cardiovascular: Normal rate, regular rhythm, normal heart sounds and intact distal pulses. Exam reveals no gallop and no friction rub.   No murmur heard.  Pulmonary/Chest: Effort normal and breath sounds normal. No stridor. No respiratory distress. He has no wheezes. He has no rales. He exhibits no tenderness.   Abdominal: Soft. Bowel sounds are normal. He exhibits no  distension and no mass. There is no tenderness. There is no rebound and no guarding.   Musculoskeletal: Normal range of motion. He exhibits no edema or tenderness.   Lymphadenopathy:     He has no cervical adenopathy.   Neurological: He is alert and oriented to person, place, and time. He has normal reflexes. He displays normal reflexes. No cranial nerve deficit. He exhibits normal muscle tone. Coordination and gait abnormal.   Skin: Skin is warm and dry. No rash noted. He is not diaphoretic. No erythema. No pallor.   Psychiatric: He has a normal mood and affect. His behavior is normal. Judgment and thought content normal.   Nursing note and vitals reviewed.      Assessment:       1. Essential hypertension    2. Type 2 diabetes mellitus with hyperglycemia, without long-term current use of insulin    3. Hypotension, unspecified hypotension type    4. Chronic disease anemia        Plan:         Kang TRAN was seen today for dizziness.    Diagnoses and all orders for this visit:    Essential hypertension  -     losartan (COZAAR) 25 MG tablet; Take 1 tablet (25 mg total) by mouth once daily.  -     Basic metabolic panel; Future    Type 2 diabetes mellitus with hyperglycemia, without long-term current use of insulin  -     Hemoglobin A1c; Future  -     Basic metabolic panel; Future    Hypotension, unspecified hypotension type    Chronic disease anemia  -     CBC auto differential; Future    Continue monitoring blood pressure at home, low sodium diet.  Continue monitoring blood sugar at home,ADA diet.  Decreased losartan to 25 mg.  Reduce gabapentin for 1 week to 400 mg twice a day

## 2019-08-15 LAB
ESTIMATED AVG GLUCOSE: 157 MG/DL (ref 68–131)
HBA1C MFR BLD HPLC: 7.1 % (ref 4–5.6)

## 2019-08-22 DIAGNOSIS — E78.5 HYPERLIPIDEMIA, UNSPECIFIED HYPERLIPIDEMIA TYPE: ICD-10-CM

## 2019-08-23 RX ORDER — ATORVASTATIN CALCIUM 10 MG/1
TABLET, FILM COATED ORAL
Qty: 90 TABLET | Refills: 3 | Status: SHIPPED | OUTPATIENT
Start: 2019-08-23 | End: 2020-12-21

## 2019-08-27 RX ORDER — BLOOD-GLUCOSE METER
EACH MISCELLANEOUS
Qty: 1 EACH | Refills: 0 | Status: SHIPPED | OUTPATIENT
Start: 2019-08-27 | End: 2019-09-17

## 2019-09-02 DIAGNOSIS — E11.42 DIABETIC POLYNEUROPATHY ASSOCIATED WITH TYPE 2 DIABETES MELLITUS: ICD-10-CM

## 2019-09-03 RX ORDER — CALCIUM CITRATE/VITAMIN D3 200MG-6.25
TABLET ORAL
Qty: 200 STRIP | Refills: 3 | Status: SHIPPED | OUTPATIENT
Start: 2019-09-03 | End: 2019-09-17

## 2019-09-05 ENCOUNTER — TELEPHONE (OUTPATIENT)
Dept: FAMILY MEDICINE | Facility: CLINIC | Age: 84
End: 2019-09-05

## 2019-09-05 DIAGNOSIS — E11.65 TYPE 2 DIABETES MELLITUS WITH HYPERGLYCEMIA, WITHOUT LONG-TERM CURRENT USE OF INSULIN: Primary | ICD-10-CM

## 2019-09-17 RX ORDER — DEXTROSE 4 G
1 TABLET,CHEWABLE ORAL DAILY
Qty: 1 EACH | Refills: 0 | Status: SHIPPED | OUTPATIENT
Start: 2019-09-17 | End: 2020-03-13 | Stop reason: SDUPTHER

## 2019-09-17 NOTE — TELEPHONE ENCOUNTER
----- Message from Yesika Singh sent at 9/17/2019 10:24 AM CDT -----  Contact: Nancy= daughter  Pt would like to be called back regarding her father appts     Pt can be reached at [phone]

## 2019-10-17 RX ORDER — FINASTERIDE 5 MG/1
TABLET, FILM COATED ORAL
Qty: 90 TABLET | Refills: 3 | Status: SHIPPED | OUTPATIENT
Start: 2019-10-17 | End: 2020-12-02

## 2019-10-31 DIAGNOSIS — E11.9 TYPE 2 DIABETES MELLITUS WITHOUT COMPLICATION, WITHOUT LONG-TERM CURRENT USE OF INSULIN: ICD-10-CM

## 2019-10-31 RX ORDER — METFORMIN HYDROCHLORIDE 1000 MG/1
1000 TABLET ORAL 2 TIMES DAILY WITH MEALS
Qty: 180 TABLET | Refills: 1 | Status: SHIPPED | OUTPATIENT
Start: 2019-10-31 | End: 2020-05-01

## 2019-11-14 ENCOUNTER — TELEPHONE (OUTPATIENT)
Dept: FAMILY MEDICINE | Facility: CLINIC | Age: 84
End: 2019-11-14

## 2019-11-14 DIAGNOSIS — L30.9 DERMATITIS: Primary | ICD-10-CM

## 2019-12-16 RX ORDER — OMEPRAZOLE 20 MG/1
CAPSULE, DELAYED RELEASE ORAL
Qty: 90 CAPSULE | Refills: 3 | Status: SHIPPED | OUTPATIENT
Start: 2019-12-16 | End: 2020-12-17

## 2019-12-20 ENCOUNTER — PATIENT OUTREACH (OUTPATIENT)
Dept: ADMINISTRATIVE | Facility: OTHER | Age: 84
End: 2019-12-20

## 2020-01-08 ENCOUNTER — PATIENT OUTREACH (OUTPATIENT)
Dept: ADMINISTRATIVE | Facility: OTHER | Age: 85
End: 2020-01-08

## 2020-01-08 NOTE — PROGRESS NOTES
CRS Office Visit History and Physical    Referring Md:   Aaareferral Self  No address on file    SUBJECTIVE:     Chief Complaint: rectal bleeding    History of Present Illness:  The patient is new patient to this practice.   Course is as follows:  Patient is a 84 y.o. male presents with rectal bleeding.  Three days ago, he noticed bright red blood per rectum on 2 bowel movements.  No pain. No protrusion.  No change in his bowel movements.  He has had normal bowel movements since that time.  No prior similar episodes.  No family history of colon rectal cancer inflammatory bowel disease.  No past anorectal surgeries.  He is not on therapeutic anticoagulation.  Functionally, he is active and performs all his activities of daily living.  He has normal bowel movements daily.  He spends 5 min on the toilet without straining for each bowel movement.    Last Colonoscopy: 2014: normal.    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     Past Surgical History:   Procedure Laterality Date    CATARACT EXTRACTION  11-/    od/os    CHOLECYSTECTOMY      HERNIA REPAIR       Family History   Problem Relation Age of Onset    No Known Problems Mother     No Known Problems Father     Glaucoma Sister     Diabetes Sister     Diabetes Brother     No Known Problems Maternal Aunt     No Known Problems Maternal Uncle     No Known Problems Paternal Aunt     No Known Problems Paternal Uncle     No Known Problems Maternal Grandmother     No Known Problems Maternal Grandfather     No Known Problems Paternal Grandmother     No Known Problems Paternal Grandfather     Amblyopia Neg Hx     Blindness Neg Hx     Cancer  Neg Hx     Cataracts Neg Hx     Hypertension Neg Hx     Macular degeneration Neg Hx     Retinal detachment Neg Hx     Strabismus Neg Hx     Stroke Neg Hx     Thyroid disease Neg Hx     Prostate cancer Neg Hx     Kidney disease Neg Hx      Social History     Tobacco Use    Smoking status: Never Smoker    Smokeless tobacco: Never Used   Substance Use Topics    Alcohol use: No    Drug use: No        Review of Systems:  Review of Systems   Constitutional: Negative for chills, diaphoresis, fever, malaise/fatigue and weight loss.   HENT: Positive for hearing loss. Negative for congestion.    Respiratory: Negative for shortness of breath.    Cardiovascular: Negative for chest pain and leg swelling.   Gastrointestinal: Positive for blood in stool. Negative for abdominal pain, constipation, nausea and vomiting.   Genitourinary: Negative for dysuria.   Musculoskeletal: Negative for back pain and myalgias.   Skin: Negative for rash.   Neurological: Negative for dizziness and weakness.   Endo/Heme/Allergies: Does not bruise/bleed easily.   Psychiatric/Behavioral: Negative for depression.       OBJECTIVE:     Vital Signs (Most Recent)  BP (!) 140/78 (BP Location: Right arm, Patient Position: Sitting, BP Method: Medium (Manual))   Wt 54.9 kg (120 lb 14.8 oz)   BMI 22.85 kg/m²     Physical Exam:  General: White male in no distress   Neuro: alert and oriented x 4.  Moves all extremities.     HEENT: no icterus.  Trachea midline  Respiratory: respirations are even and unlabored  Cardiac: regular rate  Abdomen:  Soft, nontender, no masses  Extremities: Warm dry and intact  Skin: no rashes  Anorectal:  External exam was normal. Digital exam performed.  And normal tone. No masses palpated. Normal prostate.  Anoscopy was then performed. Grade 1 internal hemorrhoids with no signs of active bleeding. These were seen in all 3 quadrants.    Labs: H&H 12/36    Imaging: none      ASSESSMENT/PLAN:     Diagnoses and all orders for  this visit:    Grade I hemorrhoids        84-year-old gentleman with 1 episode rectal bleeding that resolved spontaneously.  Grade 1 hemorrhoids seen on today's exam.  Reassurance was given.  I encouraged him to avoid straining and spends less than 2-3 minutes on the toilet for each bowel movement.  He will follow up with me with recurrent bleeding.  If he continues to have recurrent bleeding or passage of clots, we will plan for further evaluation with repeat colonoscopy.    GABY Charles MD, FACS  Staff Surgeon  Colon & Rectal Surgery

## 2020-01-09 ENCOUNTER — OFFICE VISIT (OUTPATIENT)
Dept: SURGERY | Facility: CLINIC | Age: 85
End: 2020-01-09
Payer: MEDICARE

## 2020-01-09 VITALS
DIASTOLIC BLOOD PRESSURE: 78 MMHG | BODY MASS INDEX: 22.85 KG/M2 | SYSTOLIC BLOOD PRESSURE: 140 MMHG | WEIGHT: 120.94 LBS

## 2020-01-09 DIAGNOSIS — K64.0 GRADE I HEMORRHOIDS: Primary | ICD-10-CM

## 2020-01-09 PROCEDURE — 3077F PR MOST RECENT SYSTOLIC BLOOD PRESSURE >= 140 MM HG: ICD-10-PCS | Mod: HCNC,CPTII,S$GLB, | Performed by: COLON & RECTAL SURGERY

## 2020-01-09 PROCEDURE — 3077F SYST BP >= 140 MM HG: CPT | Mod: HCNC,CPTII,S$GLB, | Performed by: COLON & RECTAL SURGERY

## 2020-01-09 PROCEDURE — 99999 PR PBB SHADOW E&M-EST. PATIENT-LVL II: ICD-10-PCS | Mod: PBBFAC,HCNC,, | Performed by: COLON & RECTAL SURGERY

## 2020-01-09 PROCEDURE — 1101F PR PT FALLS ASSESS DOC 0-1 FALLS W/OUT INJ PAST YR: ICD-10-PCS | Mod: HCNC,CPTII,S$GLB, | Performed by: COLON & RECTAL SURGERY

## 2020-01-09 PROCEDURE — 46600 DIAGNOSTIC ANOSCOPY SPX: CPT | Mod: HCNC,S$GLB,, | Performed by: COLON & RECTAL SURGERY

## 2020-01-09 PROCEDURE — 3078F PR MOST RECENT DIASTOLIC BLOOD PRESSURE < 80 MM HG: ICD-10-PCS | Mod: HCNC,CPTII,S$GLB, | Performed by: COLON & RECTAL SURGERY

## 2020-01-09 PROCEDURE — 99203 PR OFFICE/OUTPT VISIT, NEW, LEVL III, 30-44 MIN: ICD-10-PCS | Mod: 25,HCNC,S$GLB, | Performed by: COLON & RECTAL SURGERY

## 2020-01-09 PROCEDURE — 1101F PT FALLS ASSESS-DOCD LE1/YR: CPT | Mod: HCNC,CPTII,S$GLB, | Performed by: COLON & RECTAL SURGERY

## 2020-01-09 PROCEDURE — 99999 PR PBB SHADOW E&M-EST. PATIENT-LVL II: CPT | Mod: PBBFAC,HCNC,, | Performed by: COLON & RECTAL SURGERY

## 2020-01-09 PROCEDURE — 1159F MED LIST DOCD IN RCRD: CPT | Mod: HCNC,S$GLB,, | Performed by: COLON & RECTAL SURGERY

## 2020-01-09 PROCEDURE — 46600 PR DIAG2STIC A2SCOPY: ICD-10-PCS | Mod: HCNC,S$GLB,, | Performed by: COLON & RECTAL SURGERY

## 2020-01-09 PROCEDURE — 3078F DIAST BP <80 MM HG: CPT | Mod: HCNC,CPTII,S$GLB, | Performed by: COLON & RECTAL SURGERY

## 2020-01-09 PROCEDURE — 1159F PR MEDICATION LIST DOCUMENTED IN MEDICAL RECORD: ICD-10-PCS | Mod: HCNC,S$GLB,, | Performed by: COLON & RECTAL SURGERY

## 2020-01-09 PROCEDURE — 99203 OFFICE O/P NEW LOW 30 MIN: CPT | Mod: 25,HCNC,S$GLB, | Performed by: COLON & RECTAL SURGERY

## 2020-01-11 ENCOUNTER — OFFICE VISIT (OUTPATIENT)
Dept: URGENT CARE | Facility: CLINIC | Age: 85
End: 2020-01-11
Payer: MEDICARE

## 2020-01-11 VITALS
SYSTOLIC BLOOD PRESSURE: 115 MMHG | RESPIRATION RATE: 18 BRPM | OXYGEN SATURATION: 95 % | DIASTOLIC BLOOD PRESSURE: 64 MMHG | BODY MASS INDEX: 22.66 KG/M2 | TEMPERATURE: 98 F | HEART RATE: 78 BPM | HEIGHT: 61 IN | WEIGHT: 120 LBS

## 2020-01-11 DIAGNOSIS — R31.9 HEMATURIA, UNSPECIFIED TYPE: Primary | ICD-10-CM

## 2020-01-11 LAB
BILIRUB UR QL STRIP: NEGATIVE
GLUCOSE UR QL STRIP: NEGATIVE
KETONES UR QL STRIP: NEGATIVE
LEUKOCYTE ESTERASE UR QL STRIP: NEGATIVE
PH, POC UA: 7.5 (ref 5–8)
POC BLOOD, URINE: POSITIVE
POC NITRATES, URINE: NEGATIVE
PROT UR QL STRIP: NEGATIVE
SP GR UR STRIP: 1.01 (ref 1–1.03)
UROBILINOGEN UR STRIP-ACNC: NORMAL (ref 0.3–2.2)

## 2020-01-11 PROCEDURE — 81003 POCT URINALYSIS, DIPSTICK, AUTOMATED, W/O SCOPE: ICD-10-PCS | Mod: QW,S$GLB,, | Performed by: FAMILY MEDICINE

## 2020-01-11 PROCEDURE — 81003 URINALYSIS AUTO W/O SCOPE: CPT | Mod: QW,S$GLB,, | Performed by: FAMILY MEDICINE

## 2020-01-11 PROCEDURE — 99213 OFFICE O/P EST LOW 20 MIN: CPT | Mod: 25,S$GLB,, | Performed by: FAMILY MEDICINE

## 2020-01-11 PROCEDURE — 99213 PR OFFICE/OUTPT VISIT, EST, LEVL III, 20-29 MIN: ICD-10-PCS | Mod: 25,S$GLB,, | Performed by: FAMILY MEDICINE

## 2020-01-11 NOTE — PATIENT INSTRUCTIONS
La hematuria: Causas posibles    Hay numerosos problemas que pueden causar la aparición de dayan en la orina. Algunas de las causas más comunes, tales mae cálculos renales o en la vejiga, el agrandamiento de la próstata y las infecciones, suelen ser fáciles de tratar. Otras causas, tales mae el cáncer, son más graves. A continuación se describen algunas de las causas más comunes de hematuria.  · Los cálculos son acumulaciones de luis alberto que se genevieve en la orina. Pueden encontrarse en cualquier parte del tracto urinario, especialmente en los riñones o la vejiga, y a veces causan dolor intenso.  · La hiperplasia prostática benigna (HPB) es un agrandamiento de la próstata, que puede suceder con el paso de la edad en los hombres. La HPB puede dificultar el acto de orinar y, ocasionalmente, causar dolor.  · Anabella infección del tracto urinario, causada por bacterias, puede producir la inflamación de la vejiga y de la uretra. Éstas suelen ir acompañadas de ardor, fiebre, dolor y la necesidad de orinar con frecuencia.  · Los daños en el tracto urinario pueden surgir a causa de un golpe o accidente, o debido al uso de un catéter. En ocasiones, el ejercicio muy enérgico puede irritar el tracto urinario y causar sangrado.  · El cáncer puede afectar a cualquier parte del tracto urinario. A veces, el único síntoma de un tumor es el sangrado en el tracto urinario.  Otras causas  Entre otras causas de sangrado del tracto urinario se encuentran:  · La prostatitis (infección de la próstata)  · El uso de medicamentos anticoagulantes  · Un bloqueo en el tracto urinario  · Enfermedad o inflamación del riñón  · Enfermedades quísticas del riñón  · La anemia drepanocítica  · A menudo, no se encuentra la causa del sangrado. En shannan jossie, se habla de anabella hematuria idiopática.  · En shannan jossie, se habla de anabella hematuria idiopática.  · Los tumores del tracto urinario  Date Last Reviewed: 9/12/2014  © 7409-5635 [employer]  [address] [place] [address], [place] [address]. Todos los derechos reservados. Esta información no pretende sustituir la atención médica profesional. Sólo braxton médico puede diagnosticar y tratar un problema de marie.

## 2020-01-11 NOTE — PROGRESS NOTES
"Subjective:       Patient ID: Kang Herbert is a 84 y.o. male.    Vitals:  height is 5' 1" (1.549 m) and weight is 54.4 kg (120 lb). His temperature is 98 °F (36.7 °C). His blood pressure is 115/64 and his pulse is 78. His respiration is 18 and oxygen saturation is 95%.     Chief Complaint: Hematuria    84-year-old male with complaint of noticed some blood on urination just in the initial urine stream it clears up as he urinates finishes his urination no history of kidney stones no back pain no burning or urgency otherwise    Hematuria   This is a new problem. The current episode started yesterday. The problem has been gradually improving since onset. He describes the hematuria as microscopic hematuria. The hematuria occurs during the initial portion of his urinary stream. He reports no clotting in his urine stream. His pain is at a severity of 0/10. He is experiencing no pain. He describes his urine color as clear. Irritative symptoms do not include frequency or urgency. Pertinent negatives include no abdominal pain, chills, dysuria, fever, nausea or vomiting. He is sexually active.       Constitution: Negative for chills and fever.   Neck: Negative for painful lymph nodes.   Gastrointestinal: Negative for abdominal pain, nausea and vomiting.   Genitourinary: Positive for hematuria. Negative for dysuria, frequency, urgency, urine decreased, history of kidney stones, genital trauma, painful intercourse, genital sore, penile discharge, painful ejaculation, penile pain, penile swelling, scrotal swelling and testicular pain.   Musculoskeletal: Negative for back pain.   Skin: Negative for rash and lesion.   Hematologic/Lymphatic: Negative for swollen lymph nodes.       Objective:      Physical Exam   Constitutional: He is oriented to person, place, and time. He appears well-developed and well-nourished. He is cooperative.  Non-toxic appearance. He does not appear ill.   HENT:   Head: Normocephalic and atraumatic. "   Right Ear: Hearing, tympanic membrane, external ear and ear canal normal.   Left Ear: Hearing, tympanic membrane, external ear and ear canal normal.   Nose: Nose normal. No mucosal edema, rhinorrhea or nasal deformity. No epistaxis. Right sinus exhibits no maxillary sinus tenderness and no frontal sinus tenderness. Left sinus exhibits no maxillary sinus tenderness and no frontal sinus tenderness.   Mouth/Throat: Uvula is midline, oropharynx is clear and moist and mucous membranes are normal. No trismus in the jaw. Normal dentition. No uvula swelling. No posterior oropharyngeal erythema.   Eyes: Conjunctivae and lids are normal. Right eye exhibits no discharge. Left eye exhibits no discharge. No scleral icterus.   Neck: Trachea normal, normal range of motion, full passive range of motion without pain and phonation normal. Neck supple. No JVD present.   Cardiovascular: Normal rate, regular rhythm, normal heart sounds, intact distal pulses and normal pulses. Exam reveals no gallop and no friction rub.   No murmur heard.  Pulmonary/Chest: Effort normal and breath sounds normal. No stridor. No respiratory distress. He has no wheezes.   Abdominal: Soft. Normal appearance and bowel sounds are normal. He exhibits no distension, no pulsatile midline mass and no mass. There is no tenderness.   Musculoskeletal: Normal range of motion. He exhibits no edema or deformity.   No CVA tenderness   Lymphadenopathy:     He has no cervical adenopathy.   Neurological: He is alert and oriented to person, place, and time. He exhibits normal muscle tone. Coordination normal.   Skin: Skin is warm, dry, intact, not diaphoretic and not pale.   Psychiatric: He has a normal mood and affect. His speech is normal and behavior is normal. Judgment and thought content normal. Cognition and memory are normal.   Nursing note and vitals reviewed.        Assessment:       1. Hematuria, unspecified type        Plan:         Hematuria, unspecified  type  -     POCT Urinalysis, Dipstick, Automated, W/O Scope  -     Ambulatory referral to Urology         If persistent and gross hematuria son advised to take him to the emergency room

## 2020-01-14 ENCOUNTER — PATIENT OUTREACH (OUTPATIENT)
Dept: ADMINISTRATIVE | Facility: OTHER | Age: 85
End: 2020-01-14

## 2020-01-14 ENCOUNTER — OFFICE VISIT (OUTPATIENT)
Dept: UROLOGY | Facility: CLINIC | Age: 85
End: 2020-01-14
Payer: MEDICARE

## 2020-01-14 VITALS
HEART RATE: 86 BPM | BODY MASS INDEX: 22.51 KG/M2 | DIASTOLIC BLOOD PRESSURE: 61 MMHG | SYSTOLIC BLOOD PRESSURE: 104 MMHG | HEIGHT: 61 IN | WEIGHT: 119.25 LBS

## 2020-01-14 DIAGNOSIS — R31.0 GROSS HEMATURIA: Primary | ICD-10-CM

## 2020-01-14 PROCEDURE — 88112 CYTOPATH CELL ENHANCE TECH: CPT | Mod: HCNC | Performed by: PATHOLOGY

## 2020-01-14 PROCEDURE — 99215 PR OFFICE/OUTPT VISIT, EST, LEVL V, 40-54 MIN: ICD-10-PCS | Mod: HCNC,S$GLB,, | Performed by: PHYSICIAN ASSISTANT

## 2020-01-14 PROCEDURE — 3074F SYST BP LT 130 MM HG: CPT | Mod: HCNC,CPTII,S$GLB, | Performed by: PHYSICIAN ASSISTANT

## 2020-01-14 PROCEDURE — 1126F PR PAIN SEVERITY QUANTIFIED, NO PAIN PRESENT: ICD-10-PCS | Mod: HCNC,S$GLB,, | Performed by: PHYSICIAN ASSISTANT

## 2020-01-14 PROCEDURE — 1126F AMNT PAIN NOTED NONE PRSNT: CPT | Mod: HCNC,S$GLB,, | Performed by: PHYSICIAN ASSISTANT

## 2020-01-14 PROCEDURE — 1159F PR MEDICATION LIST DOCUMENTED IN MEDICAL RECORD: ICD-10-PCS | Mod: HCNC,S$GLB,, | Performed by: PHYSICIAN ASSISTANT

## 2020-01-14 PROCEDURE — 87086 URINE CULTURE/COLONY COUNT: CPT | Mod: HCNC

## 2020-01-14 PROCEDURE — 3078F DIAST BP <80 MM HG: CPT | Mod: HCNC,CPTII,S$GLB, | Performed by: PHYSICIAN ASSISTANT

## 2020-01-14 PROCEDURE — 1101F PR PT FALLS ASSESS DOC 0-1 FALLS W/OUT INJ PAST YR: ICD-10-PCS | Mod: HCNC,CPTII,S$GLB, | Performed by: PHYSICIAN ASSISTANT

## 2020-01-14 PROCEDURE — 88112 CYTOPATH CELL ENHANCE TECH: CPT | Mod: 26,HCNC,, | Performed by: PATHOLOGY

## 2020-01-14 PROCEDURE — 99215 OFFICE O/P EST HI 40 MIN: CPT | Mod: HCNC,S$GLB,, | Performed by: PHYSICIAN ASSISTANT

## 2020-01-14 PROCEDURE — 88112 PR  CYTOPATH, CELL ENHANCE TECH: ICD-10-PCS | Mod: 26,HCNC,, | Performed by: PATHOLOGY

## 2020-01-14 PROCEDURE — 3078F PR MOST RECENT DIASTOLIC BLOOD PRESSURE < 80 MM HG: ICD-10-PCS | Mod: HCNC,CPTII,S$GLB, | Performed by: PHYSICIAN ASSISTANT

## 2020-01-14 PROCEDURE — 3074F PR MOST RECENT SYSTOLIC BLOOD PRESSURE < 130 MM HG: ICD-10-PCS | Mod: HCNC,CPTII,S$GLB, | Performed by: PHYSICIAN ASSISTANT

## 2020-01-14 PROCEDURE — 99999 PR PBB SHADOW E&M-EST. PATIENT-LVL V: CPT | Mod: PBBFAC,HCNC,, | Performed by: PHYSICIAN ASSISTANT

## 2020-01-14 PROCEDURE — 99999 PR PBB SHADOW E&M-EST. PATIENT-LVL V: ICD-10-PCS | Mod: PBBFAC,HCNC,, | Performed by: PHYSICIAN ASSISTANT

## 2020-01-14 PROCEDURE — 1159F MED LIST DOCD IN RCRD: CPT | Mod: HCNC,S$GLB,, | Performed by: PHYSICIAN ASSISTANT

## 2020-01-14 PROCEDURE — 1101F PT FALLS ASSESS-DOCD LE1/YR: CPT | Mod: HCNC,CPTII,S$GLB, | Performed by: PHYSICIAN ASSISTANT

## 2020-01-14 RX ORDER — LIDOCAINE HYDROCHLORIDE 20 MG/ML
JELLY TOPICAL ONCE
Status: CANCELLED | OUTPATIENT
Start: 2020-01-14 | End: 2020-01-14

## 2020-01-14 RX ORDER — DOXYCYCLINE HYCLATE 100 MG
100 TABLET ORAL ONCE
Status: CANCELLED | OUTPATIENT
Start: 2020-01-14 | End: 2020-01-14

## 2020-01-14 NOTE — LETTER
January 14, 2020      Kendall Goldstein MD  [address] [place] [address]           [facility] - Urology 4th Floor  [address]  [facility] [address]  Phone: [phone]          Patient: Kang Herbert   MR Number: [id number]   YOB: 1935   Date of Visit: 1/14/2020       Dear Dr. Kendall Goldstein:    Thank you for referring Kang Herbert to me for evaluation. Attached you will find relevant portions of my assessment and plan of care.    If you have questions, please do not hesitate to call me. I look forward to following Kang Herbert along with you.    Sincerely,    Tierra Vasquez PA-C    Enclosure  CC:  No Recipients    If you would like to receive this communication electronically, please contact [email or web address] or [phone] to request more information on [employer] Link access.    For providers and/or their staff who would like to refer a patient to [facility], please contact us through our one-stop-shop provider referral line, [facility], at [phone].    If you feel you have received this communication in error or would no longer like to receive these types of communications, please e-mail [email or web address]

## 2020-01-14 NOTE — PATIENT INSTRUCTIONS
Stop metformin for 48hours after the CT scan.  Return 48hours after CT scan to have creatinine checked . He will be contacted with results and instructed on when to resume metformin.

## 2020-01-14 NOTE — PROGRESS NOTES
"CHIEF COMPLAINT:    Mr. Herbert is a 84 y.o. male presenting for "urinary problem and problem with anus".  PRESENTING ILLNESS:    Kang Herbert is a 84 y.o. male with a PMH of HTN, DM who presents for  "urinary problem and problem with anus"    He was seen by urgent care on 3 days ago for gross hematuria.   He was referred to urology for further evaluation.  Urinalysis was negative.  He reports 2 episodes.  He denies associated dysuria, frequency.  He does reports urgency.  He denies a prior history of gross hematuria.     Previous/Current Smoker: quit at least 50 years ago.  Smoked for 2 years.  Smoked once a week (on Saturdays)  Radiation therapy to pelvis: no  Chemotherapy: no  Personal/ family history of bladder/ kidney cancer: brother possibly had urological cancer  Exposure to harmful chemicals: no  History of kidney stones: not sure    He is an established patient but new to me.  He is taking finasteride.     He also reports blood with wiping and saw a few drops in the toilet from his anus.      Visit translated using an [facility] provided .   REVIEW OF SYSTEMS:    Constitutional: Negative for fever and chills.   HENT: Positive for hearing loss.  (wears hearing aids)  Eyes: Negative for visual disturbance.   Respiratory: Negative for shortness of breath.   Cardiovascular: Negative for chest pain.   Gastrointestinal: Negative for vomiting, and constipation.   Genitourinary: See HPI  Neurological: Negative for dizziness.   Hematological: Does not bruise/bleed easily.   Psychiatric/Behavioral: Negative for confusion.       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       Past Surgical History:   Procedure Laterality Date    " CATARACT EXTRACTION  11-/    od/os    CHOLECYSTECTOMY      HERNIA REPAIR         Family History   Problem Relation Age of Onset    No Known Problems Mother     No Known Problems Father     Glaucoma Sister     Diabetes Sister     Diabetes Brother     No Known Problems Maternal Aunt     No Known Problems Maternal Uncle     No Known Problems Paternal Aunt     No Known Problems Paternal Uncle     No Known Problems Maternal Grandmother     No Known Problems Maternal Grandfather     No Known Problems Paternal Grandmother     No Known Problems Paternal Grandfather     Amblyopia Neg Hx     Blindness Neg Hx     Cancer Neg Hx     Cataracts Neg Hx     Hypertension Neg Hx     Macular degeneration Neg Hx     Retinal detachment Neg Hx     Strabismus Neg Hx     Stroke Neg Hx     Thyroid disease Neg Hx     Prostate cancer Neg Hx     Kidney disease Neg Hx        Social History     Socioeconomic History    Marital status:      Spouse name: Not on file    Number of children: Not on file    Years of education: Not on file    Highest education level: Not on file   Occupational History    Occupation: retired   Social Needs    Financial resource strain: Not on file    Food insecurity:     Worry: Not on file     Inability: Not on file    Transportation needs:     Medical: Not on file     Non-medical: Not on file   Tobacco Use    Smoking status: Never Smoker    Smokeless tobacco: Never Used   Substance and Sexual Activity    Alcohol use: No    Drug use: No    Sexual activity: Not on file   Lifestyle    Physical activity:     Days per week: Not on file     Minutes per session: Not on file    Stress: Not on file   Relationships    Social connections:     Talks on phone: Not on file     Gets together: Not on file     Attends Baptism service: Not on file     Active member of club or organization: Not on file     Attends meetings of clubs or organizations: Not on file     Relationship  status: Not on file   Other Topics Concern    Not on file   Social History Narrative    Not on file       Allergies:  Patient has no known allergies.    Medications:    Current Outpatient Medications:     atorvastatin (LIPITOR) 10 MG tablet, TAKE 1 TABLET BY MOUTH EVERY DAY, Disp: 90 tablet, Rfl: 3    blood sugar diagnostic Strp, 1 strip by Misc.(Non-Drug; Combo Route) route once daily., Disp: 100 strip, Rfl: 1    blood-glucose meter (ACCU-CHEK ARUNA PLUS METER) Misc, 1 Units by Misc.(Non-Drug; Combo Route) route once daily., Disp: 1 each, Rfl: 0    escitalopram oxalate (LEXAPRO) 10 MG tablet, TAKE 1 TABLET BY MOUTH EVERY DAY, Disp: 90 tablet, Rfl: 3    finasteride (PROSCAR) 5 mg tablet, TAKE 1 TABLET BY MOUTH EVERY DAY, Disp: 90 tablet, Rfl: 3    gabapentin (NEURONTIN) 800 MG tablet, TAKE 1 TABLET (800 MG TOTAL) BY MOUTH 2 (TWO) TIMES DAILY., Disp: 180 tablet, Rfl: 3    levothyroxine (SYNTHROID) 75 MCG tablet, TAKE 1 TABLET (75 MCG TOTAL) BY MOUTH BEFORE BREAKFAST., Disp: 90 tablet, Rfl: 3    losartan (COZAAR) 25 MG tablet, Take 1 tablet (25 mg total) by mouth once daily., Disp: 90 tablet, Rfl: 3    metFORMIN (GLUCOPHAGE) 1000 MG tablet, Take 1 tablet (1,000 mg total) by mouth 2 (two) times daily with meals. Appointment needed for additional refills., Disp: 180 tablet, Rfl: 1    omeprazole (PRILOSEC) 20 MG capsule, TAKE 1 CAPSULE BY MOUTH EVERY DAY, Disp: 90 capsule, Rfl: 3    augmented betamethasone (DIPROLENE) 0.05 % lotion, APPLY TOPICALLY 2 (TWO) TIMES DAILY. FOR 10 DAYS, Disp: 60 mL, Rfl: 1    betamethasone valerate 0.1% (VALISONE) 0.1 % Lotn, Apply topically 2 (two) times daily. for 10 days, Disp: 60 mL, Rfl: 0    meloxicam (MOBIC) 15 MG tablet, TAKE 1 TABLET EVERY DAY AS NEEDED FOR PAIN MAY TAKE 1/2 TABLET 2 TIMES A DAY, Disp: 90 tablet, Rfl: 0    PHYSICAL EXAMINATION:    Constitutional: He appears well-developed and well-nourished.  He is in no apparent distress.    Eyes: No scleral icterus  noted bilaterally. No discharge bilaterally.    Nose: No rhinorrhea    Cardiovascular: Normal rate.  No pitting edema noted in lower extremities bilaterally    Pulmonary/Chest: Effort normal. No respiratory distress.     Abdominal:  He exhibits no distension.  There is no CVA tenderness.     Lymphadenopathy:        Right: No supraclavicular adenopathy present.        Left: No supraclavicular adenopathy present.     Neurological: He is alert and oriented to person, place, and time.     Skin: Skin is warm and dry.     Psych: Cooperative with normal affect.    Genitourinary:     Normal anal sphincter tone. No rectal mass.    The prostate is 30 g.  Prostate is smooth, non tender with no nodules noted.    Physical Exam      LABS:    U/a: 1.015, pH 5, tr protein, 50 glucose, tr blood otherwise negative  Lab Results   Component Value Date    PSA 0.13 01/22/2018    PSA 0.75 12/09/2008    PSA 0.8 02/08/2008    PSA 0.7 02/16/2007    PSA 0.7 01/06/2006    PSA 0.7 11/26/2004       IMPRESSION:    No diagnosis found.      PLAN:    I explained to the patient that the causes of hematuria, whether it be gross hematuria or microhematuria, are many.  In patients with gross hematuria, the chances of an underlying  malignancy are low\.    Nevertheless, I explained to the patient that the evaluation in both cases consists of upper tract imaging followed by flexible cystoscopy. I described the rationale and procedure for both and answered all questions.    Hematuria work up discussed in detail.  Will proceed with hematuria work up:  Creatinine prior to CT urogram to ensure adequate kidney function.   Cysto  CT urogram   Urine cytology  Urine culture    Patient instructed to hold metformin for 48hrs after the scan.  The patient will return to have his creatinine checked 48hr after scan. He will be contacted with results and instructed on when to resume metformin.      Recommend he follow up with colorectal for blood per rectum.   He  states that he needs a referral from his pcp.  Recommend he follow up with his pcp.      I spent 45 minutes with the patient of which more than half was spent in direct consultation with the patient in regards to our treatment and plan.    Tierra Vasquez PA-C

## 2020-01-15 ENCOUNTER — TELEPHONE (OUTPATIENT)
Dept: UROLOGY | Facility: CLINIC | Age: 85
End: 2020-01-15

## 2020-01-15 LAB
BACTERIA UR CULT: NO GROWTH
FINAL PATHOLOGIC DIAGNOSIS: NORMAL

## 2020-01-16 ENCOUNTER — TELEPHONE (OUTPATIENT)
Dept: UROLOGY | Facility: CLINIC | Age: 85
End: 2020-01-16

## 2020-01-21 ENCOUNTER — HOSPITAL ENCOUNTER (OUTPATIENT)
Dept: RADIOLOGY | Facility: HOSPITAL | Age: 85
Discharge: HOME | End: 2020-01-21
Attending: PHYSICIAN ASSISTANT
Payer: MEDICARE

## 2020-01-21 DIAGNOSIS — R31.0 GROSS HEMATURIA: ICD-10-CM

## 2020-01-21 PROCEDURE — 74178 CT ABD&PLV WO CNTR FLWD CNTR: CPT | Mod: 26,HCNC,, | Performed by: RADIOLOGY

## 2020-01-21 PROCEDURE — 74178 CT ABD&PLV WO CNTR FLWD CNTR: CPT | Mod: TC,HCNC

## 2020-01-21 PROCEDURE — 74178 CT UROGRAM ABD PELVIS W WO: ICD-10-PCS | Mod: 26,HCNC,, | Performed by: RADIOLOGY

## 2020-01-21 PROCEDURE — 25500020 PHARM REV CODE 255: Mod: HCNC | Performed by: PHYSICIAN ASSISTANT

## 2020-01-21 RX ADMIN — IOHEXOL 75 ML: 350 INJECTION, SOLUTION INTRAVENOUS at 06:01

## 2020-01-22 ENCOUNTER — TELEPHONE (OUTPATIENT)
Dept: UROLOGY | Facility: CLINIC | Age: 85
End: 2020-01-22

## 2020-01-22 NOTE — TELEPHONE ENCOUNTER
Returned called left voicemail to have her call us back.     ----- Message from Mary Fajardo sent at 1/21/2020  3:56 PM CST -----  Contact: pt daughter   Pt daughter Aislinn would like for ZAID Gonzalez to give her a call.  Call back [phone]

## 2020-01-22 NOTE — TELEPHONE ENCOUNTER
Discussed CT results with patient.    He will follow up next week for cysto.   Repeat creatinine tomorrow.  I will notify her when patient can start his metformin again.

## 2020-01-23 ENCOUNTER — LAB VISIT (OUTPATIENT)
Dept: LAB | Facility: HOSPITAL | Age: 85
End: 2020-01-23
Attending: PHYSICIAN ASSISTANT
Payer: MEDICARE

## 2020-01-23 DIAGNOSIS — R31.0 GROSS HEMATURIA: ICD-10-CM

## 2020-01-23 PROCEDURE — 36415 COLL VENOUS BLD VENIPUNCTURE: CPT | Mod: HCNC,PO

## 2020-01-23 PROCEDURE — 82565 ASSAY OF CREATININE: CPT | Mod: HCNC

## 2020-01-24 ENCOUNTER — TELEPHONE (OUTPATIENT)
Dept: UROLOGY | Facility: CLINIC | Age: 85
End: 2020-01-24

## 2020-01-24 LAB
CREAT SERPL-MCNC: 0.8 MG/DL (ref 0.5–1.4)
EST. GFR  (AFRICAN AMERICAN): >60 ML/MIN/1.73 M^2
EST. GFR  (NON AFRICAN AMERICAN): >60 ML/MIN/1.73 M^2

## 2020-01-24 NOTE — TELEPHONE ENCOUNTER
Daughter, Aislinn was informed of his creatinine results and he was instructed to restart his metformin.

## 2020-01-28 ENCOUNTER — HOSPITAL ENCOUNTER (OUTPATIENT)
Dept: UROLOGY | Facility: HOSPITAL | Age: 85
Discharge: HOME | End: 2020-01-28
Attending: FAMILY MEDICINE
Payer: MEDICARE

## 2020-01-28 VITALS
TEMPERATURE: 98 F | HEIGHT: 61 IN | DIASTOLIC BLOOD PRESSURE: 74 MMHG | SYSTOLIC BLOOD PRESSURE: 130 MMHG | RESPIRATION RATE: 18 BRPM | HEART RATE: 85 BPM | BODY MASS INDEX: 23.02 KG/M2 | WEIGHT: 121.94 LBS

## 2020-01-28 DIAGNOSIS — R31.0 GROSS HEMATURIA: ICD-10-CM

## 2020-01-28 PROCEDURE — 52000 CYSTOURETHROSCOPY: CPT | Mod: HCNC

## 2020-01-28 RX ORDER — LIDOCAINE HYDROCHLORIDE 20 MG/ML
JELLY TOPICAL ONCE
Status: COMPLETED | OUTPATIENT
Start: 2020-01-28 | End: 2020-01-28

## 2020-01-28 RX ORDER — CIPROFLOXACIN 500 MG/1
500 TABLET ORAL ONCE
Status: COMPLETED | OUTPATIENT
Start: 2020-01-28 | End: 2020-01-28

## 2020-01-28 RX ADMIN — CIPROFLOXACIN 500 MG: 500 TABLET ORAL at 04:01

## 2020-01-28 RX ADMIN — LIDOCAINE HYDROCHLORIDE: 20 JELLY TOPICAL at 04:01

## 2020-01-28 NOTE — PATIENT INSTRUCTIONS
What to Expect After a Cystoscopy  For the next 24-48 hours, you may feel a mild burning when you urinate. This burning is normal and expected. Drink plenty of water to dilute the urine to help relieve the burning sensation. You may also see a small amount of blood in your urine after the procedure.    Unless you are already taking antibiotics, you may be given an antibiotic after the test to prevent infection.    Signs and Symptoms to Report  Call the [facility] at [phone] if you develop any of the following:  · Fever of 101 degrees or higher  · Chills or persistent bleeding  · Inability to urinate

## 2020-01-28 NOTE — PROCEDURES
Cysto note      Procedures: Flexible cystourethroscopy      Pre Procedure Diagnosis: hematuria      Post Procedure Diagnosis mild bph      Surgeon: Renato Plasencia MD    Anesthesia: 2% uro-jet lidocaine jelly for local analgesia     Flexible cysto-urethroscopy was performed after consent was obtained.  The risks and benefits were explained.    2% lidocaine urojet was used for local analgesia.  The genitalia was prepped and draped in the sterile fashion.  The flexible scope was advanced into the urethra and into the bladder.  Bilateral ureteral orifice were evaluated and noted to be normal with clear efflux.  The bladder was completely surveyed in a systematic fashion.   No bladder tumors or lesions were seen.  No strictures were noted.  The prostate showed Mild hypertrophy.  There was No median lobe present.    The patient tolerated the procedure well without complication.    They will follow up in 5 month(s) with Tierra ANDRADE for ua

## 2020-02-06 DIAGNOSIS — M15.8 OTHER OSTEOARTHRITIS INVOLVING MULTIPLE JOINTS: ICD-10-CM

## 2020-02-06 RX ORDER — MELOXICAM 15 MG/1
TABLET ORAL
Qty: 90 TABLET | Refills: 0 | Status: SHIPPED | OUTPATIENT
Start: 2020-02-06 | End: 2020-04-30

## 2020-02-10 ENCOUNTER — PATIENT OUTREACH (OUTPATIENT)
Dept: ADMINISTRATIVE | Facility: OTHER | Age: 85
End: 2020-02-10

## 2020-02-10 DIAGNOSIS — E11.43 TYPE 2 DIABETES MELLITUS WITH DIABETIC AUTONOMIC NEUROPATHY, WITHOUT LONG-TERM CURRENT USE OF INSULIN: Primary | ICD-10-CM

## 2020-02-10 NOTE — PROGRESS NOTES
Care Everywhere: updated  Immunization: updated  Health Maintenance: updated  Media Review: n/a  Legacy Review: n/a  Order placed: a1c  Upcoming appts:optometry 2/12/2020

## 2020-02-12 ENCOUNTER — OFFICE VISIT (OUTPATIENT)
Dept: OPTOMETRY | Facility: CLINIC | Age: 85
End: 2020-02-12
Payer: COMMERCIAL

## 2020-02-12 DIAGNOSIS — H53.2 DIPLOPIA: ICD-10-CM

## 2020-02-12 DIAGNOSIS — H40.013 OPEN ANGLE WITH BORDERLINE FINDINGS AND LOW GLAUCOMA RISK IN BOTH EYES: ICD-10-CM

## 2020-02-12 DIAGNOSIS — E11.9 TYPE 2 DIABETES MELLITUS WITHOUT RETINOPATHY: Primary | ICD-10-CM

## 2020-02-12 PROCEDURE — 92015 PR REFRACTION: ICD-10-PCS | Mod: S$GLB,,, | Performed by: OPTOMETRIST

## 2020-02-12 PROCEDURE — 99499 UNLISTED E&M SERVICE: CPT | Mod: S$GLB,,, | Performed by: OPTOMETRIST

## 2020-02-12 PROCEDURE — 92133 POSTERIOR SEGMENT OCT OPTIC NERVE(OCULAR COHERENCE TOMOGRAPHY) - OU - BOTH EYES: ICD-10-PCS | Mod: S$GLB,,, | Performed by: OPTOMETRIST

## 2020-02-12 PROCEDURE — 92014 PR EYE EXAM, EST PATIENT,COMPREHESV: ICD-10-PCS | Mod: S$GLB,,, | Performed by: OPTOMETRIST

## 2020-02-12 PROCEDURE — 99999 PR PBB SHADOW E&M-EST. PATIENT-LVL II: CPT | Mod: PBBFAC,,, | Performed by: OPTOMETRIST

## 2020-02-12 PROCEDURE — 92015 DETERMINE REFRACTIVE STATE: CPT | Mod: S$GLB,,, | Performed by: OPTOMETRIST

## 2020-02-12 PROCEDURE — 99999 PR PBB SHADOW E&M-EST. PATIENT-LVL II: ICD-10-PCS | Mod: PBBFAC,,, | Performed by: OPTOMETRIST

## 2020-02-12 PROCEDURE — 92014 COMPRE OPH EXAM EST PT 1/>: CPT | Mod: S$GLB,,, | Performed by: OPTOMETRIST

## 2020-02-12 PROCEDURE — 99499 RISK ADDL DX/OHS AUDIT: ICD-10-PCS | Mod: S$GLB,,, | Performed by: OPTOMETRIST

## 2020-02-12 PROCEDURE — 92133 CPTRZD OPH DX IMG PST SGM ON: CPT | Mod: S$GLB,,, | Performed by: OPTOMETRIST

## 2020-02-12 NOTE — PROGRESS NOTES
HPI     Wants glasses rx updated  Uses drops at home OTC, unsure of name  Glaucoma suspect      Last edited by Victor M Loya, OD on 2/12/2020  4:17 PM. (History)            Assessment /Plan     For exam results, see Encounter Report.    Type 2 diabetes mellitus without retinopathy    Open angle with borderline findings and low glaucoma risk in both eyes  -     OCT - Optic Nerve    Diplopia      1. No diabetic retinopathy, no csme. Return in 1 year for dilated eye exam.  2. IOP normal and stable, nerve eval stable, OCT with some sup/inf loss but stable from prev,  Low tension suspect, IOP low, OCt with thinning Ou, Prev Hvf with some sup defects Ou, may be lid related, pachy thick, gonio open RTC HVf(24-2)ridge fast and consider treat.  3. Fine with prism.

## 2020-02-12 NOTE — MEDICAL/APP STUDENT
TIAL: 8/2018   Pt is here for routine exam.   Blurry vision OU at distance and near.   He would like an updated rx for distance and reading.   He uses drops 2 times a day OU (artificial tears?)   No pain, discomfort, or other irritation at this time.

## 2020-03-03 ENCOUNTER — OFFICE VISIT (OUTPATIENT)
Dept: URGENT CARE | Facility: CLINIC | Age: 85
End: 2020-03-03
Payer: MEDICARE

## 2020-03-03 VITALS
DIASTOLIC BLOOD PRESSURE: 74 MMHG | RESPIRATION RATE: 16 BRPM | WEIGHT: 121 LBS | SYSTOLIC BLOOD PRESSURE: 136 MMHG | TEMPERATURE: 100 F | BODY MASS INDEX: 22.84 KG/M2 | HEART RATE: 112 BPM | OXYGEN SATURATION: 95 % | HEIGHT: 61 IN

## 2020-03-03 DIAGNOSIS — J02.9 SORE THROAT: ICD-10-CM

## 2020-03-03 DIAGNOSIS — J11.1 FLU SYNDROME: ICD-10-CM

## 2020-03-03 DIAGNOSIS — R50.9 FEVER, UNSPECIFIED FEVER CAUSE: Primary | ICD-10-CM

## 2020-03-03 LAB
CTP QC/QA: YES
CTP QC/QA: YES
FLUAV AG NPH QL: NEGATIVE
FLUBV AG NPH QL: NEGATIVE
MOLECULAR STREP A: NEGATIVE

## 2020-03-03 PROCEDURE — 87651 STREP A DNA AMP PROBE: CPT | Mod: QW,S$GLB,, | Performed by: FAMILY MEDICINE

## 2020-03-03 PROCEDURE — 87651 POCT STREP A MOLECULAR: ICD-10-PCS | Mod: QW,S$GLB,, | Performed by: FAMILY MEDICINE

## 2020-03-03 PROCEDURE — 87804 POCT INFLUENZA A/B: ICD-10-PCS | Mod: 59,QW,S$GLB, | Performed by: FAMILY MEDICINE

## 2020-03-03 PROCEDURE — 99214 OFFICE O/P EST MOD 30 MIN: CPT | Mod: 25,S$GLB,, | Performed by: FAMILY MEDICINE

## 2020-03-03 PROCEDURE — 99214 PR OFFICE/OUTPT VISIT, EST, LEVL IV, 30-39 MIN: ICD-10-PCS | Mod: 25,S$GLB,, | Performed by: FAMILY MEDICINE

## 2020-03-03 PROCEDURE — 87804 INFLUENZA ASSAY W/OPTIC: CPT | Mod: 59,QW,S$GLB, | Performed by: FAMILY MEDICINE

## 2020-03-03 RX ORDER — PROMETHAZINE HYDROCHLORIDE AND DEXTROMETHORPHAN HYDROBROMIDE 6.25; 15 MG/5ML; MG/5ML
SYRUP ORAL
Qty: 180 ML | Refills: 0 | Status: SHIPPED | OUTPATIENT
Start: 2020-03-03 | End: 2020-06-01

## 2020-03-03 RX ORDER — LORATADINE 10 MG/1
10 TABLET ORAL DAILY
Qty: 30 TABLET | Refills: 2 | Status: SHIPPED | OUTPATIENT
Start: 2020-03-03 | End: 2021-08-22

## 2020-03-03 RX ORDER — OSELTAMIVIR PHOSPHATE 75 MG/1
75 CAPSULE ORAL 2 TIMES DAILY
Qty: 10 CAPSULE | Refills: 0 | Status: SHIPPED | OUTPATIENT
Start: 2020-03-03 | End: 2020-03-08

## 2020-03-04 NOTE — PROGRESS NOTES
"Subjective:       Patient ID: Kang Herbert is a 84 y.o. male.    Vitals:  height is 5' 1" (1.549 m) and weight is 54.9 kg (121 lb). His oral temperature is 100.2 °F (37.9 °C). His blood pressure is 136/74 and his pulse is 112 (abnormal). His respiration is 16 and oxygen saturation is 95%.     Chief Complaint: Sore Throat    CC 84-year-old male with the spouse with complaint of fever achy sore throat since this morning no nausea vomiting diarrhea feels body ache    Sore Throat    This is a new problem. The current episode started today. The problem has been unchanged. Neither side of throat is experiencing more pain than the other. The maximum temperature recorded prior to his arrival was 102 - 102.9 F. The pain is at a severity of 8/10. The pain is mild. Pertinent negatives include no congestion, coughing, diarrhea, headaches, shortness of breath or vomiting. He has tried nothing for the symptoms.       Constitution: Positive for generalized weakness. Negative for chills, fatigue and fever.   HENT: Positive for sore throat. Negative for congestion.    Neck: Negative for painful lymph nodes.   Cardiovascular: Negative for chest pain and leg swelling.   Eyes: Negative for double vision and blurred vision.   Respiratory: Negative for cough and shortness of breath.    Gastrointestinal: Negative for nausea, vomiting and diarrhea.   Genitourinary: Negative for dysuria, frequency and urgency.   Musculoskeletal: Negative for joint pain, joint swelling, muscle cramps and muscle ache.   Skin: Negative for color change, pale and rash.   Allergic/Immunologic: Negative for seasonal allergies.   Neurological: Negative for dizziness, history of vertigo, light-headedness, passing out and headaches.   Hematologic/Lymphatic: Negative for swollen lymph nodes, easy bruising/bleeding and history of blood clots. Does not bruise/bleed easily.   Psychiatric/Behavioral: Negative for nervous/anxious, sleep disturbance and depression. " The patient is not nervous/anxious.        Objective:      Physical Exam   Constitutional: He is oriented to person, place, and time. He appears well-developed and well-nourished. He is cooperative.  Non-toxic appearance. He does not appear ill. No distress.   HENT:   Head: Normocephalic and atraumatic.   Right Ear: Hearing, tympanic membrane, external ear and ear canal normal.   Left Ear: Hearing, tympanic membrane, external ear and ear canal normal.   Nose: Nose normal. No mucosal edema, rhinorrhea or nasal deformity. No epistaxis. Right sinus exhibits no maxillary sinus tenderness and no frontal sinus tenderness. Left sinus exhibits no maxillary sinus tenderness and no frontal sinus tenderness.   Mouth/Throat: Uvula is midline, oropharynx is clear and moist and mucous membranes are normal. No trismus in the jaw. Normal dentition. No uvula swelling. No oropharyngeal exudate or posterior oropharyngeal erythema.   Throat is clear TMs normal no lymphadenopathy   Eyes: Conjunctivae and lids are normal. Right eye exhibits no discharge. Left eye exhibits no discharge. No scleral icterus.   Neck: Trachea normal, normal range of motion, full passive range of motion without pain and phonation normal. Neck supple. No JVD present. No tracheal deviation present.   Cardiovascular: Normal rate, regular rhythm, normal heart sounds, intact distal pulses and normal pulses. Exam reveals no friction rub.   No murmur heard.  Pulmonary/Chest: Effort normal and breath sounds normal. No stridor. No respiratory distress. He has no wheezes. He has no rales.   Abdominal: Soft. Normal appearance and bowel sounds are normal. He exhibits no distension, no pulsatile midline mass and no mass. There is no tenderness.   Musculoskeletal: Normal range of motion. He exhibits no edema or deformity.   Lymphadenopathy:     He has no cervical adenopathy.   Neurological: He is alert and oriented to person, place, and time. He exhibits normal muscle tone.  Coordination normal.   Skin: Skin is warm, dry, intact, not diaphoretic and not pale.   Psychiatric: He has a normal mood and affect. His speech is normal and behavior is normal. Judgment and thought content normal. Cognition and memory are normal.   Nursing note and vitals reviewed.        Assessment:       1. Fever, unspecified fever cause    2. Sore throat    3. Flu syndrome        Plan:         Fever, unspecified fever cause  -     POCT Influenza A/B  -     POCT Strep A, Molecular    Sore throat  -     POCT Strep A, Molecular    Flu syndrome    Other orders  -     oseltamivir (TAMIFLU) 75 MG capsule; Take 1 capsule (75 mg total) by mouth 2 (two) times daily. for 5 days  Dispense: 10 capsule; Refill: 0  -     loratadine (CLARITIN) 10 mg tablet; Take 1 tablet (10 mg total) by mouth once daily.  Dispense: 30 tablet; Refill: 2  -     promethazine-dextromethorphan (PROMETHAZINE-DM) 6.25-15 mg/5 mL Syrp; Take one tsp po q 6 hrs prn cough  Dispense: 180 mL; Refill: 0        patient advised to take Tylenol alternate with Motrin 2 p.o. q.6 p.r.n. fever achiness

## 2020-03-13 RX ORDER — DEXTROSE 4 G
1 TABLET,CHEWABLE ORAL DAILY
Qty: 1 EACH | Refills: 0 | Status: SHIPPED | OUTPATIENT
Start: 2020-03-13 | End: 2021-11-04 | Stop reason: SDUPTHER

## 2020-03-13 NOTE — TELEPHONE ENCOUNTER
Daughter Aislinn requested a new glucometer, they're other one is broken. Please send to [facility]. Awaiting approval or denial.

## 2020-03-13 NOTE — TELEPHONE ENCOUNTER
----- Message from Ebony Del Real sent at 3/13/2020 12:10 PM CDT -----  Contact: Aislinn Pt's daughter [phone]  Pt's daughter is requesting new diabetic supply for Pt's. Please advise.

## 2020-03-15 ENCOUNTER — PATIENT OUTREACH (OUTPATIENT)
Dept: ADMINISTRATIVE | Facility: OTHER | Age: 85
End: 2020-03-15

## 2020-03-16 NOTE — PROGRESS NOTES
Care Everywhere: updated  Immunization: updated  Health Maintenance: updated  Media Review: n/a  Legacy Review: n/a  Order placed: n/a  Upcoming appts:n/a

## 2020-04-30 DIAGNOSIS — M15.8 OTHER OSTEOARTHRITIS INVOLVING MULTIPLE JOINTS: ICD-10-CM

## 2020-04-30 DIAGNOSIS — E11.9 TYPE 2 DIABETES MELLITUS WITHOUT COMPLICATION, WITHOUT LONG-TERM CURRENT USE OF INSULIN: ICD-10-CM

## 2020-04-30 RX ORDER — MELOXICAM 15 MG/1
TABLET ORAL
Qty: 90 TABLET | Refills: 0 | Status: SHIPPED | OUTPATIENT
Start: 2020-04-30 | End: 2020-09-28

## 2020-05-01 RX ORDER — METFORMIN HYDROCHLORIDE 1000 MG/1
1000 TABLET ORAL 2 TIMES DAILY WITH MEALS
Qty: 180 TABLET | Refills: 3 | Status: SHIPPED | OUTPATIENT
Start: 2020-05-01 | End: 2021-05-27 | Stop reason: SDUPTHER

## 2020-05-03 DIAGNOSIS — E03.9 HYPOTHYROIDISM, UNSPECIFIED TYPE: ICD-10-CM

## 2020-05-04 RX ORDER — LEVOTHYROXINE SODIUM 75 UG/1
75 TABLET ORAL
Qty: 90 TABLET | Refills: 3 | Status: SHIPPED | OUTPATIENT
Start: 2020-05-04 | End: 2021-04-26

## 2020-05-20 ENCOUNTER — TELEPHONE (OUTPATIENT)
Dept: FAMILY MEDICINE | Facility: CLINIC | Age: 85
End: 2020-05-20

## 2020-05-20 NOTE — TELEPHONE ENCOUNTER
----- Message from Carol Rojo sent at 5/20/2020  2:21 PM CDT -----  Contact: Aislinn (daughter)/[phone]  Type:  Needs Medical Advice    Who Called:  Aislinn  Symptoms (please be specific):  Right foot strange sensations  How long has patient had these symptoms:  A month now  Pharmacy name and phone #:   n/a  Would the patient rather a call back or a response via [facility]?  Call back  Best Call Back Number:  [phone]  Additional Information:  Please call her to discuss his options in regard to this

## 2020-05-20 NOTE — TELEPHONE ENCOUNTER
Spoke to Aislinn, she reported mr Herbert has been having tingling sensations and numbness, she reported he is diabetic.attempted to schedule an appt but Mrs. jenkins refused, prefers  your recommendation. Please advise. I informed her that Dr. Pritchett will be back in the office on Monday. She verbalized an understanding.

## 2020-05-26 ENCOUNTER — TELEPHONE (OUTPATIENT)
Dept: FAMILY MEDICINE | Facility: CLINIC | Age: 85
End: 2020-05-26

## 2020-05-26 NOTE — TELEPHONE ENCOUNTER
----- Message from Ingrid Singletary sent at 5/26/2020  9:31 AM CDT -----  Contact: daughter  Type:  Needs Medical Advice    Who Called: daughter  Reason:Needs to schedule a virtual visit. Father is having weird sensations in his feet  Would the patient rather a call back or a response via [facility]? callback  Best Call Back Number: [phone]  Additional Information: none

## 2020-06-01 ENCOUNTER — OFFICE VISIT (OUTPATIENT)
Dept: FAMILY MEDICINE | Facility: CLINIC | Age: 85
End: 2020-06-01
Payer: MEDICARE

## 2020-06-01 DIAGNOSIS — M25.511 CHRONIC PAIN OF BOTH SHOULDERS: ICD-10-CM

## 2020-06-01 DIAGNOSIS — E11.65 TYPE 2 DIABETES MELLITUS WITH HYPERGLYCEMIA, WITHOUT LONG-TERM CURRENT USE OF INSULIN: ICD-10-CM

## 2020-06-01 DIAGNOSIS — I10 ESSENTIAL HYPERTENSION: ICD-10-CM

## 2020-06-01 DIAGNOSIS — M25.552 CHRONIC PAIN OF BOTH HIPS: ICD-10-CM

## 2020-06-01 DIAGNOSIS — G89.29 CHRONIC PAIN OF BOTH SHOULDERS: ICD-10-CM

## 2020-06-01 DIAGNOSIS — G89.29 CHRONIC PAIN OF BOTH HIPS: ICD-10-CM

## 2020-06-01 DIAGNOSIS — G89.29 CHRONIC NECK PAIN: ICD-10-CM

## 2020-06-01 DIAGNOSIS — M25.551 CHRONIC PAIN OF BOTH HIPS: ICD-10-CM

## 2020-06-01 DIAGNOSIS — M25.512 CHRONIC PAIN OF BOTH SHOULDERS: ICD-10-CM

## 2020-06-01 DIAGNOSIS — G62.9 NEUROPATHY: Primary | ICD-10-CM

## 2020-06-01 DIAGNOSIS — M54.2 CHRONIC NECK PAIN: ICD-10-CM

## 2020-06-01 DIAGNOSIS — M25.50 ARTHRALGIA, UNSPECIFIED JOINT: ICD-10-CM

## 2020-06-01 PROCEDURE — 99442 PR PHYSICIAN TELEPHONE EVALUATION 11-20 MIN: CPT | Mod: 95,,, | Performed by: FAMILY MEDICINE

## 2020-06-01 PROCEDURE — 99442 PR PHYSICIAN TELEPHONE EVALUATION 11-20 MIN: ICD-10-PCS | Mod: 95,,, | Performed by: FAMILY MEDICINE

## 2020-06-01 NOTE — PROGRESS NOTES
Established Patient - Audio Only Telehealth Visit     The patient location is:  [place].  The chief complaint leading to consultation is:  Multiple joints pain.  Numbness and tingling  Visit type: Virtual visit with audio only (telephone)  Total time spent with patient: 15 min       The reason for the audio only service rather than synchronous audio and video virtual visit was related to technical difficulties or patient preference/necessity.     Each patient to whom I provide medical services by telemedicine is:  (1) informed of the relationship between the physician and patient and the respective role of any other health care provider with respect to management of the patient; and (2) notified that they may decline to receive medical services by telemedicine and may withdraw from such care at any time. Patient verbally consented to receive this service via voice-only telephone call.       HPI:  85 years old male who was evaluated by telemedicine.  Patient with multiple joints pain the last year.  The pain is 6/10 of intensity on and off aggravated with activity and better with rest.  Patient with pain around the neck bilateral shoulders and hips.  Patient was using meloxicam with partial improvement of the symptoms.  Patient also with numbness and tingling in both lower extremities.  He is taking gabapentin with partial improvement of the symptoms.  No recent A1c.  Patient with good compliance with the losartan .  No chest pain, palpitation, orthopnea or PND.     Assessment and plan:  Diagnoses and all orders for this visit:    Neuropathy  -     CDBCB cascade diclofenac 3%- baclofen 2%- cyclobenzaprine 2%- bupivacaine 2% in a lipoderm cream; Apply 1 Pump (1.6 g total) topically 2 (two) times daily as needed. Apply 1 to 2 grams topically to affected area 3 to 4 times daily    Essential hypertension  -     Comprehensive metabolic panel; Future    Chronic neck pain  -     X-Ray Cervical Spine AP And Lateral;  Future    Chronic pain of both hips  -     X-Ray Hip 2 View Left; Future  -     X-Ray Hip 2 View Right; Future    Chronic pain of both shoulders  -     X-Ray Shoulder 2 or More Views Left; Future  -     X-ray Shoulder 2 or More Views Right; Future    Type 2 diabetes mellitus with hyperglycemia, without long-term current use of insulin  -     Comprehensive metabolic panel; Future  -     Hemoglobin A1C; Future    Arthralgia, unspecified joint  -     Sedimentation rate; Future  -     C-Reactive Protein; Future  -     BONIFACIO Screen w/Reflex; Future  -     Rheumatoid factor; Future  -     Uric acid; Future       Continue monitoring blood pressure at home, low sodium diet.  Continue monitoring blood sugar at home,ADA diet.                     This service was not originating from a related E/M service provided within the previous 7 days nor will  to an E/M service or procedure within the next 24 hours or my soonest available appointment.  Prevailing standard of care was able to be met in this audio-only visit.

## 2020-06-02 ENCOUNTER — HOSPITAL ENCOUNTER (OUTPATIENT)
Dept: RADIOLOGY | Facility: HOSPITAL | Age: 85
Discharge: HOME | End: 2020-06-02
Attending: FAMILY MEDICINE
Payer: MEDICARE

## 2020-06-02 DIAGNOSIS — G89.29 CHRONIC PAIN OF BOTH SHOULDERS: ICD-10-CM

## 2020-06-02 DIAGNOSIS — M25.552 CHRONIC PAIN OF BOTH HIPS: ICD-10-CM

## 2020-06-02 DIAGNOSIS — M25.511 CHRONIC PAIN OF BOTH SHOULDERS: ICD-10-CM

## 2020-06-02 DIAGNOSIS — M25.551 CHRONIC PAIN OF BOTH HIPS: ICD-10-CM

## 2020-06-02 DIAGNOSIS — M25.512 CHRONIC PAIN OF BOTH SHOULDERS: ICD-10-CM

## 2020-06-02 DIAGNOSIS — G89.29 CHRONIC PAIN OF BOTH HIPS: ICD-10-CM

## 2020-06-02 DIAGNOSIS — G89.29 CHRONIC NECK PAIN: ICD-10-CM

## 2020-06-02 DIAGNOSIS — M54.2 CHRONIC NECK PAIN: ICD-10-CM

## 2020-06-02 PROCEDURE — 72040 X-RAY EXAM NECK SPINE 2-3 VW: CPT | Mod: TC,FY,PO

## 2020-06-02 PROCEDURE — 73502 XR HIP 2 VIEW LEFT: ICD-10-PCS | Mod: 26,LT,, | Performed by: RADIOLOGY

## 2020-06-02 PROCEDURE — 72040 XR CERVICAL SPINE AP LATERAL: ICD-10-PCS | Mod: 26,,, | Performed by: RADIOLOGY

## 2020-06-02 PROCEDURE — 73502 X-RAY EXAM HIP UNI 2-3 VIEWS: CPT | Mod: TC,FY,PO,RT

## 2020-06-02 PROCEDURE — 73030 X-RAY EXAM OF SHOULDER: CPT | Mod: TC,FY,PO,RT

## 2020-06-02 PROCEDURE — 73030 X-RAY EXAM OF SHOULDER: CPT | Mod: 26,LT,, | Performed by: RADIOLOGY

## 2020-06-02 PROCEDURE — 73030 XR SHOULDER COMPLETE 2 OR MORE VIEWS RIGHT: ICD-10-PCS | Mod: 26,RT,, | Performed by: RADIOLOGY

## 2020-06-02 PROCEDURE — 73502 X-RAY EXAM HIP UNI 2-3 VIEWS: CPT | Mod: 26,RT,, | Performed by: RADIOLOGY

## 2020-06-02 PROCEDURE — 73502 X-RAY EXAM HIP UNI 2-3 VIEWS: CPT | Mod: 26,LT,, | Performed by: RADIOLOGY

## 2020-06-02 PROCEDURE — 73502 X-RAY EXAM HIP UNI 2-3 VIEWS: CPT | Mod: TC,FY,PO,LT

## 2020-06-02 PROCEDURE — 73030 X-RAY EXAM OF SHOULDER: CPT | Mod: 26,RT,, | Performed by: RADIOLOGY

## 2020-06-02 PROCEDURE — 73030 X-RAY EXAM OF SHOULDER: CPT | Mod: TC,FY,PO,LT

## 2020-06-02 PROCEDURE — 73030 XR SHOULDER COMPLETE 2 OR MORE VIEWS LEFT: ICD-10-PCS | Mod: 26,LT,, | Performed by: RADIOLOGY

## 2020-06-02 PROCEDURE — 72040 X-RAY EXAM NECK SPINE 2-3 VW: CPT | Mod: 26,,, | Performed by: RADIOLOGY

## 2020-06-03 ENCOUNTER — TELEPHONE (OUTPATIENT)
Dept: FAMILY MEDICINE | Facility: CLINIC | Age: 85
End: 2020-06-03

## 2020-06-03 NOTE — TELEPHONE ENCOUNTER
----- Message from Carol Rojo sent at 6/3/2020 12:56 PM CDT -----  Contact: Aislinn (daughter)  Type:  Test Results    Who Called:  Aislinn  Name of Test (Lab/Mammo/Etc):  Xrays. Blood work  Date of Test:  06/02/2020  Ordering Provider:  Shreyas  Where the test was performed:  [facility]  Would the patient rather a call back or a response via [facility]?  Call back  Best Call Back Number:  [phone]  Additional Information:   Please call today

## 2020-06-03 NOTE — TELEPHONE ENCOUNTER
attempted to return, left detailed voicemail that once Dr. Pritchett reviews lab work the results will be released to them.

## 2020-06-08 DIAGNOSIS — G62.9 NEUROPATHY: ICD-10-CM

## 2020-06-08 NOTE — TELEPHONE ENCOUNTER
Please check if [facility] is doing compounding.    Cream to be filled in the pharmacy that do compounding.

## 2020-06-08 NOTE — TELEPHONE ENCOUNTER
----- Message from Babatunde Ricardo sent at 6/8/2020 11:44 AM CDT -----  Contact: Pt daughter Nanette  Pt daughter Nanette called and said that the pt was prescribed a cream and [facility] and [facility] don't have the prescription    Nanette can be reached [phone]

## 2020-06-09 ENCOUNTER — TELEPHONE (OUTPATIENT)
Dept: FAMILY MEDICINE | Facility: CLINIC | Age: 85
End: 2020-06-09

## 2020-06-09 DIAGNOSIS — R76.8 ANA POSITIVE: Primary | ICD-10-CM

## 2020-06-09 RX ORDER — GABAPENTIN 800 MG/1
800 TABLET ORAL 2 TIMES DAILY
Qty: 180 TABLET | Refills: 3 | Status: SHIPPED | OUTPATIENT
Start: 2020-06-09 | End: 2021-09-02 | Stop reason: SDUPTHER

## 2020-06-09 NOTE — TELEPHONE ENCOUNTER
Test for possible lupus was positive.      I ordered evaluation with rheumatology.        Please contact the patient with appointment.

## 2020-06-09 NOTE — TELEPHONE ENCOUNTER
Spoke with daughter in great detail regarding results. Daughter is upset because cream (for pain)was never followed thru. Informed daughter that the rx originally went to [facility] pharmacy. Informed new rx was sent to [facility] last night because this is a compound drug and [facility] could not fill it. . Spoke with compound pharmacist Osiris today and they are filling rx. Daughter voices understanding.

## 2020-06-09 NOTE — TELEPHONE ENCOUNTER
----- Message from Gale Edwards sent at 6/9/2020  8:39 AM CDT -----  Contact: Pt DAUGHTER   PT daughter Damon ,is calling in regards to lab results and prescription for neuropathy , please contact damon at [phone]    Thanks

## 2020-06-09 NOTE — TELEPHONE ENCOUNTER
----- Message from Gale Edwards sent at 6/9/2020  8:39 AM CDT -----  Contact: Pt DAUGHTER   PT daughter Damon ,is calling in regards to lab results and prescription for neuropathy , please contact damon at [phone]    Thanks

## 2020-06-09 NOTE — TELEPHONE ENCOUNTER
I inform patients daughter that patient has lupus and that he was referred to a rheumatologist for an evaluation. Patients daughter voices understanding. Also that a  will call him to schedule rheumatologist appointment.

## 2020-06-11 ENCOUNTER — OFFICE VISIT (OUTPATIENT)
Dept: RHEUMATOLOGY | Facility: CLINIC | Age: 85
End: 2020-06-11
Payer: MEDICARE

## 2020-06-11 ENCOUNTER — LAB VISIT (OUTPATIENT)
Dept: LAB | Facility: HOSPITAL | Age: 85
End: 2020-06-11
Attending: INTERNAL MEDICINE
Payer: MEDICARE

## 2020-06-11 VITALS
SYSTOLIC BLOOD PRESSURE: 144 MMHG | HEART RATE: 93 BPM | WEIGHT: 117.5 LBS | DIASTOLIC BLOOD PRESSURE: 87 MMHG | BODY MASS INDEX: 22.2 KG/M2

## 2020-06-11 DIAGNOSIS — R76.8 ANA POSITIVE: ICD-10-CM

## 2020-06-11 DIAGNOSIS — G25.81 RESTLESS LEG SYNDROME: Primary | ICD-10-CM

## 2020-06-11 LAB
BASOPHILS # BLD AUTO: 0.04 K/UL (ref 0–0.2)
BASOPHILS NFR BLD: 0.6 % (ref 0–1.9)
C3 SERPL-MCNC: 101 MG/DL (ref 50–180)
C4 SERPL-MCNC: 20 MG/DL (ref 11–44)
DIFFERENTIAL METHOD: ABNORMAL
EOSINOPHIL # BLD AUTO: 0.2 K/UL (ref 0–0.5)
EOSINOPHIL NFR BLD: 2.7 % (ref 0–8)
ERYTHROCYTE [DISTWIDTH] IN BLOOD BY AUTOMATED COUNT: 13.3 % (ref 11.5–14.5)
HCT VFR BLD AUTO: 42.7 % (ref 40–54)
HGB BLD-MCNC: 13.7 G/DL (ref 14–18)
IMM GRANULOCYTES # BLD AUTO: 0.02 K/UL (ref 0–0.04)
IMM GRANULOCYTES NFR BLD AUTO: 0.3 % (ref 0–0.5)
LYMPHOCYTES # BLD AUTO: 1.9 K/UL (ref 1–4.8)
LYMPHOCYTES NFR BLD: 30.3 % (ref 18–48)
MCH RBC QN AUTO: 29.7 PG (ref 27–31)
MCHC RBC AUTO-ENTMCNC: 32.1 G/DL (ref 32–36)
MCV RBC AUTO: 92 FL (ref 82–98)
MONOCYTES # BLD AUTO: 0.6 K/UL (ref 0.3–1)
MONOCYTES NFR BLD: 9.4 % (ref 4–15)
NEUTROPHILS # BLD AUTO: 3.6 K/UL (ref 1.8–7.7)
NEUTROPHILS NFR BLD: 57 % (ref 38–73)
NRBC BLD-RTO: 0 /100 WBC
PLATELET # BLD AUTO: 158 K/UL (ref 150–350)
PMV BLD AUTO: 11.1 FL (ref 9.2–12.9)
RBC # BLD AUTO: 4.62 M/UL (ref 4.6–6.2)
WBC # BLD AUTO: 6.28 K/UL (ref 3.9–12.7)

## 2020-06-11 PROCEDURE — 1101F PT FALLS ASSESS-DOCD LE1/YR: CPT | Mod: CPTII,S$GLB,, | Performed by: INTERNAL MEDICINE

## 2020-06-11 PROCEDURE — 86376 MICROSOMAL ANTIBODY EACH: CPT

## 2020-06-11 PROCEDURE — 3077F PR MOST RECENT SYSTOLIC BLOOD PRESSURE >= 140 MM HG: ICD-10-PCS | Mod: CPTII,S$GLB,, | Performed by: INTERNAL MEDICINE

## 2020-06-11 PROCEDURE — 1159F PR MEDICATION LIST DOCUMENTED IN MEDICAL RECORD: ICD-10-PCS | Mod: S$GLB,,, | Performed by: INTERNAL MEDICINE

## 2020-06-11 PROCEDURE — 36415 COLL VENOUS BLD VENIPUNCTURE: CPT

## 2020-06-11 PROCEDURE — 86160 COMPLEMENT ANTIGEN: CPT | Mod: 59

## 2020-06-11 PROCEDURE — 3079F PR MOST RECENT DIASTOLIC BLOOD PRESSURE 80-89 MM HG: ICD-10-PCS | Mod: CPTII,S$GLB,, | Performed by: INTERNAL MEDICINE

## 2020-06-11 PROCEDURE — 99205 OFFICE O/P NEW HI 60 MIN: CPT | Mod: S$GLB,,, | Performed by: INTERNAL MEDICINE

## 2020-06-11 PROCEDURE — 86160 COMPLEMENT ANTIGEN: CPT

## 2020-06-11 PROCEDURE — 99205 PR OFFICE/OUTPT VISIT, NEW, LEVL V, 60-74 MIN: ICD-10-PCS | Mod: S$GLB,,, | Performed by: INTERNAL MEDICINE

## 2020-06-11 PROCEDURE — 86147 CARDIOLIPIN ANTIBODY EA IG: CPT | Mod: 59

## 2020-06-11 PROCEDURE — 99999 PR PBB SHADOW E&M-EST. PATIENT-LVL III: ICD-10-PCS | Mod: PBBFAC,,, | Performed by: INTERNAL MEDICINE

## 2020-06-11 PROCEDURE — 85613 RUSSELL VIPER VENOM DILUTED: CPT

## 2020-06-11 PROCEDURE — 1159F MED LIST DOCD IN RCRD: CPT | Mod: S$GLB,,, | Performed by: INTERNAL MEDICINE

## 2020-06-11 PROCEDURE — 83516 IMMUNOASSAY NONANTIBODY: CPT

## 2020-06-11 PROCEDURE — 86146 BETA-2 GLYCOPROTEIN ANTIBODY: CPT | Mod: 59

## 2020-06-11 PROCEDURE — 3077F SYST BP >= 140 MM HG: CPT | Mod: CPTII,S$GLB,, | Performed by: INTERNAL MEDICINE

## 2020-06-11 PROCEDURE — 1125F PR PAIN SEVERITY QUANTIFIED, PAIN PRESENT: ICD-10-PCS | Mod: S$GLB,,, | Performed by: INTERNAL MEDICINE

## 2020-06-11 PROCEDURE — 1125F AMNT PAIN NOTED PAIN PRSNT: CPT | Mod: S$GLB,,, | Performed by: INTERNAL MEDICINE

## 2020-06-11 PROCEDURE — 99999 PR PBB SHADOW E&M-EST. PATIENT-LVL III: CPT | Mod: PBBFAC,,, | Performed by: INTERNAL MEDICINE

## 2020-06-11 PROCEDURE — 1101F PR PT FALLS ASSESS DOC 0-1 FALLS W/OUT INJ PAST YR: ICD-10-PCS | Mod: CPTII,S$GLB,, | Performed by: INTERNAL MEDICINE

## 2020-06-11 PROCEDURE — 3079F DIAST BP 80-89 MM HG: CPT | Mod: CPTII,S$GLB,, | Performed by: INTERNAL MEDICINE

## 2020-06-11 PROCEDURE — 85025 COMPLETE CBC W/AUTO DIFF WBC: CPT

## 2020-06-11 RX ORDER — ROPINIROLE 0.25 MG/1
0.25 TABLET, FILM COATED ORAL NIGHTLY
Qty: 90 TABLET | Refills: 3 | Status: SHIPPED | OUTPATIENT
Start: 2020-06-11 | End: 2022-08-11 | Stop reason: SDUPTHER

## 2020-06-11 NOTE — LETTER
June 11, 2020      Zeke Marrufo MD  [address] [address]           [facility]  [address] [place] [address]  Phone: [phone]  Fax: [phone]          Patient: Kang Herbert   MR Number: [id number]   YOB: 1935   Date of Visit: 6/11/2020       Dear Dr. Zeke Marrufo:    Thank you for referring Kang Herbert to me for evaluation. Attached you will find relevant portions of my assessment and plan of care.    If you have questions, please do not hesitate to call me. I look forward to following Kang Herbert along with you.    Sincerely,    Deonna Chino MD    Enclosure  CC:  No Recipients    If you would like to receive this communication electronically, please contact [email or web address] or [phone] to request more information on [facility] access.    For providers and/or their staff who would like to refer a patient to [facility], please contact us through our one-stop-shop provider referral line, [facility], at [phone].    If you feel you have received this communication in error or would no longer like to receive these types of communications, please e-mail [email or web address]

## 2020-06-11 NOTE — PROGRESS NOTES
CC:  Chief Complaint   Patient presents with    New patient     Lupus??- pain back of  legs lately     Referred by Zeke Marrufo MD      History of Present Illness:  Kang Anderson a 85 y.o.yo male presents for further evaluation of a positive BONIFACIO and leg cramps  Was recently evaluated by his PCP for bilateral leg cramps noted at night  Further lab testing suggested a positive BONIFACIO 1 in 80, homogeneous with negative profile  He denies any dry eyes, dry mouth, oral ulcers  Denies any hand pain or early morning stiffness  No other joint swelling    No other symptoms noted  He has history of hypothyroidism on supplements  Other medications include gabapentin and statins  He has history of type 2 diabetes    He does not speak good English so his son helps me with  history    Review of Systems:  Constitutional: Denies fever, chills. No recent weight changes.   Fatigue: no  Muscle weakness: no  Headaches: no new headaches  Eyes: No redness .  No recent visual changes.  ENT:  No oral or nasal ulcers.  Card: No chest pain.  Resp: No cough or sob.   Gastro: No nausea or vomiting.  No heartburn.  Constipation: no  Diarrhea: no  Genito:  No dysuria.  No genital ulcers.  Skin: No rash.  Raynauds:no  Neuro: No numbness / tingling.   Psych: No depression, anxiety  Endo:  no excess thirst.  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       Past Surgical History:   Procedure Laterality Date    CATARACT EXTRACTION  11-/    od/os    CHOLECYSTECTOMY      HERNIA REPAIR           Social History     Tobacco Use    Smoking status: Never Smoker    Smokeless tobacco: Never Used   Substance Use Topics    Alcohol  use: No    Drug use: No       Family History   Problem Relation Age of Onset    No Known Problems Mother     No Known Problems Father     Glaucoma Sister     Diabetes Sister     Diabetes Brother     No Known Problems Maternal Aunt     No Known Problems Maternal Uncle     No Known Problems Paternal Aunt     No Known Problems Paternal Uncle     No Known Problems Maternal Grandmother     No Known Problems Maternal Grandfather     No Known Problems Paternal Grandmother     No Known Problems Paternal Grandfather     Amblyopia Neg Hx     Blindness Neg Hx     Cancer Neg Hx     Cataracts Neg Hx     Hypertension Neg Hx     Macular degeneration Neg Hx     Retinal detachment Neg Hx     Strabismus Neg Hx     Stroke Neg Hx     Thyroid disease Neg Hx     Prostate cancer Neg Hx     Kidney disease Neg Hx        Review of patient's allergies indicates:  No Known Allergies      Statin use:  Yes      OBJECTIVE:     Vital Signs   Vitals:    06/11/20 1210   BP: (!) 144/87   Pulse: 93     Physical Exam:  General Appearance:  NAD.   Gait: not favoring.  HEENT: PERRL.  Eyes not dry or injected.  No nasal ulcers.  Mouth not dry, no oral lesions.  Lymph: cervical, supraclavicular or axillary nodes: none abnormal   Cardio: no irregularity of S1 or S2.  No gallops or rubs.   Resp: Normal respiratory motion. Clear to auscultation bilaterally.   No abnormal chest conformation.  Abd: Soft, non-tender, nondistended.  No masses.   Skin: Head and neck,  and extremities examined. No rashes.   Neuro: Ox3.   Cranial nerves II-XII grossly intact.   Sensation intact  in both distal LE and upper extremities to light touch.  Musculoskeletal Exam:    B/l hands  With osteoarthritic changes, + herbedens nodes     Muscle strength:Equal and full in all mm groups of the upper and lower ext.    Laboratory: I have reviewed all of the patient's relevant lab work available in the medical record and have utilized this in my evaluation and  management recommendations today    Imaging : I have reviewed all of the patient's diagnostic/imaging results available in the medical record and have utilized this in my evaluation and management recommendations today.    Notes reviewed  Other procedures:    ASSESSMENT/PLAN:     Restless leg syndrome  -     rOPINIRole (REQUIP) 0.25 MG tablet; Take 1 tablet (0.25 mg total) by mouth every evening.  Dispense: 90 tablet; Refill: 3    BONIFACIO positive  -     Ambulatory referral/consult to Rheumatology  -     C3 complement; Standing; Expected date: 06/11/2020  -     C4 complement; Standing; Expected date: 06/11/2020  -     Urinalysis; Standing  -     DRVVT; Future; Expected date: 06/11/2020  -     Cardiolipin antibody; Future; Expected date: 06/11/2020  -     Beta-2 glycoprotein Abs (IgA, IgG, IgM); Future; Expected date: 06/11/2020  -     THYROID PEROXIDASE ANTIBODY; Future; Expected date: 06/11/2020  -     Anti-Histone Antibody; Future; Expected date: 06/11/2020  -     CBC auto differential; Standing      BONIFACIO positive 1:80, homogeneous with negative profile  No other signs or symptoms suggestive of active connective tissue disease  Check labs as above to assess disease activity    Restless leg syndrome:  Start Requip 0.25 mg at bedtime and if symptoms persist increase to 0.5 mg at bedtime  Stay well-hydrated    Try vitamin-D and E supplements    Risks vs Benefits and potential side effects of medication prescribed today were discussed with patient. Medication literature given to patient up discharge  Went over uptodate information /literature on the meds prescribed today    Patient advised to hold DMARD and/or biologic therapy if any for signs of infection or for surgery. If you are unsure what to do please call our office for instruction. [facility] [phone]    Thank you for allowing us to participate in the care of this patient    Disclaimer: This note was prepared using voice recognition system and  is likely to have sound alike errors and is not proof read.  Please call me with any questions.

## 2020-06-12 LAB — THYROPEROXIDASE IGG SERPL-ACNC: <6 IU/ML

## 2020-06-14 LAB — HISTONE IGG SER IA-ACNC: 0.4 UNITS (ref 0–0.9)

## 2020-06-15 LAB
B2 GLYCOPROT1 IGA SER QL: <9 SAU
B2 GLYCOPROT1 IGG SER QL: <9 SGU
B2 GLYCOPROT1 IGM SER QL: <9 SMU

## 2020-06-16 DIAGNOSIS — H40.023 OPEN ANGLE WITH BORDERLINE FINDINGS AND HIGH GLAUCOMA RISK IN BOTH EYES: Primary | ICD-10-CM

## 2020-06-16 LAB
CARDIOLIPIN IGG SER IA-ACNC: <9.4 GPL (ref 0–14.99)
CARDIOLIPIN IGM SER IA-ACNC: <9.4 MPL (ref 0–12.49)
LA PPP-IMP: NEGATIVE

## 2020-06-17 ENCOUNTER — OFFICE VISIT (OUTPATIENT)
Dept: OPTOMETRY | Facility: CLINIC | Age: 85
End: 2020-06-17
Payer: COMMERCIAL

## 2020-06-17 ENCOUNTER — CLINICAL SUPPORT (OUTPATIENT)
Dept: OPHTHALMOLOGY | Facility: CLINIC | Age: 85
End: 2020-06-17
Payer: MEDICARE

## 2020-06-17 DIAGNOSIS — H26.492 LEFT POSTERIOR CAPSULAR OPACIFICATION: ICD-10-CM

## 2020-06-17 DIAGNOSIS — H40.023 OPEN ANGLE WITH BORDERLINE FINDINGS AND HIGH GLAUCOMA RISK IN BOTH EYES: ICD-10-CM

## 2020-06-17 DIAGNOSIS — H40.023 OPEN ANGLE WITH BORDERLINE FINDINGS AND HIGH GLAUCOMA RISK IN BOTH EYES: Primary | ICD-10-CM

## 2020-06-17 PROCEDURE — 99999 PR PBB SHADOW E&M-EST. PATIENT-LVL III: ICD-10-PCS | Mod: PBBFAC,,, | Performed by: OPTOMETRIST

## 2020-06-17 PROCEDURE — 92082 INTERMEDIATE VISUAL FIELD XM: CPT | Mod: S$GLB,,, | Performed by: OPTOMETRIST

## 2020-06-17 PROCEDURE — 99999 PR PBB SHADOW E&M-EST. PATIENT-LVL III: CPT | Mod: PBBFAC,,, | Performed by: OPTOMETRIST

## 2020-06-17 PROCEDURE — 92082 HUMPHREY VISUAL FIELD-OU-INTERMEDIATE-BOTH EYES: ICD-10-PCS | Mod: S$GLB,,, | Performed by: OPTOMETRIST

## 2020-06-17 PROCEDURE — 92012 PR EYE EXAM, EST PATIENT,INTERMED: ICD-10-PCS | Mod: S$GLB,,, | Performed by: OPTOMETRIST

## 2020-06-17 PROCEDURE — 92012 INTRM OPH EXAM EST PATIENT: CPT | Mod: S$GLB,,, | Performed by: OPTOMETRIST

## 2020-06-17 NOTE — PROGRESS NOTES
HPI     DLS 02/12/2020  HX; Diabetic eye exam,  Glaucoma suspect.        Last edited by Juan M Ballard on 6/17/2020 10:46 AM. (History)            Assessment /Plan     For exam results, see Encounter Report.    Open angle with borderline findings and high glaucoma risk in both eyes    Left posterior capsular opacification      1. IOP fine for nerve, nerve eval stable, HVf without defects, OCT with some sup/inf loss but stable from prev,  Low tension suspect, IOP low, OCt with thinning Ou, pachy thick, gonio open RTC yearly eye exam.   2. Monitor condition. Patient to report any changes. RTC 1 year recheck.

## 2020-06-17 NOTE — PROGRESS NOTES
Rel/fix/ good od fair os. Coop. Good. /patient requested an  , called international dept. spoke with Katya, she translated HVF test for patient./chart checked for latex allergy.-[facility]

## 2020-07-29 RX ORDER — GABAPENTIN 800 MG/1
800 TABLET ORAL 2 TIMES DAILY
Qty: 180 TABLET | Refills: 3 | Status: CANCELLED | OUTPATIENT
Start: 2020-07-29

## 2020-07-29 NOTE — TELEPHONE ENCOUNTER
Spoke with daughter and informed spoke with Mechelle with [facility] pharmacy and Gabapentin was not denied just needed refills. Informed okayed 3 refills. Daughter voices understanding.

## 2020-07-29 NOTE — TELEPHONE ENCOUNTER
----- Message from Osiris Perez sent at 7/29/2020 11:07 AM CDT -----  Contact: Daughter Aislinn [phone]  Patient's daughter is requesting to speak with nurse to ask why pt's medication gabapentin (NEURONTIN) 800 MG tablet was denied. Please advise.

## 2020-08-27 ENCOUNTER — PATIENT OUTREACH (OUTPATIENT)
Dept: ADMINISTRATIVE | Facility: HOSPITAL | Age: 85
End: 2020-08-27

## 2020-08-27 DIAGNOSIS — E11.43 TYPE 2 DIABETES MELLITUS WITH DIABETIC AUTONOMIC NEUROPATHY, WITHOUT LONG-TERM CURRENT USE OF INSULIN: Primary | ICD-10-CM

## 2020-09-08 ENCOUNTER — LAB VISIT (OUTPATIENT)
Dept: LAB | Facility: HOSPITAL | Age: 85
End: 2020-09-08
Attending: FAMILY MEDICINE
Payer: MEDICARE

## 2020-09-08 DIAGNOSIS — E11.43 TYPE 2 DIABETES MELLITUS WITH DIABETIC AUTONOMIC NEUROPATHY, WITHOUT LONG-TERM CURRENT USE OF INSULIN: ICD-10-CM

## 2020-09-08 LAB
CHOLEST SERPL-MCNC: 170 MG/DL (ref 120–199)
CHOLEST/HDLC SERPL: 5.2 {RATIO} (ref 2–5)
HDLC SERPL-MCNC: 33 MG/DL (ref 40–75)
HDLC SERPL: 19.4 % (ref 20–50)
LDLC SERPL CALC-MCNC: ABNORMAL MG/DL (ref 63–159)
NONHDLC SERPL-MCNC: 137 MG/DL
TRIGL SERPL-MCNC: 571 MG/DL (ref 30–150)

## 2020-09-08 PROCEDURE — 80061 LIPID PANEL: CPT | Mod: HCNC

## 2020-09-08 PROCEDURE — 36415 COLL VENOUS BLD VENIPUNCTURE: CPT | Mod: HCNC,PO

## 2020-09-10 ENCOUNTER — OFFICE VISIT (OUTPATIENT)
Dept: FAMILY MEDICINE | Facility: CLINIC | Age: 85
End: 2020-09-10
Payer: MEDICARE

## 2020-09-10 ENCOUNTER — LAB VISIT (OUTPATIENT)
Dept: LAB | Facility: HOSPITAL | Age: 85
End: 2020-09-10
Attending: FAMILY MEDICINE
Payer: MEDICARE

## 2020-09-10 ENCOUNTER — TELEPHONE (OUTPATIENT)
Dept: FAMILY MEDICINE | Facility: CLINIC | Age: 85
End: 2020-09-10

## 2020-09-10 VITALS
OXYGEN SATURATION: 96 % | TEMPERATURE: 98 F | BODY MASS INDEX: 22.43 KG/M2 | DIASTOLIC BLOOD PRESSURE: 70 MMHG | HEART RATE: 80 BPM | HEIGHT: 61 IN | WEIGHT: 118.81 LBS | SYSTOLIC BLOOD PRESSURE: 126 MMHG

## 2020-09-10 DIAGNOSIS — R39.89 ABNORMAL URINE COLOR: ICD-10-CM

## 2020-09-10 DIAGNOSIS — I70.0 AORTIC ATHEROSCLEROSIS: Primary | ICD-10-CM

## 2020-09-10 DIAGNOSIS — E11.65 TYPE 2 DIABETES MELLITUS WITH HYPERGLYCEMIA, WITHOUT LONG-TERM CURRENT USE OF INSULIN: ICD-10-CM

## 2020-09-10 DIAGNOSIS — E78.5 DYSLIPIDEMIA: ICD-10-CM

## 2020-09-10 DIAGNOSIS — J11.1 FLU: ICD-10-CM

## 2020-09-10 DIAGNOSIS — E03.9 HYPOTHYROIDISM, UNSPECIFIED TYPE: ICD-10-CM

## 2020-09-10 DIAGNOSIS — M25.552 PAIN OF LEFT HIP JOINT: ICD-10-CM

## 2020-09-10 PROCEDURE — 1125F PR PAIN SEVERITY QUANTIFIED, PAIN PRESENT: ICD-10-PCS | Mod: HCNC,S$GLB,, | Performed by: FAMILY MEDICINE

## 2020-09-10 PROCEDURE — 3051F HG A1C>EQUAL 7.0%<8.0%: CPT | Mod: HCNC,CPTII,S$GLB, | Performed by: FAMILY MEDICINE

## 2020-09-10 PROCEDURE — 81003 URINALYSIS AUTO W/O SCOPE: CPT | Mod: HCNC

## 2020-09-10 PROCEDURE — 99999 PR PBB SHADOW E&M-EST. PATIENT-LVL III: ICD-10-PCS | Mod: PBBFAC,HCNC,, | Performed by: FAMILY MEDICINE

## 2020-09-10 PROCEDURE — 99499 RISK ADDL DX/OHS AUDIT: ICD-10-PCS | Mod: HCNC,S$GLB,, | Performed by: FAMILY MEDICINE

## 2020-09-10 PROCEDURE — 3078F PR MOST RECENT DIASTOLIC BLOOD PRESSURE < 80 MM HG: ICD-10-PCS | Mod: HCNC,CPTII,S$GLB, | Performed by: FAMILY MEDICINE

## 2020-09-10 PROCEDURE — G0008 PR ADMIN INFLUENZA VIRUS VAC: ICD-10-PCS | Mod: HCNC,59,S$GLB, | Performed by: FAMILY MEDICINE

## 2020-09-10 PROCEDURE — 1125F AMNT PAIN NOTED PAIN PRSNT: CPT | Mod: HCNC,S$GLB,, | Performed by: FAMILY MEDICINE

## 2020-09-10 PROCEDURE — 99499 UNLISTED E&M SERVICE: CPT | Mod: HCNC,S$GLB,, | Performed by: FAMILY MEDICINE

## 2020-09-10 PROCEDURE — 3074F SYST BP LT 130 MM HG: CPT | Mod: HCNC,CPTII,S$GLB, | Performed by: FAMILY MEDICINE

## 2020-09-10 PROCEDURE — 87086 URINE CULTURE/COLONY COUNT: CPT | Mod: HCNC

## 2020-09-10 PROCEDURE — 96372 THER/PROPH/DIAG INJ SC/IM: CPT | Mod: HCNC,S$GLB,, | Performed by: FAMILY MEDICINE

## 2020-09-10 PROCEDURE — 3074F PR MOST RECENT SYSTOLIC BLOOD PRESSURE < 130 MM HG: ICD-10-PCS | Mod: HCNC,CPTII,S$GLB, | Performed by: FAMILY MEDICINE

## 2020-09-10 PROCEDURE — 3051F PR MOST RECENT HEMOGLOBIN A1C LEVEL 7.0 - < 8.0%: ICD-10-PCS | Mod: HCNC,CPTII,S$GLB, | Performed by: FAMILY MEDICINE

## 2020-09-10 PROCEDURE — G0008 ADMIN INFLUENZA VIRUS VAC: HCPCS | Mod: HCNC,59,S$GLB, | Performed by: FAMILY MEDICINE

## 2020-09-10 PROCEDURE — 90694 FLU VACCINE - QUADRIVALENT - ADJUVANTED: ICD-10-PCS | Mod: HCNC,S$GLB,, | Performed by: FAMILY MEDICINE

## 2020-09-10 PROCEDURE — 99999 PR PBB SHADOW E&M-EST. PATIENT-LVL III: CPT | Mod: PBBFAC,HCNC,, | Performed by: FAMILY MEDICINE

## 2020-09-10 PROCEDURE — 1101F PR PT FALLS ASSESS DOC 0-1 FALLS W/OUT INJ PAST YR: ICD-10-PCS | Mod: HCNC,CPTII,S$GLB, | Performed by: FAMILY MEDICINE

## 2020-09-10 PROCEDURE — 96372 PR INJECTION,THERAP/PROPH/DIAG2ST, IM OR SUBCUT: ICD-10-PCS | Mod: HCNC,S$GLB,, | Performed by: FAMILY MEDICINE

## 2020-09-10 PROCEDURE — 1159F MED LIST DOCD IN RCRD: CPT | Mod: HCNC,S$GLB,, | Performed by: FAMILY MEDICINE

## 2020-09-10 PROCEDURE — 90694 VACC AIIV4 NO PRSRV 0.5ML IM: CPT | Mod: HCNC,S$GLB,, | Performed by: FAMILY MEDICINE

## 2020-09-10 PROCEDURE — 3078F DIAST BP <80 MM HG: CPT | Mod: HCNC,CPTII,S$GLB, | Performed by: FAMILY MEDICINE

## 2020-09-10 PROCEDURE — 99214 PR OFFICE/OUTPT VISIT, EST, LEVL IV, 30-39 MIN: ICD-10-PCS | Mod: HCNC,25,S$GLB, | Performed by: FAMILY MEDICINE

## 2020-09-10 PROCEDURE — 1101F PT FALLS ASSESS-DOCD LE1/YR: CPT | Mod: HCNC,CPTII,S$GLB, | Performed by: FAMILY MEDICINE

## 2020-09-10 PROCEDURE — 99214 OFFICE O/P EST MOD 30 MIN: CPT | Mod: HCNC,25,S$GLB, | Performed by: FAMILY MEDICINE

## 2020-09-10 PROCEDURE — 1159F PR MEDICATION LIST DOCUMENTED IN MEDICAL RECORD: ICD-10-PCS | Mod: HCNC,S$GLB,, | Performed by: FAMILY MEDICINE

## 2020-09-10 RX ORDER — KETOROLAC TROMETHAMINE 30 MG/ML
15 INJECTION, SOLUTION INTRAMUSCULAR; INTRAVENOUS
Status: COMPLETED | OUTPATIENT
Start: 2020-09-10 | End: 2020-09-10

## 2020-09-10 RX ADMIN — KETOROLAC TROMETHAMINE 15 MG: 30 INJECTION, SOLUTION INTRAMUSCULAR; INTRAVENOUS at 12:09

## 2020-09-10 NOTE — TELEPHONE ENCOUNTER
Spoke to daughter Aislinn, who asked for HA1C results. Results were given, Aislinn (daughter) verbalized an understanding.

## 2020-09-10 NOTE — PROGRESS NOTES
Subjective:       Patient ID: Kang Herbert is a 85 y.o. male.    Chief Complaint: Follow-up, Diabetes, and Hypertension    85 years old male who came to the clinic for diabetes check.  Last A1c was stable .  No chest pain, palpitation orthopnea or PND.  He reports sometimes abnormal color of the urine.  No urinary symptoms .  No fever or chills.  Patient previously diagnosed with aortic atherosclerosis..  Patient with left hip pain for the last year.  The pain is 3/10 of intensity on and off aggravated with activity and better with rest.  He is requesting a medicine injectable for the pain.  Patient due for his flu vaccine.  Patient with good compliance with his thyroid medicine.    Review of Systems   Constitutional: Negative.    HENT: Negative.    Eyes: Negative.    Respiratory: Negative.    Cardiovascular: Negative.  Negative for chest pain, palpitations, leg swelling and claudication.   Gastrointestinal: Negative.    Endocrine: Negative for polydipsia, polyphagia and polyuria.   Genitourinary: Negative.  Negative for dysuria, flank pain and frequency.   Musculoskeletal: Positive for arthralgias.   Integumentary:  Negative.   Neurological: Negative.    Psychiatric/Behavioral: Negative.          Objective:      Physical Exam  Vitals signs and nursing note reviewed.   Constitutional:       General: He is not in acute distress.     Appearance: He is well-developed. He is not diaphoretic.   HENT:      Head: Normocephalic and atraumatic.      Right Ear: External ear normal.      Left Ear: External ear normal.      Nose: Nose normal.      Mouth/Throat:      Pharynx: No oropharyngeal exudate.   Eyes:      General: No scleral icterus.        Right eye: No discharge.         Left eye: No discharge.      Conjunctiva/sclera: Conjunctivae normal.      Pupils: Pupils are equal, round, and reactive to light.   Neck:      Musculoskeletal: Normal range of motion and neck supple.      Thyroid: No thyromegaly.       Vascular: No JVD.      Trachea: No tracheal deviation.   Cardiovascular:      Rate and Rhythm: Normal rate and regular rhythm.      Heart sounds: Normal heart sounds. No murmur. No friction rub. No gallop.    Pulmonary:      Effort: Pulmonary effort is normal. No respiratory distress.      Breath sounds: Normal breath sounds. No stridor. No wheezing or rales.   Chest:      Chest wall: No tenderness.   Abdominal:      General: Bowel sounds are normal. There is no distension.      Palpations: Abdomen is soft. There is no mass.      Tenderness: There is no abdominal tenderness. There is no guarding or rebound.   Musculoskeletal: Normal range of motion.      Left hip: He exhibits tenderness.   Lymphadenopathy:      Cervical: No cervical adenopathy.   Skin:     General: Skin is warm and dry.      Coloration: Skin is not pale.      Findings: No erythema or rash.   Neurological:      Mental Status: He is alert and oriented to person, place, and time.      Cranial Nerves: No cranial nerve deficit.      Motor: Weakness and abnormal muscle tone present.      Coordination: Coordination abnormal.      Deep Tendon Reflexes: Reflexes are normal and symmetric. Reflexes normal.   Psychiatric:         Behavior: Behavior normal.         Thought Content: Thought content normal.         Judgment: Judgment normal.         Assessment:       1. Aortic atherosclerosis    2. Type 2 diabetes mellitus with hyperglycemia, without long-term current use of insulin    3. Dyslipidemia    4. Flu    5. Hypothyroidism, unspecified type    6. Abnormal urine color    7. Pain of left hip joint        Plan:         [place] was seen today for follow-up, diabetes and hypertension.    Diagnoses and all orders for this visit:    Aortic atherosclerosis  -     Lipid Panel; Future    Type 2 diabetes mellitus with hyperglycemia, without long-term current use of insulin  -     Comprehensive metabolic panel; Future  -     Hemoglobin A1C; Future  -     Lipid  Panel; Future    Dyslipidemia  -     Comprehensive metabolic panel; Future  -     Lipid Panel; Future    Flu  -     Influenza - Quadrivalent (Adjuvanted)    Hypothyroidism, unspecified type  -     TSH; Future    Abnormal urine color  -     Urinalysis; Future  -     Urine culture; Future    Pain of left hip joint  -     ketorolac injection 15 mg

## 2020-09-10 NOTE — TELEPHONE ENCOUNTER
----- Message from Ebony Solorio sent at 9/10/2020  1:47 PM CDT -----  Type:  Needs Medical Advice    Who Called: pt's daughter  Advice Regarding: results  Would the patient rather a call back or a response via [facility]? call  Best Call Back Number: [phone]  Additional Information: n/a

## 2020-09-11 ENCOUNTER — TELEPHONE (OUTPATIENT)
Dept: FAMILY MEDICINE | Facility: CLINIC | Age: 85
End: 2020-09-11

## 2020-09-11 DIAGNOSIS — R26.89 LOSS OF BALANCE: ICD-10-CM

## 2020-09-11 DIAGNOSIS — R42 DIZZINESS: Primary | ICD-10-CM

## 2020-09-11 LAB
BILIRUB UR QL STRIP: NEGATIVE
CLARITY UR REFRACT.AUTO: ABNORMAL
COLOR UR AUTO: YELLOW
GLUCOSE UR QL STRIP: ABNORMAL
HGB UR QL STRIP: NEGATIVE
KETONES UR QL STRIP: NEGATIVE
LEUKOCYTE ESTERASE UR QL STRIP: NEGATIVE
NITRITE UR QL STRIP: NEGATIVE
PH UR STRIP: 6 [PH] (ref 5–8)
PROT UR QL STRIP: NEGATIVE
SP GR UR STRIP: 1.02 (ref 1–1.03)
URN SPEC COLLECT METH UR: ABNORMAL

## 2020-09-11 NOTE — TELEPHONE ENCOUNTER
----- Message from Huyen Barron sent at 9/11/2020  8:11 AM CDT -----  Regarding: call back  Contact: Aislinn [phone]  Patients daughter is calling to talk to nurse in regards to her fathers office visit from yesterday, she also wanted to mention that her father is loosing his balance and wants to see what Dr advice or if a medication can be called in or any test that can be done like an audiogram.

## 2020-09-11 NOTE — TELEPHONE ENCOUNTER
Left a message for [employer] to [facility] patients daughter that Dr. Pritchett send a referral for an Ent for patient to be evaluated for his off balance and that the  would call her to set up an appointment.

## 2020-09-11 NOTE — TELEPHONE ENCOUNTER
Patient's daughter Aislinn called wanted to let you know that her dad is losing his balance and wanted to know if any test can be done. Please advice.

## 2020-09-12 LAB — BACTERIA UR CULT: NO GROWTH

## 2020-10-23 ENCOUNTER — TELEPHONE (OUTPATIENT)
Dept: FAMILY MEDICINE | Facility: CLINIC | Age: 85
End: 2020-10-23

## 2020-12-01 ENCOUNTER — TELEPHONE (OUTPATIENT)
Dept: FAMILY MEDICINE | Facility: CLINIC | Age: 85
End: 2020-12-01

## 2020-12-09 ENCOUNTER — TELEPHONE (OUTPATIENT)
Dept: OPTOMETRY | Facility: CLINIC | Age: 85
End: 2020-12-09

## 2020-12-09 ENCOUNTER — TELEPHONE (OUTPATIENT)
Dept: FAMILY MEDICINE | Facility: CLINIC | Age: 85
End: 2020-12-09

## 2020-12-09 DIAGNOSIS — R82.90 ABNORMAL URINE: Primary | ICD-10-CM

## 2020-12-09 NOTE — TELEPHONE ENCOUNTER
----- Message from Lelia Carrillo sent at 12/9/2020 10:03 AM CST -----  Contact: Aislinn/ daughter  [phone]  Patient daughter calling requesting to speak with you regarding test results and she's requesting urine orders be put in the system.  Please advise

## 2020-12-09 NOTE — TELEPHONE ENCOUNTER
Daughter of patient wanted for you to order blood work and urine because urine is brown. Please advice.

## 2020-12-09 NOTE — TELEPHONE ENCOUNTER
Spoke to patient's daughter and inform that labs were schedule for tomorrow. Daughter voices understanding.

## 2020-12-09 NOTE — TELEPHONE ENCOUNTER
----- Message from Manuela Hdez sent at 12/9/2020  9:55 AM CST -----  Regarding: sooner appointment  Contact: Aislinn(daughter)  Ms. Tao would like to know if she get an appointment before February. She says that her dad is a diabetic and he is having problems. She can be reached at [phone].

## 2020-12-10 ENCOUNTER — LAB VISIT (OUTPATIENT)
Dept: LAB | Facility: HOSPITAL | Age: 85
End: 2020-12-10
Attending: FAMILY MEDICINE
Payer: MEDICARE

## 2020-12-10 DIAGNOSIS — R82.90 ABNORMAL URINE: ICD-10-CM

## 2020-12-10 LAB
ANION GAP SERPL CALC-SCNC: 9 MMOL/L (ref 8–16)
BASOPHILS # BLD AUTO: 0.04 K/UL (ref 0–0.2)
BASOPHILS NFR BLD: 0.7 % (ref 0–1.9)
BUN SERPL-MCNC: 18 MG/DL (ref 8–23)
CALCIUM SERPL-MCNC: 9.6 MG/DL (ref 8.7–10.5)
CHLORIDE SERPL-SCNC: 102 MMOL/L (ref 95–110)
CO2 SERPL-SCNC: 30 MMOL/L (ref 23–29)
CREAT SERPL-MCNC: 0.8 MG/DL (ref 0.5–1.4)
DIFFERENTIAL METHOD: ABNORMAL
EOSINOPHIL # BLD AUTO: 0.3 K/UL (ref 0–0.5)
EOSINOPHIL NFR BLD: 4.6 % (ref 0–8)
ERYTHROCYTE [DISTWIDTH] IN BLOOD BY AUTOMATED COUNT: 13.4 % (ref 11.5–14.5)
EST. GFR  (AFRICAN AMERICAN): >60 ML/MIN/1.73 M^2
EST. GFR  (NON AFRICAN AMERICAN): >60 ML/MIN/1.73 M^2
GLUCOSE SERPL-MCNC: 169 MG/DL (ref 70–110)
HCT VFR BLD AUTO: 43.1 % (ref 40–54)
HGB BLD-MCNC: 13.5 G/DL (ref 14–18)
IMM GRANULOCYTES # BLD AUTO: 0.02 K/UL (ref 0–0.04)
IMM GRANULOCYTES NFR BLD AUTO: 0.4 % (ref 0–0.5)
LYMPHOCYTES # BLD AUTO: 2.3 K/UL (ref 1–4.8)
LYMPHOCYTES NFR BLD: 41.1 % (ref 18–48)
MCH RBC QN AUTO: 29.1 PG (ref 27–31)
MCHC RBC AUTO-ENTMCNC: 31.3 G/DL (ref 32–36)
MCV RBC AUTO: 93 FL (ref 82–98)
MONOCYTES # BLD AUTO: 0.5 K/UL (ref 0.3–1)
MONOCYTES NFR BLD: 8.7 % (ref 4–15)
NEUTROPHILS # BLD AUTO: 2.5 K/UL (ref 1.8–7.7)
NEUTROPHILS NFR BLD: 44.5 % (ref 38–73)
NRBC BLD-RTO: 0 /100 WBC
PLATELET # BLD AUTO: 140 K/UL (ref 150–350)
PMV BLD AUTO: 12.5 FL (ref 9.2–12.9)
POTASSIUM SERPL-SCNC: 4.3 MMOL/L (ref 3.5–5.1)
RBC # BLD AUTO: 4.64 M/UL (ref 4.6–6.2)
SODIUM SERPL-SCNC: 141 MMOL/L (ref 136–145)
WBC # BLD AUTO: 5.62 K/UL (ref 3.9–12.7)

## 2020-12-10 PROCEDURE — 36415 COLL VENOUS BLD VENIPUNCTURE: CPT | Mod: HCNC,PO

## 2020-12-10 PROCEDURE — 85025 COMPLETE CBC W/AUTO DIFF WBC: CPT | Mod: HCNC

## 2020-12-10 PROCEDURE — 80048 BASIC METABOLIC PNL TOTAL CA: CPT | Mod: HCNC

## 2020-12-11 ENCOUNTER — TELEPHONE (OUTPATIENT)
Dept: FAMILY MEDICINE | Facility: CLINIC | Age: 85
End: 2020-12-11

## 2020-12-11 DIAGNOSIS — R82.998 BROWN-COLORED URINE: Primary | ICD-10-CM

## 2020-12-11 NOTE — TELEPHONE ENCOUNTER
----- Message from Qiana Batista sent at 12/11/2020  1:33 PM CST -----  Contact: [phone]/patient  Kang Herbert DAUGHTER calling to speak with you regarding test results   Please advise

## 2020-12-11 NOTE — TELEPHONE ENCOUNTER
----- Message from Lelia Carrillo sent at 12/11/2020 11:53 AM CST -----  Contact: [phone]/patient  Kang Simmsos DAUGHTER calling to speak with you regarding test results   Please advise

## 2020-12-14 ENCOUNTER — TELEPHONE (OUTPATIENT)
Dept: FAMILY MEDICINE | Facility: CLINIC | Age: 85
End: 2020-12-14

## 2020-12-14 NOTE — TELEPHONE ENCOUNTER
Spoke to patients daughter Hepatic Function Panel was scheduled for 12/14/2020. PATIENTS DAUGHTER VOICED UNDERSTANDING

## 2020-12-15 ENCOUNTER — LAB VISIT (OUTPATIENT)
Dept: LAB | Facility: HOSPITAL | Age: 85
End: 2020-12-15
Attending: FAMILY MEDICINE
Payer: MEDICARE

## 2020-12-15 DIAGNOSIS — R82.998 BROWN-COLORED URINE: ICD-10-CM

## 2020-12-15 LAB
ALBUMIN SERPL BCP-MCNC: 4 G/DL (ref 3.5–5.2)
ALP SERPL-CCNC: 93 U/L (ref 55–135)
ALT SERPL W/O P-5'-P-CCNC: 21 U/L (ref 10–44)
AST SERPL-CCNC: 22 U/L (ref 10–40)
BILIRUB DIRECT SERPL-MCNC: 0.2 MG/DL (ref 0.1–0.3)
BILIRUB SERPL-MCNC: 0.6 MG/DL (ref 0.1–1)
PROT SERPL-MCNC: 6.8 G/DL (ref 6–8.4)

## 2020-12-15 PROCEDURE — 80076 HEPATIC FUNCTION PANEL: CPT | Mod: HCNC

## 2020-12-15 PROCEDURE — 36415 COLL VENOUS BLD VENIPUNCTURE: CPT | Mod: HCNC,PO

## 2020-12-20 ENCOUNTER — PATIENT OUTREACH (OUTPATIENT)
Dept: ADMINISTRATIVE | Facility: OTHER | Age: 85
End: 2020-12-20

## 2020-12-20 NOTE — PROGRESS NOTES
Care Everywhere: updated  Immunization: updated  Health Maintenance: updated  Media Review:   Legacy Review:   Order placed:   Upcoming appts:hemoglobin 3.8.2021

## 2020-12-22 ENCOUNTER — TELEPHONE (OUTPATIENT)
Dept: FAMILY MEDICINE | Facility: CLINIC | Age: 85
End: 2020-12-22

## 2020-12-22 ENCOUNTER — OFFICE VISIT (OUTPATIENT)
Dept: OPTOMETRY | Facility: CLINIC | Age: 85
End: 2020-12-22
Payer: MEDICARE

## 2020-12-22 DIAGNOSIS — H40.023 OPEN ANGLE WITH BORDERLINE FINDINGS AND HIGH GLAUCOMA RISK IN BOTH EYES: ICD-10-CM

## 2020-12-22 DIAGNOSIS — H26.492 LEFT POSTERIOR CAPSULAR OPACIFICATION: Primary | ICD-10-CM

## 2020-12-22 DIAGNOSIS — E11.9 TYPE 2 DIABETES MELLITUS WITHOUT RETINOPATHY: ICD-10-CM

## 2020-12-22 PROCEDURE — 99999 PR PBB SHADOW E&M-EST. PATIENT-LVL I: ICD-10-PCS | Mod: PBBFAC,HCNC,, | Performed by: OPTOMETRIST

## 2020-12-22 PROCEDURE — 92014 PR EYE EXAM, EST PATIENT,COMPREHESV: ICD-10-PCS | Mod: HCNC,S$GLB,, | Performed by: OPTOMETRIST

## 2020-12-22 PROCEDURE — 99999 PR PBB SHADOW E&M-EST. PATIENT-LVL I: CPT | Mod: PBBFAC,HCNC,, | Performed by: OPTOMETRIST

## 2020-12-22 PROCEDURE — 92014 COMPRE OPH EXAM EST PT 1/>: CPT | Mod: HCNC,S$GLB,, | Performed by: OPTOMETRIST

## 2020-12-22 NOTE — PROGRESS NOTES
HPI     DLS  06/17/2020  Patient here for IOP CK,and refraction.    Last edited by Juan M Ballard on 12/22/2020  2:57 PM. (History)            Assessment /Plan     For exam results, see Encounter Report.    Left posterior capsular opacification    Open angle with borderline findings and high glaucoma risk in both eyes    Type 2 diabetes mellitus without retinopathy      1. Refer to Kalin for YAG. Pt has seen him in past.  2. IOP stable, nerve eval stable, prev HVf without defects, prev OCT with some sup/inf loss but stable from prev,  Low tension suspect, IOP low, OCt with thinning Ou, pachy thick, gonio open RTC yearly eye exam.   3. No diabetic retinopathy, no csme. Return in 1 year for dilated eye exam.

## 2020-12-22 NOTE — TELEPHONE ENCOUNTER
I spoke with patient's daughter in regards to patient lab results from 12/10/2020. I read daughter message left from Dr. Pritchett listed under the labs results. Pt daughter voices understanding.

## 2020-12-22 NOTE — TELEPHONE ENCOUNTER
----- Message from Jessica Drew MA sent at 12/22/2020  9:38 AM CST -----  Regarding: FW: Results  Contact: Shelly Jackson/ [phone]    ----- Message -----  From: Irineo Saleh  Sent: 12/22/2020   7:34 AM CST  To: [facility] Staff  Subject: Results                                          Patient requesting to speak with you regarding lab results. She says she does not want the medical assistant to call back, only the nurse. Please advise.

## 2020-12-30 ENCOUNTER — OFFICE VISIT (OUTPATIENT)
Dept: OPHTHALMOLOGY | Facility: CLINIC | Age: 85
End: 2020-12-30
Payer: MEDICARE

## 2020-12-30 DIAGNOSIS — Z96.1 PSEUDOPHAKIA: ICD-10-CM

## 2020-12-30 DIAGNOSIS — H40.023 OPEN ANGLE WITH BORDERLINE FINDINGS AND HIGH GLAUCOMA RISK IN BOTH EYES: ICD-10-CM

## 2020-12-30 DIAGNOSIS — H26.492 PCO (POSTERIOR CAPSULAR OPACIFICATION), LEFT: Primary | ICD-10-CM

## 2020-12-30 DIAGNOSIS — E11.9 DM TYPE 2 WITHOUT RETINOPATHY: ICD-10-CM

## 2020-12-30 PROCEDURE — 92004 COMPRE OPH EXAM NEW PT 1/>: CPT | Mod: 57,HCNC,S$GLB, | Performed by: OPHTHALMOLOGY

## 2020-12-30 PROCEDURE — 66821 AFTER CATARACT LASER SURGERY: CPT | Mod: HCNC,LT,S$GLB, | Performed by: OPHTHALMOLOGY

## 2020-12-30 PROCEDURE — 1126F PR PAIN SEVERITY QUANTIFIED, NO PAIN PRESENT: ICD-10-PCS | Mod: HCNC,S$GLB,, | Performed by: OPHTHALMOLOGY

## 2020-12-30 PROCEDURE — 3288F FALL RISK ASSESSMENT DOCD: CPT | Mod: HCNC,CPTII,S$GLB, | Performed by: OPHTHALMOLOGY

## 2020-12-30 PROCEDURE — 1126F AMNT PAIN NOTED NONE PRSNT: CPT | Mod: HCNC,S$GLB,, | Performed by: OPHTHALMOLOGY

## 2020-12-30 PROCEDURE — 66821 PR DISCISSION,2ND CATARACT,LASER: ICD-10-PCS | Mod: HCNC,LT,S$GLB, | Performed by: OPHTHALMOLOGY

## 2020-12-30 PROCEDURE — 99999 PR PBB SHADOW E&M-EST. PATIENT-LVL III: CPT | Mod: PBBFAC,HCNC,, | Performed by: OPHTHALMOLOGY

## 2020-12-30 PROCEDURE — 3288F PR FALLS RISK ASSESSMENT DOCUMENTED: ICD-10-PCS | Mod: HCNC,CPTII,S$GLB, | Performed by: OPHTHALMOLOGY

## 2020-12-30 PROCEDURE — 1101F PT FALLS ASSESS-DOCD LE1/YR: CPT | Mod: HCNC,CPTII,S$GLB, | Performed by: OPHTHALMOLOGY

## 2020-12-30 PROCEDURE — 99999 PR PBB SHADOW E&M-EST. PATIENT-LVL III: ICD-10-PCS | Mod: PBBFAC,HCNC,, | Performed by: OPHTHALMOLOGY

## 2020-12-30 PROCEDURE — 92004 PR EYE EXAM, NEW PATIENT,COMPREHESV: ICD-10-PCS | Mod: 57,HCNC,S$GLB, | Performed by: OPHTHALMOLOGY

## 2020-12-30 PROCEDURE — 1101F PR PT FALLS ASSESS DOC 0-1 FALLS W/OUT INJ PAST YR: ICD-10-PCS | Mod: HCNC,CPTII,S$GLB, | Performed by: OPHTHALMOLOGY

## 2020-12-30 RX ORDER — PREDNISOLONE ACETATE 10 MG/ML
1 SUSPENSION/ DROPS OPHTHALMIC 4 TIMES DAILY
Qty: 5 ML | Refills: 0 | Status: SHIPPED | OUTPATIENT
Start: 2020-12-30 | End: 2021-01-29

## 2020-12-31 NOTE — PROGRESS NOTES
Subjective:       Patient ID: Kang Herbert is a 85 y.o. male.    Chief Complaint: No chief complaint on file.    HPI     Ref by Dr. Loya     86 YO male here for YAG laser OS. No eye pain nor other ocular complaints.   Pt here with wife and son who is interpreting Pan[place]ian for pt. Pt sons   states that pt will occasionally use eye gtts and have tearing.     Last edited by Jessica Sanchez on 12/30/2020  3:06 PM. (History)             Assessment:       1. PCO (posterior capsular opacification), left    2. Open angle with borderline findings and high glaucoma risk in both eyes    3. 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 Pseudophakia        Plan:       Visually significant  PCO OS- Pt. Wants Laser.  Glaucoma suspect OU-IOP's are stable OU & ON appears stable OS.     DM-No NPDR OS.      YAG CAP left eye (OS) today. TE=   77.7 mj, Avg. 0.7 mj, 111 pulses.  PF taper OS.  Control DM.  RTC 2-3 wks.    YAG laser Capsulotomy Procedure Note:  Informed consent was obtained and correct eye was verified with patient.   One drop of topical Proparacaine 1% was instilled.  YAG laser applied to posterior capsule in cruciate pattern.  One drop of Apraclonidine 0.5% and 1 drop of Pred Acetate 1% applied to eye after laser.  Pt tolerated procedure well. No complications.  Follow up in 2-3 weeks.

## 2021-01-14 ENCOUNTER — TELEPHONE (OUTPATIENT)
Dept: OPHTHALMOLOGY | Facility: CLINIC | Age: 86
End: 2021-01-14

## 2021-01-27 ENCOUNTER — OFFICE VISIT (OUTPATIENT)
Dept: OPHTHALMOLOGY | Facility: CLINIC | Age: 86
End: 2021-01-27
Payer: MEDICARE

## 2021-01-27 DIAGNOSIS — H52.7 REFRACTIVE ERROR: ICD-10-CM

## 2021-01-27 DIAGNOSIS — H40.023 OPEN ANGLE WITH BORDERLINE FINDINGS AND HIGH GLAUCOMA RISK IN BOTH EYES: ICD-10-CM

## 2021-01-27 DIAGNOSIS — Z98.890 POST-OPERATIVE STATE: Primary | ICD-10-CM

## 2021-01-27 DIAGNOSIS — Z96.1 PSEUDOPHAKIA: ICD-10-CM

## 2021-01-27 PROCEDURE — 1101F PR PT FALLS ASSESS DOC 0-1 FALLS W/OUT INJ PAST YR: ICD-10-PCS | Mod: CPTII,S$GLB,, | Performed by: OPHTHALMOLOGY

## 2021-01-27 PROCEDURE — 3288F FALL RISK ASSESSMENT DOCD: CPT | Mod: CPTII,S$GLB,, | Performed by: OPHTHALMOLOGY

## 2021-01-27 PROCEDURE — 99024 POSTOP FOLLOW-UP VISIT: CPT | Mod: S$GLB,,, | Performed by: OPHTHALMOLOGY

## 2021-01-27 PROCEDURE — 1101F PT FALLS ASSESS-DOCD LE1/YR: CPT | Mod: CPTII,S$GLB,, | Performed by: OPHTHALMOLOGY

## 2021-01-27 PROCEDURE — 99999 PR PBB SHADOW E&M-EST. PATIENT-LVL III: CPT | Mod: PBBFAC,,, | Performed by: OPHTHALMOLOGY

## 2021-01-27 PROCEDURE — 99024 PR POST-OP FOLLOW-UP VISIT: ICD-10-PCS | Mod: S$GLB,,, | Performed by: OPHTHALMOLOGY

## 2021-01-27 PROCEDURE — 1126F AMNT PAIN NOTED NONE PRSNT: CPT | Mod: S$GLB,,, | Performed by: OPHTHALMOLOGY

## 2021-01-27 PROCEDURE — 3288F PR FALLS RISK ASSESSMENT DOCUMENTED: ICD-10-PCS | Mod: CPTII,S$GLB,, | Performed by: OPHTHALMOLOGY

## 2021-01-27 PROCEDURE — 99999 PR PBB SHADOW E&M-EST. PATIENT-LVL III: ICD-10-PCS | Mod: PBBFAC,,, | Performed by: OPHTHALMOLOGY

## 2021-01-27 PROCEDURE — 1126F PR PAIN SEVERITY QUANTIFIED, NO PAIN PRESENT: ICD-10-PCS | Mod: S$GLB,,, | Performed by: OPHTHALMOLOGY

## 2021-01-29 ENCOUNTER — TELEPHONE (OUTPATIENT)
Dept: OTOLARYNGOLOGY | Facility: CLINIC | Age: 86
End: 2021-01-29

## 2021-02-09 ENCOUNTER — CLINICAL SUPPORT (OUTPATIENT)
Dept: OTOLARYNGOLOGY | Facility: CLINIC | Age: 86
End: 2021-02-09
Payer: MEDICARE

## 2021-02-09 ENCOUNTER — OFFICE VISIT (OUTPATIENT)
Dept: OTOLARYNGOLOGY | Facility: CLINIC | Age: 86
End: 2021-02-09
Payer: MEDICARE

## 2021-02-09 VITALS
BODY MASS INDEX: 22.58 KG/M2 | DIASTOLIC BLOOD PRESSURE: 78 MMHG | HEART RATE: 80 BPM | WEIGHT: 119.63 LBS | SYSTOLIC BLOOD PRESSURE: 130 MMHG | HEIGHT: 61 IN

## 2021-02-09 DIAGNOSIS — R26.89 IMBALANCE: Primary | Chronic | ICD-10-CM

## 2021-02-09 DIAGNOSIS — H90.3 SENSORINEURAL HEARING LOSS (SNHL), BILATERAL: Chronic | ICD-10-CM

## 2021-02-09 DIAGNOSIS — H90.3 SENSORINEURAL HEARING LOSS (SNHL), BILATERAL: Primary | ICD-10-CM

## 2021-02-09 DIAGNOSIS — R42 DIZZINESS: ICD-10-CM

## 2021-02-09 PROCEDURE — 3288F FALL RISK ASSESSMENT DOCD: CPT | Mod: CPTII,S$GLB,, | Performed by: OTOLARYNGOLOGY

## 2021-02-09 PROCEDURE — 1126F PR PAIN SEVERITY QUANTIFIED, NO PAIN PRESENT: ICD-10-PCS | Mod: S$GLB,,, | Performed by: OTOLARYNGOLOGY

## 2021-02-09 PROCEDURE — 1100F PTFALLS ASSESS-DOCD GE2>/YR: CPT | Mod: CPTII,S$GLB,, | Performed by: OTOLARYNGOLOGY

## 2021-02-09 PROCEDURE — 99204 OFFICE O/P NEW MOD 45 MIN: CPT | Mod: S$GLB,,, | Performed by: OTOLARYNGOLOGY

## 2021-02-09 PROCEDURE — 92553 PR AUDIOMETRY, AIR & BONE: ICD-10-PCS | Mod: S$GLB,,, | Performed by: AUDIOLOGIST-HEARING AID FITTER

## 2021-02-09 PROCEDURE — 3072F LOW RISK FOR RETINOPATHY: CPT | Mod: S$GLB,,, | Performed by: OTOLARYNGOLOGY

## 2021-02-09 PROCEDURE — 92567 TYMPANOMETRY: CPT | Mod: S$GLB,,, | Performed by: AUDIOLOGIST-HEARING AID FITTER

## 2021-02-09 PROCEDURE — 92567 PR TYMPA2METRY: ICD-10-PCS | Mod: S$GLB,,, | Performed by: AUDIOLOGIST-HEARING AID FITTER

## 2021-02-09 PROCEDURE — 3075F SYST BP GE 130 - 139MM HG: CPT | Mod: CPTII,S$GLB,, | Performed by: OTOLARYNGOLOGY

## 2021-02-09 PROCEDURE — 1126F AMNT PAIN NOTED NONE PRSNT: CPT | Mod: S$GLB,,, | Performed by: OTOLARYNGOLOGY

## 2021-02-09 PROCEDURE — 92553 AUDIOMETRY AIR & BONE: CPT | Mod: S$GLB,,, | Performed by: AUDIOLOGIST-HEARING AID FITTER

## 2021-02-09 PROCEDURE — 1159F MED LIST DOCD IN RCRD: CPT | Mod: S$GLB,,, | Performed by: OTOLARYNGOLOGY

## 2021-02-09 PROCEDURE — 1100F PR PT FALLS ASSESS DOC 2+ FALLS/FALL W/INJURY/YR: ICD-10-PCS | Mod: CPTII,S$GLB,, | Performed by: OTOLARYNGOLOGY

## 2021-02-09 PROCEDURE — 99204 PR OFFICE/OUTPT VISIT, NEW, LEVL IV, 45-59 MIN: ICD-10-PCS | Mod: S$GLB,,, | Performed by: OTOLARYNGOLOGY

## 2021-02-09 PROCEDURE — 99999 PR PBB SHADOW E&M-EST. PATIENT-LVL IV: CPT | Mod: PBBFAC,,, | Performed by: OTOLARYNGOLOGY

## 2021-02-09 PROCEDURE — 1159F PR MEDICATION LIST DOCUMENTED IN MEDICAL RECORD: ICD-10-PCS | Mod: S$GLB,,, | Performed by: OTOLARYNGOLOGY

## 2021-02-09 PROCEDURE — 3072F PR LOW RISK FOR RETINOPATHY: ICD-10-PCS | Mod: S$GLB,,, | Performed by: OTOLARYNGOLOGY

## 2021-02-09 PROCEDURE — 99999 PR PBB SHADOW E&M-EST. PATIENT-LVL IV: ICD-10-PCS | Mod: PBBFAC,,, | Performed by: OTOLARYNGOLOGY

## 2021-02-09 PROCEDURE — 3078F DIAST BP <80 MM HG: CPT | Mod: CPTII,S$GLB,, | Performed by: OTOLARYNGOLOGY

## 2021-02-09 PROCEDURE — 3288F PR FALLS RISK ASSESSMENT DOCUMENTED: ICD-10-PCS | Mod: CPTII,S$GLB,, | Performed by: OTOLARYNGOLOGY

## 2021-02-09 PROCEDURE — 3078F PR MOST RECENT DIASTOLIC BLOOD PRESSURE < 80 MM HG: ICD-10-PCS | Mod: CPTII,S$GLB,, | Performed by: OTOLARYNGOLOGY

## 2021-02-09 PROCEDURE — 3075F PR MOST RECENT SYSTOLIC BLOOD PRESS GE 130-139MM HG: ICD-10-PCS | Mod: CPTII,S$GLB,, | Performed by: OTOLARYNGOLOGY

## 2021-02-18 ENCOUNTER — TELEPHONE (OUTPATIENT)
Dept: OPTOMETRY | Facility: CLINIC | Age: 86
End: 2021-02-18

## 2021-03-03 ENCOUNTER — IMMUNIZATION (OUTPATIENT)
Dept: PRIMARY CARE CLINIC | Facility: CLINIC | Age: 86
End: 2021-03-03
Payer: MEDICARE

## 2021-03-03 DIAGNOSIS — Z23 NEED FOR VACCINATION: Primary | ICD-10-CM

## 2021-03-03 PROCEDURE — 91300 PR SARS-COV- 2 COVID-19 VACCINE, NO PRSV, 30MCG/0.3ML, IM: CPT | Mod: S$GLB,,, | Performed by: INTERNAL MEDICINE

## 2021-03-03 PROCEDURE — 0001A PR IMMUNIZ ADMIN, SARS-COV-2 COVID-19 VACC, 30MCG/0.3ML, 1ST DOSE: ICD-10-PCS | Mod: CV19,S$GLB,, | Performed by: INTERNAL MEDICINE

## 2021-03-03 PROCEDURE — 91300 PR SARS-COV- 2 COVID-19 VACCINE, NO PRSV, 30MCG/0.3ML, IM: ICD-10-PCS | Mod: S$GLB,,, | Performed by: INTERNAL MEDICINE

## 2021-03-03 PROCEDURE — 0001A PR IMMUNIZ ADMIN, SARS-COV-2 COVID-19 VACC, 30MCG/0.3ML, 1ST DOSE: CPT | Mod: CV19,S$GLB,, | Performed by: INTERNAL MEDICINE

## 2021-03-03 RX ADMIN — Medication 0.3 ML: at 02:03

## 2021-03-09 ENCOUNTER — OFFICE VISIT (OUTPATIENT)
Dept: OPTOMETRY | Facility: CLINIC | Age: 86
End: 2021-03-09
Payer: MEDICARE

## 2021-03-09 DIAGNOSIS — H52.7 REFRACTIVE ERROR: Primary | ICD-10-CM

## 2021-03-09 PROCEDURE — 3288F PR FALLS RISK ASSESSMENT DOCUMENTED: ICD-10-PCS | Mod: CPTII,S$GLB,, | Performed by: OPTOMETRIST

## 2021-03-09 PROCEDURE — 1126F AMNT PAIN NOTED NONE PRSNT: CPT | Mod: S$GLB,,, | Performed by: OPTOMETRIST

## 2021-03-09 PROCEDURE — 99999 PR PBB SHADOW E&M-EST. PATIENT-LVL III: CPT | Mod: PBBFAC,,, | Performed by: OPTOMETRIST

## 2021-03-09 PROCEDURE — 1101F PR PT FALLS ASSESS DOC 0-1 FALLS W/OUT INJ PAST YR: ICD-10-PCS | Mod: CPTII,S$GLB,, | Performed by: OPTOMETRIST

## 2021-03-09 PROCEDURE — 99499 NO LOS: ICD-10-PCS | Mod: S$GLB,,, | Performed by: OPTOMETRIST

## 2021-03-09 PROCEDURE — 1126F PR PAIN SEVERITY QUANTIFIED, NO PAIN PRESENT: ICD-10-PCS | Mod: S$GLB,,, | Performed by: OPTOMETRIST

## 2021-03-09 PROCEDURE — 99999 PR PBB SHADOW E&M-EST. PATIENT-LVL III: ICD-10-PCS | Mod: PBBFAC,,, | Performed by: OPTOMETRIST

## 2021-03-09 PROCEDURE — 3288F FALL RISK ASSESSMENT DOCD: CPT | Mod: CPTII,S$GLB,, | Performed by: OPTOMETRIST

## 2021-03-09 PROCEDURE — 1101F PT FALLS ASSESS-DOCD LE1/YR: CPT | Mod: CPTII,S$GLB,, | Performed by: OPTOMETRIST

## 2021-03-09 PROCEDURE — 99499 UNLISTED E&M SERVICE: CPT | Mod: S$GLB,,, | Performed by: OPTOMETRIST

## 2021-03-11 ENCOUNTER — TELEPHONE (OUTPATIENT)
Dept: FAMILY MEDICINE | Facility: CLINIC | Age: 86
End: 2021-03-11

## 2021-03-11 DIAGNOSIS — I10 ESSENTIAL HYPERTENSION: ICD-10-CM

## 2021-03-11 DIAGNOSIS — E11.65 TYPE 2 DIABETES MELLITUS WITH HYPERGLYCEMIA, WITHOUT LONG-TERM CURRENT USE OF INSULIN: Primary | ICD-10-CM

## 2021-03-15 ENCOUNTER — LAB VISIT (OUTPATIENT)
Dept: LAB | Facility: HOSPITAL | Age: 86
End: 2021-03-15
Attending: FAMILY MEDICINE
Payer: MEDICARE

## 2021-03-15 DIAGNOSIS — E11.65 TYPE 2 DIABETES MELLITUS WITH HYPERGLYCEMIA, WITHOUT LONG-TERM CURRENT USE OF INSULIN: ICD-10-CM

## 2021-03-15 DIAGNOSIS — I10 ESSENTIAL HYPERTENSION: ICD-10-CM

## 2021-03-15 LAB
ALBUMIN SERPL BCP-MCNC: 4 G/DL (ref 3.5–5.2)
ALP SERPL-CCNC: 83 U/L (ref 55–135)
ALT SERPL W/O P-5'-P-CCNC: 20 U/L (ref 10–44)
ANION GAP SERPL CALC-SCNC: 9 MMOL/L (ref 8–16)
AST SERPL-CCNC: 23 U/L (ref 10–40)
BILIRUB SERPL-MCNC: 0.6 MG/DL (ref 0.1–1)
BUN SERPL-MCNC: 20 MG/DL (ref 8–23)
CALCIUM SERPL-MCNC: 9.1 MG/DL (ref 8.7–10.5)
CHLORIDE SERPL-SCNC: 103 MMOL/L (ref 95–110)
CHOLEST SERPL-MCNC: 146 MG/DL (ref 120–199)
CHOLEST/HDLC SERPL: 4.3 {RATIO} (ref 2–5)
CO2 SERPL-SCNC: 27 MMOL/L (ref 23–29)
CREAT SERPL-MCNC: 0.7 MG/DL (ref 0.5–1.4)
EST. GFR  (AFRICAN AMERICAN): >60 ML/MIN/1.73 M^2
EST. GFR  (NON AFRICAN AMERICAN): >60 ML/MIN/1.73 M^2
GLUCOSE SERPL-MCNC: 166 MG/DL (ref 70–110)
HDLC SERPL-MCNC: 34 MG/DL (ref 40–75)
HDLC SERPL: 23.3 % (ref 20–50)
LDLC SERPL CALC-MCNC: 71.8 MG/DL (ref 63–159)
NONHDLC SERPL-MCNC: 112 MG/DL
POTASSIUM SERPL-SCNC: 4 MMOL/L (ref 3.5–5.1)
PROT SERPL-MCNC: 7 G/DL (ref 6–8.4)
SODIUM SERPL-SCNC: 139 MMOL/L (ref 136–145)
TRIGL SERPL-MCNC: 201 MG/DL (ref 30–150)
TSH SERPL DL<=0.005 MIU/L-ACNC: 2.35 UIU/ML (ref 0.4–4)

## 2021-03-15 PROCEDURE — 36415 COLL VENOUS BLD VENIPUNCTURE: CPT | Mod: PO | Performed by: FAMILY MEDICINE

## 2021-03-15 PROCEDURE — 83036 HEMOGLOBIN GLYCOSYLATED A1C: CPT | Performed by: FAMILY MEDICINE

## 2021-03-15 PROCEDURE — 80053 COMPREHEN METABOLIC PANEL: CPT | Performed by: FAMILY MEDICINE

## 2021-03-15 PROCEDURE — 84443 ASSAY THYROID STIM HORMONE: CPT | Performed by: FAMILY MEDICINE

## 2021-03-15 PROCEDURE — 80061 LIPID PANEL: CPT | Performed by: FAMILY MEDICINE

## 2021-03-16 LAB
ESTIMATED AVG GLUCOSE: 171 MG/DL (ref 68–131)
HBA1C MFR BLD: 7.6 % (ref 4–5.6)

## 2021-03-18 ENCOUNTER — OFFICE VISIT (OUTPATIENT)
Dept: FAMILY MEDICINE | Facility: CLINIC | Age: 86
End: 2021-03-18
Payer: MEDICARE

## 2021-03-18 VITALS
HEART RATE: 88 BPM | TEMPERATURE: 97 F | OXYGEN SATURATION: 94 % | WEIGHT: 118.63 LBS | SYSTOLIC BLOOD PRESSURE: 126 MMHG | DIASTOLIC BLOOD PRESSURE: 72 MMHG | HEIGHT: 61 IN | BODY MASS INDEX: 22.4 KG/M2

## 2021-03-18 DIAGNOSIS — E03.9 HYPOTHYROIDISM, UNSPECIFIED TYPE: ICD-10-CM

## 2021-03-18 DIAGNOSIS — I70.0 AORTIC ATHEROSCLEROSIS: ICD-10-CM

## 2021-03-18 DIAGNOSIS — M54.50 CHRONIC BILATERAL LOW BACK PAIN WITHOUT SCIATICA: Primary | ICD-10-CM

## 2021-03-18 DIAGNOSIS — E11.65 TYPE 2 DIABETES MELLITUS WITH HYPERGLYCEMIA, WITHOUT LONG-TERM CURRENT USE OF INSULIN: ICD-10-CM

## 2021-03-18 DIAGNOSIS — R26.9 GAIT ABNORMALITY: ICD-10-CM

## 2021-03-18 DIAGNOSIS — G89.29 CHRONIC BILATERAL LOW BACK PAIN WITHOUT SCIATICA: Primary | ICD-10-CM

## 2021-03-18 DIAGNOSIS — I10 ESSENTIAL HYPERTENSION: ICD-10-CM

## 2021-03-18 PROCEDURE — 99999 PR PBB SHADOW E&M-EST. PATIENT-LVL III: CPT | Mod: PBBFAC,,, | Performed by: FAMILY MEDICINE

## 2021-03-18 PROCEDURE — 3074F PR MOST RECENT SYSTOLIC BLOOD PRESSURE < 130 MM HG: ICD-10-PCS | Mod: CPTII,S$GLB,, | Performed by: FAMILY MEDICINE

## 2021-03-18 PROCEDURE — 1159F MED LIST DOCD IN RCRD: CPT | Mod: S$GLB,,, | Performed by: FAMILY MEDICINE

## 2021-03-18 PROCEDURE — 99499 RISK ADDL DX/OHS AUDIT: ICD-10-PCS | Mod: S$GLB,,, | Performed by: FAMILY MEDICINE

## 2021-03-18 PROCEDURE — 3078F PR MOST RECENT DIASTOLIC BLOOD PRESSURE < 80 MM HG: ICD-10-PCS | Mod: CPTII,S$GLB,, | Performed by: FAMILY MEDICINE

## 2021-03-18 PROCEDURE — 3078F DIAST BP <80 MM HG: CPT | Mod: CPTII,S$GLB,, | Performed by: FAMILY MEDICINE

## 2021-03-18 PROCEDURE — 99214 PR OFFICE/OUTPT VISIT, EST, LEVL IV, 30-39 MIN: ICD-10-PCS | Mod: S$GLB,,, | Performed by: FAMILY MEDICINE

## 2021-03-18 PROCEDURE — 3051F HG A1C>EQUAL 7.0%<8.0%: CPT | Mod: CPTII,S$GLB,, | Performed by: FAMILY MEDICINE

## 2021-03-18 PROCEDURE — 1159F PR MEDICATION LIST DOCUMENTED IN MEDICAL RECORD: ICD-10-PCS | Mod: S$GLB,,, | Performed by: FAMILY MEDICINE

## 2021-03-18 PROCEDURE — 3072F PR LOW RISK FOR RETINOPATHY: ICD-10-PCS | Mod: S$GLB,,, | Performed by: FAMILY MEDICINE

## 2021-03-18 PROCEDURE — 3072F LOW RISK FOR RETINOPATHY: CPT | Mod: S$GLB,,, | Performed by: FAMILY MEDICINE

## 2021-03-18 PROCEDURE — 3051F PR MOST RECENT HEMOGLOBIN A1C LEVEL 7.0 - < 8.0%: ICD-10-PCS | Mod: CPTII,S$GLB,, | Performed by: FAMILY MEDICINE

## 2021-03-18 PROCEDURE — 99214 OFFICE O/P EST MOD 30 MIN: CPT | Mod: S$GLB,,, | Performed by: FAMILY MEDICINE

## 2021-03-18 PROCEDURE — 99999 PR PBB SHADOW E&M-EST. PATIENT-LVL III: ICD-10-PCS | Mod: PBBFAC,,, | Performed by: FAMILY MEDICINE

## 2021-03-18 PROCEDURE — 3074F SYST BP LT 130 MM HG: CPT | Mod: CPTII,S$GLB,, | Performed by: FAMILY MEDICINE

## 2021-03-18 PROCEDURE — 99499 UNLISTED E&M SERVICE: CPT | Mod: S$GLB,,, | Performed by: FAMILY MEDICINE

## 2021-03-26 ENCOUNTER — IMMUNIZATION (OUTPATIENT)
Dept: PRIMARY CARE CLINIC | Facility: CLINIC | Age: 86
End: 2021-03-26

## 2021-03-26 DIAGNOSIS — Z23 NEED FOR VACCINATION: Primary | ICD-10-CM

## 2021-03-26 PROCEDURE — 0002A PR IMMUNIZ ADMIN, SARS-COV-2 COVID-19 VACC, 30MCG/0.3ML, 2ND DOSE: CPT | Mod: CV19,S$GLB,, | Performed by: INTERNAL MEDICINE

## 2021-03-26 PROCEDURE — 91300 PR SARS-COV- 2 COVID-19 VACCINE, NO PRSV, 30MCG/0.3ML, IM: ICD-10-PCS | Mod: S$GLB,,, | Performed by: INTERNAL MEDICINE

## 2021-03-26 PROCEDURE — 91300 PR SARS-COV- 2 COVID-19 VACCINE, NO PRSV, 30MCG/0.3ML, IM: CPT | Mod: S$GLB,,, | Performed by: INTERNAL MEDICINE

## 2021-03-26 PROCEDURE — 0002A PR IMMUNIZ ADMIN, SARS-COV-2 COVID-19 VACC, 30MCG/0.3ML, 2ND DOSE: ICD-10-PCS | Mod: CV19,S$GLB,, | Performed by: INTERNAL MEDICINE

## 2021-03-26 RX ADMIN — Medication 0.3 ML: at 10:03

## 2021-04-07 ENCOUNTER — CLINICAL SUPPORT (OUTPATIENT)
Dept: AUDIOLOGY | Facility: CLINIC | Age: 86
End: 2021-04-07
Payer: MEDICARE

## 2021-04-07 DIAGNOSIS — H81.8X9 OTHER DISORDERS OF VESTIBULAR FUNCTION, UNSPECIFIED EAR: Primary | ICD-10-CM

## 2021-04-07 PROCEDURE — 92548 CDP-SOT 6 COND W/I&R: CPT | Mod: S$GLB,,, | Performed by: AUDIOLOGIST

## 2021-04-07 PROCEDURE — 92548 PR POSTUROGRAPHY, COMPUTER/DYNAMIC, SENS ORG TEST, 6 COND: ICD-10-PCS | Mod: S$GLB,,, | Performed by: AUDIOLOGIST

## 2021-04-08 ENCOUNTER — TELEPHONE (OUTPATIENT)
Dept: OTOLARYNGOLOGY | Facility: CLINIC | Age: 86
End: 2021-04-08

## 2021-04-09 ENCOUNTER — TELEPHONE (OUTPATIENT)
Dept: OTOLARYNGOLOGY | Facility: CLINIC | Age: 86
End: 2021-04-09

## 2021-04-13 ENCOUNTER — TELEPHONE (OUTPATIENT)
Dept: FAMILY MEDICINE | Facility: CLINIC | Age: 86
End: 2021-04-13

## 2021-04-15 ENCOUNTER — TELEPHONE (OUTPATIENT)
Dept: OTOLARYNGOLOGY | Facility: CLINIC | Age: 86
End: 2021-04-15

## 2021-04-19 ENCOUNTER — OFFICE VISIT (OUTPATIENT)
Dept: OTOLARYNGOLOGY | Facility: CLINIC | Age: 86
End: 2021-04-19
Payer: MEDICARE

## 2021-04-19 DIAGNOSIS — R26.89 IMBALANCE: Primary | ICD-10-CM

## 2021-04-19 PROCEDURE — 99442 PR PHYSICIAN TELEPHONE EVALUATION 11-20 MIN: CPT | Mod: 95,,, | Performed by: OTOLARYNGOLOGY

## 2021-04-19 PROCEDURE — 3072F PR LOW RISK FOR RETINOPATHY: ICD-10-PCS | Mod: 95,,, | Performed by: OTOLARYNGOLOGY

## 2021-04-19 PROCEDURE — 99442 PR PHYSICIAN TELEPHONE EVALUATION 11-20 MIN: ICD-10-PCS | Mod: 95,,, | Performed by: OTOLARYNGOLOGY

## 2021-04-19 PROCEDURE — 1159F MED LIST DOCD IN RCRD: CPT | Mod: 95,,, | Performed by: OTOLARYNGOLOGY

## 2021-04-19 PROCEDURE — 3072F LOW RISK FOR RETINOPATHY: CPT | Mod: 95,,, | Performed by: OTOLARYNGOLOGY

## 2021-04-19 PROCEDURE — 1159F PR MEDICATION LIST DOCUMENTED IN MEDICAL RECORD: ICD-10-PCS | Mod: 95,,, | Performed by: OTOLARYNGOLOGY

## 2021-04-22 ENCOUNTER — TELEPHONE (OUTPATIENT)
Dept: OPTOMETRY | Facility: CLINIC | Age: 86
End: 2021-04-22

## 2021-04-26 ENCOUNTER — OFFICE VISIT (OUTPATIENT)
Dept: OPTOMETRY | Facility: CLINIC | Age: 86
End: 2021-04-26
Payer: MEDICARE

## 2021-04-26 DIAGNOSIS — H01.01B ULCERATIVE BLEPHARITIS OF UPPER AND LOWER EYELIDS OF BOTH EYES: Primary | ICD-10-CM

## 2021-04-26 DIAGNOSIS — H01.01A ULCERATIVE BLEPHARITIS OF UPPER AND LOWER EYELIDS OF BOTH EYES: Primary | ICD-10-CM

## 2021-04-26 PROCEDURE — 1126F AMNT PAIN NOTED NONE PRSNT: CPT | Mod: S$GLB,,, | Performed by: OPTOMETRIST

## 2021-04-26 PROCEDURE — 3288F PR FALLS RISK ASSESSMENT DOCUMENTED: ICD-10-PCS | Mod: CPTII,S$GLB,, | Performed by: OPTOMETRIST

## 2021-04-26 PROCEDURE — 1101F PR PT FALLS ASSESS DOC 0-1 FALLS W/OUT INJ PAST YR: ICD-10-PCS | Mod: CPTII,S$GLB,, | Performed by: OPTOMETRIST

## 2021-04-26 PROCEDURE — 99999 PR PBB SHADOW E&M-EST. PATIENT-LVL II: ICD-10-PCS | Mod: PBBFAC,,, | Performed by: OPTOMETRIST

## 2021-04-26 PROCEDURE — 92012 PR EYE EXAM, EST PATIENT,INTERMED: ICD-10-PCS | Mod: S$GLB,,, | Performed by: OPTOMETRIST

## 2021-04-26 PROCEDURE — 1126F PR PAIN SEVERITY QUANTIFIED, NO PAIN PRESENT: ICD-10-PCS | Mod: S$GLB,,, | Performed by: OPTOMETRIST

## 2021-04-26 PROCEDURE — 99999 PR PBB SHADOW E&M-EST. PATIENT-LVL II: CPT | Mod: PBBFAC,,, | Performed by: OPTOMETRIST

## 2021-04-26 PROCEDURE — 3288F FALL RISK ASSESSMENT DOCD: CPT | Mod: CPTII,S$GLB,, | Performed by: OPTOMETRIST

## 2021-04-26 PROCEDURE — 92012 INTRM OPH EXAM EST PATIENT: CPT | Mod: S$GLB,,, | Performed by: OPTOMETRIST

## 2021-04-26 PROCEDURE — 1101F PT FALLS ASSESS-DOCD LE1/YR: CPT | Mod: CPTII,S$GLB,, | Performed by: OPTOMETRIST

## 2021-04-26 RX ORDER — BACITRACIN 500 [USP'U]/G
OINTMENT OPHTHALMIC 3 TIMES DAILY
Qty: 3.5 TUBE | Refills: 0 | Status: SHIPPED | OUTPATIENT
Start: 2021-04-26 | End: 2021-05-03

## 2021-04-27 ENCOUNTER — TELEPHONE (OUTPATIENT)
Dept: OPTOMETRY | Facility: CLINIC | Age: 86
End: 2021-04-27

## 2021-05-19 ENCOUNTER — CLINICAL SUPPORT (OUTPATIENT)
Dept: REHABILITATION | Facility: HOSPITAL | Age: 86
End: 2021-05-19
Attending: FAMILY MEDICINE
Payer: MEDICARE

## 2021-05-19 DIAGNOSIS — M54.50 CHRONIC BILATERAL LOW BACK PAIN WITHOUT SCIATICA: Primary | ICD-10-CM

## 2021-05-19 DIAGNOSIS — R53.1 DECREASED STRENGTH: ICD-10-CM

## 2021-05-19 DIAGNOSIS — R26.89 IMPAIRED GAIT AND MOBILITY: ICD-10-CM

## 2021-05-19 DIAGNOSIS — G89.29 CHRONIC BILATERAL LOW BACK PAIN WITHOUT SCIATICA: Primary | ICD-10-CM

## 2021-05-19 PROBLEM — M54.42 CHRONIC BILATERAL LOW BACK PAIN WITH LEFT-SIDED SCIATICA: Status: RESOLVED | Noted: 2021-05-19 | Resolved: 2021-05-19

## 2021-05-19 PROBLEM — M54.42 CHRONIC BILATERAL LOW BACK PAIN WITH LEFT-SIDED SCIATICA: Status: ACTIVE | Noted: 2021-05-19

## 2021-05-19 PROCEDURE — 97162 PT EVAL MOD COMPLEX 30 MIN: CPT | Mod: PN

## 2021-05-20 PROBLEM — R53.1 DECREASED STRENGTH: Status: ACTIVE | Noted: 2021-05-20

## 2021-05-20 PROBLEM — G89.29 CHRONIC BILATERAL LOW BACK PAIN WITH LEFT-SIDED SCIATICA: Status: RESOLVED | Noted: 2021-05-19 | Resolved: 2021-05-20

## 2021-05-20 PROBLEM — G89.29 CHRONIC BILATERAL LOW BACK PAIN WITHOUT SCIATICA: Status: ACTIVE | Noted: 2021-05-20

## 2021-05-20 PROBLEM — M54.42 CHRONIC BILATERAL LOW BACK PAIN WITH LEFT-SIDED SCIATICA: Status: RESOLVED | Noted: 2021-05-19 | Resolved: 2021-05-20

## 2021-05-20 PROBLEM — Z74.09 IMPAIRED MOBILITY: Status: ACTIVE | Noted: 2021-05-20

## 2021-05-20 PROBLEM — M54.50 CHRONIC BILATERAL LOW BACK PAIN WITHOUT SCIATICA: Status: ACTIVE | Noted: 2021-05-20

## 2021-05-27 DIAGNOSIS — E11.9 TYPE 2 DIABETES MELLITUS WITHOUT COMPLICATION, WITHOUT LONG-TERM CURRENT USE OF INSULIN: ICD-10-CM

## 2021-05-27 RX ORDER — METFORMIN HYDROCHLORIDE 1000 MG/1
1000 TABLET ORAL 2 TIMES DAILY WITH MEALS
Qty: 180 TABLET | Refills: 3 | Status: SHIPPED | OUTPATIENT
Start: 2021-05-27 | End: 2022-07-05

## 2021-06-04 ENCOUNTER — PATIENT OUTREACH (OUTPATIENT)
Dept: ADMINISTRATIVE | Facility: HOSPITAL | Age: 86
End: 2021-06-04

## 2021-06-04 DIAGNOSIS — E11.43 TYPE 2 DIABETES MELLITUS WITH DIABETIC AUTONOMIC NEUROPATHY, WITHOUT LONG-TERM CURRENT USE OF INSULIN: Primary | ICD-10-CM

## 2021-06-11 ENCOUNTER — TELEPHONE (OUTPATIENT)
Dept: OPTOMETRY | Facility: CLINIC | Age: 86
End: 2021-06-11

## 2021-06-16 ENCOUNTER — CLINICAL SUPPORT (OUTPATIENT)
Dept: REHABILITATION | Facility: HOSPITAL | Age: 86
End: 2021-06-16
Attending: FAMILY MEDICINE
Payer: MEDICARE

## 2021-06-16 DIAGNOSIS — R53.1 DECREASED STRENGTH: ICD-10-CM

## 2021-06-16 DIAGNOSIS — Z74.09 IMPAIRED MOBILITY: ICD-10-CM

## 2021-06-16 DIAGNOSIS — G89.29 CHRONIC BILATERAL LOW BACK PAIN WITHOUT SCIATICA: ICD-10-CM

## 2021-06-16 DIAGNOSIS — M54.50 CHRONIC BILATERAL LOW BACK PAIN WITHOUT SCIATICA: ICD-10-CM

## 2021-06-16 PROCEDURE — 97110 THERAPEUTIC EXERCISES: CPT | Mod: PN

## 2021-06-21 ENCOUNTER — OFFICE VISIT (OUTPATIENT)
Dept: OPTOMETRY | Facility: CLINIC | Age: 86
End: 2021-06-21
Payer: COMMERCIAL

## 2021-06-21 ENCOUNTER — TELEPHONE (OUTPATIENT)
Dept: FAMILY MEDICINE | Facility: CLINIC | Age: 86
End: 2021-06-21

## 2021-06-21 DIAGNOSIS — H53.2 DIPLOPIA: Primary | ICD-10-CM

## 2021-06-21 DIAGNOSIS — H50.22 HYPERTROPIA OF LEFT EYE: ICD-10-CM

## 2021-06-21 DIAGNOSIS — I10 ESSENTIAL HYPERTENSION: ICD-10-CM

## 2021-06-21 DIAGNOSIS — E03.9 HYPOTHYROIDISM, UNSPECIFIED TYPE: Primary | ICD-10-CM

## 2021-06-21 DIAGNOSIS — E11.65 TYPE 2 DIABETES MELLITUS WITH HYPERGLYCEMIA, WITHOUT LONG-TERM CURRENT USE OF INSULIN: ICD-10-CM

## 2021-06-21 PROCEDURE — 99999 PR PBB SHADOW E&M-EST. PATIENT-LVL III: CPT | Mod: PBBFAC,,, | Performed by: OPTOMETRIST

## 2021-06-21 PROCEDURE — 99499 NO LOS: ICD-10-PCS | Mod: S$GLB,,, | Performed by: OPTOMETRIST

## 2021-06-21 PROCEDURE — 99499 UNLISTED E&M SERVICE: CPT | Mod: S$GLB,,, | Performed by: OPTOMETRIST

## 2021-06-21 PROCEDURE — 92015 PR REFRACTION: ICD-10-PCS | Mod: S$GLB,,, | Performed by: OPTOMETRIST

## 2021-06-21 PROCEDURE — 92015 DETERMINE REFRACTIVE STATE: CPT | Mod: S$GLB,,, | Performed by: OPTOMETRIST

## 2021-06-21 PROCEDURE — 99999 PR PBB SHADOW E&M-EST. PATIENT-LVL III: ICD-10-PCS | Mod: PBBFAC,,, | Performed by: OPTOMETRIST

## 2021-06-22 ENCOUNTER — CLINICAL SUPPORT (OUTPATIENT)
Dept: REHABILITATION | Facility: HOSPITAL | Age: 86
End: 2021-06-22
Attending: FAMILY MEDICINE
Payer: MEDICARE

## 2021-06-22 DIAGNOSIS — M54.50 CHRONIC BILATERAL LOW BACK PAIN WITHOUT SCIATICA: ICD-10-CM

## 2021-06-22 DIAGNOSIS — G89.29 CHRONIC BILATERAL LOW BACK PAIN WITHOUT SCIATICA: ICD-10-CM

## 2021-06-22 DIAGNOSIS — R53.1 DECREASED STRENGTH: ICD-10-CM

## 2021-06-22 DIAGNOSIS — Z74.09 IMPAIRED MOBILITY: ICD-10-CM

## 2021-06-22 PROCEDURE — 97110 THERAPEUTIC EXERCISES: CPT | Mod: PN,CQ

## 2021-06-25 ENCOUNTER — TELEPHONE (OUTPATIENT)
Dept: OPHTHALMOLOGY | Facility: CLINIC | Age: 86
End: 2021-06-25

## 2021-06-25 ENCOUNTER — LAB VISIT (OUTPATIENT)
Dept: LAB | Facility: HOSPITAL | Age: 86
End: 2021-06-25
Attending: FAMILY MEDICINE
Payer: MEDICARE

## 2021-06-25 DIAGNOSIS — I10 ESSENTIAL HYPERTENSION: ICD-10-CM

## 2021-06-25 DIAGNOSIS — E11.65 TYPE 2 DIABETES MELLITUS WITH HYPERGLYCEMIA, WITHOUT LONG-TERM CURRENT USE OF INSULIN: ICD-10-CM

## 2021-06-25 DIAGNOSIS — E03.9 HYPOTHYROIDISM, UNSPECIFIED TYPE: ICD-10-CM

## 2021-06-25 LAB
ALBUMIN SERPL BCP-MCNC: 4.2 G/DL (ref 3.5–5.2)
ALP SERPL-CCNC: 82 U/L (ref 55–135)
ALT SERPL W/O P-5'-P-CCNC: 18 U/L (ref 10–44)
ANION GAP SERPL CALC-SCNC: 11 MMOL/L (ref 8–16)
AST SERPL-CCNC: 21 U/L (ref 10–40)
BASOPHILS # BLD AUTO: 0.03 K/UL (ref 0–0.2)
BASOPHILS NFR BLD: 0.5 % (ref 0–1.9)
BILIRUB SERPL-MCNC: 0.5 MG/DL (ref 0.1–1)
BUN SERPL-MCNC: 23 MG/DL (ref 8–23)
CALCIUM SERPL-MCNC: 9.3 MG/DL (ref 8.7–10.5)
CHLORIDE SERPL-SCNC: 106 MMOL/L (ref 95–110)
CHOLEST SERPL-MCNC: 145 MG/DL (ref 120–199)
CHOLEST/HDLC SERPL: 4.1 {RATIO} (ref 2–5)
CO2 SERPL-SCNC: 24 MMOL/L (ref 23–29)
CREAT SERPL-MCNC: 0.7 MG/DL (ref 0.5–1.4)
DIFFERENTIAL METHOD: ABNORMAL
EOSINOPHIL # BLD AUTO: 0.2 K/UL (ref 0–0.5)
EOSINOPHIL NFR BLD: 3.8 % (ref 0–8)
ERYTHROCYTE [DISTWIDTH] IN BLOOD BY AUTOMATED COUNT: 13.7 % (ref 11.5–14.5)
EST. GFR  (AFRICAN AMERICAN): >60 ML/MIN/1.73 M^2
EST. GFR  (NON AFRICAN AMERICAN): >60 ML/MIN/1.73 M^2
ESTIMATED AVG GLUCOSE: 163 MG/DL (ref 68–131)
GLUCOSE SERPL-MCNC: 139 MG/DL (ref 70–110)
HBA1C MFR BLD: 7.3 % (ref 4–5.6)
HCT VFR BLD AUTO: 41.9 % (ref 40–54)
HDLC SERPL-MCNC: 35 MG/DL (ref 40–75)
HDLC SERPL: 24.1 % (ref 20–50)
HGB BLD-MCNC: 13.7 G/DL (ref 14–18)
IMM GRANULOCYTES # BLD AUTO: 0.01 K/UL (ref 0–0.04)
IMM GRANULOCYTES NFR BLD AUTO: 0.2 % (ref 0–0.5)
LDLC SERPL CALC-MCNC: 34.6 MG/DL (ref 63–159)
LYMPHOCYTES # BLD AUTO: 2.3 K/UL (ref 1–4.8)
LYMPHOCYTES NFR BLD: 36.6 % (ref 18–48)
MCH RBC QN AUTO: 30.2 PG (ref 27–31)
MCHC RBC AUTO-ENTMCNC: 32.7 G/DL (ref 32–36)
MCV RBC AUTO: 92 FL (ref 82–98)
MONOCYTES # BLD AUTO: 0.5 K/UL (ref 0.3–1)
MONOCYTES NFR BLD: 7.8 % (ref 4–15)
NEUTROPHILS # BLD AUTO: 3.3 K/UL (ref 1.8–7.7)
NEUTROPHILS NFR BLD: 51.1 % (ref 38–73)
NONHDLC SERPL-MCNC: 110 MG/DL
NRBC BLD-RTO: 0 /100 WBC
PLATELET # BLD AUTO: 161 K/UL (ref 150–450)
PMV BLD AUTO: 11.7 FL (ref 9.2–12.9)
POTASSIUM SERPL-SCNC: 4.1 MMOL/L (ref 3.5–5.1)
PROT SERPL-MCNC: 7 G/DL (ref 6–8.4)
RBC # BLD AUTO: 4.54 M/UL (ref 4.6–6.2)
SODIUM SERPL-SCNC: 141 MMOL/L (ref 136–145)
TRIGL SERPL-MCNC: 377 MG/DL (ref 30–150)
TSH SERPL DL<=0.005 MIU/L-ACNC: 2.31 UIU/ML (ref 0.4–4)
WBC # BLD AUTO: 6.37 K/UL (ref 3.9–12.7)

## 2021-06-25 PROCEDURE — 83036 HEMOGLOBIN GLYCOSYLATED A1C: CPT | Performed by: FAMILY MEDICINE

## 2021-06-25 PROCEDURE — 80053 COMPREHEN METABOLIC PANEL: CPT | Performed by: FAMILY MEDICINE

## 2021-06-25 PROCEDURE — 80061 LIPID PANEL: CPT | Performed by: FAMILY MEDICINE

## 2021-06-25 PROCEDURE — 84443 ASSAY THYROID STIM HORMONE: CPT | Performed by: FAMILY MEDICINE

## 2021-06-25 PROCEDURE — 85025 COMPLETE CBC W/AUTO DIFF WBC: CPT | Performed by: FAMILY MEDICINE

## 2021-06-25 PROCEDURE — 36415 COLL VENOUS BLD VENIPUNCTURE: CPT | Mod: PO | Performed by: FAMILY MEDICINE

## 2021-06-28 ENCOUNTER — DOCUMENTATION ONLY (OUTPATIENT)
Dept: REHABILITATION | Facility: HOSPITAL | Age: 86
End: 2021-06-28

## 2021-06-28 ENCOUNTER — OFFICE VISIT (OUTPATIENT)
Dept: FAMILY MEDICINE | Facility: CLINIC | Age: 86
End: 2021-06-28
Payer: MEDICARE

## 2021-06-28 VITALS
WEIGHT: 120.13 LBS | OXYGEN SATURATION: 96 % | HEIGHT: 61 IN | DIASTOLIC BLOOD PRESSURE: 68 MMHG | HEART RATE: 81 BPM | SYSTOLIC BLOOD PRESSURE: 132 MMHG | BODY MASS INDEX: 22.68 KG/M2

## 2021-06-28 DIAGNOSIS — E03.9 HYPOTHYROIDISM, UNSPECIFIED TYPE: ICD-10-CM

## 2021-06-28 DIAGNOSIS — E78.5 DYSLIPIDEMIA: ICD-10-CM

## 2021-06-28 DIAGNOSIS — I10 ESSENTIAL HYPERTENSION: Primary | ICD-10-CM

## 2021-06-28 DIAGNOSIS — E11.43 TYPE 2 DIABETES MELLITUS WITH DIABETIC AUTONOMIC NEUROPATHY, WITHOUT LONG-TERM CURRENT USE OF INSULIN: ICD-10-CM

## 2021-06-28 PROCEDURE — 1159F PR MEDICATION LIST DOCUMENTED IN MEDICAL RECORD: ICD-10-PCS | Mod: S$GLB,,, | Performed by: FAMILY MEDICINE

## 2021-06-28 PROCEDURE — 1159F MED LIST DOCD IN RCRD: CPT | Mod: S$GLB,,, | Performed by: FAMILY MEDICINE

## 2021-06-28 PROCEDURE — 1100F PR PT FALLS ASSESS DOC 2+ FALLS/FALL W/INJURY/YR: ICD-10-PCS | Mod: CPTII,S$GLB,, | Performed by: FAMILY MEDICINE

## 2021-06-28 PROCEDURE — 3078F PR MOST RECENT DIASTOLIC BLOOD PRESSURE < 80 MM HG: ICD-10-PCS | Mod: CPTII,S$GLB,, | Performed by: FAMILY MEDICINE

## 2021-06-28 PROCEDURE — 3072F LOW RISK FOR RETINOPATHY: CPT | Mod: S$GLB,,, | Performed by: FAMILY MEDICINE

## 2021-06-28 PROCEDURE — 99214 OFFICE O/P EST MOD 30 MIN: CPT | Mod: S$GLB,,, | Performed by: FAMILY MEDICINE

## 2021-06-28 PROCEDURE — 3072F PR LOW RISK FOR RETINOPATHY: ICD-10-PCS | Mod: S$GLB,,, | Performed by: FAMILY MEDICINE

## 2021-06-28 PROCEDURE — 3288F FALL RISK ASSESSMENT DOCD: CPT | Mod: CPTII,S$GLB,, | Performed by: FAMILY MEDICINE

## 2021-06-28 PROCEDURE — 3078F DIAST BP <80 MM HG: CPT | Mod: CPTII,S$GLB,, | Performed by: FAMILY MEDICINE

## 2021-06-28 PROCEDURE — 99999 PR PBB SHADOW E&M-EST. PATIENT-LVL III: ICD-10-PCS | Mod: PBBFAC,,, | Performed by: FAMILY MEDICINE

## 2021-06-28 PROCEDURE — 1126F AMNT PAIN NOTED NONE PRSNT: CPT | Mod: S$GLB,,, | Performed by: FAMILY MEDICINE

## 2021-06-28 PROCEDURE — 1100F PTFALLS ASSESS-DOCD GE2>/YR: CPT | Mod: CPTII,S$GLB,, | Performed by: FAMILY MEDICINE

## 2021-06-28 PROCEDURE — 3288F PR FALLS RISK ASSESSMENT DOCUMENTED: ICD-10-PCS | Mod: CPTII,S$GLB,, | Performed by: FAMILY MEDICINE

## 2021-06-28 PROCEDURE — 3051F PR MOST RECENT HEMOGLOBIN A1C LEVEL 7.0 - < 8.0%: ICD-10-PCS | Mod: CPTII,S$GLB,, | Performed by: FAMILY MEDICINE

## 2021-06-28 PROCEDURE — 99999 PR PBB SHADOW E&M-EST. PATIENT-LVL III: CPT | Mod: PBBFAC,,, | Performed by: FAMILY MEDICINE

## 2021-06-28 PROCEDURE — 3051F HG A1C>EQUAL 7.0%<8.0%: CPT | Mod: CPTII,S$GLB,, | Performed by: FAMILY MEDICINE

## 2021-06-28 PROCEDURE — 1126F PR PAIN SEVERITY QUANTIFIED, NO PAIN PRESENT: ICD-10-PCS | Mod: S$GLB,,, | Performed by: FAMILY MEDICINE

## 2021-06-28 PROCEDURE — 3075F PR MOST RECENT SYSTOLIC BLOOD PRESS GE 130-139MM HG: ICD-10-PCS | Mod: CPTII,S$GLB,, | Performed by: FAMILY MEDICINE

## 2021-06-28 PROCEDURE — 99214 PR OFFICE/OUTPT VISIT, EST, LEVL IV, 30-39 MIN: ICD-10-PCS | Mod: S$GLB,,, | Performed by: FAMILY MEDICINE

## 2021-06-28 PROCEDURE — 3075F SYST BP GE 130 - 139MM HG: CPT | Mod: CPTII,S$GLB,, | Performed by: FAMILY MEDICINE

## 2021-06-29 ENCOUNTER — TELEPHONE (OUTPATIENT)
Dept: OPTOMETRY | Facility: CLINIC | Age: 86
End: 2021-06-29

## 2021-06-29 ENCOUNTER — CLINICAL SUPPORT (OUTPATIENT)
Dept: REHABILITATION | Facility: HOSPITAL | Age: 86
End: 2021-06-29
Attending: FAMILY MEDICINE
Payer: MEDICARE

## 2021-06-29 DIAGNOSIS — M54.50 CHRONIC BILATERAL LOW BACK PAIN WITHOUT SCIATICA: ICD-10-CM

## 2021-06-29 DIAGNOSIS — R53.1 DECREASED STRENGTH: ICD-10-CM

## 2021-06-29 DIAGNOSIS — Z74.09 IMPAIRED MOBILITY: ICD-10-CM

## 2021-06-29 DIAGNOSIS — G89.29 CHRONIC BILATERAL LOW BACK PAIN WITHOUT SCIATICA: ICD-10-CM

## 2021-06-29 PROCEDURE — 97110 THERAPEUTIC EXERCISES: CPT | Mod: PN,CQ

## 2021-07-01 ENCOUNTER — CLINICAL SUPPORT (OUTPATIENT)
Dept: REHABILITATION | Facility: HOSPITAL | Age: 86
End: 2021-07-01
Attending: FAMILY MEDICINE
Payer: MEDICARE

## 2021-07-01 DIAGNOSIS — M54.50 CHRONIC BILATERAL LOW BACK PAIN WITHOUT SCIATICA: ICD-10-CM

## 2021-07-01 DIAGNOSIS — G89.29 CHRONIC BILATERAL LOW BACK PAIN WITHOUT SCIATICA: ICD-10-CM

## 2021-07-01 DIAGNOSIS — R53.1 DECREASED STRENGTH: ICD-10-CM

## 2021-07-01 DIAGNOSIS — Z74.09 IMPAIRED MOBILITY: ICD-10-CM

## 2021-07-01 PROCEDURE — 97110 THERAPEUTIC EXERCISES: CPT | Mod: PN

## 2021-07-01 PROCEDURE — 97140 MANUAL THERAPY 1/> REGIONS: CPT | Mod: PN

## 2021-07-07 ENCOUNTER — CLINICAL SUPPORT (OUTPATIENT)
Dept: REHABILITATION | Facility: HOSPITAL | Age: 86
End: 2021-07-07
Attending: FAMILY MEDICINE
Payer: MEDICARE

## 2021-07-07 DIAGNOSIS — Z74.09 IMPAIRED MOBILITY: ICD-10-CM

## 2021-07-07 DIAGNOSIS — M54.50 CHRONIC BILATERAL LOW BACK PAIN WITHOUT SCIATICA: Primary | ICD-10-CM

## 2021-07-07 DIAGNOSIS — R53.1 DECREASED STRENGTH: ICD-10-CM

## 2021-07-07 DIAGNOSIS — G89.29 CHRONIC BILATERAL LOW BACK PAIN WITHOUT SCIATICA: Primary | ICD-10-CM

## 2021-07-07 PROCEDURE — 97110 THERAPEUTIC EXERCISES: CPT | Mod: PN

## 2021-07-09 ENCOUNTER — CLINICAL SUPPORT (OUTPATIENT)
Dept: REHABILITATION | Facility: HOSPITAL | Age: 86
End: 2021-07-09
Attending: FAMILY MEDICINE
Payer: MEDICARE

## 2021-07-09 DIAGNOSIS — R53.1 DECREASED STRENGTH: ICD-10-CM

## 2021-07-09 DIAGNOSIS — M54.50 CHRONIC BILATERAL LOW BACK PAIN WITHOUT SCIATICA: ICD-10-CM

## 2021-07-09 DIAGNOSIS — G89.29 CHRONIC BILATERAL LOW BACK PAIN WITHOUT SCIATICA: ICD-10-CM

## 2021-07-09 DIAGNOSIS — Z74.09 IMPAIRED MOBILITY: ICD-10-CM

## 2021-07-09 PROCEDURE — 97110 THERAPEUTIC EXERCISES: CPT | Mod: PN

## 2021-07-12 ENCOUNTER — CLINICAL SUPPORT (OUTPATIENT)
Dept: REHABILITATION | Facility: HOSPITAL | Age: 86
End: 2021-07-12
Attending: FAMILY MEDICINE
Payer: MEDICARE

## 2021-07-12 ENCOUNTER — TELEPHONE (OUTPATIENT)
Dept: REHABILITATION | Facility: HOSPITAL | Age: 86
End: 2021-07-12

## 2021-07-12 DIAGNOSIS — G89.29 CHRONIC BILATERAL LOW BACK PAIN WITHOUT SCIATICA: ICD-10-CM

## 2021-07-12 DIAGNOSIS — M54.50 CHRONIC BILATERAL LOW BACK PAIN WITHOUT SCIATICA: ICD-10-CM

## 2021-07-12 DIAGNOSIS — R53.1 DECREASED STRENGTH: ICD-10-CM

## 2021-07-12 DIAGNOSIS — Z74.09 IMPAIRED MOBILITY: ICD-10-CM

## 2021-07-12 PROCEDURE — 97110 THERAPEUTIC EXERCISES: CPT | Mod: PN

## 2021-07-14 ENCOUNTER — CLINICAL SUPPORT (OUTPATIENT)
Dept: REHABILITATION | Facility: HOSPITAL | Age: 86
End: 2021-07-14
Attending: FAMILY MEDICINE
Payer: MEDICARE

## 2021-07-14 DIAGNOSIS — R53.1 DECREASED STRENGTH: ICD-10-CM

## 2021-07-14 DIAGNOSIS — Z74.09 IMPAIRED MOBILITY: ICD-10-CM

## 2021-07-14 DIAGNOSIS — G89.29 CHRONIC BILATERAL LOW BACK PAIN WITHOUT SCIATICA: ICD-10-CM

## 2021-07-14 DIAGNOSIS — M54.50 CHRONIC BILATERAL LOW BACK PAIN WITHOUT SCIATICA: ICD-10-CM

## 2021-07-14 PROCEDURE — 97110 THERAPEUTIC EXERCISES: CPT | Mod: PN

## 2021-07-14 PROCEDURE — 97140 MANUAL THERAPY 1/> REGIONS: CPT | Mod: PN

## 2021-07-16 ENCOUNTER — PATIENT OUTREACH (OUTPATIENT)
Dept: ADMINISTRATIVE | Facility: OTHER | Age: 86
End: 2021-07-16

## 2021-07-19 ENCOUNTER — OFFICE VISIT (OUTPATIENT)
Dept: OPHTHALMOLOGY | Facility: CLINIC | Age: 86
End: 2021-07-19
Payer: MEDICARE

## 2021-07-19 DIAGNOSIS — E11.59 HYPERTENSION ASSOCIATED WITH DIABETES: Primary | ICD-10-CM

## 2021-07-19 DIAGNOSIS — I15.2 HYPERTENSION ASSOCIATED WITH DIABETES: Primary | ICD-10-CM

## 2021-07-19 PROCEDURE — 99999 PR PBB SHADOW E&M-EST. PATIENT-LVL III: ICD-10-PCS | Mod: PBBFAC,,, | Performed by: OPHTHALMOLOGY

## 2021-07-19 PROCEDURE — 99999 PR PBB SHADOW E&M-EST. PATIENT-LVL III: CPT | Mod: PBBFAC,,, | Performed by: OPHTHALMOLOGY

## 2021-07-19 PROCEDURE — 99499 UNLISTED E&M SERVICE: CPT | Mod: HCNC,S$GLB,, | Performed by: OPHTHALMOLOGY

## 2021-07-19 PROCEDURE — 1159F PR MEDICATION LIST DOCUMENTED IN MEDICAL RECORD: ICD-10-PCS | Mod: CPTII,S$GLB,, | Performed by: OPHTHALMOLOGY

## 2021-07-19 PROCEDURE — 99499 RISK ADDL DX/OHS AUDIT: ICD-10-PCS | Mod: HCNC,S$GLB,, | Performed by: OPHTHALMOLOGY

## 2021-07-19 PROCEDURE — 99214 OFFICE O/P EST MOD 30 MIN: CPT | Mod: S$GLB,,, | Performed by: OPHTHALMOLOGY

## 2021-07-19 PROCEDURE — 3051F HG A1C>EQUAL 7.0%<8.0%: CPT | Mod: CPTII,S$GLB,, | Performed by: OPHTHALMOLOGY

## 2021-07-19 PROCEDURE — 99214 PR OFFICE/OUTPT VISIT, EST, LEVL IV, 30-39 MIN: ICD-10-PCS | Mod: S$GLB,,, | Performed by: OPHTHALMOLOGY

## 2021-07-19 PROCEDURE — 1159F MED LIST DOCD IN RCRD: CPT | Mod: CPTII,S$GLB,, | Performed by: OPHTHALMOLOGY

## 2021-07-19 PROCEDURE — 3051F PR MOST RECENT HEMOGLOBIN A1C LEVEL 7.0 - < 8.0%: ICD-10-PCS | Mod: CPTII,S$GLB,, | Performed by: OPHTHALMOLOGY

## 2021-07-21 ENCOUNTER — CLINICAL SUPPORT (OUTPATIENT)
Dept: REHABILITATION | Facility: HOSPITAL | Age: 86
End: 2021-07-21
Payer: MEDICARE

## 2021-07-21 DIAGNOSIS — G89.29 CHRONIC BILATERAL LOW BACK PAIN WITHOUT SCIATICA: ICD-10-CM

## 2021-07-21 DIAGNOSIS — M54.50 CHRONIC BILATERAL LOW BACK PAIN WITHOUT SCIATICA: ICD-10-CM

## 2021-07-21 DIAGNOSIS — Z74.09 IMPAIRED MOBILITY: ICD-10-CM

## 2021-07-21 DIAGNOSIS — R53.1 DECREASED STRENGTH: ICD-10-CM

## 2021-07-21 PROCEDURE — 97110 THERAPEUTIC EXERCISES: CPT | Mod: PN

## 2021-07-27 ENCOUNTER — DOCUMENTATION ONLY (OUTPATIENT)
Dept: REHABILITATION | Facility: HOSPITAL | Age: 86
End: 2021-07-27

## 2021-07-28 ENCOUNTER — CLINICAL SUPPORT (OUTPATIENT)
Dept: REHABILITATION | Facility: HOSPITAL | Age: 86
End: 2021-07-28
Attending: FAMILY MEDICINE
Payer: MEDICARE

## 2021-07-28 DIAGNOSIS — G89.29 CHRONIC BILATERAL LOW BACK PAIN WITHOUT SCIATICA: ICD-10-CM

## 2021-07-28 DIAGNOSIS — M54.50 CHRONIC BILATERAL LOW BACK PAIN WITHOUT SCIATICA: ICD-10-CM

## 2021-07-28 DIAGNOSIS — R53.1 DECREASED STRENGTH: ICD-10-CM

## 2021-07-28 DIAGNOSIS — Z74.09 IMPAIRED MOBILITY: ICD-10-CM

## 2021-07-28 PROCEDURE — 97140 MANUAL THERAPY 1/> REGIONS: CPT | Mod: PN

## 2021-07-28 PROCEDURE — 97110 THERAPEUTIC EXERCISES: CPT | Mod: PN

## 2021-08-12 ENCOUNTER — CLINICAL SUPPORT (OUTPATIENT)
Dept: REHABILITATION | Facility: HOSPITAL | Age: 86
End: 2021-08-12
Payer: MEDICARE

## 2021-08-12 DIAGNOSIS — G89.29 CHRONIC BILATERAL LOW BACK PAIN WITHOUT SCIATICA: ICD-10-CM

## 2021-08-12 DIAGNOSIS — R53.1 DECREASED STRENGTH: ICD-10-CM

## 2021-08-12 DIAGNOSIS — M54.50 CHRONIC BILATERAL LOW BACK PAIN WITHOUT SCIATICA: ICD-10-CM

## 2021-08-12 DIAGNOSIS — Z74.09 IMPAIRED MOBILITY: ICD-10-CM

## 2021-08-12 PROCEDURE — 97140 MANUAL THERAPY 1/> REGIONS: CPT | Mod: PN,CQ

## 2021-08-12 PROCEDURE — 97110 THERAPEUTIC EXERCISES: CPT | Mod: PN,CQ

## 2021-08-13 ENCOUNTER — OFFICE VISIT (OUTPATIENT)
Dept: OPHTHALMOLOGY | Facility: CLINIC | Age: 86
End: 2021-08-13
Payer: MEDICARE

## 2021-08-13 DIAGNOSIS — H50.22 HYPERTROPIA OF LEFT EYE: Primary | ICD-10-CM

## 2021-08-13 PROCEDURE — 92060 SENSORIMOTOR EXAMINATION: CPT | Mod: S$GLB,,, | Performed by: STUDENT IN AN ORGANIZED HEALTH CARE EDUCATION/TRAINING PROGRAM

## 2021-08-13 PROCEDURE — 92012 INTRM OPH EXAM EST PATIENT: CPT | Mod: S$GLB,,, | Performed by: STUDENT IN AN ORGANIZED HEALTH CARE EDUCATION/TRAINING PROGRAM

## 2021-08-13 PROCEDURE — 92060 PR SPECIAL EYE EVAL,SENSORIMOTOR: ICD-10-PCS | Mod: S$GLB,,, | Performed by: STUDENT IN AN ORGANIZED HEALTH CARE EDUCATION/TRAINING PROGRAM

## 2021-08-13 PROCEDURE — 92012 PR EYE EXAM, EST PATIENT,INTERMED: ICD-10-PCS | Mod: S$GLB,,, | Performed by: STUDENT IN AN ORGANIZED HEALTH CARE EDUCATION/TRAINING PROGRAM

## 2021-08-17 ENCOUNTER — TELEPHONE (OUTPATIENT)
Dept: FAMILY MEDICINE | Facility: CLINIC | Age: 86
End: 2021-08-17

## 2021-08-17 DIAGNOSIS — R25.2 SPASM: Primary | ICD-10-CM

## 2021-08-17 RX ORDER — TIZANIDINE 2 MG/1
4 TABLET ORAL 2 TIMES DAILY PRN
Qty: 90 TABLET | Refills: 0 | Status: SHIPPED | OUTPATIENT
Start: 2021-08-17 | End: 2021-10-23

## 2021-08-18 ENCOUNTER — CLINICAL SUPPORT (OUTPATIENT)
Dept: REHABILITATION | Facility: HOSPITAL | Age: 86
End: 2021-08-18
Payer: MEDICARE

## 2021-08-18 DIAGNOSIS — R53.1 DECREASED STRENGTH: ICD-10-CM

## 2021-08-18 DIAGNOSIS — Z74.09 IMPAIRED MOBILITY: ICD-10-CM

## 2021-08-18 DIAGNOSIS — M54.50 CHRONIC BILATERAL LOW BACK PAIN WITHOUT SCIATICA: ICD-10-CM

## 2021-08-18 DIAGNOSIS — G89.29 CHRONIC BILATERAL LOW BACK PAIN WITHOUT SCIATICA: ICD-10-CM

## 2021-08-18 PROCEDURE — 97140 MANUAL THERAPY 1/> REGIONS: CPT | Mod: PN

## 2021-08-18 PROCEDURE — 97110 THERAPEUTIC EXERCISES: CPT | Mod: PN

## 2021-08-24 ENCOUNTER — TELEPHONE (OUTPATIENT)
Dept: REHABILITATION | Facility: HOSPITAL | Age: 86
End: 2021-08-24

## 2021-08-24 ENCOUNTER — HOSPITAL ENCOUNTER (OUTPATIENT)
Dept: RADIOLOGY | Facility: HOSPITAL | Age: 86
Discharge: HOME | End: 2021-08-24
Attending: ORTHOPAEDIC SURGERY
Payer: MEDICARE

## 2021-08-24 ENCOUNTER — TELEPHONE (OUTPATIENT)
Dept: ORTHOPEDICS | Facility: CLINIC | Age: 86
End: 2021-08-24

## 2021-08-24 ENCOUNTER — DOCUMENTATION ONLY (OUTPATIENT)
Dept: REHABILITATION | Facility: HOSPITAL | Age: 86
End: 2021-08-24

## 2021-08-24 DIAGNOSIS — M79.604 LEG PAIN, BILATERAL: Primary | ICD-10-CM

## 2021-08-24 DIAGNOSIS — M79.605 LEG PAIN, BILATERAL: Primary | ICD-10-CM

## 2021-08-24 DIAGNOSIS — M79.605 LEG PAIN, BILATERAL: ICD-10-CM

## 2021-08-24 DIAGNOSIS — M79.604 LEG PAIN, BILATERAL: ICD-10-CM

## 2021-08-24 PROCEDURE — 73590 X-RAY EXAM OF LOWER LEG: CPT | Mod: 26,50,, | Performed by: RADIOLOGY

## 2021-08-24 PROCEDURE — 73564 X-RAY EXAM KNEE 4 OR MORE: CPT | Mod: TC,50

## 2021-08-24 PROCEDURE — 73590 XR TIBIA FIBULA BILATERAL: ICD-10-PCS | Mod: 26,50,, | Performed by: RADIOLOGY

## 2021-08-24 PROCEDURE — 73564 X-RAY EXAM KNEE 4 OR MORE: CPT | Mod: 26,50,, | Performed by: RADIOLOGY

## 2021-08-24 PROCEDURE — 73564 XR KNEE ORTHO BILAT WITH FLEXION: ICD-10-PCS | Mod: 26,50,, | Performed by: RADIOLOGY

## 2021-08-24 PROCEDURE — 73590 X-RAY EXAM OF LOWER LEG: CPT | Mod: TC,50

## 2021-08-25 ENCOUNTER — OFFICE VISIT (OUTPATIENT)
Dept: ORTHOPEDICS | Facility: CLINIC | Age: 86
End: 2021-08-25
Payer: MEDICARE

## 2021-08-25 VITALS
BODY MASS INDEX: 22.66 KG/M2 | SYSTOLIC BLOOD PRESSURE: 104 MMHG | WEIGHT: 120 LBS | HEIGHT: 61 IN | HEART RATE: 93 BPM | DIASTOLIC BLOOD PRESSURE: 64 MMHG

## 2021-08-25 DIAGNOSIS — I73.9 VASCULAR CLAUDICATION: Primary | ICD-10-CM

## 2021-08-25 PROCEDURE — 99999 PR PBB SHADOW E&M-EST. PATIENT-LVL IV: ICD-10-PCS | Mod: PBBFAC,,, | Performed by: ORTHOPAEDIC SURGERY

## 2021-08-25 PROCEDURE — 3288F FALL RISK ASSESSMENT DOCD: CPT | Mod: CPTII,S$GLB,, | Performed by: ORTHOPAEDIC SURGERY

## 2021-08-25 PROCEDURE — 1125F PR PAIN SEVERITY QUANTIFIED, PAIN PRESENT: ICD-10-PCS | Mod: CPTII,S$GLB,, | Performed by: ORTHOPAEDIC SURGERY

## 2021-08-25 PROCEDURE — 3072F PR LOW RISK FOR RETINOPATHY: ICD-10-PCS | Mod: CPTII,S$GLB,, | Performed by: ORTHOPAEDIC SURGERY

## 2021-08-25 PROCEDURE — 1101F PT FALLS ASSESS-DOCD LE1/YR: CPT | Mod: CPTII,S$GLB,, | Performed by: ORTHOPAEDIC SURGERY

## 2021-08-25 PROCEDURE — 3074F PR MOST RECENT SYSTOLIC BLOOD PRESSURE < 130 MM HG: ICD-10-PCS | Mod: CPTII,S$GLB,, | Performed by: ORTHOPAEDIC SURGERY

## 2021-08-25 PROCEDURE — 99999 PR PBB SHADOW E&M-EST. PATIENT-LVL IV: CPT | Mod: PBBFAC,,, | Performed by: ORTHOPAEDIC SURGERY

## 2021-08-25 PROCEDURE — 3074F SYST BP LT 130 MM HG: CPT | Mod: CPTII,S$GLB,, | Performed by: ORTHOPAEDIC SURGERY

## 2021-08-25 PROCEDURE — 3072F LOW RISK FOR RETINOPATHY: CPT | Mod: CPTII,S$GLB,, | Performed by: ORTHOPAEDIC SURGERY

## 2021-08-25 PROCEDURE — 1125F AMNT PAIN NOTED PAIN PRSNT: CPT | Mod: CPTII,S$GLB,, | Performed by: ORTHOPAEDIC SURGERY

## 2021-08-25 PROCEDURE — 1159F PR MEDICATION LIST DOCUMENTED IN MEDICAL RECORD: ICD-10-PCS | Mod: CPTII,S$GLB,, | Performed by: ORTHOPAEDIC SURGERY

## 2021-08-25 PROCEDURE — 3288F PR FALLS RISK ASSESSMENT DOCUMENTED: ICD-10-PCS | Mod: CPTII,S$GLB,, | Performed by: ORTHOPAEDIC SURGERY

## 2021-08-25 PROCEDURE — 3078F PR MOST RECENT DIASTOLIC BLOOD PRESSURE < 80 MM HG: ICD-10-PCS | Mod: CPTII,S$GLB,, | Performed by: ORTHOPAEDIC SURGERY

## 2021-08-25 PROCEDURE — 99203 PR OFFICE/OUTPT VISIT, NEW, LEVL III, 30-44 MIN: ICD-10-PCS | Mod: S$GLB,,, | Performed by: ORTHOPAEDIC SURGERY

## 2021-08-25 PROCEDURE — 3078F DIAST BP <80 MM HG: CPT | Mod: CPTII,S$GLB,, | Performed by: ORTHOPAEDIC SURGERY

## 2021-08-25 PROCEDURE — 99203 OFFICE O/P NEW LOW 30 MIN: CPT | Mod: S$GLB,,, | Performed by: ORTHOPAEDIC SURGERY

## 2021-08-25 PROCEDURE — 1101F PR PT FALLS ASSESS DOC 0-1 FALLS W/OUT INJ PAST YR: ICD-10-PCS | Mod: CPTII,S$GLB,, | Performed by: ORTHOPAEDIC SURGERY

## 2021-08-25 PROCEDURE — 1159F MED LIST DOCD IN RCRD: CPT | Mod: CPTII,S$GLB,, | Performed by: ORTHOPAEDIC SURGERY

## 2021-09-02 ENCOUNTER — TELEPHONE (OUTPATIENT)
Dept: HOME HEALTH SERVICES | Facility: CLINIC | Age: 86
End: 2021-09-02

## 2021-09-02 ENCOUNTER — NURSE TRIAGE (OUTPATIENT)
Dept: ADMINISTRATIVE | Facility: CLINIC | Age: 86
End: 2021-09-02

## 2021-09-02 RX ORDER — GABAPENTIN 800 MG/1
800 TABLET ORAL 2 TIMES DAILY
Qty: 60 TABLET | Refills: 0 | OUTPATIENT
Start: 2021-09-02 | End: 2021-09-07 | Stop reason: SDUPTHER

## 2021-09-08 RX ORDER — GABAPENTIN 800 MG/1
800 TABLET ORAL 2 TIMES DAILY
Qty: 60 TABLET | Refills: 0 | Status: SHIPPED | OUTPATIENT
Start: 2021-09-08 | End: 2021-09-08 | Stop reason: SDUPTHER

## 2021-09-08 RX ORDER — GABAPENTIN 800 MG/1
800 TABLET ORAL 2 TIMES DAILY
Qty: 60 TABLET | Refills: 0 | Status: SHIPPED | OUTPATIENT
Start: 2021-09-08 | End: 2021-10-13

## 2021-09-13 ENCOUNTER — CLINICAL SUPPORT (OUTPATIENT)
Dept: REHABILITATION | Facility: HOSPITAL | Age: 86
End: 2021-09-13
Payer: MEDICARE

## 2021-09-13 DIAGNOSIS — R53.1 DECREASED STRENGTH: ICD-10-CM

## 2021-09-13 DIAGNOSIS — G89.29 CHRONIC BILATERAL LOW BACK PAIN WITHOUT SCIATICA: ICD-10-CM

## 2021-09-13 DIAGNOSIS — Z74.09 IMPAIRED MOBILITY: ICD-10-CM

## 2021-09-13 DIAGNOSIS — M54.50 CHRONIC BILATERAL LOW BACK PAIN WITHOUT SCIATICA: ICD-10-CM

## 2021-09-13 PROCEDURE — 97110 THERAPEUTIC EXERCISES: CPT | Mod: PN

## 2021-10-07 ENCOUNTER — TELEPHONE (OUTPATIENT)
Dept: CARDIOLOGY | Facility: CLINIC | Age: 86
End: 2021-10-07

## 2021-10-07 DIAGNOSIS — R07.9 CHEST PAIN, UNSPECIFIED TYPE: Primary | ICD-10-CM

## 2021-10-07 DIAGNOSIS — M79.605 PAIN IN BOTH LOWER EXTREMITIES: ICD-10-CM

## 2021-10-07 DIAGNOSIS — E11.42 DIABETIC PERIPHERAL NEUROPATHY ASSOCIATED WITH TYPE 2 DIABETES MELLITUS: ICD-10-CM

## 2021-10-07 DIAGNOSIS — M79.604 PAIN IN BOTH LOWER EXTREMITIES: ICD-10-CM

## 2021-10-07 DIAGNOSIS — E78.2 MIXED HYPERLIPIDEMIA: Primary | ICD-10-CM

## 2021-10-11 ENCOUNTER — TELEPHONE (OUTPATIENT)
Dept: FAMILY MEDICINE | Facility: CLINIC | Age: 86
End: 2021-10-11

## 2021-10-11 DIAGNOSIS — M54.42 CHRONIC BILATERAL LOW BACK PAIN WITH LEFT-SIDED SCIATICA: Primary | ICD-10-CM

## 2021-10-11 DIAGNOSIS — G89.29 CHRONIC BILATERAL LOW BACK PAIN WITH LEFT-SIDED SCIATICA: Primary | ICD-10-CM

## 2021-10-12 ENCOUNTER — LAB VISIT (OUTPATIENT)
Dept: LAB | Facility: HOSPITAL | Age: 86
End: 2021-10-12
Attending: INTERNAL MEDICINE
Payer: MEDICARE

## 2021-10-12 ENCOUNTER — DOCUMENTATION ONLY (OUTPATIENT)
Dept: REHABILITATION | Facility: HOSPITAL | Age: 86
End: 2021-10-12

## 2021-10-12 DIAGNOSIS — E78.2 MIXED HYPERLIPIDEMIA: ICD-10-CM

## 2021-10-12 PROBLEM — G89.29 CHRONIC BILATERAL LOW BACK PAIN WITHOUT SCIATICA: Status: RESOLVED | Noted: 2021-05-20 | Resolved: 2021-10-12

## 2021-10-12 PROBLEM — Z74.09 IMPAIRED MOBILITY: Status: RESOLVED | Noted: 2021-05-20 | Resolved: 2021-10-12

## 2021-10-12 PROBLEM — M54.50 CHRONIC BILATERAL LOW BACK PAIN WITHOUT SCIATICA: Status: RESOLVED | Noted: 2021-05-20 | Resolved: 2021-10-12

## 2021-10-12 PROBLEM — R53.1 DECREASED STRENGTH: Status: RESOLVED | Noted: 2021-05-20 | Resolved: 2021-10-12

## 2021-10-12 LAB
ALBUMIN SERPL BCP-MCNC: 4.2 G/DL (ref 3.5–5.2)
ALP SERPL-CCNC: 99 U/L (ref 55–135)
ALT SERPL W/O P-5'-P-CCNC: 23 U/L (ref 10–44)
ANION GAP SERPL CALC-SCNC: 14 MMOL/L (ref 8–16)
AST SERPL-CCNC: 19 U/L (ref 10–40)
BASOPHILS # BLD AUTO: 0.04 K/UL (ref 0–0.2)
BASOPHILS NFR BLD: 0.6 % (ref 0–1.9)
BILIRUB SERPL-MCNC: 0.6 MG/DL (ref 0.1–1)
BUN SERPL-MCNC: 20 MG/DL (ref 8–23)
CALCIUM SERPL-MCNC: 10.2 MG/DL (ref 8.7–10.5)
CHLORIDE SERPL-SCNC: 103 MMOL/L (ref 95–110)
CHOLEST SERPL-MCNC: 149 MG/DL (ref 120–199)
CHOLEST/HDLC SERPL: 3.9 {RATIO} (ref 2–5)
CO2 SERPL-SCNC: 23 MMOL/L (ref 23–29)
CREAT SERPL-MCNC: 0.6 MG/DL (ref 0.5–1.4)
DIFFERENTIAL METHOD: ABNORMAL
EOSINOPHIL # BLD AUTO: 0.2 K/UL (ref 0–0.5)
EOSINOPHIL NFR BLD: 3 % (ref 0–8)
ERYTHROCYTE [DISTWIDTH] IN BLOOD BY AUTOMATED COUNT: 13.2 % (ref 11.5–14.5)
EST. GFR  (AFRICAN AMERICAN): >60 ML/MIN/1.73 M^2
EST. GFR  (NON AFRICAN AMERICAN): >60 ML/MIN/1.73 M^2
GLUCOSE SERPL-MCNC: 151 MG/DL (ref 70–110)
HCT VFR BLD AUTO: 40.3 % (ref 40–54)
HDLC SERPL-MCNC: 38 MG/DL (ref 40–75)
HDLC SERPL: 25.5 % (ref 20–50)
HGB BLD-MCNC: 13.4 G/DL (ref 14–18)
IMM GRANULOCYTES # BLD AUTO: 0.02 K/UL (ref 0–0.04)
IMM GRANULOCYTES NFR BLD AUTO: 0.3 % (ref 0–0.5)
LDLC SERPL CALC-MCNC: 53.2 MG/DL (ref 63–159)
LYMPHOCYTES # BLD AUTO: 2.4 K/UL (ref 1–4.8)
LYMPHOCYTES NFR BLD: 35.7 % (ref 18–48)
MCH RBC QN AUTO: 30 PG (ref 27–31)
MCHC RBC AUTO-ENTMCNC: 33.3 G/DL (ref 32–36)
MCV RBC AUTO: 90 FL (ref 82–98)
MONOCYTES # BLD AUTO: 0.6 K/UL (ref 0.3–1)
MONOCYTES NFR BLD: 8.1 % (ref 4–15)
NEUTROPHILS # BLD AUTO: 3.5 K/UL (ref 1.8–7.7)
NEUTROPHILS NFR BLD: 52.3 % (ref 38–73)
NONHDLC SERPL-MCNC: 111 MG/DL
NRBC BLD-RTO: 0 /100 WBC
PLATELET # BLD AUTO: 157 K/UL (ref 150–450)
PMV BLD AUTO: 12.1 FL (ref 9.2–12.9)
POTASSIUM SERPL-SCNC: 4 MMOL/L (ref 3.5–5.1)
PROT SERPL-MCNC: 7.1 G/DL (ref 6–8.4)
RBC # BLD AUTO: 4.46 M/UL (ref 4.6–6.2)
SODIUM SERPL-SCNC: 140 MMOL/L (ref 136–145)
TRIGL SERPL-MCNC: 289 MG/DL (ref 30–150)
WBC # BLD AUTO: 6.77 K/UL (ref 3.9–12.7)

## 2021-10-12 PROCEDURE — 36415 COLL VENOUS BLD VENIPUNCTURE: CPT | Mod: HCNC | Performed by: INTERNAL MEDICINE

## 2021-10-12 PROCEDURE — 80061 LIPID PANEL: CPT | Mod: HCNC | Performed by: INTERNAL MEDICINE

## 2021-10-12 PROCEDURE — 85025 COMPLETE CBC W/AUTO DIFF WBC: CPT | Mod: HCNC | Performed by: INTERNAL MEDICINE

## 2021-10-12 PROCEDURE — 80053 COMPREHEN METABOLIC PANEL: CPT | Mod: HCNC | Performed by: INTERNAL MEDICINE

## 2021-10-14 ENCOUNTER — TELEPHONE (OUTPATIENT)
Dept: FAMILY MEDICINE | Facility: CLINIC | Age: 86
End: 2021-10-14

## 2021-10-15 ENCOUNTER — HOSPITAL ENCOUNTER (OUTPATIENT)
Dept: CARDIOLOGY | Facility: HOSPITAL | Age: 86
Discharge: HOME | End: 2021-10-15
Attending: INTERNAL MEDICINE
Payer: MEDICARE

## 2021-10-15 ENCOUNTER — HOSPITAL ENCOUNTER (OUTPATIENT)
Dept: CARDIOLOGY | Facility: CLINIC | Age: 86
Discharge: HOME | End: 2021-10-15
Payer: MEDICARE

## 2021-10-15 VITALS
DIASTOLIC BLOOD PRESSURE: 60 MMHG | WEIGHT: 120 LBS | HEART RATE: 80 BPM | BODY MASS INDEX: 22.66 KG/M2 | SYSTOLIC BLOOD PRESSURE: 110 MMHG | HEIGHT: 61 IN

## 2021-10-15 DIAGNOSIS — E11.42 DIABETIC PERIPHERAL NEUROPATHY ASSOCIATED WITH TYPE 2 DIABETES MELLITUS: ICD-10-CM

## 2021-10-15 DIAGNOSIS — M79.604 PAIN IN BOTH LOWER EXTREMITIES: ICD-10-CM

## 2021-10-15 DIAGNOSIS — R07.9 CHEST PAIN, UNSPECIFIED TYPE: ICD-10-CM

## 2021-10-15 DIAGNOSIS — M79.605 PAIN IN BOTH LOWER EXTREMITIES: ICD-10-CM

## 2021-10-15 LAB
ASCENDING AORTA: 3.24 CM
AV INDEX (PROSTH): 0.87
AV MEAN GRADIENT: 2 MMHG
AV PEAK GRADIENT: 3 MMHG
AV VALVE AREA: 2.85 CM2
AV VELOCITY RATIO: 0.86
BSA FOR ECHO PROCEDURE: 1.53 M2
CV ECHO LV RWT: 0.3 CM
DOP CALC AO PEAK VEL: 0.91 M/S
DOP CALC AO VTI: 17.38 CM
DOP CALC LVOT AREA: 3.3 CM2
DOP CALC LVOT DIAMETER: 2.04 CM
DOP CALC LVOT PEAK VEL: 0.78 M/S
DOP CALC LVOT STROKE VOLUME: 49.59 CM3
DOP CALCLVOT PEAK VEL VTI: 15.18 CM
E WAVE DECELERATION TIME: 168.81 MSEC
E/A RATIO: 0.51
E/E' RATIO: 8.8 M/S
ECHO LV POSTERIOR WALL: 0.67 CM (ref 0.6–1.1)
EJECTION FRACTION: 55 %
FRACTIONAL SHORTENING: 36 % (ref 28–44)
IMMEDIATE ARM BP: 151 MMHG
IMMEDIATE LEFT ABI: 1.12
IMMEDIATE LEFT TIBIAL: 169 MMHG
IMMEDIATE RIGHT ABI: 1.69
IMMEDIATE RIGHT TIBIAL: 255 MMHG
INTERVENTRICULAR SEPTUM: 0.61 CM (ref 0.6–1.1)
LA MAJOR: 3.65 CM
LA MINOR: 3.62 CM
LA WIDTH: 3.25 CM
LEFT ABI: 1.34
LEFT ARM BP: 144 MMHG
LEFT ATRIUM SIZE: 3.69 CM
LEFT ATRIUM VOLUME INDEX MOD: 25.7 ML/M2
LEFT ATRIUM VOLUME INDEX: 24.4 ML/M2
LEFT ATRIUM VOLUME MOD: 39 CM3
LEFT ATRIUM VOLUME: 37.05 CM3
LEFT DORSALIS PEDIS: 194 MMHG
LEFT INTERNAL DIMENSION IN SYSTOLE: 2.88 CM (ref 2.1–4)
LEFT POSTERIOR TIBIAL: 192 MMHG
LEFT TBI: 0.74
LEFT TOE PRESSURE: 107 MMHG
LEFT VENTRICLE DIASTOLIC VOLUME INDEX: 61.24 ML/M2
LEFT VENTRICLE DIASTOLIC VOLUME: 93.08 ML
LEFT VENTRICLE MASS INDEX: 56 G/M2
LEFT VENTRICLE SYSTOLIC VOLUME INDEX: 20.9 ML/M2
LEFT VENTRICLE SYSTOLIC VOLUME: 31.78 ML
LEFT VENTRICULAR INTERNAL DIMENSION IN DIASTOLE: 4.51 CM (ref 3.5–6)
LEFT VENTRICULAR MASS: 85.77 G
LV LATERAL E/E' RATIO: 7.33 M/S
LV SEPTAL E/E' RATIO: 11 M/S
MV A" WAVE DURATION": 12.56 MSEC
MV PEAK A VEL: 0.86 M/S
MV PEAK E VEL: 0.44 M/S
MV STENOSIS PRESSURE HALF TIME: 48.95 MS
MV VALVE AREA P 1/2 METHOD: 4.49 CM2
PISA TR MAX VEL: 2.33 M/S
PULM VEIN S/D RATIO: 2.74
PV PEAK D VEL: 0.19 M/S
PV PEAK S VEL: 0.52 M/S
RA MAJOR: 3.28 CM
RA PRESSURE: 3 MMHG
RA WIDTH: 2.76 CM
RIGHT ABI: 1.76
RIGHT ARM BP: 145 MMHG
RIGHT DORSALIS PEDIS: 255 MMHG
RIGHT POSTERIOR TIBIAL: 255 MMHG
RIGHT TBI: 0.49
RIGHT TOE PRESSURE: 71 MMHG
RIGHT VENTRICULAR END-DIASTOLIC DIMENSION: 2.51 CM
RV TISSUE DOPPLER FREE WALL SYSTOLIC VELOCITY 1 (APICAL 4 CHAMBER VIEW): 11.8 CM/S
SINUS: 3.42 CM
STJ: 3.19 CM
TDI LATERAL: 0.06 M/S
TDI SEPTAL: 0.04 M/S
TDI: 0.05 M/S
TOE RAISES: 75
TR MAX PG: 22 MMHG
TRICUSPID ANNULAR PLANE SYSTOLIC EXCURSION: 1.93 CM
TV REST PULMONARY ARTERY PRESSURE: 25 MMHG

## 2021-10-15 PROCEDURE — 93924 LWR XTR VASC STDY BILAT: CPT | Mod: 26,HCNC,, | Performed by: INTERNAL MEDICINE

## 2021-10-15 PROCEDURE — 93010 EKG 12-LEAD: ICD-10-PCS | Mod: HCNC,S$GLB,, | Performed by: INTERNAL MEDICINE

## 2021-10-15 PROCEDURE — 93306 ECHO (CUPID ONLY): ICD-10-PCS | Mod: 26,HCNC,, | Performed by: INTERNAL MEDICINE

## 2021-10-15 PROCEDURE — 93010 ELECTROCARDIOGRAM REPORT: CPT | Mod: HCNC,S$GLB,, | Performed by: INTERNAL MEDICINE

## 2021-10-15 PROCEDURE — 93005 ELECTROCARDIOGRAM TRACING: CPT | Mod: HCNC,S$GLB,, | Performed by: INTERNAL MEDICINE

## 2021-10-15 PROCEDURE — 93306 TTE W/DOPPLER COMPLETE: CPT | Mod: HCNC

## 2021-10-15 PROCEDURE — 93924 ANKLE BRACHIAL INDICES (ABI): ICD-10-PCS | Mod: 26,HCNC,, | Performed by: INTERNAL MEDICINE

## 2021-10-15 PROCEDURE — 93306 TTE W/DOPPLER COMPLETE: CPT | Mod: 26,HCNC,, | Performed by: INTERNAL MEDICINE

## 2021-10-15 PROCEDURE — 93924 LWR XTR VASC STDY BILAT: CPT | Mod: HCNC

## 2021-10-15 PROCEDURE — 93005 EKG 12-LEAD: ICD-10-PCS | Mod: HCNC,S$GLB,, | Performed by: INTERNAL MEDICINE

## 2021-10-21 ENCOUNTER — TELEPHONE (OUTPATIENT)
Dept: OPHTHALMOLOGY | Facility: CLINIC | Age: 86
End: 2021-10-21

## 2021-10-26 ENCOUNTER — PATIENT OUTREACH (OUTPATIENT)
Dept: ADMINISTRATIVE | Facility: OTHER | Age: 86
End: 2021-10-26
Payer: MEDICARE

## 2021-10-27 ENCOUNTER — OFFICE VISIT (OUTPATIENT)
Dept: CARDIOLOGY | Facility: CLINIC | Age: 86
End: 2021-10-27
Payer: MEDICARE

## 2021-10-27 VITALS
BODY MASS INDEX: 21.52 KG/M2 | HEIGHT: 61 IN | DIASTOLIC BLOOD PRESSURE: 67 MMHG | WEIGHT: 114 LBS | HEART RATE: 79 BPM | SYSTOLIC BLOOD PRESSURE: 128 MMHG

## 2021-10-27 DIAGNOSIS — I70.0 AORTIC ATHEROSCLEROSIS: ICD-10-CM

## 2021-10-27 DIAGNOSIS — M54.16 LUMBAR RADICULOPATHY, CHRONIC: ICD-10-CM

## 2021-10-27 DIAGNOSIS — I15.2 HYPERTENSION ASSOCIATED WITH DIABETES: ICD-10-CM

## 2021-10-27 DIAGNOSIS — E78.2 MIXED DYSLIPIDEMIA: ICD-10-CM

## 2021-10-27 DIAGNOSIS — I10 ESSENTIAL HYPERTENSION: ICD-10-CM

## 2021-10-27 DIAGNOSIS — I73.9 PAD (PERIPHERAL ARTERY DISEASE): Primary | ICD-10-CM

## 2021-10-27 DIAGNOSIS — E11.59 HYPERTENSION ASSOCIATED WITH DIABETES: ICD-10-CM

## 2021-10-27 DIAGNOSIS — I73.9 VASCULAR CLAUDICATION: ICD-10-CM

## 2021-10-27 DIAGNOSIS — M41.9 ACQUIRED SCOLIOSIS: ICD-10-CM

## 2021-10-27 PROCEDURE — 3288F PR FALLS RISK ASSESSMENT DOCUMENTED: ICD-10-PCS | Mod: HCNC,CPTII,S$GLB, | Performed by: INTERNAL MEDICINE

## 2021-10-27 PROCEDURE — 3074F SYST BP LT 130 MM HG: CPT | Mod: HCNC,CPTII,S$GLB, | Performed by: INTERNAL MEDICINE

## 2021-10-27 PROCEDURE — 1126F PR PAIN SEVERITY QUANTIFIED, NO PAIN PRESENT: ICD-10-PCS | Mod: HCNC,CPTII,S$GLB, | Performed by: INTERNAL MEDICINE

## 2021-10-27 PROCEDURE — 3051F PR MOST RECENT HEMOGLOBIN A1C LEVEL 7.0 - < 8.0%: ICD-10-PCS | Mod: HCNC,CPTII,S$GLB, | Performed by: INTERNAL MEDICINE

## 2021-10-27 PROCEDURE — 1101F PT FALLS ASSESS-DOCD LE1/YR: CPT | Mod: HCNC,CPTII,S$GLB, | Performed by: INTERNAL MEDICINE

## 2021-10-27 PROCEDURE — 3078F DIAST BP <80 MM HG: CPT | Mod: HCNC,CPTII,S$GLB, | Performed by: INTERNAL MEDICINE

## 2021-10-27 PROCEDURE — 3072F LOW RISK FOR RETINOPATHY: CPT | Mod: HCNC,CPTII,S$GLB, | Performed by: INTERNAL MEDICINE

## 2021-10-27 PROCEDURE — 99999 PR PBB SHADOW E&M-EST. PATIENT-LVL IV: CPT | Mod: PBBFAC,HCNC,, | Performed by: INTERNAL MEDICINE

## 2021-10-27 PROCEDURE — 1159F PR MEDICATION LIST DOCUMENTED IN MEDICAL RECORD: ICD-10-PCS | Mod: HCNC,CPTII,S$GLB, | Performed by: INTERNAL MEDICINE

## 2021-10-27 PROCEDURE — 1126F AMNT PAIN NOTED NONE PRSNT: CPT | Mod: HCNC,CPTII,S$GLB, | Performed by: INTERNAL MEDICINE

## 2021-10-27 PROCEDURE — 99499 RISK ADDL DX/OHS AUDIT: ICD-10-PCS | Mod: HCNC,S$GLB,, | Performed by: INTERNAL MEDICINE

## 2021-10-27 PROCEDURE — 99499 UNLISTED E&M SERVICE: CPT | Mod: HCNC,S$GLB,, | Performed by: INTERNAL MEDICINE

## 2021-10-27 PROCEDURE — 3078F PR MOST RECENT DIASTOLIC BLOOD PRESSURE < 80 MM HG: ICD-10-PCS | Mod: HCNC,CPTII,S$GLB, | Performed by: INTERNAL MEDICINE

## 2021-10-27 PROCEDURE — 3288F FALL RISK ASSESSMENT DOCD: CPT | Mod: HCNC,CPTII,S$GLB, | Performed by: INTERNAL MEDICINE

## 2021-10-27 PROCEDURE — 1101F PR PT FALLS ASSESS DOC 0-1 FALLS W/OUT INJ PAST YR: ICD-10-PCS | Mod: HCNC,CPTII,S$GLB, | Performed by: INTERNAL MEDICINE

## 2021-10-27 PROCEDURE — 99205 PR OFFICE/OUTPT VISIT, NEW, LEVL V, 60-74 MIN: ICD-10-PCS | Mod: HCNC,S$GLB,, | Performed by: INTERNAL MEDICINE

## 2021-10-27 PROCEDURE — 99999 PR PBB SHADOW E&M-EST. PATIENT-LVL IV: ICD-10-PCS | Mod: PBBFAC,HCNC,, | Performed by: INTERNAL MEDICINE

## 2021-10-27 PROCEDURE — 3072F PR LOW RISK FOR RETINOPATHY: ICD-10-PCS | Mod: HCNC,CPTII,S$GLB, | Performed by: INTERNAL MEDICINE

## 2021-10-27 PROCEDURE — 3051F HG A1C>EQUAL 7.0%<8.0%: CPT | Mod: HCNC,CPTII,S$GLB, | Performed by: INTERNAL MEDICINE

## 2021-10-27 PROCEDURE — 1159F MED LIST DOCD IN RCRD: CPT | Mod: HCNC,CPTII,S$GLB, | Performed by: INTERNAL MEDICINE

## 2021-10-27 PROCEDURE — 3074F PR MOST RECENT SYSTOLIC BLOOD PRESSURE < 130 MM HG: ICD-10-PCS | Mod: HCNC,CPTII,S$GLB, | Performed by: INTERNAL MEDICINE

## 2021-10-27 PROCEDURE — 99205 OFFICE O/P NEW HI 60 MIN: CPT | Mod: HCNC,S$GLB,, | Performed by: INTERNAL MEDICINE

## 2021-10-29 PROBLEM — I73.9 PAD (PERIPHERAL ARTERY DISEASE): Status: ACTIVE | Noted: 2021-10-29

## 2021-11-01 ENCOUNTER — TELEPHONE (OUTPATIENT)
Dept: OPHTHALMOLOGY | Facility: CLINIC | Age: 86
End: 2021-11-01
Payer: MEDICARE

## 2021-11-01 ENCOUNTER — TELEPHONE (OUTPATIENT)
Dept: CARDIOLOGY | Facility: CLINIC | Age: 86
End: 2021-11-01
Payer: MEDICARE

## 2021-11-02 ENCOUNTER — HOSPITAL ENCOUNTER (OUTPATIENT)
Dept: RADIOLOGY | Facility: HOSPITAL | Age: 86
Discharge: HOME | End: 2021-11-02
Attending: INTERNAL MEDICINE
Payer: MEDICARE

## 2021-11-02 DIAGNOSIS — M54.16 LUMBAR RADICULOPATHY, CHRONIC: ICD-10-CM

## 2021-11-02 DIAGNOSIS — I73.9 PAD (PERIPHERAL ARTERY DISEASE): ICD-10-CM

## 2021-11-02 PROCEDURE — 93922 UPR/L XTREMITY ART 2 LEVELS: CPT | Mod: TC,HCNC

## 2021-11-02 PROCEDURE — 93925 US ARTERIAL LOWER EXTREMITY BILAT WITH ABI (XPD): ICD-10-PCS | Mod: 26,HCNC,, | Performed by: RADIOLOGY

## 2021-11-02 PROCEDURE — 93922 US ARTERIAL LOWER EXTREMITY BILAT WITH ABI (XPD): ICD-10-PCS | Mod: 26,HCNC,, | Performed by: RADIOLOGY

## 2021-11-02 PROCEDURE — 93922 UPR/L XTREMITY ART 2 LEVELS: CPT | Mod: 26,HCNC,, | Performed by: RADIOLOGY

## 2021-11-02 PROCEDURE — 93925 LOWER EXTREMITY STUDY: CPT | Mod: 26,HCNC,, | Performed by: RADIOLOGY

## 2021-11-02 PROCEDURE — 72148 MRI LUMBAR SPINE W/O DYE: CPT | Mod: 26,HCNC,, | Performed by: RADIOLOGY

## 2021-11-02 PROCEDURE — 72148 MRI LUMBAR SPINE W/O DYE: CPT | Mod: TC,HCNC

## 2021-11-02 PROCEDURE — 72148 MRI LUMBAR SPINE WITHOUT CONTRAST: ICD-10-PCS | Mod: 26,HCNC,, | Performed by: RADIOLOGY

## 2021-11-04 RX ORDER — DEXTROSE 4 G
1 TABLET,CHEWABLE ORAL DAILY
Qty: 1 EACH | Refills: 0 | Status: SHIPPED | OUTPATIENT
Start: 2021-11-04 | End: 2021-11-08 | Stop reason: SDUPTHER

## 2021-11-08 ENCOUNTER — TELEPHONE (OUTPATIENT)
Dept: FAMILY MEDICINE | Facility: CLINIC | Age: 86
End: 2021-11-08
Payer: MEDICARE

## 2021-11-08 DIAGNOSIS — E11.65 TYPE 2 DIABETES MELLITUS WITH HYPERGLYCEMIA, WITHOUT LONG-TERM CURRENT USE OF INSULIN: Primary | ICD-10-CM

## 2021-11-08 RX ORDER — LANCETS
1 EACH MISCELLANEOUS DAILY
Qty: 100 EACH | Refills: 3 | Status: SHIPPED | OUTPATIENT
Start: 2021-11-08

## 2021-11-08 RX ORDER — DEXTROSE 4 G
1 TABLET,CHEWABLE ORAL DAILY
Qty: 1 EACH | Refills: 0 | Status: SHIPPED | OUTPATIENT
Start: 2021-11-08 | End: 2023-08-30 | Stop reason: SDUPTHER

## 2021-11-11 ENCOUNTER — TELEPHONE (OUTPATIENT)
Dept: CARDIOLOGY | Facility: CLINIC | Age: 86
End: 2021-11-11
Payer: MEDICARE

## 2021-11-11 ENCOUNTER — OFFICE VISIT (OUTPATIENT)
Dept: OPTOMETRY | Facility: CLINIC | Age: 86
End: 2021-11-11
Payer: MEDICARE

## 2021-11-11 DIAGNOSIS — H01.01A ULCERATIVE BLEPHARITIS OF UPPER AND LOWER EYELIDS OF BOTH EYES: Primary | ICD-10-CM

## 2021-11-11 DIAGNOSIS — H52.203 MYOPIA OF BOTH EYES WITH ASTIGMATISM AND PRESBYOPIA: ICD-10-CM

## 2021-11-11 DIAGNOSIS — H01.01B ULCERATIVE BLEPHARITIS OF UPPER AND LOWER EYELIDS OF BOTH EYES: Primary | ICD-10-CM

## 2021-11-11 DIAGNOSIS — H52.4 MYOPIA OF BOTH EYES WITH ASTIGMATISM AND PRESBYOPIA: ICD-10-CM

## 2021-11-11 DIAGNOSIS — H50.22 HYPERTROPIA OF LEFT EYE: ICD-10-CM

## 2021-11-11 DIAGNOSIS — H52.13 MYOPIA OF BOTH EYES WITH ASTIGMATISM AND PRESBYOPIA: ICD-10-CM

## 2021-11-11 PROCEDURE — 92014 COMPRE OPH EXAM EST PT 1/>: CPT | Mod: HCNC,S$GLB,, | Performed by: OPTOMETRIST

## 2021-11-11 PROCEDURE — 99999 PR PBB SHADOW E&M-EST. PATIENT-LVL II: CPT | Mod: PBBFAC,HCNC,, | Performed by: OPTOMETRIST

## 2021-11-11 PROCEDURE — 92015 PR REFRACTION: ICD-10-PCS | Mod: HCNC,S$GLB,, | Performed by: OPTOMETRIST

## 2021-11-11 PROCEDURE — 92015 DETERMINE REFRACTIVE STATE: CPT | Mod: HCNC,S$GLB,, | Performed by: OPTOMETRIST

## 2021-11-11 PROCEDURE — 92014 PR EYE EXAM, EST PATIENT,COMPREHESV: ICD-10-PCS | Mod: HCNC,S$GLB,, | Performed by: OPTOMETRIST

## 2021-11-11 PROCEDURE — 99999 PR PBB SHADOW E&M-EST. PATIENT-LVL II: ICD-10-PCS | Mod: PBBFAC,HCNC,, | Performed by: OPTOMETRIST

## 2021-11-17 ENCOUNTER — TELEPHONE (OUTPATIENT)
Dept: CARDIOLOGY | Facility: CLINIC | Age: 86
End: 2021-11-17
Payer: MEDICARE

## 2021-11-18 ENCOUNTER — TELEPHONE (OUTPATIENT)
Dept: CARDIOLOGY | Facility: CLINIC | Age: 86
End: 2021-11-18
Payer: MEDICARE

## 2021-11-18 ENCOUNTER — TELEPHONE (OUTPATIENT)
Dept: FAMILY MEDICINE | Facility: CLINIC | Age: 86
End: 2021-11-18
Payer: MEDICARE

## 2021-11-21 ENCOUNTER — TELEPHONE (OUTPATIENT)
Dept: FAMILY MEDICINE | Facility: CLINIC | Age: 86
End: 2021-11-21
Payer: MEDICARE

## 2021-11-21 DIAGNOSIS — M54.9 CHRONIC BACK PAIN GREATER THAN 3 MONTHS DURATION: Primary | ICD-10-CM

## 2021-11-21 DIAGNOSIS — G89.29 CHRONIC BACK PAIN GREATER THAN 3 MONTHS DURATION: Primary | ICD-10-CM

## 2021-11-22 ENCOUNTER — OFFICE VISIT (OUTPATIENT)
Dept: PAIN MEDICINE | Facility: CLINIC | Age: 86
End: 2021-11-22
Payer: MEDICARE

## 2021-11-22 VITALS
BODY MASS INDEX: 21.54 KG/M2 | DIASTOLIC BLOOD PRESSURE: 74 MMHG | HEART RATE: 90 BPM | SYSTOLIC BLOOD PRESSURE: 128 MMHG | HEIGHT: 61 IN

## 2021-11-22 DIAGNOSIS — M51.36 DDD (DEGENERATIVE DISC DISEASE), LUMBAR: Primary | ICD-10-CM

## 2021-11-22 DIAGNOSIS — M54.9 CHRONIC BACK PAIN GREATER THAN 3 MONTHS DURATION: ICD-10-CM

## 2021-11-22 DIAGNOSIS — M48.062 SPINAL STENOSIS OF LUMBAR REGION WITH NEUROGENIC CLAUDICATION: ICD-10-CM

## 2021-11-22 DIAGNOSIS — G89.29 CHRONIC BACK PAIN GREATER THAN 3 MONTHS DURATION: ICD-10-CM

## 2021-11-22 DIAGNOSIS — M54.16 LUMBAR RADICULOPATHY: ICD-10-CM

## 2021-11-22 PROCEDURE — 3072F LOW RISK FOR RETINOPATHY: CPT | Mod: HCNC,CPTII,S$GLB, | Performed by: PAIN MEDICINE

## 2021-11-22 PROCEDURE — 3072F PR LOW RISK FOR RETINOPATHY: ICD-10-PCS | Mod: HCNC,CPTII,S$GLB, | Performed by: PAIN MEDICINE

## 2021-11-22 PROCEDURE — 99999 PR PBB SHADOW E&M-EST. PATIENT-LVL IV: ICD-10-PCS | Mod: PBBFAC,HCNC,, | Performed by: PAIN MEDICINE

## 2021-11-22 PROCEDURE — 99214 PR OFFICE/OUTPT VISIT, EST, LEVL IV, 30-39 MIN: ICD-10-PCS | Mod: HCNC,S$GLB,, | Performed by: PAIN MEDICINE

## 2021-11-22 PROCEDURE — 99999 PR PBB SHADOW E&M-EST. PATIENT-LVL IV: CPT | Mod: PBBFAC,HCNC,, | Performed by: PAIN MEDICINE

## 2021-11-22 PROCEDURE — 99214 OFFICE O/P EST MOD 30 MIN: CPT | Mod: HCNC,S$GLB,, | Performed by: PAIN MEDICINE

## 2021-11-23 ENCOUNTER — TELEPHONE (OUTPATIENT)
Dept: PAIN MEDICINE | Facility: CLINIC | Age: 86
End: 2021-11-23
Payer: MEDICARE

## 2021-11-23 DIAGNOSIS — M54.16 LUMBAR RADICULOPATHY: Primary | ICD-10-CM

## 2021-11-24 ENCOUNTER — TELEPHONE (OUTPATIENT)
Dept: PAIN MEDICINE | Facility: CLINIC | Age: 86
End: 2021-11-24
Payer: MEDICARE

## 2021-11-29 ENCOUNTER — TELEPHONE (OUTPATIENT)
Dept: PAIN MEDICINE | Facility: CLINIC | Age: 86
End: 2021-11-29
Payer: MEDICARE

## 2021-11-30 ENCOUNTER — HOSPITAL ENCOUNTER (OUTPATIENT)
Facility: HOSPITAL | Age: 86
Discharge: HOME | End: 2021-11-30
Attending: PAIN MEDICINE | Admitting: PAIN MEDICINE
Payer: MEDICARE

## 2021-11-30 VITALS
BODY MASS INDEX: 21.71 KG/M2 | TEMPERATURE: 97 F | WEIGHT: 115 LBS | RESPIRATION RATE: 18 BRPM | SYSTOLIC BLOOD PRESSURE: 155 MMHG | HEIGHT: 61 IN | OXYGEN SATURATION: 100 % | DIASTOLIC BLOOD PRESSURE: 96 MMHG | HEART RATE: 70 BPM

## 2021-11-30 DIAGNOSIS — G89.29 CHRONIC PAIN: ICD-10-CM

## 2021-11-30 DIAGNOSIS — M48.062 SPINAL STENOSIS OF LUMBAR REGION WITH NEUROGENIC CLAUDICATION: Primary | ICD-10-CM

## 2021-11-30 PROCEDURE — 25000003 PHARM REV CODE 250: Mod: HCNC | Performed by: PAIN MEDICINE

## 2021-11-30 PROCEDURE — 62323 NJX INTERLAMINAR LMBR/SAC: CPT | Mod: HCNC | Performed by: PAIN MEDICINE

## 2021-11-30 PROCEDURE — 62323 PR INJ LUMBAR/SACRAL, W/IMAGING GUIDANCE: ICD-10-PCS | Mod: HCNC,,, | Performed by: PAIN MEDICINE

## 2021-11-30 PROCEDURE — 62323 NJX INTERLAMINAR LMBR/SAC: CPT | Mod: HCNC,,, | Performed by: PAIN MEDICINE

## 2021-11-30 PROCEDURE — 63600175 PHARM REV CODE 636 W HCPCS: Mod: HCNC | Performed by: PAIN MEDICINE

## 2021-11-30 PROCEDURE — 25500020 PHARM REV CODE 255: Mod: HCNC | Performed by: PAIN MEDICINE

## 2021-11-30 RX ORDER — METHYLPREDNISOLONE ACETATE 40 MG/ML
INJECTION, SUSPENSION INTRA-ARTICULAR; INTRALESIONAL; INTRAMUSCULAR; SOFT TISSUE
Status: DISCONTINUED | OUTPATIENT
Start: 2021-11-30 | End: 2021-11-30 | Stop reason: HOSPADM

## 2021-11-30 RX ORDER — LIDOCAINE HYDROCHLORIDE 10 MG/ML
INJECTION, SOLUTION EPIDURAL; INFILTRATION; INTRACAUDAL; PERINEURAL
Status: DISCONTINUED | OUTPATIENT
Start: 2021-11-30 | End: 2021-11-30 | Stop reason: HOSPADM

## 2021-11-30 RX ORDER — LIDOCAINE HYDROCHLORIDE 10 MG/ML
INJECTION INFILTRATION; PERINEURAL
Status: DISCONTINUED | OUTPATIENT
Start: 2021-11-30 | End: 2021-11-30 | Stop reason: HOSPADM

## 2021-11-30 RX ORDER — DEXAMETHASONE SODIUM PHOSPHATE 100 MG/10ML
INJECTION INTRAMUSCULAR; INTRAVENOUS
Status: DISCONTINUED | OUTPATIENT
Start: 2021-11-30 | End: 2021-11-30 | Stop reason: HOSPADM

## 2021-11-30 RX ORDER — SODIUM BICARBONATE 1 MEQ/ML
SYRINGE (ML) INTRAVENOUS
Status: DISCONTINUED | OUTPATIENT
Start: 2021-11-30 | End: 2021-11-30 | Stop reason: HOSPADM

## 2021-12-01 LAB — POCT GLUCOSE: 133 MG/DL (ref 70–110)

## 2021-12-09 ENCOUNTER — PATIENT OUTREACH (OUTPATIENT)
Dept: ADMINISTRATIVE | Facility: OTHER | Age: 86
End: 2021-12-09
Payer: MEDICARE

## 2021-12-10 DIAGNOSIS — M15.8 OTHER OSTEOARTHRITIS INVOLVING MULTIPLE JOINTS: ICD-10-CM

## 2021-12-10 RX ORDER — MELOXICAM 15 MG/1
TABLET ORAL
Qty: 90 TABLET | Refills: 0 | Status: SHIPPED | OUTPATIENT
Start: 2021-12-10 | End: 2022-01-05 | Stop reason: SDUPTHER

## 2021-12-10 RX ORDER — FINASTERIDE 5 MG/1
TABLET, FILM COATED ORAL
Qty: 90 TABLET | Refills: 0 | Status: SHIPPED | OUTPATIENT
Start: 2021-12-10 | End: 2022-03-08

## 2021-12-16 ENCOUNTER — TELEPHONE (OUTPATIENT)
Dept: PAIN MEDICINE | Facility: CLINIC | Age: 86
End: 2021-12-16
Payer: MEDICARE

## 2021-12-17 ENCOUNTER — TELEPHONE (OUTPATIENT)
Dept: PAIN MEDICINE | Facility: CLINIC | Age: 86
End: 2021-12-17
Payer: MEDICARE

## 2021-12-20 DIAGNOSIS — E11.65 TYPE 2 DIABETES MELLITUS WITH HYPERGLYCEMIA, WITHOUT LONG-TERM CURRENT USE OF INSULIN: ICD-10-CM

## 2021-12-22 DIAGNOSIS — E11.65 TYPE 2 DIABETES MELLITUS WITH HYPERGLYCEMIA, WITHOUT LONG-TERM CURRENT USE OF INSULIN: ICD-10-CM

## 2021-12-22 NOTE — TELEPHONE ENCOUNTER
No new care gaps identified.  Powered by [employer]. Reference number: [id number].   12/22/2021 1:50:40 PM CST

## 2021-12-27 ENCOUNTER — LAB VISIT (OUTPATIENT)
Dept: LAB | Facility: HOSPITAL | Age: 86
End: 2021-12-27
Attending: FAMILY MEDICINE
Payer: MEDICARE

## 2021-12-27 DIAGNOSIS — I10 ESSENTIAL HYPERTENSION: ICD-10-CM

## 2021-12-27 DIAGNOSIS — E03.9 HYPOTHYROIDISM, UNSPECIFIED TYPE: ICD-10-CM

## 2021-12-27 DIAGNOSIS — E11.43 TYPE 2 DIABETES MELLITUS WITH DIABETIC AUTONOMIC NEUROPATHY, WITHOUT LONG-TERM CURRENT USE OF INSULIN: ICD-10-CM

## 2021-12-27 LAB
ALBUMIN SERPL BCP-MCNC: 3.9 G/DL (ref 3.5–5.2)
ALP SERPL-CCNC: 94 U/L (ref 55–135)
ALT SERPL W/O P-5'-P-CCNC: 30 U/L (ref 10–44)
ANION GAP SERPL CALC-SCNC: 8 MMOL/L (ref 8–16)
AST SERPL-CCNC: 24 U/L (ref 10–40)
BILIRUB SERPL-MCNC: 0.5 MG/DL (ref 0.1–1)
BUN SERPL-MCNC: 22 MG/DL (ref 8–23)
CALCIUM SERPL-MCNC: 9.2 MG/DL (ref 8.7–10.5)
CHLORIDE SERPL-SCNC: 102 MMOL/L (ref 95–110)
CHOLEST SERPL-MCNC: 173 MG/DL (ref 120–199)
CHOLEST/HDLC SERPL: 4.4 {RATIO} (ref 2–5)
CO2 SERPL-SCNC: 27 MMOL/L (ref 23–29)
CREAT SERPL-MCNC: 0.6 MG/DL (ref 0.5–1.4)
EST. GFR  (AFRICAN AMERICAN): >60 ML/MIN/1.73 M^2
EST. GFR  (NON AFRICAN AMERICAN): >60 ML/MIN/1.73 M^2
ESTIMATED AVG GLUCOSE: 166 MG/DL (ref 68–131)
GLUCOSE SERPL-MCNC: 152 MG/DL (ref 70–110)
HBA1C MFR BLD: 7.4 % (ref 4–5.6)
HDLC SERPL-MCNC: 39 MG/DL (ref 40–75)
HDLC SERPL: 22.5 % (ref 20–50)
LDLC SERPL CALC-MCNC: 72.6 MG/DL (ref 63–159)
NONHDLC SERPL-MCNC: 134 MG/DL
POTASSIUM SERPL-SCNC: 4.3 MMOL/L (ref 3.5–5.1)
PROT SERPL-MCNC: 6.9 G/DL (ref 6–8.4)
SODIUM SERPL-SCNC: 137 MMOL/L (ref 136–145)
T4 FREE SERPL-MCNC: 1.12 NG/DL (ref 0.71–1.51)
TRIGL SERPL-MCNC: 307 MG/DL (ref 30–150)
TSH SERPL DL<=0.005 MIU/L-ACNC: 0.35 UIU/ML (ref 0.4–4)

## 2021-12-27 PROCEDURE — 83036 HEMOGLOBIN GLYCOSYLATED A1C: CPT | Mod: HCNC | Performed by: FAMILY MEDICINE

## 2021-12-27 PROCEDURE — 84443 ASSAY THYROID STIM HORMONE: CPT | Mod: HCNC | Performed by: FAMILY MEDICINE

## 2021-12-27 PROCEDURE — 36415 COLL VENOUS BLD VENIPUNCTURE: CPT | Mod: HCNC,PO | Performed by: FAMILY MEDICINE

## 2021-12-27 PROCEDURE — 80061 LIPID PANEL: CPT | Mod: HCNC | Performed by: FAMILY MEDICINE

## 2021-12-27 PROCEDURE — 84439 ASSAY OF FREE THYROXINE: CPT | Mod: HCNC | Performed by: FAMILY MEDICINE

## 2021-12-27 PROCEDURE — 80053 COMPREHEN METABOLIC PANEL: CPT | Mod: HCNC | Performed by: FAMILY MEDICINE

## 2021-12-30 RX ORDER — CALCIUM CITRATE/VITAMIN D3 200MG-6.25
TABLET ORAL
Qty: 200 STRIP | Refills: 3 | OUTPATIENT
Start: 2021-12-30

## 2022-01-03 ENCOUNTER — TELEPHONE (OUTPATIENT)
Dept: PAIN MEDICINE | Facility: CLINIC | Age: 87
End: 2022-01-03
Payer: MEDICARE

## 2022-01-03 NOTE — TELEPHONE ENCOUNTER
----- Message from Romina Sheets sent at 1/3/2022  8:47 AM CST -----  Contact: [phone]  Who Called: pt's daughter   Regarding: schedule an injection   Would the patient rather a call back or a response via [facility]? Call back  Best Call Back Number: [phone]  Additional Information:

## 2022-01-04 ENCOUNTER — OFFICE VISIT (OUTPATIENT)
Dept: OPTOMETRY | Facility: CLINIC | Age: 87
End: 2022-01-04
Payer: COMMERCIAL

## 2022-01-04 DIAGNOSIS — H52.13 MYOPIA OF BOTH EYES WITH ASTIGMATISM AND PRESBYOPIA: ICD-10-CM

## 2022-01-04 DIAGNOSIS — H43.11 VITREOUS HEMORRHAGE OF RIGHT EYE: ICD-10-CM

## 2022-01-04 DIAGNOSIS — H52.4 MYOPIA OF BOTH EYES WITH ASTIGMATISM AND PRESBYOPIA: ICD-10-CM

## 2022-01-04 DIAGNOSIS — H50.22 HYPERTROPIA OF LEFT EYE: ICD-10-CM

## 2022-01-04 DIAGNOSIS — H53.2 DIPLOPIA: Primary | ICD-10-CM

## 2022-01-04 DIAGNOSIS — H40.023 OPEN ANGLE WITH BORDERLINE FINDINGS AND HIGH GLAUCOMA RISK IN BOTH EYES: ICD-10-CM

## 2022-01-04 DIAGNOSIS — H52.203 MYOPIA OF BOTH EYES WITH ASTIGMATISM AND PRESBYOPIA: ICD-10-CM

## 2022-01-04 PROCEDURE — 99999 PR PBB SHADOW E&M-EST. PATIENT-LVL III: ICD-10-PCS | Mod: PBBFAC,HCNC,, | Performed by: OPTOMETRIST

## 2022-01-04 PROCEDURE — 92014 COMPRE OPH EXAM EST PT 1/>: CPT | Mod: HCNC,S$GLB,, | Performed by: OPTOMETRIST

## 2022-01-04 PROCEDURE — 92015 DETERMINE REFRACTIVE STATE: CPT | Mod: HCNC,S$GLB,, | Performed by: OPTOMETRIST

## 2022-01-04 PROCEDURE — 92014 PR EYE EXAM, EST PATIENT,COMPREHESV: ICD-10-PCS | Mod: HCNC,S$GLB,, | Performed by: OPTOMETRIST

## 2022-01-04 PROCEDURE — 99999 PR PBB SHADOW E&M-EST. PATIENT-LVL III: CPT | Mod: PBBFAC,HCNC,, | Performed by: OPTOMETRIST

## 2022-01-04 PROCEDURE — 92015 PR REFRACTION: ICD-10-PCS | Mod: HCNC,S$GLB,, | Performed by: OPTOMETRIST

## 2022-01-04 NOTE — PROGRESS NOTES
HPI     Patient states he has seen several providers in our department. He states   he is still having trouble seeing out of his right eye. He states he sees   double just a little.  Patient states he lost the glasses with the prism   on them. The glasses he has now are about 2-3 years old.     Last edited by Babmi Hirsch on 1/4/2022  1:32 PM. (History)            Assessment /Plan            1. Poor view of fundus right eye, RTC with retina for eval of rd vs vit heme.  2. Cont with prism specs for diplopia.  3. IOP stable, nerve eval stable, prev HVf without defects, prev OCT with some sup/inf loss but stable from prev,  Low tension suspect, IOP low, OCt with thinning Ou, pachy thick, gonio open RTC yearly eye exam.

## 2022-01-05 ENCOUNTER — OFFICE VISIT (OUTPATIENT)
Dept: PAIN MEDICINE | Facility: CLINIC | Age: 87
End: 2022-01-05
Payer: MEDICARE

## 2022-01-05 ENCOUNTER — TELEPHONE (OUTPATIENT)
Dept: FAMILY MEDICINE | Facility: CLINIC | Age: 87
End: 2022-01-05
Payer: MEDICARE

## 2022-01-05 ENCOUNTER — TELEPHONE (OUTPATIENT)
Dept: PAIN MEDICINE | Facility: CLINIC | Age: 87
End: 2022-01-05

## 2022-01-05 VITALS
DIASTOLIC BLOOD PRESSURE: 72 MMHG | BODY MASS INDEX: 21.74 KG/M2 | SYSTOLIC BLOOD PRESSURE: 117 MMHG | WEIGHT: 115.06 LBS | HEART RATE: 66 BPM

## 2022-01-05 DIAGNOSIS — M48.062 SPINAL STENOSIS OF LUMBAR REGION WITH NEUROGENIC CLAUDICATION: ICD-10-CM

## 2022-01-05 DIAGNOSIS — G89.29 CHRONIC BACK PAIN GREATER THAN 3 MONTHS DURATION: ICD-10-CM

## 2022-01-05 DIAGNOSIS — M51.36 DDD (DEGENERATIVE DISC DISEASE), LUMBAR: ICD-10-CM

## 2022-01-05 DIAGNOSIS — M54.16 LUMBAR RADICULOPATHY: Primary | ICD-10-CM

## 2022-01-05 DIAGNOSIS — M15.8 OTHER OSTEOARTHRITIS INVOLVING MULTIPLE JOINTS: ICD-10-CM

## 2022-01-05 DIAGNOSIS — G89.4 CHRONIC PAIN SYNDROME: ICD-10-CM

## 2022-01-05 DIAGNOSIS — M54.9 CHRONIC BACK PAIN GREATER THAN 3 MONTHS DURATION: ICD-10-CM

## 2022-01-05 PROCEDURE — 99999 PR PBB SHADOW E&M-EST. PATIENT-LVL III: ICD-10-PCS | Mod: PBBFAC,HCNC,, | Performed by: NURSE PRACTITIONER

## 2022-01-05 PROCEDURE — 1125F PR PAIN SEVERITY QUANTIFIED, PAIN PRESENT: ICD-10-PCS | Mod: HCNC,CPTII,S$GLB, | Performed by: NURSE PRACTITIONER

## 2022-01-05 PROCEDURE — 3074F PR MOST RECENT SYSTOLIC BLOOD PRESSURE < 130 MM HG: ICD-10-PCS | Mod: HCNC,CPTII,S$GLB, | Performed by: NURSE PRACTITIONER

## 2022-01-05 PROCEDURE — 1160F RVW MEDS BY RX/DR IN RCRD: CPT | Mod: HCNC,CPTII,S$GLB, | Performed by: NURSE PRACTITIONER

## 2022-01-05 PROCEDURE — 1125F AMNT PAIN NOTED PAIN PRSNT: CPT | Mod: HCNC,CPTII,S$GLB, | Performed by: NURSE PRACTITIONER

## 2022-01-05 PROCEDURE — 99214 PR OFFICE/OUTPT VISIT, EST, LEVL IV, 30-39 MIN: ICD-10-PCS | Mod: HCNC,S$GLB,, | Performed by: NURSE PRACTITIONER

## 2022-01-05 PROCEDURE — 3078F PR MOST RECENT DIASTOLIC BLOOD PRESSURE < 80 MM HG: ICD-10-PCS | Mod: HCNC,CPTII,S$GLB, | Performed by: NURSE PRACTITIONER

## 2022-01-05 PROCEDURE — 3074F SYST BP LT 130 MM HG: CPT | Mod: HCNC,CPTII,S$GLB, | Performed by: NURSE PRACTITIONER

## 2022-01-05 PROCEDURE — 99214 OFFICE O/P EST MOD 30 MIN: CPT | Mod: HCNC,S$GLB,, | Performed by: NURSE PRACTITIONER

## 2022-01-05 PROCEDURE — 1160F PR REVIEW ALL MEDS BY PRESCRIBER/CLIN PHARMACIST DOCUMENTED: ICD-10-PCS | Mod: HCNC,CPTII,S$GLB, | Performed by: NURSE PRACTITIONER

## 2022-01-05 PROCEDURE — 1159F PR MEDICATION LIST DOCUMENTED IN MEDICAL RECORD: ICD-10-PCS | Mod: HCNC,CPTII,S$GLB, | Performed by: NURSE PRACTITIONER

## 2022-01-05 PROCEDURE — 99999 PR PBB SHADOW E&M-EST. PATIENT-LVL III: CPT | Mod: PBBFAC,HCNC,, | Performed by: NURSE PRACTITIONER

## 2022-01-05 PROCEDURE — 1159F MED LIST DOCD IN RCRD: CPT | Mod: HCNC,CPTII,S$GLB, | Performed by: NURSE PRACTITIONER

## 2022-01-05 PROCEDURE — 3078F DIAST BP <80 MM HG: CPT | Mod: HCNC,CPTII,S$GLB, | Performed by: NURSE PRACTITIONER

## 2022-01-05 RX ORDER — GABAPENTIN 800 MG/1
800 TABLET ORAL 2 TIMES DAILY
Qty: 180 TABLET | Refills: 3 | Status: SHIPPED | OUTPATIENT
Start: 2022-01-05 | End: 2022-08-11 | Stop reason: SDUPTHER

## 2022-01-05 RX ORDER — MELOXICAM 15 MG/1
TABLET ORAL
Qty: 90 TABLET | Refills: 0 | Status: SHIPPED | OUTPATIENT
Start: 2022-01-05 | End: 2022-06-06

## 2022-01-05 NOTE — TELEPHONE ENCOUNTER
----- Message from Romina Sheets sent at 1/5/2022  1:25 PM CST -----  Contact: [phone]/ pt's daughter  Who Called: pt's daughter   Regarding: discuss today's visit   Would the patient rather a call back or a response via [facility]? Call back  Best Call Back Number: [phone]  Additional Information:

## 2022-01-05 NOTE — H&P (VIEW-ONLY)
[facility]    Referred by: No ref. provider found   Reason for referral: * No diagnoses found *     CC:   Chief Complaint   Patient presents with    Leg Pain     Left        Interval updates:   1/5/2022 Jazmin returns to clinic s/p Lumbar Epidural Steroid Injection at L4-5 on 1/5/22 with 70% relief.  He reports a pain intensity 5/10 today with a weekly range of 5-5/10.    Additionally he did report continued pain in the left lower extremity however his overall relief from the injection was very good.    Subjective:   Kang Herbert is a 86 y.o. male who has a past medical history of Cataract, Diabetes mellitus, Diabetes mellitus, type 2, Hyperlipidemia, Hypertension, Hypertropia of left eye, Open angle with borderline findings and low glaucoma risk in both eyes, Seizures, and Spondylosis without myelopathy. He complains of pain as described below.    Location: low back   Onset: years  Radiation: bilateral legs, left is worse  Timing: intermittent  Current Pain Score: 9/10  Weekly Pain Range: 4-9/10  Quality: Aching, Tingling and Numb  Worsened by: walking for more than several minutes  Improved by: rest    Previous Therapies:  PT/OT: Yes, previously  HEP:   TENS:   Interventions:   -11/30/2021 Lumbar Epidural Steroid Injection at L4-5  Hx of LESI in 2012 with excellent relief for pain shooting down leg at that time  Surgery: None for spine  Opioids: None  Adjuvants: None    Current Pain Medications:  1. Gabapentin 800 b.i.d.  2. Mobic 15     Assessment & Plan:  Problem List Items Addressed This Visit        Neuro    DDD (degenerative disc disease), lumbar    Lumbar radiculopathy - Primary    Relevant Medications    meloxicam (MOBIC) 15 MG tablet    gabapentin (NEURONTIN) 800 MG tablet    Spinal stenosis of lumbar region with neurogenic claudication    Relevant Medications    meloxicam (MOBIC) 15 MG tablet    gabapentin (NEURONTIN) 800 MG tablet       Orthopedic    Chronic back  pain greater than 3 months duration      Other Visit Diagnoses     Other osteoarthritis involving multiple joints        Relevant Medications    meloxicam (MOBIC) 15 MG tablet    gabapentin (NEURONTIN) 800 MG tablet    Chronic pain syndrome            11/22/21 - Kang Herbert is a 86 y.o. male who  has a past medical history of Cataract, Diabetes mellitus, Diabetes mellitus, type 2, Hyperlipidemia, Hypertension, Hypertropia of left eye (6/22/2016), Open angle with borderline findings and low glaucoma risk in both eyes (9/9/2014), Seizures, and Spondylosis without myelopathy (5/8/2013).  By history and examination this patient has chronic low back pain with radiculopathy.  The underlying cause cause is degenerative disc disease and central canal stenosis.  Pathology is confirmed by imaging.  We discussed the underlying diagnoses and multiple treatment options including non-opioid medications, injections and physical therapy.  He is currently taking 2 good medications for this condition and should continue both- gabapentin and mobic.  The risks and benefits of each treatment option were discussed and all questions were answered.  I think it would be best to try an epidural steroid injection before adding on new medications, though I did explain that central canal stenosis cannot be cured by a epidural, only managed.      1/5/202- Kang Herbert is a 86 y.o. male who  has a past medical history of Cataract, Diabetes mellitus, Diabetes mellitus, type 2, Hyperlipidemia, Hypertension, Hypertropia of left eye (6/22/2016), Open angle with borderline findings and low glaucoma risk in both eyes (9/9/2014), Seizures, and Spondylosis without myelopathy (5/8/2013).  By history and examination this patient has chronic low back pain with radiculopathy.  The underlying cause cause is degenerative disc disease and central canal stenosis.  Pathology is confirmed by imaging.  We discussed the underlying diagnoses and  multiple treatment options including non-opioid medications, interventional procedures and physical therapy.  The risks and benefits of each treatment option were discussed and all questions were answered.    Patient did receive 70% from L4-5 LESI he is still having pain in his left LE likely caused by his central canal stenosis. He continues on Gabapentin per his PCP recommended he continued, additionally he has not been taking Mobic for flare ups, I will recommend restarting Mobic        1. Cont and refilled mobic 15 mg and gabapentin 800 mg , patient reports he needs refills, orignially Rx'd byb Dr. Shreyas Reed  2. Consider repeating  LESI @ L4-5 with no sedation( patient cannot afford it right now)   3. Consider PT in the future patient refuse today due to financial constraints  4. Follow-up with PCP    Follow Up: 1-2 months or sooner if needed.     Azam Dong NP-C  Interventional Pain Management      Disclaimer: This note was partly generated using dictation software which may occasionally result in transcription errors.    [email or web address]  MRI Lumbar Spine Without Contrast  FINDINGS:  Alignment: Mild lumbar levoscoliosis.  Minimal grade 1 retrolisthesis of L1 on L2 and L2 on L3.  Vertebrae: Degenerative endplate changes are seen throughout the lumbar spine.  No fracture or marrow infiltrative process.  Discs: Multilevel disc height loss and desiccation, most severe at T12-L1 through to L3-L4. no evidence for discitis.  Cord: Normal.  Conus terminates at L1.  Degenerative findings:  T12-L1: Circumferential disc bulge and moderate facet arthropathy result in mild right neural foraminal narrowing.  L1-L2: Circumferential disc bulge and mild facet arthropathy result in mild spinal canal stenosis and moderate bilateral neural foraminal narrowing.  L2-L3: Circumferential disc bulge and mild facet arthropathy result in mild spinal canal stenosis and moderate bilateral neural foraminal narrowing.  L3-L4:  Circumferential disc bulge and mild facet arthropathy result in mild effacement of the thecal sac and mild right neural foraminal narrowing.  L4-L5: Circumferential disc bulge and moderate facet arthropathy result in severe left neural foraminal narrowing.  L5-S1: No spinal canal stenosis or neural foraminal narrowing.  Paraspinal muscles & soft tissues: Partially imaged left renal cyst.  Impression:  Multilevel degenerative changes of the lumbar spine detailed above.  Mild spinal canal stenosis noted at L1-L4.  Moderate to severe neural foraminal narrowing noted at L1-L3 and L4-L5.      Review of Systems:  Review of Systems   Constitutional: Negative for chills and fever.   HENT: Negative for nosebleeds.    Eyes: Negative for blurred vision and pain.   Respiratory: Negative for hemoptysis.    Cardiovascular: Negative for chest pain and palpitations.   Gastrointestinal: Negative for heartburn, nausea and vomiting.   Genitourinary: Negative for dysuria and hematuria.   Musculoskeletal: Positive for back pain and joint pain. Negative for falls and myalgias.   Skin: Negative for rash.   Neurological: Negative for dizziness, seizures and loss of consciousness.   Endo/Heme/Allergies: Does not bruise/bleed easily.   Psychiatric/Behavioral: Negative for hallucinations.       Physical Exam:  Vitals:    01/05/22 1108   Weight: 52.2 kg (115 lb 1.3 oz)   PainSc:   5     General    Nursing note and vitals reviewed.  Constitutional: He is oriented to person, place, and time. He appears well-developed and well-nourished. No distress.   HENT:   Head: Normocephalic and atraumatic.   Nose: Nose normal.   Eyes: Conjunctivae and EOM are normal. Pupils are equal, round, and reactive to light. Right eye exhibits no discharge. Left eye exhibits no discharge. No scleral icterus.   Neck: No JVD present.   Cardiovascular: Intact distal pulses.    Pulmonary/Chest: Effort normal. No respiratory distress.   Abdominal: He exhibits no  distension.   Neurological: He is alert and oriented to person, place, and time. Coordination normal.   Psychiatric: He has a normal mood and affect. His behavior is normal. Judgment and thought content normal.     General Musculoskeletal Exam   Gait: normal     Back (L-Spine & T-Spine) / Neck (C-Spine) Exam     Tenderness Right paramedian tenderness of the Lower L-Spine. Left paramedian tenderness of the Lower L-Spine.     Back (L-Spine & T-Spine) Range of Motion   Back extension: facet loading is positive and exacerabtes/reproduces the patient's typical low back pain    Back flexion: limited ROM but partial relief of low back pain noted.     Spinal Sensation   Right Side Sensation  L-Spine Level: normal  Left Side Sensation  L-Spine Level: normal    Other He has no scoliosis .      Muscle Strength   Right Lower Extremity   Hip Flexion: 5/5   Hip Extensors: 5/5  Quadriceps:  5/5   Hamstrin/5   Gastrocsoleus:  5/5   Left Lower Extremity   Hip Flexion: 5/5   Hip Extensors: 5/5  Quadriceps:  5/5   Hamstrin/5   Gastrocsoleus:  5/5     Reflexes     Left Side  Achilles:  2+  Quadriceps:  2+    Right Side   Achilles:  2+  Quadriceps:  2+

## 2022-01-05 NOTE — TELEPHONE ENCOUNTER
Spoke with pt's daughter regarding pt's appt today. Pt's appt was discussed. Aislinn voiced understanding.

## 2022-01-07 ENCOUNTER — TELEPHONE (OUTPATIENT)
Dept: FAMILY MEDICINE | Facility: CLINIC | Age: 87
End: 2022-01-07
Payer: MEDICARE

## 2022-01-07 DIAGNOSIS — E03.9 HYPOTHYROIDISM, UNSPECIFIED TYPE: ICD-10-CM

## 2022-01-07 RX ORDER — LEVOTHYROXINE SODIUM 50 UG/1
50 TABLET ORAL
Qty: 90 TABLET | Refills: 0 | Status: SHIPPED | OUTPATIENT
Start: 2022-01-07 | End: 2022-02-18

## 2022-01-07 NOTE — TELEPHONE ENCOUNTER
----- Message from Jessica Drew MA sent at 1/3/2022  8:55 AM CST -----  Regarding: Test Results  Contact: [phone]/ pt's daughter  Patient is looking for test results  ----- Message -----  From: Romina Sheets  Sent: 1/3/2022   8:47 AM CST  To: Niru FLOWERS Staff    Type:  Sooner Apoointment Request    Caller is requesting a sooner appointment.  Caller declined first available appointment listed below.  Caller will not accept being placed on the waitlist and is requesting a message be sent to doctor.  Name of Caller: pt   When is the first available appointment? 03/16  Symptoms: 6 month follow up   Would the patient rather a call back or a response via [email or web address]? Call back   Best Call Back Number: [phone]  Additional Information:

## 2022-01-07 NOTE — TELEPHONE ENCOUNTER
[facility]    Patient with low TSH or overactivity in the thyroid.    Thyroid medicine was reduced to 50 mcg.    Repeat testing in 2 months.

## 2022-01-07 NOTE — TELEPHONE ENCOUNTER
Spoke with patients daughter and informed her that Patient with low TSH or overactivity in the thyroid andThyroid medicine was reduced to 50 mcg. Labs were scheduled for 2 month as per Dr Pritchett request. Patient daughter voiced understanding . She has requested results for all of patient other labs

## 2022-01-10 NOTE — TELEPHONE ENCOUNTER
Blood work results are available at my [facility].    Stable blood sugar for the last 3 months.    Diet and physical activity to improve glucose control.    Normal kidney and liver function.     Thyroid test was repeated.    Please notify the patient.

## 2022-01-11 ENCOUNTER — OFFICE VISIT (OUTPATIENT)
Dept: OPHTHALMOLOGY | Facility: CLINIC | Age: 87
End: 2022-01-11
Payer: MEDICARE

## 2022-01-11 DIAGNOSIS — H59.099: Primary | ICD-10-CM

## 2022-01-11 DIAGNOSIS — Z96.1 PSEUDOPHAKIA OF BOTH EYES: ICD-10-CM

## 2022-01-11 DIAGNOSIS — E11.9 DM TYPE 2 WITHOUT RETINOPATHY: ICD-10-CM

## 2022-01-11 DIAGNOSIS — H43.813 VITREOUS DEGENERATION OF BOTH EYES: ICD-10-CM

## 2022-01-11 PROCEDURE — 1126F AMNT PAIN NOTED NONE PRSNT: CPT | Mod: HCNC,CPTII,S$GLB, | Performed by: OPHTHALMOLOGY

## 2022-01-11 PROCEDURE — 99999 PR PBB SHADOW E&M-EST. PATIENT-LVL III: CPT | Mod: PBBFAC,HCNC,, | Performed by: OPHTHALMOLOGY

## 2022-01-11 PROCEDURE — 99499 UNLISTED E&M SERVICE: CPT | Mod: S$GLB,,, | Performed by: OPHTHALMOLOGY

## 2022-01-11 PROCEDURE — 1126F PR PAIN SEVERITY QUANTIFIED, NO PAIN PRESENT: ICD-10-PCS | Mod: HCNC,CPTII,S$GLB, | Performed by: OPHTHALMOLOGY

## 2022-01-11 PROCEDURE — 3051F HG A1C>EQUAL 7.0%<8.0%: CPT | Mod: HCNC,CPTII,S$GLB, | Performed by: OPHTHALMOLOGY

## 2022-01-11 PROCEDURE — 1159F PR MEDICATION LIST DOCUMENTED IN MEDICAL RECORD: ICD-10-PCS | Mod: HCNC,CPTII,S$GLB, | Performed by: OPHTHALMOLOGY

## 2022-01-11 PROCEDURE — 1101F PR PT FALLS ASSESS DOC 0-1 FALLS W/OUT INJ PAST YR: ICD-10-PCS | Mod: HCNC,CPTII,S$GLB, | Performed by: OPHTHALMOLOGY

## 2022-01-11 PROCEDURE — 99499 RISK ADDL DX/OHS AUDIT: ICD-10-PCS | Mod: S$GLB,,, | Performed by: OPHTHALMOLOGY

## 2022-01-11 PROCEDURE — 1101F PT FALLS ASSESS-DOCD LE1/YR: CPT | Mod: HCNC,CPTII,S$GLB, | Performed by: OPHTHALMOLOGY

## 2022-01-11 PROCEDURE — 3051F PR MOST RECENT HEMOGLOBIN A1C LEVEL 7.0 - < 8.0%: ICD-10-PCS | Mod: HCNC,CPTII,S$GLB, | Performed by: OPHTHALMOLOGY

## 2022-01-11 PROCEDURE — 2023F DILAT RTA XM W/O RTNOPTHY: CPT | Mod: HCNC,CPTII,S$GLB, | Performed by: OPHTHALMOLOGY

## 2022-01-11 PROCEDURE — 99214 OFFICE O/P EST MOD 30 MIN: CPT | Mod: HCNC,S$GLB,, | Performed by: OPHTHALMOLOGY

## 2022-01-11 PROCEDURE — 1160F PR REVIEW ALL MEDS BY PRESCRIBER/CLIN PHARMACIST DOCUMENTED: ICD-10-PCS | Mod: HCNC,CPTII,S$GLB, | Performed by: OPHTHALMOLOGY

## 2022-01-11 PROCEDURE — 3288F PR FALLS RISK ASSESSMENT DOCUMENTED: ICD-10-PCS | Mod: HCNC,CPTII,S$GLB, | Performed by: OPHTHALMOLOGY

## 2022-01-11 PROCEDURE — 1159F MED LIST DOCD IN RCRD: CPT | Mod: HCNC,CPTII,S$GLB, | Performed by: OPHTHALMOLOGY

## 2022-01-11 PROCEDURE — 92134 OCT, RETINA - OU - BOTH EYES: ICD-10-PCS | Mod: HCNC,S$GLB,, | Performed by: OPHTHALMOLOGY

## 2022-01-11 PROCEDURE — 92134 CPTRZ OPH DX IMG PST SGM RTA: CPT | Mod: HCNC,S$GLB,, | Performed by: OPHTHALMOLOGY

## 2022-01-11 PROCEDURE — 3288F FALL RISK ASSESSMENT DOCD: CPT | Mod: HCNC,CPTII,S$GLB, | Performed by: OPHTHALMOLOGY

## 2022-01-11 PROCEDURE — 1160F RVW MEDS BY RX/DR IN RCRD: CPT | Mod: HCNC,CPTII,S$GLB, | Performed by: OPHTHALMOLOGY

## 2022-01-11 PROCEDURE — 99214 PR OFFICE/OUTPT VISIT, EST, LEVL IV, 30-39 MIN: ICD-10-PCS | Mod: HCNC,S$GLB,, | Performed by: OPHTHALMOLOGY

## 2022-01-11 PROCEDURE — 2023F PR DILATED RETINAL EXAM W/O EVID OF RETINOPATHY: ICD-10-PCS | Mod: HCNC,CPTII,S$GLB, | Performed by: OPHTHALMOLOGY

## 2022-01-11 PROCEDURE — 99999 PR PBB SHADOW E&M-EST. PATIENT-LVL III: ICD-10-PCS | Mod: PBBFAC,HCNC,, | Performed by: OPHTHALMOLOGY

## 2022-01-11 NOTE — PROGRESS NOTES
HPI     Referred: Dr. Loya    87 y/o male is here for Retinal evaluation for possible RD VS Vit Heme.    Pt reports blurry vision of the RT eye for a year     Denies floaters and flashes. BSL was 142 this morning.     Eyemeds  At's OU PRN    Last edited by Ernesto Chopra MD on 1/11/2022 12:18 PM. (History)         A/P    ICD-10-CM ICD-9-CM   1. Capsular bag distension syndrome  H59.099 997.99   2. Pseudophakia of both eyes  Z96.1 V43.1   3. 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 Vitreous degeneration of both eyes  H43.813 379.21       1. Capsular bag distension syndrome  2. Pseudophakia of both eyes  Pt has noted decr VA over past year, no trauma or sudden onset  Pt with wife today  Exam notable for distended capsular bag OD with proteinaceous material  Had CEIOL w Dr Gagnon, last seen 2016    Will refer for YAG OD    3. 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 Marrufo  12/27/2021  7.4  A1C   No DR/DME  Plan: Observation  Recommend good blood pressure control, tight blood glucose control, and good cholesterol control     4. Vitreous degeneration of both eyes  No RT/RD  Plan: Observation     RTC Kalin 1 week DFE/Yag OD  RTC Nav 1 year DFE/OCTm OU       I saw and examined the patient and reviewed in detail the findings documented. The final examination findings, image interpretations, and plan as documented in the record represent my personal judgment and conclusions.    Ernesto Chopra MD  Vitreoretinal Surgery   [facility]

## 2022-01-12 ENCOUNTER — TELEPHONE (OUTPATIENT)
Dept: PAIN MEDICINE | Facility: CLINIC | Age: 87
End: 2022-01-12
Payer: MEDICARE

## 2022-01-12 DIAGNOSIS — M54.16 LUMBAR RADICULOPATHY: Primary | ICD-10-CM

## 2022-01-12 NOTE — TELEPHONE ENCOUNTER
----- Message from SUPA Key sent at 1/12/2022  9:43 AM CST -----  Contact: [phone]  Please schedule him for a repeat LESI @ L4-5 with no sedation.     Thank you  ----- Message -----  From: Brianna Ma MA  Sent: 1/11/2022  10:43 AM CST  To: SUPA Key    Daughter called - they want to get scheduled for another procedure.     Please advise.       ER    ----- Message -----  From: Amanda Kendall MA  Sent: 1/11/2022   7:49 AM CST  To: Brianna Ma MA      ----- Message -----  From: Osiris Woodard  Sent: 1/10/2022   1:33 PM CST  To: Iker COLE Staff    Type:  Needs Medical Advice    Who Called: pt called   Would the patient rather a call back or a response via [facility]? Call back  Best Call Back Number: [phone]  Additional Information: needs to schedule epidural shot for back.Please call pt back to advice

## 2022-01-12 NOTE — TELEPHONE ENCOUNTER
Spoke with daughter and scheduled patient.         CHENCHO L4-5       Reviewed instructions with daughter.       ER

## 2022-01-12 NOTE — TELEPHONE ENCOUNTER
----- Message from Michael Velasquez sent at 1/12/2022  2:18 PM CST -----  Type:  Patient Returning Call    Who Called:alice   Who Left Message for Patient kingsley  Does the patient know what this is regarding?:yes  Would the patient rather a call back or a response via [facility]? call  Best Call Back Number:   Additional Information:     Returning a call

## 2022-01-14 ENCOUNTER — OFFICE VISIT (OUTPATIENT)
Dept: OPHTHALMOLOGY | Facility: CLINIC | Age: 87
End: 2022-01-14
Payer: MEDICARE

## 2022-01-14 DIAGNOSIS — H59.099: Primary | ICD-10-CM

## 2022-01-14 DIAGNOSIS — H26.491 POSTERIOR CAPSULAR OPACIFICATION VISUALLY SIGNIFICANT, RIGHT EYE: ICD-10-CM

## 2022-01-14 PROCEDURE — 99214 OFFICE O/P EST MOD 30 MIN: CPT | Mod: 57,HCNC,S$GLB, | Performed by: OPHTHALMOLOGY

## 2022-01-14 PROCEDURE — 66821 YAG CAPSULOTOMY - OD - RIGHT EYE: ICD-10-PCS | Mod: HCNC,RT,S$GLB, | Performed by: OPHTHALMOLOGY

## 2022-01-14 PROCEDURE — 99214 PR OFFICE/OUTPT VISIT, EST, LEVL IV, 30-39 MIN: ICD-10-PCS | Mod: 57,HCNC,S$GLB, | Performed by: OPHTHALMOLOGY

## 2022-01-14 PROCEDURE — 66821 AFTER CATARACT LASER SURGERY: CPT | Mod: HCNC,RT,S$GLB, | Performed by: OPHTHALMOLOGY

## 2022-01-14 NOTE — PROGRESS NOTES
HPI     Vincenzo / jodi    Pciol OU     At's OU PRN    Last edited by Rosalinda Boone MD on 1/14/2022  5:52 PM. (History)            Assessment /Plan     For exam results, see Encounter Report.    Capsular bag distension syndrome    Posterior capsular opacification visually significant, right eye  -     Yag Capsulotomy - OD - Right Eye      Visually significant posterior capsular opacity present.    - discussed risks, benefits, and alternatives to laser surgery - pt wishes to proceed with yag laser  - Informed consent obtained and correct eye(s) verified with patient.  - Intraocular Pressure to be taken 10- 30 minutes post procedure.   - PF QID x 4 days then d/c  - f/up as scheduled    DIAGNOSIS: Visually significant posterior capsular opacity    PROCEDURE: YAG Laser Capsulotomy OD    COMPLICATIONS: none     DESCRIPTION OF PROCEDURE IN DETAIL:  1 drop of topical Proparacaine and Iopidine instilled, and eye(s) dilated with 1% Tropicamide 2.5% Phenylephrine. YAG laser applied to posterior capsule in cruciate pattern.      DISPOSITION:  Patient tolerated procedure well.        Will call pt next wk to ensure doing well s/p yag cap    F/up vincenzo for updated mrx post yag

## 2022-01-17 ENCOUNTER — TELEPHONE (OUTPATIENT)
Dept: FAMILY MEDICINE | Facility: CLINIC | Age: 87
End: 2022-01-17
Payer: MEDICARE

## 2022-01-17 DIAGNOSIS — E03.9 HYPOTHYROIDISM, UNSPECIFIED TYPE: Primary | ICD-10-CM

## 2022-01-19 ENCOUNTER — TELEPHONE (OUTPATIENT)
Dept: FAMILY MEDICINE | Facility: CLINIC | Age: 87
End: 2022-01-19
Payer: MEDICARE

## 2022-01-19 NOTE — TELEPHONE ENCOUNTER
----- Message from Alejandrina Arenas sent at 1/19/2022  8:26 AM CST -----  Contact: [phone]  Who Called: PT daughter   Regarding: appt questions about blood work   Would the patient rather a call back or a response via [facility]? Call back  Best Call Back Number: [phone]  Additional Information:

## 2022-01-20 NOTE — DISCHARGE INSTRUCTIONS
Home Care Instructions Pain Management:    1.  DIET:    You may resume your normal diet today.    2.  BATHING:    You may shower with luke warm water.    3.  DRESSING:    You may remove your bandage today.    4.  ACTIVITY LEVEL:      You may resume your normal activities 24 hours after your procedure.    5.  MEDICATIONS:    You may resume your normal medications today.    6.  SPECIAL INSTRUCTIONS:    No heat to the injection site for 24 hours including bath or shower, heating pad, moist heat or hot tubs.    Use an ice pack to the injection site for any pain or discomfort.  Apply ice packs for 20 minute intervals as needed.    If you have received any sedatives by mouth today, you can not drive for 12 hours.    If you have received sedation through an IV, you can not drive for 24 hours.    PLEASE CALL YOUR DOCTOR FOR THE FOLLOWIN.  Redness or swelling around the injection site.  2.  Fever of 101 degrees.  3.  Drainage (pus) from the injection site.  4.  For any continuous bleeding (some dried blood over the incision is normal.)    FOR EMERGENCIES:    If any unusual problems or difficulties occur during clinic hours, call [phone] or dial [phone].    Follow up with with your physician in 2-3 weeks.

## 2022-01-21 ENCOUNTER — TELEPHONE (OUTPATIENT)
Dept: FAMILY MEDICINE | Facility: CLINIC | Age: 87
End: 2022-01-21
Payer: MEDICARE

## 2022-01-24 ENCOUNTER — TELEPHONE (OUTPATIENT)
Dept: PAIN MEDICINE | Facility: CLINIC | Age: 87
End: 2022-01-24
Payer: MEDICARE

## 2022-01-25 ENCOUNTER — TELEPHONE (OUTPATIENT)
Dept: PAIN MEDICINE | Facility: CLINIC | Age: 87
End: 2022-01-25
Payer: MEDICARE

## 2022-01-25 ENCOUNTER — HOSPITAL ENCOUNTER (OUTPATIENT)
Facility: HOSPITAL | Age: 87
Discharge: HOME | End: 2022-01-25
Attending: PAIN MEDICINE | Admitting: PAIN MEDICINE
Payer: MEDICARE

## 2022-01-25 VITALS
HEART RATE: 86 BPM | TEMPERATURE: 98 F | BODY MASS INDEX: 21.71 KG/M2 | OXYGEN SATURATION: 97 % | DIASTOLIC BLOOD PRESSURE: 61 MMHG | SYSTOLIC BLOOD PRESSURE: 128 MMHG | RESPIRATION RATE: 16 BRPM | WEIGHT: 115 LBS | HEIGHT: 61 IN

## 2022-01-25 DIAGNOSIS — M54.16 LUMBAR RADICULOPATHY: Primary | ICD-10-CM

## 2022-01-25 DIAGNOSIS — G89.29 CHRONIC PAIN: ICD-10-CM

## 2022-01-25 LAB — POCT GLUCOSE: 114 MG/DL (ref 70–110)

## 2022-01-25 PROCEDURE — 63600175 PHARM REV CODE 636 W HCPCS: Mod: HCNC | Performed by: PAIN MEDICINE

## 2022-01-25 PROCEDURE — 62323 PR INJ LUMBAR/SACRAL, W/IMAGING GUIDANCE: ICD-10-PCS | Mod: HCNC,,, | Performed by: PAIN MEDICINE

## 2022-01-25 PROCEDURE — 25500020 PHARM REV CODE 255: Mod: HCNC | Performed by: PAIN MEDICINE

## 2022-01-25 PROCEDURE — 62323 NJX INTERLAMINAR LMBR/SAC: CPT | Mod: HCNC,,, | Performed by: PAIN MEDICINE

## 2022-01-25 PROCEDURE — 25000003 PHARM REV CODE 250: Mod: HCNC | Performed by: PAIN MEDICINE

## 2022-01-25 PROCEDURE — 62323 NJX INTERLAMINAR LMBR/SAC: CPT | Mod: HCNC | Performed by: PAIN MEDICINE

## 2022-01-25 RX ORDER — INDOMETHACIN 25 MG/1
CAPSULE ORAL
Status: DISCONTINUED | OUTPATIENT
Start: 2022-01-25 | End: 2022-01-25 | Stop reason: HOSPADM

## 2022-01-25 RX ORDER — LIDOCAINE HYDROCHLORIDE 10 MG/ML
INJECTION INFILTRATION; PERINEURAL
Status: DISCONTINUED | OUTPATIENT
Start: 2022-01-25 | End: 2022-01-25 | Stop reason: HOSPADM

## 2022-01-25 RX ORDER — METHYLPREDNISOLONE ACETATE 80 MG/ML
INJECTION, SUSPENSION INTRA-ARTICULAR; INTRALESIONAL; INTRAMUSCULAR; SOFT TISSUE
Status: DISCONTINUED | OUTPATIENT
Start: 2022-01-25 | End: 2022-01-25 | Stop reason: HOSPADM

## 2022-01-25 RX ORDER — LIDOCAINE HYDROCHLORIDE 10 MG/ML
INJECTION, SOLUTION EPIDURAL; INFILTRATION; INTRACAUDAL; PERINEURAL
Status: DISCONTINUED | OUTPATIENT
Start: 2022-01-25 | End: 2022-01-25 | Stop reason: HOSPADM

## 2022-01-25 NOTE — OP NOTE
"Procedure Note    Pre-operative Diagnosis: Lumbar Radiculopathy  Post-operative Diagnosis: Lumbar Radiculopathy  Procedure Date: 01/25/2022  Procedure:  (1) Lumbar Epidural Steroid Injection at L4-5    (2) Intraoperative fluoroscopy          Anesthesia: Local    Indications: To alleviate pain and suffering, and reduce functional impairment.    Procedure in Detail:   The patients history and physical exam were reviewed. The risks, benefits and alternatives to the procedure were discussed, and all questions were answered to the patients satisfaction. The patient agreed to proceed, and written informed consent was verified.    The patient was brought into the procedure room and placed in the prone position on the fluoroscopy table. The area of the lumbar spine was prepped with Chloraprep and draped in a sterile manner. The targeted interspace was identified and marked under AP fluoroscopy. The skin and subcutaneous tissues overlying the targeted interspace were anesthetized with 3-5 mL of 1% lidocaine using a 25G, 1.5" needle. A 20G, 3.5" Tuohy epidural needle was directed toward the interspace under fluoroscopic guidance until the ligamentum flavum was engaged. From this point, a loss of resistance technique with a glass syringe and saline was used to identify entrance of the needle into the epidural space. Once loss of resistance was observed, up to 1 mL of contrast solution was injected. An appropriate epidurogram was noted.    A 5 mL mixture consisting of PF Lidocaine 1% (4 mL) and Depomedrol 80 mg (1 mL) was injected slowly and without resistance.  The needle was removed and a bandage applied to puncture site.    Blood Loss: nil    Disposition: The patient tolerated the procedure well, and there were no apparent complications. Vital signs remained stable throughout the procedure. The patient was taken to the recovery area where written discharge instructions for the procedure were given.     Follow-up: RTC as " scheduled      Sisi Lua Jr, MD  Interventional Pain Medicine / Anesthesiology

## 2022-01-25 NOTE — INTERVAL H&P NOTE
No significant changes were noted from the H&P or last clinic note.    The risks and benefits of this intervention, and alternative therapies were discussed with the patient.  The discussion of risks included infection, bleeding, need for additional procedures or surgery, nerve damage, paralysis, adverse medication reaction(s), stroke, and/or death.  Questions regarding the procedure, risks, expected outcome, and possible side effects were solicited and answered to the patient's satisfaction.  Kang Herbert wishes to proceed with the injection or procedure.  Written consent was obtained.    Sisi Lua Jr, MD  Interventional Pain Medicine / Anesthesiology

## 2022-01-25 NOTE — TELEPHONE ENCOUNTER
----- Message from Paige Verma sent at 1/25/2022 12:29 PM CST -----  Regarding: advice  Contact: [phone]/dayton [place]  Patient's daughter Kinjal is requesting a call back regarding are there any restrictions? Can he shower?   Would the patient rather a call back or a response via [facility]?  call  Best Call Back Number:  [phone]/dayton [place]ton  Additional Information:  She doesn't have the discharge papers

## 2022-01-25 NOTE — DISCHARGE SUMMARY
[facility]  Discharge Note  Short Stay     Admit Date: 1/25/2022    Discharge Date: 1/25/2022     Attending Physician: Sisi Lua Jr, MD    Diagnoses:  There are no hospital problems to display for this patient.    Discharged Condition: Good     Hospital Course: Patient was admitted for an outpatient interventional pain management procedure and tolerated the procedure well with no complications.     Final Diagnoses: Same as principal problem.     Disposition: Home or Self Care     Follow up/Patient Instructions:     Follow-up Information     Sisi Lua Jr, MD. Go in 2 weeks.    Specialty: Pain Medicine  Why: Post-procedural Follow Up As Scheduled  Contact information:  [address]  [address]  [place] [address]  [phone]                         Reconciled Medications:     Medication List      CONTINUE taking these medications    atorvastatin 10 MG tablet  Commonly known as: LIPITOR  TAKE 1 TABLET BY MOUTH EVERY DAY     blood sugar diagnostic Strp  1 strip by Misc.(Non-Drug; Combo Route) route once daily.     blood-glucose meter Misc  Commonly known as: ACCU-CHEK ARUNA PLUS METER  1 Units by Misc.(Non-Drug; Combo Route) route once daily.     EScitalopram oxalate 10 MG tablet  Commonly known as: LEXAPRO  TAKE 1 TABLET BY MOUTH EVERY DAY     finasteride 5 mg tablet  Commonly known as: PROSCAR  TAKE 1 TABLET BY MOUTH EVERY DAY     gabapentin 800 MG tablet  Commonly known as: NEURONTIN  Take 1 tablet (800 mg total) by mouth 2 (two) times daily.     lancets Misc  Commonly known as: ONETOUCH ULTRASOFT LANCETS  1 lancet by Misc.(Non-Drug; Combo Route) route once daily.     levothyroxine 50 MCG tablet  Commonly known as: SYNTHROID  Take 1 tablet (50 mcg total) by mouth before breakfast.     loratadine 10 mg tablet  Commonly known as: CLARITIN  TAKE 1 TABLET BY MOUTH EVERY DAY     losartan 25 MG tablet  Commonly known as: COZAAR  TAKE 1 TABLET BY MOUTH EVERY DAY     meloxicam 15 MG  tablet  Commonly known as: MOBIC  TAKE 1 TABLET EVERY DAY AS NEEDED FOR PAIN, OR MAY TAKE 1/2 TABLET 2 TIMES A DAY     metFORMIN 1000 MG tablet  Commonly known as: GLUCOPHAGE  Take 1 tablet (1,000 mg total) by mouth 2 (two) times daily with meals. Appointment needed for additional refills.     omeprazole 20 MG capsule  Commonly known as: PRILOSEC  TAKE 1 CAPSULE BY MOUTH EVERY DAY     rOPINIRole 0.25 MG tablet  Commonly known as: REQUIP  Take 1 tablet (0.25 mg total) by mouth every evening.     tiZANidine 2 MG tablet  Commonly known as: ZANAFLEX  TAKE 2 TABLETS (4 MG TOTAL) BY MOUTH 2 (TWO) TIMES DAILY AS NEEDED (SPASM).           Discharge Procedure Orders (must include Diet, Follow-up, Activity)   Diet Adult Regular     No driving until:   Order Comments: If you received sedation, no driving for 12 hrs     Notify your health care provider if you experience any of the following:  temperature >100.4     Notify your health care provider if you experience any of the following:  severe uncontrolled pain     Notify your health care provider if you experience any of the following:  redness, tenderness, or signs of infection (pain, swelling, redness, odor or green/yellow discharge around incision site)     Notify your health care provider if you experience any of the following:  difficulty breathing or increased cough     Notify your health care provider if you experience any of the following:  severe persistent headache     Notify your health care provider if you experience any of the following:  increased confusion or weakness     Remove dressing in 24 hours     Activity as tolerated       Sisi Lua Jr, MD  Interventional Pain Medicine / Anesthesiology

## 2022-01-25 NOTE — PLAN OF CARE
Reviewed DC instructions with pt. All questions answered. No other needs or concerns at this time. Pt. Verbalized understanding of DC instructions and when to follow up with MD with questions and concerns.

## 2022-01-26 ENCOUNTER — LAB VISIT (OUTPATIENT)
Dept: LAB | Facility: HOSPITAL | Age: 87
End: 2022-01-26
Attending: FAMILY MEDICINE
Payer: MEDICARE

## 2022-01-26 ENCOUNTER — TELEPHONE (OUTPATIENT)
Dept: FAMILY MEDICINE | Facility: CLINIC | Age: 87
End: 2022-01-26
Payer: MEDICARE

## 2022-01-26 DIAGNOSIS — E03.9 HYPOTHYROIDISM, UNSPECIFIED TYPE: ICD-10-CM

## 2022-01-26 LAB
T3 SERPL-MCNC: 61 NG/DL (ref 60–180)
T4 FREE SERPL-MCNC: 1.01 NG/DL (ref 0.71–1.51)
TSH SERPL DL<=0.005 MIU/L-ACNC: 1.31 UIU/ML (ref 0.4–4)

## 2022-01-26 PROCEDURE — 84439 ASSAY OF FREE THYROXINE: CPT | Mod: HCNC | Performed by: FAMILY MEDICINE

## 2022-01-26 PROCEDURE — 84480 ASSAY TRIIODOTHYRONINE (T3): CPT | Mod: HCNC | Performed by: FAMILY MEDICINE

## 2022-01-26 PROCEDURE — 84443 ASSAY THYROID STIM HORMONE: CPT | Mod: HCNC | Performed by: FAMILY MEDICINE

## 2022-01-26 PROCEDURE — 36415 COLL VENOUS BLD VENIPUNCTURE: CPT | Mod: HCNC,PO | Performed by: FAMILY MEDICINE

## 2022-01-26 NOTE — TELEPHONE ENCOUNTER
Results available at my [facility].    Thyroid test was slightly abnormal.    Follow-up testing was ordered.    Normal total cholesterol.  Elevated triglycerides.  Avoid refined sugars.  Omega 3 acids recommended twice a day to improve triglycerides.    Elevated blood sugar for the last 3 months .  Blood sugar appropriate for his age.

## 2022-01-27 ENCOUNTER — TELEPHONE (OUTPATIENT)
Dept: OPHTHALMOLOGY | Facility: CLINIC | Age: 87
End: 2022-01-27
Payer: MEDICARE

## 2022-01-27 NOTE — TELEPHONE ENCOUNTER
----- Message from Victor M Loya, FATEMEH sent at 1/26/2022  2:05 PM CST -----  Regarding: RE: MRX post YAG with Mely  Sure, can do a vision check, next available fine  ----- Message -----  From: Lo Frye  Sent: 1/26/2022   1:49 PM CST  To: Victor M Loya, FATEMEH  Subject: FW: MRX post YAG with Mely                    Do you need to see him again?  ----- Message -----  From: Jose Roque  Sent: 1/26/2022  12:32 PM CST  To: Vincen[facility] HANLEY Staff  Subject: MRX post YAG with Mely                        Please call and schedule patient for a updated MRX, I tried to call patient to follow-up post yag but no answer I left a message for him to call back. If you get in touch with patient please ask how he is doing since the laser and let me know. Thanks. Ill document if he calls me back  ----- Message -----  From: Rosalinda Boone MD  Sent: 1/14/2022   5:53 PM CST  To: Mely TROY Staff      Will call pt next wk to ensure doing well s/p yag cap    F/up vincenzo for updated mrx post yag

## 2022-01-28 ENCOUNTER — TELEPHONE (OUTPATIENT)
Dept: OPHTHALMOLOGY | Facility: CLINIC | Age: 87
End: 2022-01-28
Payer: MEDICARE

## 2022-01-28 NOTE — TELEPHONE ENCOUNTER
Doing well post YAg.     ----- Message from Shelly Grijalva sent at 1/28/2022  4:12 PM CST -----  Contact: Aislinn @[phone]  Pt daughter states her father is doing well

## 2022-02-05 ENCOUNTER — PATIENT OUTREACH (OUTPATIENT)
Dept: ADMINISTRATIVE | Facility: OTHER | Age: 87
End: 2022-02-05
Payer: MEDICARE

## 2022-02-05 NOTE — PROGRESS NOTES
Health Maintenance Due   Topic Date Due    Shingles Vaccine (1 of 2) Never done     Updates were requested from care everywhere.  Chart was reviewed for overdue Proactive [facility] Encounters ([facility]) topics (CRS, Breast Cancer Screening, Eye exam)  Health Maintenance has been updated.  LINKS immunization registry triggered.  Immunizations were reconciled.

## 2022-02-08 ENCOUNTER — OFFICE VISIT (OUTPATIENT)
Dept: PAIN MEDICINE | Facility: CLINIC | Age: 87
End: 2022-02-08
Payer: MEDICARE

## 2022-02-08 VITALS — HEART RATE: 99 BPM | SYSTOLIC BLOOD PRESSURE: 125 MMHG | DIASTOLIC BLOOD PRESSURE: 81 MMHG

## 2022-02-08 DIAGNOSIS — G89.29 CHRONIC BACK PAIN GREATER THAN 3 MONTHS DURATION: ICD-10-CM

## 2022-02-08 DIAGNOSIS — M54.16 LUMBAR RADICULOPATHY: Primary | ICD-10-CM

## 2022-02-08 DIAGNOSIS — M51.36 DDD (DEGENERATIVE DISC DISEASE), LUMBAR: ICD-10-CM

## 2022-02-08 DIAGNOSIS — M48.062 SPINAL STENOSIS OF LUMBAR REGION WITH NEUROGENIC CLAUDICATION: ICD-10-CM

## 2022-02-08 DIAGNOSIS — G89.4 CHRONIC PAIN SYNDROME: ICD-10-CM

## 2022-02-08 DIAGNOSIS — M54.9 CHRONIC BACK PAIN GREATER THAN 3 MONTHS DURATION: ICD-10-CM

## 2022-02-08 PROCEDURE — 3074F PR MOST RECENT SYSTOLIC BLOOD PRESSURE < 130 MM HG: ICD-10-PCS | Mod: HCNC,CPTII,S$GLB, | Performed by: NURSE PRACTITIONER

## 2022-02-08 PROCEDURE — 1160F PR REVIEW ALL MEDS BY PRESCRIBER/CLIN PHARMACIST DOCUMENTED: ICD-10-PCS | Mod: HCNC,CPTII,S$GLB, | Performed by: NURSE PRACTITIONER

## 2022-02-08 PROCEDURE — 1159F PR MEDICATION LIST DOCUMENTED IN MEDICAL RECORD: ICD-10-PCS | Mod: HCNC,CPTII,S$GLB, | Performed by: NURSE PRACTITIONER

## 2022-02-08 PROCEDURE — 3079F DIAST BP 80-89 MM HG: CPT | Mod: HCNC,CPTII,S$GLB, | Performed by: NURSE PRACTITIONER

## 2022-02-08 PROCEDURE — 99214 OFFICE O/P EST MOD 30 MIN: CPT | Mod: HCNC,S$GLB,, | Performed by: NURSE PRACTITIONER

## 2022-02-08 PROCEDURE — 3079F PR MOST RECENT DIASTOLIC BLOOD PRESSURE 80-89 MM HG: ICD-10-PCS | Mod: HCNC,CPTII,S$GLB, | Performed by: NURSE PRACTITIONER

## 2022-02-08 PROCEDURE — 1160F RVW MEDS BY RX/DR IN RCRD: CPT | Mod: HCNC,CPTII,S$GLB, | Performed by: NURSE PRACTITIONER

## 2022-02-08 PROCEDURE — 99214 PR OFFICE/OUTPT VISIT, EST, LEVL IV, 30-39 MIN: ICD-10-PCS | Mod: HCNC,S$GLB,, | Performed by: NURSE PRACTITIONER

## 2022-02-08 PROCEDURE — 99999 PR PBB SHADOW E&M-EST. PATIENT-LVL III: ICD-10-PCS | Mod: PBBFAC,HCNC,, | Performed by: NURSE PRACTITIONER

## 2022-02-08 PROCEDURE — 1159F MED LIST DOCD IN RCRD: CPT | Mod: HCNC,CPTII,S$GLB, | Performed by: NURSE PRACTITIONER

## 2022-02-08 PROCEDURE — 1125F AMNT PAIN NOTED PAIN PRSNT: CPT | Mod: HCNC,CPTII,S$GLB, | Performed by: NURSE PRACTITIONER

## 2022-02-08 PROCEDURE — 3074F SYST BP LT 130 MM HG: CPT | Mod: HCNC,CPTII,S$GLB, | Performed by: NURSE PRACTITIONER

## 2022-02-08 PROCEDURE — 1125F PR PAIN SEVERITY QUANTIFIED, PAIN PRESENT: ICD-10-PCS | Mod: HCNC,CPTII,S$GLB, | Performed by: NURSE PRACTITIONER

## 2022-02-08 PROCEDURE — 99999 PR PBB SHADOW E&M-EST. PATIENT-LVL III: CPT | Mod: PBBFAC,HCNC,, | Performed by: NURSE PRACTITIONER

## 2022-02-08 RX ORDER — DULOXETIN HYDROCHLORIDE 20 MG/1
20 CAPSULE, DELAYED RELEASE ORAL DAILY
Qty: 30 CAPSULE | Refills: 0 | Status: CANCELLED | OUTPATIENT
Start: 2022-02-08 | End: 2022-03-10

## 2022-02-08 NOTE — PROGRESS NOTES
[facility] established clinic visit    Referred by: No ref. provider found   Reason for referral: * No diagnoses found *     CC:   Chief Complaint   Patient presents with    Leg Pain       Interval updates:   02/08/2022 Kalin Herbert returns to clinic s/p Lumbar Epidural Steroid Injection at L4-5 on 01/25/22 with 0% relief.  He reports a pain intensity 7/10 today with a weekly range of 7-/10.  The pain is described as Aching.  Pain is worsened by: nothing in particular and improved by: nothing.  Mr. Herbert reports continued pain in his left lower extremity, pain is described as tingling pain.  He has attempted x2 lumbar DANIEL has that did not provide him with any relief.  Denies any profound weakness, denies any bowel bladder dysfunction, denies any recent incident or trauma denies any saddle anesthesia.    01/05/2022 Jazmin returns to clinic s/p Lumbar Epidural Steroid Injection at L4-5 on 1/5/22 with 70% relief.  He reports a pain intensity 5/10 today with a weekly range of 5-5/10.    Additionally he did report continued pain in the left lower extremity however his overall relief from the injection was very good.    Subjective:   Kang Herbert is a 86 y.o. male who has a past medical history of Cataract, Diabetes mellitus, Diabetes mellitus, type 2, Hyperlipidemia, Hypertension, Hypertropia of left eye, Open angle with borderline findings and low glaucoma risk in both eyes, Seizures, and Spondylosis without myelopathy. He complains of pain as described below.    Location: low back   Onset: years  Radiation: bilateral legs, left is worse  Timing: intermittent  Current Pain Score: 9/10  Weekly Pain Range: 4-9/10  Quality: Aching, Tingling and Numb  Worsened by: walking for more than several minutes  Improved by: rest    Previous Therapies:  PT/OT: Yes, previously  HEP:   TENS:   Interventions:   -11/30/2021 Lumbar Epidural Steroid Injection at L4-5  Hx of LESI in 2012 with excellent  relief for pain shooting down leg at that time  Surgery: None for spine  Opioids: None  Adjuvants: None    Current Pain Medications:  1. Gabapentin 800 b.i.d.  2. Mobic 15     Assessment & Plan:  Problem List Items Addressed This Visit        Neuro    DDD (degenerative disc disease), lumbar    Lumbar radiculopathy - Primary    Spinal stenosis of lumbar region with neurogenic claudication       Orthopedic    Chronic back pain greater than 3 months duration      Other Visit Diagnoses     Chronic pain syndrome            11/22/21 - Kang Herbert is a 86 y.o. male who  has a past medical history of Cataract, Diabetes mellitus, Diabetes mellitus, type 2, Hyperlipidemia, Hypertension, Hypertropia of left eye (6/22/2016), Open angle with borderline findings and low glaucoma risk in both eyes (9/9/2014), Seizures, and Spondylosis without myelopathy (5/8/2013).  By history and examination this patient has chronic low back pain with radiculopathy.  The underlying cause cause is degenerative disc disease and central canal stenosis.  Pathology is confirmed by imaging.  We discussed the underlying diagnoses and multiple treatment options including non-opioid medications, injections and physical therapy.  He is currently taking 2 good medications for this condition and should continue both- gabapentin and mobic.  The risks and benefits of each treatment option were discussed and all questions were answered.  I think it would be best to try an epidural steroid injection before adding on new medications, though I did explain that central canal stenosis cannot be cured by a epidural, only managed.      1/5/202- Kang Herbert is a 86 y.o. male who  has a past medical history of Cataract, Diabetes mellitus, Diabetes mellitus, type 2, Hyperlipidemia, Hypertension, Hypertropia of left eye (6/22/2016), Open angle with borderline findings and low glaucoma risk in both eyes (9/9/2014), Seizures, and Spondylosis without  myelopathy (5/8/2013).  By history and examination this patient has chronic low back pain with radiculopathy.  The underlying cause cause is degenerative disc disease and central canal stenosis.  Pathology is confirmed by imaging.  We discussed the underlying diagnoses and multiple treatment options including non-opioid medications, interventional procedures and physical therapy.  The risks and benefits of each treatment option were discussed and all questions were answered.    Patient did receive 70% from L4-5 LESI he is still having pain in his left LE likely caused by his central canal stenosis. He continues on Gabapentin per his PCP recommended he continued, additionally he has not been taking Mobic for flare ups, I will recommend restarting Mobic    02/08/2022-Kang Herbert is a 86 y.o. male who  has a past medical history of Cataract, Diabetes mellitus, Diabetes mellitus, type 2, Hyperlipidemia, Hypertension, Hypertropia of left eye (6/22/2016), Open angle with borderline findings and low glaucoma risk in both eyes (9/9/2014), Seizures, and Spondylosis without myelopathy (5/8/2013).  By history and examination this patient has chronic Left LE  Radiculopathy. Pain begins at the knee and radiates down into his left foot.  The underlying cause cause is degenerative disc disease, foraminal stenosis and central canal stenosis.  Pathology is confirmed by imaging.  We discussed the underlying diagnoses and multiple treatment options including non-opioid medications, interventional procedures, physical therapy, home exercise and activity modification.  The risks and benefits of each treatment option were discussed and all questions were answered.      Mr. Herbert's pain has been refractory to multiple injections and medications, I recommended we do not repeat any more pain interventions at this time I discussed with him via the  that we will consider optimizing his medications for example starting him  on Cymbalta.  He is currently taking Lexapro I discussed with the [place] that I will need to contact his PCP to further discuss discontinuation  Lexapro and possibly starting Cymbalta.        1. Cont and refilled mobic 15 mg and gabapentin 800 mg , patient reports he needs refills, orignially Rx'd byb Dr. Shreyas Reed  2. No pain interventions recommended at this time  3. Consider PT in the future patient refuse today due to financial constraints  4. Consider discontinuing Lexapro and starting Cymbalta to help with left lower extremity neuropathic symptoms.  5. Message PCP/Dr. Marrufo to discuss discontinuing Lexapro and possibly starting Cymbalta to help with left lower extremity neuropathic symptoms.    Follow Up: 1-2 months or sooner if needed.     Azam Dong NP-C  Interventional Pain Management      Disclaimer: This note was partly generated using dictation software which may occasionally result in transcription errors.    [id number]  MRI Lumbar Spine Without Contrast  FINDINGS:  Alignment: Mild lumbar levoscoliosis.  Minimal grade 1 retrolisthesis of L1 on L2 and L2 on L3.  Vertebrae: Degenerative endplate changes are seen throughout the lumbar spine.  No fracture or marrow infiltrative process.  Discs: Multilevel disc height loss and desiccation, most severe at T12-L1 through to L3-L4. no evidence for discitis.  Cord: Normal.  Conus terminates at L1.  Degenerative findings:  T12-L1: Circumferential disc bulge and moderate facet arthropathy result in mild right neural foraminal narrowing.  L1-L2: Circumferential disc bulge and mild facet arthropathy result in mild spinal canal stenosis and moderate bilateral neural foraminal narrowing.  L2-L3: Circumferential disc bulge and mild facet arthropathy result in mild spinal canal stenosis and moderate bilateral neural foraminal narrowing.  L3-L4: Circumferential disc bulge and mild facet arthropathy result in mild effacement of the thecal sac and mild  right neural foraminal narrowing.  L4-L5: Circumferential disc bulge and moderate facet arthropathy result in severe left neural foraminal narrowing.  L5-S1: No spinal canal stenosis or neural foraminal narrowing.  Paraspinal muscles & soft tissues: Partially imaged left renal cyst.  Impression:  Multilevel degenerative changes of the lumbar spine detailed above.  Mild spinal canal stenosis noted at L1-L4.  Moderate to severe neural foraminal narrowing noted at L1-L3 and L4-L5.      Review of Systems:  Review of Systems   Constitutional: Negative for chills and fever.   HENT: Negative for nosebleeds.    Eyes: Negative for blurred vision and pain.   Respiratory: Negative for hemoptysis.    Cardiovascular: Negative for chest pain and palpitations.   Gastrointestinal: Negative for heartburn, nausea and vomiting.   Genitourinary: Negative for dysuria and hematuria.   Musculoskeletal: Positive for back pain and joint pain. Negative for falls and myalgias.   Skin: Negative for rash.   Neurological: Negative for dizziness, seizures and loss of consciousness.   Endo/Heme/Allergies: Does not bruise/bleed easily.   Psychiatric/Behavioral: Negative for hallucinations.       Physical Exam:  Vitals:    02/08/22 1330   BP: 125/81   Pulse: 99   PainSc:   7     General    Nursing note and vitals reviewed.  Constitutional: He is oriented to person, place, and time. He appears well-developed and well-nourished. No distress.   HENT:   Head: Normocephalic and atraumatic.   Nose: Nose normal.   Eyes: Conjunctivae and EOM are normal. Pupils are equal, round, and reactive to light. Right eye exhibits no discharge. Left eye exhibits no discharge. No scleral icterus.   Neck: No JVD present.   Cardiovascular: Intact distal pulses.    Pulmonary/Chest: Effort normal. No respiratory distress.   Abdominal: He exhibits no distension.   Neurological: He is alert and oriented to person, place, and time. Coordination normal.   Psychiatric: He has a  normal mood and affect. His behavior is normal. Judgment and thought content normal.     General Musculoskeletal Exam   Gait: normal     Back (L-Spine & T-Spine) / Neck (C-Spine) Exam     Tenderness Right paramedian tenderness of the Lower L-Spine. Left paramedian tenderness of the Lower L-Spine.     Back (L-Spine & T-Spine) Range of Motion   Back extension: facet loading is positive and exacerabtes/reproduces the patient's typical low back pain    Back flexion: limited ROM but partial relief of low back pain noted.     Spinal Sensation   Right Side Sensation  L-Spine Level: normal  Left Side Sensation  L-Spine Level: normal    Other He has no scoliosis .      Muscle Strength   Right Lower Extremity   Hip Flexion: 5/5   Hip Extensors: 5/5  Quadriceps:  5/5   Hamstrin/5   Gastrocsoleus:  5/5   Left Lower Extremity   Hip Flexion: 5/5   Hip Extensors: 5/5  Quadriceps:  5/5   Hamstrin/5   Gastrocsoleus:  5/5     Reflexes     Left Side  Achilles:  2+  Quadriceps:  2+    Right Side   Achilles:  2+  Quadriceps:  2+

## 2022-02-10 ENCOUNTER — TELEPHONE (OUTPATIENT)
Dept: FAMILY MEDICINE | Facility: CLINIC | Age: 87
End: 2022-02-10
Payer: MEDICARE

## 2022-02-10 NOTE — TELEPHONE ENCOUNTER
Schedule a follow-up appointment with me or my nurse practitioner to discuss possible change in his depression treatment.

## 2022-02-10 NOTE — TELEPHONE ENCOUNTER
----- Message from SUPA Key sent at 2/8/2022  3:40 PM CST -----  Regarding: Switch from Lexapro to Cymbalta  Hi Dr. Marrufo,     I saw Mr. Herbert today, his pain has been refractory to pain injections, he and I discussed that I would consider optimize his medication.  What are your thoughts on switching him from Lexapro to Cymbalta to see if it could help with left lower extremity symptoms??   He is also on gabapentin that has been ineffective.  I look forward to hearing back.    Thank you    Azam Dong, BONITA-C  Interventional Pain Management

## 2022-02-11 ENCOUNTER — TELEPHONE (OUTPATIENT)
Dept: FAMILY MEDICINE | Facility: CLINIC | Age: 87
End: 2022-02-11
Payer: MEDICARE

## 2022-02-15 ENCOUNTER — PATIENT MESSAGE (OUTPATIENT)
Dept: FAMILY MEDICINE | Facility: CLINIC | Age: 87
End: 2022-02-15
Payer: MEDICARE

## 2022-02-17 ENCOUNTER — OFFICE VISIT (OUTPATIENT)
Dept: FAMILY MEDICINE | Facility: CLINIC | Age: 87
End: 2022-02-17
Payer: MEDICARE

## 2022-02-17 VITALS
DIASTOLIC BLOOD PRESSURE: 60 MMHG | SYSTOLIC BLOOD PRESSURE: 118 MMHG | OXYGEN SATURATION: 95 % | BODY MASS INDEX: 21.81 KG/M2 | WEIGHT: 115.5 LBS | HEIGHT: 61 IN | HEART RATE: 88 BPM

## 2022-02-17 DIAGNOSIS — I70.0 AORTIC ATHEROSCLEROSIS: ICD-10-CM

## 2022-02-17 DIAGNOSIS — G62.9 NEUROPATHY: ICD-10-CM

## 2022-02-17 DIAGNOSIS — E11.59 HYPERTENSION ASSOCIATED WITH DIABETES: ICD-10-CM

## 2022-02-17 DIAGNOSIS — E03.9 HYPOTHYROIDISM, UNSPECIFIED TYPE: ICD-10-CM

## 2022-02-17 DIAGNOSIS — E03.9 HYPOTHYROIDISM, UNSPECIFIED TYPE: Primary | ICD-10-CM

## 2022-02-17 DIAGNOSIS — I15.2 HYPERTENSION ASSOCIATED WITH DIABETES: ICD-10-CM

## 2022-02-17 DIAGNOSIS — G40.209 PARTIAL SYMPTOMATIC EPILEPSY WITH COMPLEX PARTIAL SEIZURES, NOT INTRACTABLE, WITHOUT STATUS EPILEPTICUS: ICD-10-CM

## 2022-02-17 PROCEDURE — 1125F AMNT PAIN NOTED PAIN PRSNT: CPT | Mod: HCNC,CPTII,S$GLB, | Performed by: FAMILY MEDICINE

## 2022-02-17 PROCEDURE — 99499 UNLISTED E&M SERVICE: CPT | Mod: HCNC,S$GLB,, | Performed by: FAMILY MEDICINE

## 2022-02-17 PROCEDURE — 1125F PR PAIN SEVERITY QUANTIFIED, PAIN PRESENT: ICD-10-PCS | Mod: HCNC,CPTII,S$GLB, | Performed by: FAMILY MEDICINE

## 2022-02-17 PROCEDURE — 3051F PR MOST RECENT HEMOGLOBIN A1C LEVEL 7.0 - < 8.0%: ICD-10-PCS | Mod: HCNC,CPTII,S$GLB, | Performed by: FAMILY MEDICINE

## 2022-02-17 PROCEDURE — 99215 PR OFFICE/OUTPT VISIT, EST, LEVL V, 40-54 MIN: ICD-10-PCS | Mod: HCNC,S$GLB,, | Performed by: FAMILY MEDICINE

## 2022-02-17 PROCEDURE — 99499 RISK ADDL DX/OHS AUDIT: ICD-10-PCS | Mod: HCNC,S$GLB,, | Performed by: FAMILY MEDICINE

## 2022-02-17 PROCEDURE — 1160F PR REVIEW ALL MEDS BY PRESCRIBER/CLIN PHARMACIST DOCUMENTED: ICD-10-PCS | Mod: HCNC,CPTII,S$GLB, | Performed by: FAMILY MEDICINE

## 2022-02-17 PROCEDURE — 3074F SYST BP LT 130 MM HG: CPT | Mod: HCNC,CPTII,S$GLB, | Performed by: FAMILY MEDICINE

## 2022-02-17 PROCEDURE — 1159F MED LIST DOCD IN RCRD: CPT | Mod: HCNC,CPTII,S$GLB, | Performed by: FAMILY MEDICINE

## 2022-02-17 PROCEDURE — 3288F FALL RISK ASSESSMENT DOCD: CPT | Mod: HCNC,CPTII,S$GLB, | Performed by: FAMILY MEDICINE

## 2022-02-17 PROCEDURE — 1101F PT FALLS ASSESS-DOCD LE1/YR: CPT | Mod: HCNC,CPTII,S$GLB, | Performed by: FAMILY MEDICINE

## 2022-02-17 PROCEDURE — 3074F PR MOST RECENT SYSTOLIC BLOOD PRESSURE < 130 MM HG: ICD-10-PCS | Mod: HCNC,CPTII,S$GLB, | Performed by: FAMILY MEDICINE

## 2022-02-17 PROCEDURE — 3078F PR MOST RECENT DIASTOLIC BLOOD PRESSURE < 80 MM HG: ICD-10-PCS | Mod: HCNC,CPTII,S$GLB, | Performed by: FAMILY MEDICINE

## 2022-02-17 PROCEDURE — 3288F PR FALLS RISK ASSESSMENT DOCUMENTED: ICD-10-PCS | Mod: HCNC,CPTII,S$GLB, | Performed by: FAMILY MEDICINE

## 2022-02-17 PROCEDURE — 3078F DIAST BP <80 MM HG: CPT | Mod: HCNC,CPTII,S$GLB, | Performed by: FAMILY MEDICINE

## 2022-02-17 PROCEDURE — 99999 PR PBB SHADOW E&M-EST. PATIENT-LVL III: CPT | Mod: PBBFAC,HCNC,, | Performed by: FAMILY MEDICINE

## 2022-02-17 PROCEDURE — 99215 OFFICE O/P EST HI 40 MIN: CPT | Mod: HCNC,S$GLB,, | Performed by: FAMILY MEDICINE

## 2022-02-17 PROCEDURE — 1160F RVW MEDS BY RX/DR IN RCRD: CPT | Mod: HCNC,CPTII,S$GLB, | Performed by: FAMILY MEDICINE

## 2022-02-17 PROCEDURE — 3051F HG A1C>EQUAL 7.0%<8.0%: CPT | Mod: HCNC,CPTII,S$GLB, | Performed by: FAMILY MEDICINE

## 2022-02-17 PROCEDURE — 1101F PR PT FALLS ASSESS DOC 0-1 FALLS W/OUT INJ PAST YR: ICD-10-PCS | Mod: HCNC,CPTII,S$GLB, | Performed by: FAMILY MEDICINE

## 2022-02-17 PROCEDURE — 1159F PR MEDICATION LIST DOCUMENTED IN MEDICAL RECORD: ICD-10-PCS | Mod: HCNC,CPTII,S$GLB, | Performed by: FAMILY MEDICINE

## 2022-02-17 PROCEDURE — 99999 PR PBB SHADOW E&M-EST. PATIENT-LVL III: ICD-10-PCS | Mod: PBBFAC,HCNC,, | Performed by: FAMILY MEDICINE

## 2022-02-17 RX ORDER — DULOXETIN HYDROCHLORIDE 30 MG/1
30 CAPSULE, DELAYED RELEASE ORAL DAILY
Qty: 90 CAPSULE | Refills: 3 | Status: SHIPPED | OUTPATIENT
Start: 2022-02-17 | End: 2022-08-11 | Stop reason: SDUPTHER

## 2022-02-17 NOTE — PROGRESS NOTES
Subjective:       Patient ID: Kang Herbert is a 86 y.o. male.    Chief Complaint: Follow-up    86 years old male came to the clinic for blood pressure check.  Blood pressure today stable.  No chest pain, palpitation, orthopnea or PND.  Last A1c was stable.  No recent seizures.  No polyuria, polydipsia or polyphagia.  Pain management is requesting Cymbalta to treat neuropathic pain.  He is currently on Lexapro.    Review of Systems   Constitutional: Negative.    HENT: Negative.    Eyes: Negative.    Respiratory: Negative.    Cardiovascular: Negative.  Negative for chest pain, palpitations, leg swelling and claudication.   Gastrointestinal: Negative.    Endocrine: Negative for cold intolerance and heat intolerance.   Genitourinary: Negative.    Musculoskeletal: Negative.    Integumentary:  Negative.   Neurological: Positive for numbness.   Psychiatric/Behavioral: Negative.          Objective:      Physical Exam  Vitals and nursing note reviewed.   Constitutional:       General: He is not in acute distress.     Appearance: He is well-developed and well-nourished. He is not diaphoretic.   HENT:      Head: Normocephalic and atraumatic.      Right Ear: External ear normal.      Left Ear: External ear normal.      Nose: Nose normal.      Mouth/Throat:      Mouth: Oropharynx is clear and moist.      Pharynx: No oropharyngeal exudate.   Eyes:      General: No scleral icterus.        Right eye: No discharge.         Left eye: No discharge.      Extraocular Movements: EOM normal.      Conjunctiva/sclera: Conjunctivae normal.      Pupils: Pupils are equal, round, and reactive to light.   Neck:      Thyroid: No thyromegaly.      Vascular: No JVD.      Trachea: No tracheal deviation.   Cardiovascular:      Rate and Rhythm: Normal rate and regular rhythm.      Pulses: Intact distal pulses.      Heart sounds: Normal heart sounds. No murmur heard.  No friction rub. No gallop.    Pulmonary:      Effort: Pulmonary effort is  normal. No respiratory distress.      Breath sounds: Normal breath sounds. No stridor. No wheezing or rales.   Chest:      Chest wall: No tenderness.   Abdominal:      General: Bowel sounds are normal. There is no distension.      Palpations: Abdomen is soft. There is no mass.      Tenderness: There is no abdominal tenderness. There is no guarding or rebound.   Musculoskeletal:         General: No tenderness or edema. Normal range of motion.      Cervical back: Normal range of motion and neck supple.   Lymphadenopathy:      Cervical: No cervical adenopathy.   Skin:     General: Skin is warm and dry.      Coloration: Skin is not pale.      Findings: No erythema or rash.   Neurological:      Mental Status: He is alert and oriented to person, place, and time.      Cranial Nerves: No cranial nerve deficit.      Motor: No abnormal muscle tone.      Coordination: Coordination normal.      Deep Tendon Reflexes: Reflexes are normal and symmetric. Reflexes normal.   Psychiatric:         Mood and Affect: Mood and affect normal.         Behavior: Behavior normal.         Thought Content: Thought content normal.         Judgment: Judgment normal.         Assessment:       Problem List Items Addressed This Visit     Aortic atherosclerosis    Relevant Orders    Lipid Panel    Hypertension associated with diabetes    Relevant Orders    Comprehensive Metabolic Panel    Hemoglobin A1C    Lipid Panel    Microalbumin/Creatinine Ratio, Urine    Partial symptomatic epilepsy with complex partial seizures, not intractable, without status epilepticus      Other Visit Diagnoses     Hypothyroidism, unspecified type    -  Primary    Relevant Orders    TSH    Neuropathy        Relevant Medications    DULoxetine (CYMBALTA) 30 MG capsule          Plan:         [place] was seen today for follow-up.    Diagnoses and all orders for this visit:    Hypothyroidism, unspecified type  -     TSH; Future    Hypertension associated with diabetes  -      Comprehensive Metabolic Panel; Future  -     Hemoglobin A1C; Future  -     Lipid Panel; Future  -     Microalbumin/Creatinine Ratio, Urine; Future    Partial symptomatic epilepsy with complex partial seizures, not intractable, without status epilepticus    Aortic atherosclerosis  -     Lipid Panel; Future    Neuropathy  -     DULoxetine (CYMBALTA) 30 MG capsule; Take 1 capsule (30 mg total) by mouth once daily.

## 2022-02-17 NOTE — TELEPHONE ENCOUNTER
No new care gaps identified.  Powered by [employer]. Reference number: [id number].   2/17/2022 4:16:11 PM CST

## 2022-02-18 RX ORDER — LEVOTHYROXINE SODIUM 50 UG/1
50 TABLET ORAL
Qty: 90 TABLET | Refills: 3 | Status: SHIPPED | OUTPATIENT
Start: 2022-02-18 | End: 2022-08-11 | Stop reason: SDUPTHER

## 2022-02-18 NOTE — TELEPHONE ENCOUNTER
Refill Routing Note   Medication(s) are not appropriate for processing by [facility] for the following reason(s):      - Medication requested has undergone a recent dosage adjustment (<3 months)    [facility] action(s):  Defer Medication-related problems identified: Dose adjustment     Medication Therapy Plan: FOV;FLOS;   --->Care Gap information included in message below if applicable.   Medication reconciliation completed: No   Automatic Epic Generated Protocol Data:        Requested Prescriptions   Pending Prescriptions Disp Refills    levothyroxine (SYNTHROID) 50 MCG tablet [Pharmacy Med Name: LEVOTHYROXINE 50 MCG TABLET] 90 tablet 3     Sig: Take 1 tablet (50 mcg total) by mouth before breakfast.       Endocrinology:  Hypothyroid Agents Passed - 2/18/2022  7:55 AM        Passed - Patient is at least 18 years old        Passed - Valid encounter within last 15 months     Recent Visits  Date Type Provider Dept   02/17/22 Office Visit Zeke Marrufo MD [facility]   06/28/21 Office Visit Zeke Marrufo MD [facility]   03/18/21 Office Visit Zeke Marrufo MD [facility]   09/10/20 Office Visit Zeke Marrufo MD [facility]   06/01/20 Office Visit Zeke Marrufo MD [facility]   Showing recent visits within past 720 days and meeting all other requirements  Future Appointments  No visits were found meeting these conditions.  Showing future appointments within next 150 days and meeting all other requirements      Future Appointments              In 1 month Azam G. Luke, FNP [facility]    In 5 months [facility] [place] [facility]    In 5 months [facility] [place] [facility] [place]    In 6 months MD Linda [facility] [place] [facility]                Passed - Manual Review: FT4 is not required if last TSH is WNL.        Passed - TSH in normal range and  within 360 days     TSH   Date Value Ref Range Status   01/26/2022 1.309 0.400 - 4.000 uIU/mL Final   12/27/2021 0.348 (L) 0.400 - 4.000 uIU/mL Final   06/25/2021 2.309 0.400 - 4.000 uIU/mL Final              Passed - T4 free within 1080 days     Free T4   Date Value Ref Range Status   01/26/2022 1.01 0.71 - 1.51 ng/dL Final   12/27/2021 1.12 0.71 - 1.51 ng/dL Final   07/17/2017 1.26 0.71 - 1.51 ng/dL Final                    Appointments  past 12m or future 3m with PCP    Date Provider   Last Visit   2/17/2022 eZke Marrufo MD   Next Visit   Visit date not found Zeke Marrufo MD   ED visits in past 90 days: 0        Note composed:7:57 AM 02/18/2022

## 2022-03-24 ENCOUNTER — HOSPITAL ENCOUNTER (EMERGENCY)
Facility: HOSPITAL | Age: 87
Discharge: HOME | End: 2022-03-24
Attending: EMERGENCY MEDICINE
Payer: MEDICARE

## 2022-03-24 VITALS
RESPIRATION RATE: 20 BRPM | BODY MASS INDEX: 21.35 KG/M2 | SYSTOLIC BLOOD PRESSURE: 147 MMHG | TEMPERATURE: 98 F | HEART RATE: 89 BPM | DIASTOLIC BLOOD PRESSURE: 69 MMHG | WEIGHT: 116 LBS | HEIGHT: 62 IN | OXYGEN SATURATION: 95 %
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
## 2022-03-24 DIAGNOSIS — S00.83XA CONTUSION OF FOREHEAD, INITIAL ENCOUNTER: ICD-10-CM

## 2022-03-24 LAB — POCT GLUCOSE: 212 MG/DL (ref 70–110)

## 2022-03-24 PROCEDURE — 99284 EMERGENCY DEPT VISIT MOD MDM: CPT | Mod: 25

## 2022-03-24 PROCEDURE — 82962 GLUCOSE BLOOD TEST: CPT

## 2022-03-24 NOTE — ED TRIAGE NOTES
Pt admitted to the ED for a fall. Pt reports while using his vacuum  he fell forward. Pt denies LOC, dizziness, CP, or SOB.

## 2022-03-24 NOTE — DISCHARGE INSTRUCTIONS
The CT of your head and cervical spine did not reveal any evidence of acute fractures or dislocations.  You are worsening symptom advised to follow-up with your primary care provider for re-evaluation within 3 days.  You are instructed to return to the emergency department immediately for any new or

## 2022-03-24 NOTE — ED PROVIDER NOTES
Encounter Date: 3/24/2022       History     Chief Complaint   Patient presents with    Fall     Pt. Fell forward while using vacuum  earlier today. Denies loc. Pt. Has hematoma to rt. Forehead. Pt. Had mild headache that was relieved by Tylenol. Pt. Has chronic episodes of dizziness and gait imbalance. Dom symptoms presently. Ambulates with a cane.      86-year-old male presents emergency department with his son for evaluation status post mechanical fall.  Patient reports that he was leaning over using a small hand held vacuum  when he misstepped falling forward and hitting his forehead on hardwood kathy.  He reports that he did not lose consciousness upon impact.  He states that he was able to get up and call his wife for help.  The son reports that this was not a witnessed fall.  Son reports that the fall happened approximately 1:00 p.m. today.  Patient reports swelling, bruising and pain to the right-side of his forehead.  Patient reports taking Tylenol approximately 130 with moderate relief of pain.  He denies any vision changes, dizziness, neck pain, chest pain, shortness of breath, cough, abdominal pain, nausea, vomiting, flank pain or dysuria.  No treatment was attempted at home or prior to arrival today.  He reports that he is not taking any blood thinning medications nor does he have any bleeding disorders.        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     Past Surgical History:   Procedure Laterality Date    CATARACT EXTRACTION  11-/    od/os    CHOLECYSTECTOMY      EPIDURAL STEROID INJECTION INTO LUMBAR SPINE N/A 11/30/2021     Procedure: Lumbar Epidural Steroid Injection L4-5 (No Sedation);  Surgeon: Sisi Lua Jr., MD;  [place] [facility] [place]  Service: Pain Management;  Laterality: N/A;  Diabetic    EPIDURAL STEROID INJECTION INTO LUMBAR SPINE N/A 1/25/2022    Procedure: Lumbar Epidural Steroid Injection L4-5;  Surgeon: Sisi Lua Jr., MD;  [place] [facility] [place]  Service: Pain Management;  Laterality: N/A;  diabetic     HERNIA REPAIR       Family History   Problem Relation Age of Onset    No Known Problems Mother     No Known Problems Father     Glaucoma Sister     Diabetes Sister     Diabetes Brother     No Known Problems Maternal Aunt     No Known Problems Maternal Uncle     No Known Problems Paternal Aunt     No Known Problems Paternal Uncle     No Known Problems Maternal Grandmother     No Known Problems Maternal Grandfather     No Known Problems Paternal Grandmother     No Known Problems Paternal Grandfather     Amblyopia Neg Hx     Blindness Neg Hx     Cancer Neg Hx     Cataracts Neg Hx     Hypertension Neg Hx     Macular degeneration Neg Hx     Retinal detachment Neg Hx     Strabismus Neg Hx     Stroke Neg Hx     Thyroid disease Neg Hx     Prostate cancer Neg Hx     Kidney disease Neg Hx      Social History     Tobacco Use    Smoking status: Never Smoker    Smokeless tobacco: Never Used   Substance Use Topics    Alcohol use: No    Drug use: No     Review of Systems   Constitutional: Negative for activity change, appetite change and fever.   HENT: Negative for congestion, ear discharge, ear pain, rhinorrhea and sore throat.    Eyes: Negative for photophobia and visual disturbance.   Respiratory: Negative for cough, chest tightness, shortness of breath and wheezing.    Cardiovascular: Negative for chest pain.   Gastrointestinal: Negative for abdominal pain, constipation, diarrhea, nausea and vomiting.   Genitourinary: Negative for decreased urine volume and dysuria.    Musculoskeletal: Negative for back pain, joint swelling, neck pain and neck stiffness.   Skin: Negative for rash.   Neurological: Negative for dizziness, syncope, weakness, light-headedness, numbness and headaches.   Hematological: Does not bruise/bleed easily.       Physical Exam     Initial Vitals [03/24/22 1512]   BP Pulse Resp Temp SpO2   (!) 147/69 89 20 98.2 °F (36.8 °C) 95 %      MAP       --         Physical Exam    Nursing note and vitals reviewed.  Constitutional: He appears well-developed and well-nourished. He is not diaphoretic. No distress.   HENT:   Head: Normocephalic. Head is with contusion.   Right Ear: Tympanic membrane, external ear and ear canal normal.   Left Ear: Tympanic membrane, external ear and ear canal normal.   Mouth/Throat: Uvula is midline and oropharynx is clear and moist. No uvula swelling. No oropharyngeal exudate, posterior oropharyngeal edema or posterior oropharyngeal erythema.   Eyes: Conjunctivae and EOM are normal. Pupils are equal, round, and reactive to light.   Neck: Neck supple.   Normal range of motion.  Cardiovascular: Normal rate, regular rhythm and normal heart sounds.   Pulmonary/Chest: Breath sounds normal. No respiratory distress. He has no wheezes. He has no rhonchi. He has no rales. He exhibits no tenderness.   Abdominal: Abdomen is soft. Bowel sounds are normal. He exhibits no distension. There is no abdominal tenderness. There is no guarding.   Musculoskeletal:      Cervical back: Normal range of motion and neck supple.     Lymphadenopathy:     He has no cervical adenopathy.   Neurological: He is alert and oriented to person, place, and time. No cranial nerve deficit.   Skin: Skin is warm and dry.   Psychiatric: He has a normal mood and affect.         ED Course   Procedures  Labs Reviewed   POCT GLUCOSE - Abnormal; Notable for the following components:       Result Value    POCT Glucose 212 (*)     All other components within normal limits          Imaging  Results          CT Cervical Spine Without Contrast (Final result)  Result time 03/24/22 17:29:16    Final result by Martinez Verma DO (03/24/22 17:29:16)                 Impression:      1. No acute fracture or subluxation of the cervical spine.  2. Multilevel degenerative changes of the cervical spine as detailed above.      Electronically signed by: Martinez Verma  Date:    03/24/2022  Time:    17:29             Narrative:    EXAMINATION:  CT CERVICAL SPINE WITHOUT CONTRAST    CLINICAL HISTORY:  Neck trauma (Age >= 65y);    TECHNIQUE:  Low dose axial images, sagittal and coronal reformations were performed though the cervical spine without intravenous contrast.    COMPARISON:  Cervical and radiographs from 06/12/2020.  CT of the cervical spine from 10/28/2008.    FINDINGS:  Alignment: There is straightening of the usual cervical lordosis.  Minimal anterolisthesis of C5 on C6.  Alignment otherwise normal.    Vertebra: There is no acute fracture or subluxation of the cervical spine.  The vertebral body heights are maintained.    Discs: There is mild disc height loss at C6-C7.    Cord: The contents of the spinal canal are not well visualized on non-contrast CT.    Degenerative changes: There are multilevel degenerative changes of the cervical spine as detailed below:    C2-C3: Left greater than right facet arthropathy contributes to mild left neural foraminal narrowing.  No spinal canal stenosis.    C3-C4: Right greater than left facet arthropathy and uncovertebral joint spurring contribute to moderate right neural foraminal narrowing.  No spinal canal stenosis.    C4-C5: Right greater than left facet arthropathy and uncovertebral joint spurring contribute to moderate right neural foraminal narrowing.  No spinal canal stenosis.    C5-C6: Right greater than left facet arthropathy and uncovertebral joint spurring contribute to severe right neural foraminal narrowing and mild left neural foraminal narrowing.  No  spinal canal stenosis.    C6-C7: Bilateral facet arthropathy and uncovertebral joint spurring contributes to mild bilateral neural foraminal narrowing.  No spinal canal stenosis.    C7-T1: Bilateral facet arthropathy.  No spinal canal stenosis or neural foraminal narrowing.    Miscellaneous: The soft tissues of the neck are unremarkable.  There are atherosclerotic calcifications of the partially visualized aortic arch and the carotid bifurcations bilaterally.  Minimal ground-glass attenuation of the lung apices may be related to atelectasis or scarring.                               CT Head Without Contrast (Final result)  Result time 03/24/22 17:10:04    Final result by Martinez Verma DO (03/24/22 17:10:04)                 Impression:      1. No acute intracranial abnormality.  2. Right frontal scalp hematoma without underlying calvarial fracture.  3. Additional stable chronic findings as above.      Electronically signed by: Martinez Verma  Date:    03/24/2022  Time:    17:10             Narrative:    EXAMINATION:  CT HEAD WITHOUT CONTRAST    CLINICAL HISTORY:  Head trauma, minor (Age >= 65y);    TECHNIQUE:  Low dose axial CT images obtained throughout the head without intravenous contrast. Sagittal and coronal reconstructions were performed.    COMPARISON:  MRI brain from 09/30/2016.    FINDINGS:  There is age-related brain parenchymal volume loss and prominence of the CSF spaces.  There is no evidence of hydrocephalus.    There is hypoattenuation in the supratentorial white matter compatible with advanced chronic microvascular ischemic changes, similar appearance to prior.    No extra-axial blood or fluid collections.    No parenchymal mass, hemorrhage, edema or major vascular distribution infarct.    There are intracranial atherosclerotic calcifications.    There is a right frontal scalp hematoma without underlying calvarial fracture.  There is chronic right maxillary sinusitis with sinus atelectasis, similar  appearance to prior.  Remaining paranasal sinuses and mastoid air cells are clear.  There are bilateral prosthetic lenses.                                 Medications - No data to display  Medical Decision Making:   Initial Assessment:   86-year-old male presents emergency department for evaluation of head injury status post mechanical fall.  Physical exam reveals a nontoxic-appearing male in no acute distress.  Patient is afebrile vital signs within normal limits.  Neurological exam reveals alert and oriented patient.  Large hematoma noted over the right forehead.  No bony instability or crepitus noted.  No tenderness to palpation or bony instability noted over the periorbital region or facial region.  No cranial nerve deficits noted.  No Blanton signs or raccoon eyes noted.  No hemotympanum noted.  No tenderness to palpation noted over the paraspinal musculature of the spinous processes of the cervical, thoracic or lumbar spine.  Lungs clear to auscultation bilaterally.  Abdominal exam reveals soft abdomen, nontender to palpation.  No CVA tenderness noted.  Full range of motion, sensation and peripheral pulses intact in upper lower extremities bilaterally.  Differential Diagnosis:   CT of the head and cervical spine ordered to assess possible serious injury including fractures or hemorrhage.  ED Management:  Patient continues to endorse that this was a mechanical fall.  He reports that although he does have a history of dizzy spells he states he did not get dizzy or pass out.  CT report of the cervical spine reveals no acute fracture subluxation of the cervical spine.  Multilevel degenerative changes of the cervical spine.  CT report of the head reveals no acute intracranial abnormality.  Right frontal scalp hematoma without underlying calvarial fracture.  Discussed these findings at length with the patient and family verbalized understanding and agreement course of treatment.  Instructed the patient to follow up  with his primary care provider for re-evaluation and to return to the emergency department immediately for any new or worsening symptoms.  I discussed this patient at length with  who is in agreement with the course of treatment                       Clinical Impression:   Final diagnoses:  [W19.XXXA] Fall, initial encounter (Primary)  [S00.83XA] Contusion of forehead, initial encounter          ED Disposition Condition    Discharge Stable        ED Prescriptions     None        Follow-up Information     Follow up With Specialties Details Why Contact Info    Zeke Marrufo MD Family Medicine In 3 days  [address] [facility] [address]  [phone]             Ana Vergara PA-C  03/24/22 2054

## 2022-04-04 ENCOUNTER — PATIENT OUTREACH (OUTPATIENT)
Dept: ADMINISTRATIVE | Facility: OTHER | Age: 87
End: 2022-04-04
Payer: MEDICARE

## 2022-04-23 ENCOUNTER — LAB VISIT (OUTPATIENT)
Dept: LAB | Facility: HOSPITAL | Age: 87
End: 2022-04-23
Attending: FAMILY MEDICINE
Payer: MEDICARE

## 2022-04-23 DIAGNOSIS — E11.59 HYPERTENSION ASSOCIATED WITH DIABETES: ICD-10-CM

## 2022-04-23 DIAGNOSIS — I15.2 HYPERTENSION ASSOCIATED WITH DIABETES: ICD-10-CM

## 2022-04-23 LAB
ALBUMIN/CREAT UR: 11 UG/MG (ref 0–30)
CREAT UR-MCNC: 73 MG/DL (ref 23–375)
MICROALBUMIN UR DL<=1MG/L-MCNC: 8 UG/ML

## 2022-04-23 PROCEDURE — 82570 ASSAY OF URINE CREATININE: CPT | Performed by: FAMILY MEDICINE

## 2022-04-23 PROCEDURE — 82043 UR ALBUMIN QUANTITATIVE: CPT | Performed by: FAMILY MEDICINE

## 2022-04-27 ENCOUNTER — TELEPHONE (OUTPATIENT)
Dept: FAMILY MEDICINE | Facility: CLINIC | Age: 87
End: 2022-04-27
Payer: MEDICARE

## 2022-04-27 DIAGNOSIS — R82.90 URINE ABNORMALITY: Primary | ICD-10-CM

## 2022-04-27 NOTE — TELEPHONE ENCOUNTER
Left message stating that UA was ordered and to please contact the office to schedule appointment.

## 2022-04-27 NOTE — TELEPHONE ENCOUNTER
----- Message from Jessica Drew MA sent at 4/21/2022  2:24 PM CDT -----  Regarding: Requesting UA  Contact: pt's daughter    ----- Message -----  From: Luz Bautista  Sent: 4/21/2022   1:47 PM CDT  To: [facility] Staff    Type:  Needs Medical Advice    Who Called: pt's daughter's Aislinn  Symptoms (please be specific): uti - due to color of urine  How long has patient had these symptoms:    Pharmacy name and phone #:    Would the patient rather a call back or a response via [facility]? call  Best Call Back Number: [phone]  Additional Information: wants a urine culture

## 2022-04-30 DIAGNOSIS — R25.2 SPASM: ICD-10-CM

## 2022-04-30 NOTE — TELEPHONE ENCOUNTER
No new care gaps identified.  Powered by [employer]. Reference number: [id number].   4/30/2022 12:16:08 AM CDT

## 2022-05-02 DIAGNOSIS — R25.2 SPASM: ICD-10-CM

## 2022-05-02 NOTE — TELEPHONE ENCOUNTER
Contacted the patients daughter and informed her of microalbumin results. Patient scheduled UA and culture for tomorrow.

## 2022-05-02 NOTE — TELEPHONE ENCOUNTER
No new care gaps identified.  Powered by [employer]. Reference number: [id number].   5/02/2022 3:50:57 PM CDT

## 2022-05-03 ENCOUNTER — LAB VISIT (OUTPATIENT)
Dept: LAB | Facility: HOSPITAL | Age: 87
End: 2022-05-03
Attending: FAMILY MEDICINE
Payer: MEDICARE

## 2022-05-03 DIAGNOSIS — R82.90 URINE ABNORMALITY: ICD-10-CM

## 2022-05-03 LAB
BILIRUB UR QL STRIP: NEGATIVE
CLARITY UR REFRACT.AUTO: CLEAR
COLOR UR AUTO: YELLOW
GLUCOSE UR QL STRIP: NEGATIVE
HGB UR QL STRIP: NEGATIVE
KETONES UR QL STRIP: NEGATIVE
LEUKOCYTE ESTERASE UR QL STRIP: NEGATIVE
NITRITE UR QL STRIP: NEGATIVE
PH UR STRIP: 5 [PH] (ref 5–8)
PROT UR QL STRIP: NEGATIVE
SP GR UR STRIP: 1.01 (ref 1–1.03)
URN SPEC COLLECT METH UR: NORMAL

## 2022-05-03 PROCEDURE — 87086 URINE CULTURE/COLONY COUNT: CPT | Performed by: FAMILY MEDICINE

## 2022-05-03 PROCEDURE — 81003 URINALYSIS AUTO W/O SCOPE: CPT | Performed by: FAMILY MEDICINE

## 2022-05-03 RX ORDER — TIZANIDINE 2 MG/1
4 TABLET ORAL 2 TIMES DAILY PRN
Qty: 90 TABLET | Refills: 0 | Status: SHIPPED | OUTPATIENT
Start: 2022-05-03 | End: 2022-10-24

## 2022-05-03 RX ORDER — TIZANIDINE 2 MG/1
4 TABLET ORAL 2 TIMES DAILY PRN
Qty: 90 TABLET | Refills: 0 | OUTPATIENT
Start: 2022-05-03 | End: 2022-08-01

## 2022-05-05 LAB — BACTERIA UR CULT: NO GROWTH

## 2022-05-17 ENCOUNTER — TELEPHONE (OUTPATIENT)
Dept: FAMILY MEDICINE | Facility: CLINIC | Age: 87
End: 2022-05-17
Payer: MEDICARE

## 2022-05-17 NOTE — TELEPHONE ENCOUNTER
[facility] at [facility].    Good news the urine was normal , no infection in the culture.

## 2022-05-17 NOTE — TELEPHONE ENCOUNTER
----- Message from Jessica Drew MA sent at 5/12/2022  2:22 PM CDT -----  Regarding: Results  Contact: pt.  Patient daughter call looking for UA results   ----- Message -----  From: Homer Black  Sent: 5/12/2022   2:04 PM CDT  To: Niru Victoria    .Type:  Test Results    Who Called: pt  Name of Test (Lab/Mammo/Etc): UA  Date of Test: 05/03/22  Ordering Provider:   Where the test was performed: [facility]  Would the patient rather a call back or a response via [facility]? Call back  Best Call Back Number: [phone]  Additional Information:  Pt. Is requesting a call back to discuss his test results.

## 2022-06-05 NOTE — TELEPHONE ENCOUNTER
Care Due:                  Date            Visit Type   Department     Provider  --------------------------------------------------------------------------------                                EP -                              PRIMARY      [facility]  Last Visit: 02-      CARE ([facility])   DIOMEDES Marrufo                              EP -                              PRIMARY      [facility]  Next Visit: 08-      CARE ([facility])   DIOMEDES Marrufo                                                            Last  Test          Frequency    Reason                     Performed    Due Date  --------------------------------------------------------------------------------    HBA1C.......  6 months...  metFORMIN................  12- 06-    [facility] Embedded Care Gaps. Reference number: [id number]. 6/05/2022   12:17:20 AM CDT

## 2022-06-06 RX ORDER — FINASTERIDE 5 MG/1
TABLET, FILM COATED ORAL
Qty: 90 TABLET | Refills: 3 | Status: SHIPPED | OUTPATIENT
Start: 2022-06-06 | End: 2022-08-11 | Stop reason: SDUPTHER

## 2022-06-06 NOTE — TELEPHONE ENCOUNTER
Refill Routing Note   Medication(s) are not appropriate for processing by [facility] for the following reason(s):      - Patient has been seen in the ED/Hospital since the last PCP visit    [facility] action(s):  Defer          Medication reconciliation completed: No     Appointments  past 12m or future 3m with PCP    Date Provider   Last Visit   2/17/2022 Zkee Marrufo MD   Next Visit   8/11/2022 Zeke Marrufo MD   ED visits in past 90 days: 1        Note composed:8:37 AM 06/06/2022

## 2022-06-29 NOTE — TELEPHONE ENCOUNTER
Left message returning pt call    Pt states that he is starting to feel anxious. Pt is requesting medication. Meds were ordered for pt, father did not want pt to have med, requested oral meds. md at bedside discussing proper care for pt.

## 2022-08-08 ENCOUNTER — LAB VISIT (OUTPATIENT)
Dept: LAB | Facility: HOSPITAL | Age: 87
End: 2022-08-08
Attending: FAMILY MEDICINE
Payer: MEDICARE

## 2022-08-08 DIAGNOSIS — I15.2 HYPERTENSION ASSOCIATED WITH DIABETES: ICD-10-CM

## 2022-08-08 DIAGNOSIS — E11.59 HYPERTENSION ASSOCIATED WITH DIABETES: ICD-10-CM

## 2022-08-08 DIAGNOSIS — E03.9 HYPOTHYROIDISM, UNSPECIFIED TYPE: ICD-10-CM

## 2022-08-08 DIAGNOSIS — I70.0 AORTIC ATHEROSCLEROSIS: ICD-10-CM

## 2022-08-08 LAB
ALBUMIN SERPL BCP-MCNC: 4.3 G/DL (ref 3.5–5.2)
ALP SERPL-CCNC: 94 U/L (ref 55–135)
ALT SERPL W/O P-5'-P-CCNC: 17 U/L (ref 10–44)
ANION GAP SERPL CALC-SCNC: 8 MMOL/L (ref 8–16)
AST SERPL-CCNC: 22 U/L (ref 10–40)
BILIRUB SERPL-MCNC: 0.5 MG/DL (ref 0.1–1)
BUN SERPL-MCNC: 16 MG/DL (ref 8–23)
CALCIUM SERPL-MCNC: 9.3 MG/DL (ref 8.7–10.5)
CHLORIDE SERPL-SCNC: 100 MMOL/L (ref 95–110)
CHOLEST SERPL-MCNC: 123 MG/DL (ref 120–199)
CHOLEST/HDLC SERPL: 3.4 {RATIO} (ref 2–5)
CO2 SERPL-SCNC: 29 MMOL/L (ref 23–29)
CREAT SERPL-MCNC: 0.7 MG/DL (ref 0.5–1.4)
EST. GFR  (NO RACE VARIABLE): >60 ML/MIN/1.73 M^2
ESTIMATED AVG GLUCOSE: 174 MG/DL (ref 68–131)
GLUCOSE SERPL-MCNC: 134 MG/DL (ref 70–110)
HBA1C MFR BLD: 7.7 % (ref 4–5.6)
HDLC SERPL-MCNC: 36 MG/DL (ref 40–75)
HDLC SERPL: 29.3 % (ref 20–50)
LDLC SERPL CALC-MCNC: 34.4 MG/DL (ref 63–159)
NONHDLC SERPL-MCNC: 87 MG/DL
POTASSIUM SERPL-SCNC: 4.2 MMOL/L (ref 3.5–5.1)
PROT SERPL-MCNC: 6.9 G/DL (ref 6–8.4)
SODIUM SERPL-SCNC: 137 MMOL/L (ref 136–145)
T4 FREE SERPL-MCNC: 0.97 NG/DL (ref 0.71–1.51)
TRIGL SERPL-MCNC: 263 MG/DL (ref 30–150)
TSH SERPL DL<=0.005 MIU/L-ACNC: 4.54 UIU/ML (ref 0.4–4)

## 2022-08-08 PROCEDURE — 80053 COMPREHEN METABOLIC PANEL: CPT | Performed by: FAMILY MEDICINE

## 2022-08-08 PROCEDURE — 36415 COLL VENOUS BLD VENIPUNCTURE: CPT | Mod: PO | Performed by: FAMILY MEDICINE

## 2022-08-08 PROCEDURE — 84439 ASSAY OF FREE THYROXINE: CPT | Performed by: FAMILY MEDICINE

## 2022-08-08 PROCEDURE — 80061 LIPID PANEL: CPT | Performed by: FAMILY MEDICINE

## 2022-08-08 PROCEDURE — 84443 ASSAY THYROID STIM HORMONE: CPT | Performed by: FAMILY MEDICINE

## 2022-08-08 PROCEDURE — 83036 HEMOGLOBIN GLYCOSYLATED A1C: CPT | Performed by: FAMILY MEDICINE

## 2022-08-11 ENCOUNTER — OFFICE VISIT (OUTPATIENT)
Dept: FAMILY MEDICINE | Facility: CLINIC | Age: 87
End: 2022-08-11
Payer: MEDICARE

## 2022-08-11 VITALS
SYSTOLIC BLOOD PRESSURE: 100 MMHG | HEIGHT: 62 IN | BODY MASS INDEX: 21.3 KG/M2 | WEIGHT: 115.75 LBS | DIASTOLIC BLOOD PRESSURE: 60 MMHG | HEART RATE: 103 BPM

## 2022-08-11 DIAGNOSIS — G62.9 NEUROPATHY: ICD-10-CM

## 2022-08-11 DIAGNOSIS — M48.062 SPINAL STENOSIS OF LUMBAR REGION WITH NEUROGENIC CLAUDICATION: ICD-10-CM

## 2022-08-11 DIAGNOSIS — I10 ESSENTIAL HYPERTENSION: ICD-10-CM

## 2022-08-11 DIAGNOSIS — E11.59 HYPERTENSION ASSOCIATED WITH DIABETES: Primary | ICD-10-CM

## 2022-08-11 DIAGNOSIS — M15.8 OTHER OSTEOARTHRITIS INVOLVING MULTIPLE JOINTS: ICD-10-CM

## 2022-08-11 DIAGNOSIS — G25.81 RESTLESS LEG SYNDROME: ICD-10-CM

## 2022-08-11 DIAGNOSIS — E03.9 HYPOTHYROIDISM, UNSPECIFIED TYPE: ICD-10-CM

## 2022-08-11 DIAGNOSIS — E78.2 MIXED HYPERLIPIDEMIA: ICD-10-CM

## 2022-08-11 DIAGNOSIS — E11.9 TYPE 2 DIABETES MELLITUS WITHOUT COMPLICATION, WITHOUT LONG-TERM CURRENT USE OF INSULIN: ICD-10-CM

## 2022-08-11 DIAGNOSIS — E78.5 HYPERLIPIDEMIA, UNSPECIFIED HYPERLIPIDEMIA TYPE: ICD-10-CM

## 2022-08-11 DIAGNOSIS — M54.16 LUMBAR RADICULOPATHY: ICD-10-CM

## 2022-08-11 DIAGNOSIS — I15.2 HYPERTENSION ASSOCIATED WITH DIABETES: Primary | ICD-10-CM

## 2022-08-11 PROCEDURE — 3288F PR FALLS RISK ASSESSMENT DOCUMENTED: ICD-10-PCS | Mod: CPTII,S$GLB,, | Performed by: FAMILY MEDICINE

## 2022-08-11 PROCEDURE — 96372 PR INJECTION,THERAP/PROPH/DIAG2ST, IM OR SUBCUT: ICD-10-PCS | Mod: S$GLB,,, | Performed by: FAMILY MEDICINE

## 2022-08-11 PROCEDURE — 3288F FALL RISK ASSESSMENT DOCD: CPT | Mod: CPTII,S$GLB,, | Performed by: FAMILY MEDICINE

## 2022-08-11 PROCEDURE — 1160F PR REVIEW ALL MEDS BY PRESCRIBER/CLIN PHARMACIST DOCUMENTED: ICD-10-PCS | Mod: CPTII,S$GLB,, | Performed by: FAMILY MEDICINE

## 2022-08-11 PROCEDURE — 99499 UNLISTED E&M SERVICE: CPT | Mod: S$GLB,,, | Performed by: FAMILY MEDICINE

## 2022-08-11 PROCEDURE — 99215 PR OFFICE/OUTPT VISIT, EST, LEVL V, 40-54 MIN: ICD-10-PCS | Mod: 25,S$GLB,, | Performed by: FAMILY MEDICINE

## 2022-08-11 PROCEDURE — 1101F PR PT FALLS ASSESS DOC 0-1 FALLS W/OUT INJ PAST YR: ICD-10-PCS | Mod: CPTII,S$GLB,, | Performed by: FAMILY MEDICINE

## 2022-08-11 PROCEDURE — 96372 THER/PROPH/DIAG INJ SC/IM: CPT | Mod: S$GLB,,, | Performed by: FAMILY MEDICINE

## 2022-08-11 PROCEDURE — 1126F AMNT PAIN NOTED NONE PRSNT: CPT | Mod: CPTII,S$GLB,, | Performed by: FAMILY MEDICINE

## 2022-08-11 PROCEDURE — 99999 PR PBB SHADOW E&M-EST. PATIENT-LVL III: CPT | Mod: PBBFAC,,, | Performed by: FAMILY MEDICINE

## 2022-08-11 PROCEDURE — 1126F PR PAIN SEVERITY QUANTIFIED, NO PAIN PRESENT: ICD-10-PCS | Mod: CPTII,S$GLB,, | Performed by: FAMILY MEDICINE

## 2022-08-11 PROCEDURE — 1159F PR MEDICATION LIST DOCUMENTED IN MEDICAL RECORD: ICD-10-PCS | Mod: CPTII,S$GLB,, | Performed by: FAMILY MEDICINE

## 2022-08-11 PROCEDURE — 1159F MED LIST DOCD IN RCRD: CPT | Mod: CPTII,S$GLB,, | Performed by: FAMILY MEDICINE

## 2022-08-11 PROCEDURE — 99499 RISK ADDL DX/OHS AUDIT: ICD-10-PCS | Mod: S$GLB,,, | Performed by: FAMILY MEDICINE

## 2022-08-11 PROCEDURE — 1160F RVW MEDS BY RX/DR IN RCRD: CPT | Mod: CPTII,S$GLB,, | Performed by: FAMILY MEDICINE

## 2022-08-11 PROCEDURE — 99999 PR PBB SHADOW E&M-EST. PATIENT-LVL III: ICD-10-PCS | Mod: PBBFAC,,, | Performed by: FAMILY MEDICINE

## 2022-08-11 PROCEDURE — 1101F PT FALLS ASSESS-DOCD LE1/YR: CPT | Mod: CPTII,S$GLB,, | Performed by: FAMILY MEDICINE

## 2022-08-11 PROCEDURE — 99215 OFFICE O/P EST HI 40 MIN: CPT | Mod: 25,S$GLB,, | Performed by: FAMILY MEDICINE

## 2022-08-11 RX ORDER — PIROXICAM 10 MG/1
10 CAPSULE ORAL DAILY PRN
Qty: 30 CAPSULE | Refills: 0 | Status: SHIPPED | OUTPATIENT
Start: 2022-08-11 | End: 2022-10-07 | Stop reason: ALTCHOICE

## 2022-08-11 RX ORDER — KETOROLAC TROMETHAMINE 30 MG/ML
30 INJECTION, SOLUTION INTRAMUSCULAR; INTRAVENOUS
Status: COMPLETED | OUTPATIENT
Start: 2022-08-11 | End: 2022-08-11

## 2022-08-11 RX ORDER — ATORVASTATIN CALCIUM 10 MG/1
10 TABLET, FILM COATED ORAL DAILY
Qty: 90 TABLET | Refills: 3 | Status: SHIPPED | OUTPATIENT
Start: 2022-08-11 | End: 2023-10-28

## 2022-08-11 RX ORDER — OMEPRAZOLE 20 MG/1
20 CAPSULE, DELAYED RELEASE ORAL DAILY
Qty: 90 CAPSULE | Refills: 3 | Status: SHIPPED | OUTPATIENT
Start: 2022-08-11 | End: 2023-10-28

## 2022-08-11 RX ORDER — METFORMIN HYDROCHLORIDE 1000 MG/1
1000 TABLET ORAL 2 TIMES DAILY WITH MEALS
Qty: 180 TABLET | Refills: 3 | Status: SHIPPED | OUTPATIENT
Start: 2022-08-11 | End: 2023-09-18

## 2022-08-11 RX ORDER — GABAPENTIN 800 MG/1
800 TABLET ORAL 2 TIMES DAILY
Qty: 180 TABLET | Refills: 3 | Status: SHIPPED | OUTPATIENT
Start: 2022-08-11 | End: 2023-08-23

## 2022-08-11 RX ORDER — LEVOTHYROXINE SODIUM 50 UG/1
50 TABLET ORAL
Qty: 90 TABLET | Refills: 3 | Status: SHIPPED | OUTPATIENT
Start: 2022-08-11 | End: 2023-09-11

## 2022-08-11 RX ORDER — ROPINIROLE 0.25 MG/1
0.25 TABLET, FILM COATED ORAL NIGHTLY
Qty: 90 TABLET | Refills: 3 | Status: SHIPPED | OUTPATIENT
Start: 2022-08-11 | End: 2023-10-16

## 2022-08-11 RX ORDER — LOSARTAN POTASSIUM 25 MG/1
25 TABLET ORAL DAILY
Qty: 90 TABLET | Refills: 3 | Status: SHIPPED | OUTPATIENT
Start: 2022-08-11 | End: 2023-10-11

## 2022-08-11 RX ORDER — FINASTERIDE 5 MG/1
5 TABLET, FILM COATED ORAL DAILY
Qty: 90 TABLET | Refills: 3 | Status: SHIPPED | OUTPATIENT
Start: 2022-08-11

## 2022-08-11 RX ORDER — DULOXETIN HYDROCHLORIDE 30 MG/1
30 CAPSULE, DELAYED RELEASE ORAL DAILY
Qty: 90 CAPSULE | Refills: 3 | Status: SHIPPED | OUTPATIENT
Start: 2022-08-11 | End: 2023-02-14 | Stop reason: SDUPTHER

## 2022-08-11 RX ADMIN — KETOROLAC TROMETHAMINE 30 MG: 30 INJECTION, SOLUTION INTRAMUSCULAR; INTRAVENOUS at 04:08

## 2022-08-11 NOTE — PROGRESS NOTES
Subjective:       Patient ID: Kang Herbert is a 87 y.o. male.    Chief Complaint: Follow-up    87 years old male came to the clinic for blood pressure check.  Blood pressure today was stable.  No chest pain, palpitation, orthopnea or PND.  Patient with left lower extremity pain associated with lumbar radiculopathy.  Patient is requesting a medicine to help with the symptoms.  Last A1c was stable for his age.  No polyuria, polydipsia or polyphagia.  Patient with borderline thyroid test.    Review of Systems   Constitutional: Negative.    HENT: Negative.    Eyes: Negative.    Respiratory: Negative.    Cardiovascular: Negative.  Negative for chest pain, palpitations, leg swelling and claudication.   Gastrointestinal: Negative.    Endocrine: Negative for polydipsia, polyphagia and polyuria.   Genitourinary: Negative.    Musculoskeletal: Positive for arthralgias, back pain and leg pain.   Integumentary:  Negative.   Neurological: Negative.    Psychiatric/Behavioral: Negative.          Objective:      Physical Exam  Vitals and nursing note reviewed.   Constitutional:       General: He is not in acute distress.     Appearance: He is well-developed. He is not diaphoretic.   HENT:      Head: Normocephalic and atraumatic.      Right Ear: External ear normal.      Left Ear: External ear normal.      Nose: Nose normal.      Mouth/Throat:      Pharynx: No oropharyngeal exudate.   Eyes:      General: No scleral icterus.        Right eye: No discharge.         Left eye: No discharge.      Conjunctiva/sclera: Conjunctivae normal.      Pupils: Pupils are equal, round, and reactive to light.   Neck:      Thyroid: No thyromegaly.      Vascular: No JVD.      Trachea: No tracheal deviation.   Cardiovascular:      Rate and Rhythm: Normal rate and regular rhythm.      Heart sounds: Normal heart sounds. No murmur heard.    No friction rub. No gallop.   Pulmonary:      Effort: Pulmonary effort is normal. No respiratory distress.       Breath sounds: Normal breath sounds. No stridor. No wheezing or rales.   Chest:      Chest wall: No tenderness.   Abdominal:      General: Bowel sounds are normal. There is no distension.      Palpations: Abdomen is soft. There is no mass.      Tenderness: There is no abdominal tenderness. There is no guarding or rebound.   Musculoskeletal:         General: Normal range of motion.      Cervical back: Normal range of motion and neck supple.      Right lower leg: Tenderness present.   Lymphadenopathy:      Cervical: No cervical adenopathy.   Skin:     General: Skin is warm and dry.      Coloration: Skin is not pale.      Findings: No erythema or rash.   Neurological:      Mental Status: He is alert and oriented to person, place, and time.      Cranial Nerves: No cranial nerve deficit.      Motor: No abnormal muscle tone.      Coordination: Coordination normal.      Deep Tendon Reflexes: Reflexes are normal and symmetric. Reflexes normal.   Psychiatric:         Behavior: Behavior normal.         Thought Content: Thought content normal.         Judgment: Judgment normal.         Assessment:       Problem List Items Addressed This Visit     Lumbar radiculopathy    Relevant Medications    gabapentin (NEURONTIN) 800 MG tablet    piroxicam (FELDENE) 10 MG Cap    ketorolac injection 30 mg    Essential hypertension    Relevant Medications    losartan (COZAAR) 25 MG tablet    Hypertension associated with diabetes - Primary    Relevant Medications    metFORMIN (GLUCOPHAGE) 1000 MG tablet    Other Relevant Orders    CBC Auto Differential    Comprehensive Metabolic Panel    Hemoglobin A1C    Lipid Panel    Microalbumin/Creatinine Ratio, Urine    Spinal stenosis of lumbar region with neurogenic claudication    Relevant Medications    gabapentin (NEURONTIN) 800 MG tablet    piroxicam (FELDENE) 10 MG Cap    ketorolac injection 30 mg      Other Visit Diagnoses     Mixed hyperlipidemia        Relevant Orders    Comprehensive Metabolic  Panel    Lipid Panel    Hyperlipidemia, unspecified hyperlipidemia type        Relevant Medications    atorvastatin (LIPITOR) 10 MG tablet    Restless leg syndrome        Relevant Medications    rOPINIRole (REQUIP) 0.25 MG tablet    Type 2 diabetes mellitus without complication, without long-term current use of insulin        Relevant Medications    metFORMIN (GLUCOPHAGE) 1000 MG tablet    Neuropathy        Relevant Medications    DULoxetine (CYMBALTA) 30 MG capsule    Hypothyroidism, unspecified type        Relevant Medications    levothyroxine (SYNTHROID) 50 MCG tablet    Other osteoarthritis involving multiple joints        Relevant Medications    gabapentin (NEURONTIN) 800 MG tablet          Plan:         [place] was seen today for follow-up.    Diagnoses and all orders for this visit:    Hypertension associated with diabetes  -     CBC Auto Differential; Future  -     Comprehensive Metabolic Panel; Future  -     Hemoglobin A1C; Future  -     Lipid Panel; Future  -     Microalbumin/Creatinine Ratio, Urine; Future    Mixed hyperlipidemia  -     Comprehensive Metabolic Panel; Future  -     Lipid Panel; Future    Essential hypertension  -     losartan (COZAAR) 25 MG tablet; Take 1 tablet (25 mg total) by mouth once daily.    Hyperlipidemia, unspecified hyperlipidemia type  -     atorvastatin (LIPITOR) 10 MG tablet; Take 1 tablet (10 mg total) by mouth once daily.    Restless leg syndrome  -     rOPINIRole (REQUIP) 0.25 MG tablet; Take 1 tablet (0.25 mg total) by mouth every evening.    Type 2 diabetes mellitus without complication, without long-term current use of insulin  -     metFORMIN (GLUCOPHAGE) 1000 MG tablet; Take 1 tablet (1,000 mg total) by mouth 2 (two) times daily with meals. Appointment needed for additional refills.    Neuropathy  -     DULoxetine (CYMBALTA) 30 MG capsule; Take 1 capsule (30 mg total) by mouth once daily.    Hypothyroidism, unspecified type  -     levothyroxine (SYNTHROID) 50 MCG  tablet; Take 1 tablet (50 mcg total) by mouth before breakfast.    Other osteoarthritis involving multiple joints  -     gabapentin (NEURONTIN) 800 MG tablet; Take 1 tablet (800 mg total) by mouth 2 (two) times daily.    Spinal stenosis of lumbar region with neurogenic claudication  -     gabapentin (NEURONTIN) 800 MG tablet; Take 1 tablet (800 mg total) by mouth 2 (two) times daily.  -     piroxicam (FELDENE) 10 MG Cap; Take 1 capsule (10 mg total) by mouth daily as needed (pain).  -     ketorolac injection 30 mg    Lumbar radiculopathy  -     gabapentin (NEURONTIN) 800 MG tablet; Take 1 tablet (800 mg total) by mouth 2 (two) times daily.  -     piroxicam (FELDENE) 10 MG Cap; Take 1 capsule (10 mg total) by mouth daily as needed (pain).  -     ketorolac injection 30 mg    Other orders  -     omeprazole (PRILOSEC) 20 MG capsule; Take 1 capsule (20 mg total) by mouth once daily.  -     finasteride (PROSCAR) 5 mg tablet; Take 1 tablet (5 mg total) by mouth once daily.

## 2022-09-26 ENCOUNTER — TELEPHONE (OUTPATIENT)
Dept: INTERNAL MEDICINE | Facility: CLINIC | Age: 87
End: 2022-09-26
Payer: MEDICARE

## 2022-09-26 NOTE — TELEPHONE ENCOUNTER
----- Message from Kristyn Schofield sent at 9/26/2022  2:38 PM CDT -----  .Type:  Needs Medical Advice    Who Called: pt daughter   Would the patient rather a call back or a response via [facility]? Call back   Best Call Back Number: [phone]  Additional Information: pt daughter would like a call back to speak with someone in the office concerning her father

## 2022-11-12 DIAGNOSIS — E11.65 TYPE 2 DIABETES MELLITUS WITH HYPERGLYCEMIA, WITHOUT LONG-TERM CURRENT USE OF INSULIN: ICD-10-CM

## 2022-11-12 RX ORDER — CALCIUM CITRATE/VITAMIN D3 200MG-6.25
TABLET ORAL
Qty: 100 STRIP | Refills: 3 | Status: SHIPPED | OUTPATIENT
Start: 2022-11-12

## 2022-11-12 NOTE — TELEPHONE ENCOUNTER
Refill Decision Note   Washington Keon  is requesting a refill authorization.  Brief Assessment and Rationale for Refill:  Approve     Medication Therapy Plan:       Medication Reconciliation Completed: No   Comments:     No Care Gaps recommended.     Note composed:1:45 PM 11/12/2022

## 2022-11-12 NOTE — TELEPHONE ENCOUNTER
Care Due:                  Date            Visit Type   Department     Provider  --------------------------------------------------------------------------------                                EP -                              PRIMARY      [facility]  Last Visit: 08-      CARE ([facility])   DIOMEDES Marrufo                              EP -                              PRIMARY      [facility]  Next Visit: 02-      CARE ([facility])   DIOMEDES Marrufo                                                            Last  Test          Frequency    Reason                     Performed    Due Date  --------------------------------------------------------------------------------    HBA1C.......  6 months...  metFORMIN................  08- 02-    [facility] Embedded Care Gaps. Reference number: [id number]. 11/12/2022   12:16:41 AM CST

## 2023-02-07 DIAGNOSIS — Z00.00 ENCOUNTER FOR MEDICARE ANNUAL WELLNESS EXAM: ICD-10-CM

## 2023-02-08 ENCOUNTER — LAB VISIT (OUTPATIENT)
Dept: LAB | Facility: HOSPITAL | Age: 88
End: 2023-02-08
Attending: FAMILY MEDICINE
Payer: MEDICARE

## 2023-02-08 DIAGNOSIS — I15.2 HYPERTENSION ASSOCIATED WITH DIABETES: ICD-10-CM

## 2023-02-08 DIAGNOSIS — E11.59 HYPERTENSION ASSOCIATED WITH DIABETES: ICD-10-CM

## 2023-02-08 DIAGNOSIS — E78.2 MIXED HYPERLIPIDEMIA: ICD-10-CM

## 2023-02-08 LAB
ALBUMIN SERPL BCP-MCNC: 3.8 G/DL (ref 3.5–5.2)
ALP SERPL-CCNC: 86 U/L (ref 55–135)
ALT SERPL W/O P-5'-P-CCNC: 18 U/L (ref 10–44)
ANION GAP SERPL CALC-SCNC: 10 MMOL/L (ref 8–16)
AST SERPL-CCNC: 21 U/L (ref 10–40)
BASOPHILS # BLD AUTO: 0.04 K/UL (ref 0–0.2)
BASOPHILS NFR BLD: 0.7 % (ref 0–1.9)
BILIRUB SERPL-MCNC: 0.4 MG/DL (ref 0.1–1)
BUN SERPL-MCNC: 13 MG/DL (ref 8–23)
CALCIUM SERPL-MCNC: 9 MG/DL (ref 8.7–10.5)
CHLORIDE SERPL-SCNC: 103 MMOL/L (ref 95–110)
CHOLEST SERPL-MCNC: 127 MG/DL (ref 120–199)
CHOLEST/HDLC SERPL: 3.6 {RATIO} (ref 2–5)
CO2 SERPL-SCNC: 25 MMOL/L (ref 23–29)
CREAT SERPL-MCNC: 0.7 MG/DL (ref 0.5–1.4)
DIFFERENTIAL METHOD: ABNORMAL
EOSINOPHIL # BLD AUTO: 0.3 K/UL (ref 0–0.5)
EOSINOPHIL NFR BLD: 6.3 % (ref 0–8)
ERYTHROCYTE [DISTWIDTH] IN BLOOD BY AUTOMATED COUNT: 13.1 % (ref 11.5–14.5)
EST. GFR  (NO RACE VARIABLE): >60 ML/MIN/1.73 M^2
ESTIMATED AVG GLUCOSE: 169 MG/DL (ref 68–131)
GLUCOSE SERPL-MCNC: 166 MG/DL (ref 70–110)
HBA1C MFR BLD: 7.5 % (ref 4–5.6)
HCT VFR BLD AUTO: 40.5 % (ref 40–54)
HDLC SERPL-MCNC: 35 MG/DL (ref 40–75)
HDLC SERPL: 27.6 % (ref 20–50)
HGB BLD-MCNC: 13.1 G/DL (ref 14–18)
IMM GRANULOCYTES # BLD AUTO: 0.01 K/UL (ref 0–0.04)
IMM GRANULOCYTES NFR BLD AUTO: 0.2 % (ref 0–0.5)
LDLC SERPL CALC-MCNC: 51 MG/DL (ref 63–159)
LYMPHOCYTES # BLD AUTO: 2 K/UL (ref 1–4.8)
LYMPHOCYTES NFR BLD: 36 % (ref 18–48)
MCH RBC QN AUTO: 30.3 PG (ref 27–31)
MCHC RBC AUTO-ENTMCNC: 32.3 G/DL (ref 32–36)
MCV RBC AUTO: 94 FL (ref 82–98)
MONOCYTES # BLD AUTO: 0.5 K/UL (ref 0.3–1)
MONOCYTES NFR BLD: 9.6 % (ref 4–15)
NEUTROPHILS # BLD AUTO: 2.6 K/UL (ref 1.8–7.7)
NEUTROPHILS NFR BLD: 47.2 % (ref 38–73)
NONHDLC SERPL-MCNC: 92 MG/DL
NRBC BLD-RTO: 0 /100 WBC
PLATELET # BLD AUTO: 178 K/UL (ref 150–450)
PMV BLD AUTO: 11.4 FL (ref 9.2–12.9)
POTASSIUM SERPL-SCNC: 4.1 MMOL/L (ref 3.5–5.1)
PROT SERPL-MCNC: 6.7 G/DL (ref 6–8.4)
RBC # BLD AUTO: 4.33 M/UL (ref 4.6–6.2)
SODIUM SERPL-SCNC: 138 MMOL/L (ref 136–145)
TRIGL SERPL-MCNC: 205 MG/DL (ref 30–150)
WBC # BLD AUTO: 5.42 K/UL (ref 3.9–12.7)

## 2023-02-08 PROCEDURE — 85025 COMPLETE CBC W/AUTO DIFF WBC: CPT | Mod: HCNC | Performed by: FAMILY MEDICINE

## 2023-02-08 PROCEDURE — 80053 COMPREHEN METABOLIC PANEL: CPT | Mod: HCNC | Performed by: FAMILY MEDICINE

## 2023-02-08 PROCEDURE — 36415 COLL VENOUS BLD VENIPUNCTURE: CPT | Mod: HCNC,PO | Performed by: FAMILY MEDICINE

## 2023-02-08 PROCEDURE — 83036 HEMOGLOBIN GLYCOSYLATED A1C: CPT | Mod: HCNC | Performed by: FAMILY MEDICINE

## 2023-02-08 PROCEDURE — 80061 LIPID PANEL: CPT | Mod: HCNC | Performed by: FAMILY MEDICINE

## 2023-02-09 DIAGNOSIS — Z00.00 ENCOUNTER FOR MEDICARE ANNUAL WELLNESS EXAM: ICD-10-CM

## 2023-02-14 ENCOUNTER — OFFICE VISIT (OUTPATIENT)
Dept: FAMILY MEDICINE | Facility: CLINIC | Age: 88
End: 2023-02-14
Payer: MEDICARE

## 2023-02-14 VITALS
BODY MASS INDEX: 21.14 KG/M2 | HEIGHT: 62 IN | SYSTOLIC BLOOD PRESSURE: 100 MMHG | DIASTOLIC BLOOD PRESSURE: 60 MMHG | WEIGHT: 114.88 LBS | OXYGEN SATURATION: 96 % | HEART RATE: 75 BPM

## 2023-02-14 DIAGNOSIS — I15.2 HYPERTENSION ASSOCIATED WITH DIABETES: ICD-10-CM

## 2023-02-14 DIAGNOSIS — Z23 NEEDS FLU SHOT: Primary | ICD-10-CM

## 2023-02-14 DIAGNOSIS — G62.9 NEUROPATHY: ICD-10-CM

## 2023-02-14 DIAGNOSIS — E11.59 HYPERTENSION ASSOCIATED WITH DIABETES: ICD-10-CM

## 2023-02-14 DIAGNOSIS — E78.2 MIXED HYPERLIPIDEMIA: ICD-10-CM

## 2023-02-14 DIAGNOSIS — M54.42 BILATERAL LOW BACK PAIN WITH LEFT-SIDED SCIATICA, UNSPECIFIED CHRONICITY: ICD-10-CM

## 2023-02-14 PROCEDURE — 99999 PR PBB SHADOW E&M-EST. PATIENT-LVL III: CPT | Mod: PBBFAC,HCNC,, | Performed by: FAMILY MEDICINE

## 2023-02-14 PROCEDURE — 90694 FLU VACCINE - QUADRIVALENT - ADJUVANTED: ICD-10-PCS | Mod: HCNC,S$GLB,, | Performed by: FAMILY MEDICINE

## 2023-02-14 PROCEDURE — 96372 PR INJECTION,THERAP/PROPH/DIAG2ST, IM OR SUBCUT: ICD-10-PCS | Mod: 59,HCNC,S$GLB, | Performed by: FAMILY MEDICINE

## 2023-02-14 PROCEDURE — 1159F MED LIST DOCD IN RCRD: CPT | Mod: HCNC,CPTII,S$GLB, | Performed by: FAMILY MEDICINE

## 2023-02-14 PROCEDURE — 96372 THER/PROPH/DIAG INJ SC/IM: CPT | Mod: 59,HCNC,S$GLB, | Performed by: FAMILY MEDICINE

## 2023-02-14 PROCEDURE — 3288F FALL RISK ASSESSMENT DOCD: CPT | Mod: HCNC,CPTII,S$GLB, | Performed by: FAMILY MEDICINE

## 2023-02-14 PROCEDURE — 1159F PR MEDICATION LIST DOCUMENTED IN MEDICAL RECORD: ICD-10-PCS | Mod: HCNC,CPTII,S$GLB, | Performed by: FAMILY MEDICINE

## 2023-02-14 PROCEDURE — 99215 PR OFFICE/OUTPT VISIT, EST, LEVL V, 40-54 MIN: ICD-10-PCS | Mod: 25,HCNC,S$GLB, | Performed by: FAMILY MEDICINE

## 2023-02-14 PROCEDURE — 3288F PR FALLS RISK ASSESSMENT DOCUMENTED: ICD-10-PCS | Mod: HCNC,CPTII,S$GLB, | Performed by: FAMILY MEDICINE

## 2023-02-14 PROCEDURE — G0008 ADMIN INFLUENZA VIRUS VAC: HCPCS | Mod: HCNC,S$GLB,, | Performed by: FAMILY MEDICINE

## 2023-02-14 PROCEDURE — 1100F PR PT FALLS ASSESS DOC 2+ FALLS/FALL W/INJURY/YR: ICD-10-PCS | Mod: HCNC,CPTII,S$GLB, | Performed by: FAMILY MEDICINE

## 2023-02-14 PROCEDURE — 1100F PTFALLS ASSESS-DOCD GE2>/YR: CPT | Mod: HCNC,CPTII,S$GLB, | Performed by: FAMILY MEDICINE

## 2023-02-14 PROCEDURE — 90694 VACC AIIV4 NO PRSRV 0.5ML IM: CPT | Mod: HCNC,S$GLB,, | Performed by: FAMILY MEDICINE

## 2023-02-14 PROCEDURE — 1125F AMNT PAIN NOTED PAIN PRSNT: CPT | Mod: HCNC,CPTII,S$GLB, | Performed by: FAMILY MEDICINE

## 2023-02-14 PROCEDURE — 1125F PR PAIN SEVERITY QUANTIFIED, PAIN PRESENT: ICD-10-PCS | Mod: HCNC,CPTII,S$GLB, | Performed by: FAMILY MEDICINE

## 2023-02-14 PROCEDURE — 1160F PR REVIEW ALL MEDS BY PRESCRIBER/CLIN PHARMACIST DOCUMENTED: ICD-10-PCS | Mod: HCNC,CPTII,S$GLB, | Performed by: FAMILY MEDICINE

## 2023-02-14 PROCEDURE — G0008 FLU VACCINE - QUADRIVALENT - ADJUVANTED: ICD-10-PCS | Mod: HCNC,S$GLB,, | Performed by: FAMILY MEDICINE

## 2023-02-14 PROCEDURE — 99215 OFFICE O/P EST HI 40 MIN: CPT | Mod: 25,HCNC,S$GLB, | Performed by: FAMILY MEDICINE

## 2023-02-14 PROCEDURE — 1160F RVW MEDS BY RX/DR IN RCRD: CPT | Mod: HCNC,CPTII,S$GLB, | Performed by: FAMILY MEDICINE

## 2023-02-14 PROCEDURE — 99999 PR PBB SHADOW E&M-EST. PATIENT-LVL III: ICD-10-PCS | Mod: PBBFAC,HCNC,, | Performed by: FAMILY MEDICINE

## 2023-02-14 RX ORDER — DULOXETIN HYDROCHLORIDE 30 MG/1
30 CAPSULE, DELAYED RELEASE ORAL DAILY
Qty: 90 CAPSULE | Refills: 3 | Status: SHIPPED | OUTPATIENT
Start: 2023-02-14 | End: 2023-10-16

## 2023-02-14 RX ORDER — KETOROLAC TROMETHAMINE 30 MG/ML
30 INJECTION, SOLUTION INTRAMUSCULAR; INTRAVENOUS ONCE
Status: COMPLETED | OUTPATIENT
Start: 2023-02-14 | End: 2023-02-14

## 2023-02-14 RX ADMIN — KETOROLAC TROMETHAMINE 30 MG: 30 INJECTION, SOLUTION INTRAMUSCULAR; INTRAVENOUS at 03:02

## 2023-02-14 NOTE — PROGRESS NOTES
Subjective:       Patient ID: Kang Herbert is a 87 y.o. male.    Chief Complaint: Hypertension and Diabetes    [age] years male came to the clinic for blood pressure check.  Blood pressure today was stable.  No chest pain, palpitation, orthopnea or PND.  Last A1c was normal for his age.  No polyuria, polydipsia or polyphagia.  Patient is requesting a medicine to help with the chronic back pain.  He is using meloxicam as needed for the pain.  Patient with restless leg syndrome associated with neuropathy.  He was not taking Cymbalta.  Last cholesterol was normal.  Triglycerides were elevated.  He is due for his flu shot.    Hypertension  Pertinent negatives include no chest pain or palpitations.   Diabetes  Pertinent negatives for diabetes include no chest pain, no polydipsia, no polyphagia and no polyuria.   Review of Systems   Constitutional: Negative.    HENT: Negative.     Eyes: Negative.    Respiratory: Negative.     Cardiovascular: Negative.  Negative for chest pain, palpitations, leg swelling and claudication.   Gastrointestinal: Negative.    Endocrine: Negative for polydipsia, polyphagia and polyuria.   Genitourinary: Negative.    Musculoskeletal:  Positive for back pain.   Integumentary:  Negative.   Neurological: Negative.    Psychiatric/Behavioral: Negative.         Objective:      Physical Exam  Vitals and nursing note reviewed.   Constitutional:       General: He is not in acute distress.     Appearance: He is well-developed. He is not diaphoretic.   HENT:      Head: Normocephalic and atraumatic.      Right Ear: External ear normal.      Left Ear: External ear normal.      Nose: Nose normal.      Mouth/Throat:      Pharynx: No oropharyngeal exudate.   Eyes:      General: No scleral icterus.        Right eye: No discharge.         Left eye: No discharge.      Conjunctiva/sclera: Conjunctivae normal.      Pupils: Pupils are equal, round, and reactive to light.   Neck:      Thyroid: No  thyromegaly.      Vascular: No JVD.      Trachea: No tracheal deviation.   Cardiovascular:      Rate and Rhythm: Normal rate and regular rhythm.      Heart sounds: Normal heart sounds. No murmur heard.    No friction rub. No gallop.   Pulmonary:      Effort: Pulmonary effort is normal. No respiratory distress.      Breath sounds: Normal breath sounds. No stridor. No wheezing or rales.   Chest:      Chest wall: No tenderness.   Abdominal:      General: Bowel sounds are normal. There is no distension.      Palpations: Abdomen is soft. There is no mass.      Tenderness: There is no abdominal tenderness. There is no guarding or rebound.   Musculoskeletal:         General: Normal range of motion.      Cervical back: Normal range of motion and neck supple.      Lumbar back: Spasms and tenderness present. Negative right straight leg raise test and negative left straight leg raise test.   Lymphadenopathy:      Cervical: No cervical adenopathy.   Skin:     General: Skin is warm and dry.      Coloration: Skin is not pale.      Findings: No erythema or rash.   Neurological:      Mental Status: He is alert and oriented to person, place, and time.      Cranial Nerves: No cranial nerve deficit.      Motor: No abnormal muscle tone.      Coordination: Coordination normal.      Deep Tendon Reflexes: Reflexes are normal and symmetric. Reflexes normal.   Psychiatric:         Behavior: Behavior normal.         Thought Content: Thought content normal.         Judgment: Judgment normal.       Assessment:       Problem List Items Addressed This Visit       Lumbago    Relevant Medications    ketorolac injection 30 mg (Start on 2/14/2023  3:45 PM)    Hypertension associated with diabetes    Relevant Orders    CBC Auto Differential    Comprehensive Metabolic Panel    Hemoglobin A1C    Lipid Panel    Microalbumin/Creatinine Ratio, Urine     Other Visit Diagnoses       Needs flu shot    -  Primary    Relevant Orders    Influenza (FLUAD) -  Quadrivalent (Adjuvanted) *Preferred* (65+) (PF)    Neuropathy        Relevant Medications    DULoxetine (CYMBALTA) 30 MG capsule    Mixed hyperlipidemia        Relevant Orders    Comprehensive Metabolic Panel    Lipid Panel              Plan:         Washington was seen today for hypertension and diabetes.    Diagnoses and all orders for this visit:    Needs flu shot  -     Influenza (FLUAD) - Quadrivalent (Adjuvanted) *Preferred* (65+) (PF)    Neuropathy  -     DULoxetine (CYMBALTA) 30 MG capsule; Take 1 capsule (30 mg total) by mouth once daily.    Mixed hyperlipidemia  -     Comprehensive Metabolic Panel; Future  -     Lipid Panel; Future    Hypertension associated with diabetes  -     CBC Auto Differential; Future  -     Comprehensive Metabolic Panel; Future  -     Hemoglobin A1C; Future  -     Lipid Panel; Future  -     Microalbumin/Creatinine Ratio, Urine; Future    Bilateral low back pain with left-sided sciatica, unspecified chronicity  -     ketorolac injection 30 mg

## 2023-03-14 ENCOUNTER — TELEPHONE (OUTPATIENT)
Dept: FAMILY MEDICINE | Facility: CLINIC | Age: 88
End: 2023-03-14
Payer: MEDICARE

## 2023-03-14 NOTE — TELEPHONE ENCOUNTER
----- Message from Qiana Lala sent at 3/14/2023  2:44 PM CDT -----  .Type:  Referral Request     Who Called: pt's daughter Aislinn  Would the patient rather a call back or a response via [facility]? call  Best Call Back Number: [phone]  Additional Information:     Pt would like a referral/prescription for orthopedic shoes

## 2023-04-06 ENCOUNTER — OFFICE VISIT (OUTPATIENT)
Dept: PODIATRY | Facility: CLINIC | Age: 88
End: 2023-04-06
Payer: MEDICARE

## 2023-04-06 VITALS
BODY MASS INDEX: 21.01 KG/M2 | DIASTOLIC BLOOD PRESSURE: 58 MMHG | HEART RATE: 82 BPM | HEIGHT: 62 IN | SYSTOLIC BLOOD PRESSURE: 104 MMHG

## 2023-04-06 DIAGNOSIS — R09.89 DIMINISHED PULSES IN LOWER EXTREMITY: ICD-10-CM

## 2023-04-06 DIAGNOSIS — Q66.70 PES CAVUS: Primary | ICD-10-CM

## 2023-04-06 DIAGNOSIS — E11.9 ENCOUNTER FOR COMPREHENSIVE DIABETIC FOOT EXAMINATION, TYPE 2 DIABETES MELLITUS: ICD-10-CM

## 2023-04-06 DIAGNOSIS — E11.42 DIABETIC POLYNEUROPATHY ASSOCIATED WITH TYPE 2 DIABETES MELLITUS: ICD-10-CM

## 2023-04-06 PROCEDURE — 99203 PR OFFICE/OUTPT VISIT, NEW, LEVL III, 30-44 MIN: ICD-10-PCS | Mod: HCNC,S$GLB,, | Performed by: STUDENT IN AN ORGANIZED HEALTH CARE EDUCATION/TRAINING PROGRAM

## 2023-04-06 PROCEDURE — 99999 PR PBB SHADOW E&M-EST. PATIENT-LVL III: ICD-10-PCS | Mod: PBBFAC,HCNC,, | Performed by: STUDENT IN AN ORGANIZED HEALTH CARE EDUCATION/TRAINING PROGRAM

## 2023-04-06 PROCEDURE — 1126F PR PAIN SEVERITY QUANTIFIED, NO PAIN PRESENT: ICD-10-PCS | Mod: HCNC,CPTII,S$GLB, | Performed by: STUDENT IN AN ORGANIZED HEALTH CARE EDUCATION/TRAINING PROGRAM

## 2023-04-06 PROCEDURE — 1126F AMNT PAIN NOTED NONE PRSNT: CPT | Mod: HCNC,CPTII,S$GLB, | Performed by: STUDENT IN AN ORGANIZED HEALTH CARE EDUCATION/TRAINING PROGRAM

## 2023-04-06 PROCEDURE — 1159F MED LIST DOCD IN RCRD: CPT | Mod: HCNC,CPTII,S$GLB, | Performed by: STUDENT IN AN ORGANIZED HEALTH CARE EDUCATION/TRAINING PROGRAM

## 2023-04-06 PROCEDURE — 1159F PR MEDICATION LIST DOCUMENTED IN MEDICAL RECORD: ICD-10-PCS | Mod: HCNC,CPTII,S$GLB, | Performed by: STUDENT IN AN ORGANIZED HEALTH CARE EDUCATION/TRAINING PROGRAM

## 2023-04-06 PROCEDURE — 99999 PR PBB SHADOW E&M-EST. PATIENT-LVL III: CPT | Mod: PBBFAC,HCNC,, | Performed by: STUDENT IN AN ORGANIZED HEALTH CARE EDUCATION/TRAINING PROGRAM

## 2023-04-06 PROCEDURE — 99203 OFFICE O/P NEW LOW 30 MIN: CPT | Mod: HCNC,S$GLB,, | Performed by: STUDENT IN AN ORGANIZED HEALTH CARE EDUCATION/TRAINING PROGRAM

## 2023-04-06 NOTE — PROGRESS NOTES
Subjective:     Patient ID: Kang Herbert is a 87 y.o. male.    Chief Complaint: Diabetes Mellitus and Nail Care    [place] is a 87 y.o. male who presents to the clinic upon referral from Dr. Nohemi almazan. provider found  for evaluation and treatment of diabetic feet. 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 Patient relates no major problem with feet. Only complaints today consist of here for a diabetic foot exam. Denies issues with trimming toenails. No further pedal complaints.     PCP: Zeke Marrufo MD    Date Last Seen by PCP:     Current shoe gear: Casual shoes    Hemoglobin A1C   Date Value Ref Range Status   02/08/2023 7.5 (H) 4.0 - 5.6 % Final     Comment:     ADA Screening Guidelines:  5.7-6.4%  Consistent with prediabetes  >or=6.5%  Consistent with diabetes    High levels of fetal hemoglobin interfere with the HbA1C  assay. Heterozygous hemoglobin variants (HbS, HgC, etc)do  not significantly interfere with this assay.   However, presence of multiple variants may affect accuracy.     08/08/2022 7.7 (H) 4.0 - 5.6 % Final     Comment:     ADA Screening Guidelines:  5.7-6.4%  Consistent with prediabetes  >or=6.5%  Consistent with diabetes    High levels of fetal hemoglobin interfere with the HbA1C  assay. Heterozygous hemoglobin variants (HbS, HgC, etc)do  not significantly interfere with this assay.   However, presence of multiple variants may affect accuracy.     12/27/2021 7.4 (H) 4.0 - 5.6 % Final     Comment:     ADA Screening Guidelines:  5.7-6.4%  Consistent with prediabetes  >or=6.5%  Consistent with diabetes    High levels of fetal hemoglobin interfere with the HbA1C  assay. Heterozygous hemoglobin variants (HbS, HgC, etc)do  not  significantly interfere with this assay.   However, presence of multiple variants may affect accuracy.           Review of Systems   Constitutional: Negative for chills, decreased appetite, diaphoresis and fever.   HENT:  Negative for congestion and hearing loss.    Cardiovascular:  Negative for chest pain, claudication, leg swelling and syncope.   Respiratory:  Negative for cough and shortness of breath.    Skin:  Positive for dry skin and nail changes. Negative for color change, flushing, itching, poor wound healing and rash.   Musculoskeletal:  Positive for back pain. Negative for joint swelling.   Gastrointestinal:  Negative for nausea and vomiting.   Neurological:  Positive for numbness and paresthesias. Negative for focal weakness and weakness.   Psychiatric/Behavioral:  Negative for altered mental status. The patient is not nervous/anxious.       Objective:     Physical Exam  Constitutional:       General: He is not in acute distress.     Appearance: Normal appearance. He is well-developed. He is not diaphoretic.   Cardiovascular:      Comments: Dorsalis pedis and posterior tibial pulses are mildly diminished.Skin temperature is within normal limits. Toes are cool to touch and feet are warm proximally. Hair growth is diminished. Skin is atrophic and with mild hyperpigmentation. Mild edema noted, bilaterally. Telangiectasias to medial ankle, bilaterally   Musculoskeletal:         General: No tenderness.      Comments: Adequate joint range of motion without pain, limitation, nor crepitation to foot and ankle joints. Muscle strength is 5/5 in all groups bilaterally.    Pes cavus, bilaterally   Feet:      Right foot:      Skin integrity: Dry skin present. No ulcer, blister, erythema or warmth.      Left foot:      Skin integrity: Dry skin present. No ulcer, blister, erythema or warmth.   Skin:     General: Skin is warm and dry.      Capillary Refill: Capillary refill takes less than 2 seconds.      Comments: Skin  is warm and dry, no acute signs of infection noted bilaterally      Toenails are well trimmed and thickened, bilaterally    Otherwise, no open wounds, macerations or hyperkeratotic lesions, bilaterally            Neurological:      Mental Status: He is alert and oriented to person, place, and time.      Sensory: Sensory deficit present.      Motor: No abnormal muscle tone.      Comments: Light touch within normal limits. [place] 5.07 monofilamant testing is within normal limits.Vibratory sensation is diminished bilaterally.   Psychiatric:         Mood and Affect: Mood normal.         Behavior: Behavior normal.         Thought Content: Thought content normal.         Assessment:      Encounter Diagnoses   Name Primary?    Pes cavus Yes    Diabetic polyneuropathy associated with type 2 diabetes mellitus     Diminished pulses in lower extremity     Encounter for comprehensive diabetic foot examination, type 2 diabetes mellitus      Plan:     [place] was seen today for diabetes mellitus and nail care.    Diagnoses and all orders for this visit:    Pes cavus  -     DIABETIC SHOES FOR HOME USE    Diabetic polyneuropathy associated with type 2 diabetes mellitus  -     DIABETIC SHOES FOR HOME USE    Diminished pulses in lower extremity  -     US Lower Extremity Arteries Bilateral; Future    Encounter for comprehensive diabetic foot examination, type 2 diabetes mellitus      I counseled the patient on his conditions, their implications and medical management.  Patient with diminished pedal pulses, arterial US ordered  Rx diabetic shoes, he is to wear at all times while ambulating  Shoe inspection. Diabetic Foot Education. Patient reminded of the importance of good nutrition and blood sugar control to help prevent podiatric complications of diabetes. Patient instructed on proper foot hygeine. We discussed wearing proper shoe gear, daily foot inspections, never walking without protective shoe gear, never putting  sharp instruments to feet, routine podiatric nail visits    Return to clinic in 6 months-1 year, sooner PRN

## 2023-04-12 ENCOUNTER — HOSPITAL ENCOUNTER (OUTPATIENT)
Dept: RADIOLOGY | Facility: HOSPITAL | Age: 88
Discharge: HOME | End: 2023-04-12
Attending: STUDENT IN AN ORGANIZED HEALTH CARE EDUCATION/TRAINING PROGRAM
Payer: MEDICARE

## 2023-04-12 DIAGNOSIS — R09.89 DIMINISHED PULSES IN LOWER EXTREMITY: ICD-10-CM

## 2023-04-12 PROCEDURE — 93925 LOWER EXTREMITY STUDY: CPT | Mod: TC,HCNC

## 2023-04-12 PROCEDURE — 93925 LOWER EXTREMITY STUDY: CPT | Mod: 26,HCNC,, | Performed by: RADIOLOGY

## 2023-04-12 PROCEDURE — 93925 US LOWER EXTREMITY ARTERIES BILATERAL: ICD-10-PCS | Mod: 26,HCNC,, | Performed by: RADIOLOGY

## 2023-04-28 ENCOUNTER — TELEPHONE (OUTPATIENT)
Dept: PODIATRY | Facility: CLINIC | Age: 88
End: 2023-04-28
Payer: MEDICARE

## 2023-04-28 NOTE — TELEPHONE ENCOUNTER
Spoke with patient's daughter, Aislinn, who was inquiring if her father's shoes would be covered by insurance. Discussed with her which orthotic vendor she would like to use and that once that is determined the vendor can do an authorization for insurance approval. It was agreed upon to send the prescription for shoes and supporting documentation to [facility]. Also of note, daughter inquired about Arterial US results. Informed her that Dr Wood is out of the office today, but I will let her know about her inquiry. No other needs voiced at this time. Encouraged call back if needed

## 2023-04-28 NOTE — TELEPHONE ENCOUNTER
Faxed and routed prescription for diabetic shoes and supporting documentation to [facility] as requested per patient

## 2023-04-28 NOTE — TELEPHONE ENCOUNTER
----- Message from Huyen Barron sent at 4/28/2023 11:33 AM CDT -----  Regarding: call back  Contact: [phone]  TEST RESULTS:   Patient would like to get test results.  Name of test (lab, mammo, etc.): Labs   Date of test: 4/12/23     Patient also wants to know if his diabetic shoes orders were approved by the insurance.

## 2023-05-01 ENCOUNTER — TELEPHONE (OUTPATIENT)
Dept: PODIATRY | Facility: CLINIC | Age: 88
End: 2023-05-01
Payer: MEDICARE

## 2023-05-01 NOTE — TELEPHONE ENCOUNTER
Spoke with Aislinn Alcaraz, Mr Herbert's daughter, to inform her per Dr Wood that there were no blockages on arterial us. Per Dr Wood, he has been medically managed per Dr. Romero in the past, and if he develops any pain in his legs she would advise follow up with Cardiology. Verbalized understanding with no other needs voiced at this time. Encouraged her to call if further assistance is needed. Also of note, diabetic prescription for shoes and supporting documentation were faxed and routed to [facility] per family request.

## 2023-05-03 DIAGNOSIS — M15.8 OTHER OSTEOARTHRITIS INVOLVING MULTIPLE JOINTS: ICD-10-CM

## 2023-05-03 DIAGNOSIS — M54.16 LUMBAR RADICULOPATHY: ICD-10-CM

## 2023-05-03 DIAGNOSIS — M48.062 SPINAL STENOSIS OF LUMBAR REGION WITH NEUROGENIC CLAUDICATION: ICD-10-CM

## 2023-05-03 RX ORDER — MELOXICAM 15 MG/1
TABLET ORAL
Qty: 30 TABLET | Refills: 2 | Status: SHIPPED | OUTPATIENT
Start: 2023-05-03 | End: 2024-02-22 | Stop reason: SDUPTHER

## 2023-06-12 ENCOUNTER — TELEPHONE (OUTPATIENT)
Dept: PAIN MEDICINE | Facility: CLINIC | Age: 88
End: 2023-06-12
Payer: MEDICARE

## 2023-06-12 NOTE — TELEPHONE ENCOUNTER
----- Message from Paige Verma sent at 6/9/2023 11:07 AM CDT -----  Regarding: procedure  Contact: [phone]/dayton Tao  Patient's daughter Aislinn is requesting a call back regarding scheduling a procedure to treat:  Sciatic pain as soon as possible.   Would the patient rather a call back or a response via [facility]?  Call   Best Call Back Number:  [phone]/dayton Tao  Additional Information:

## 2023-07-28 ENCOUNTER — PATIENT MESSAGE (OUTPATIENT)
Dept: CARDIOLOGY | Facility: CLINIC | Age: 88
End: 2023-07-28
Payer: MEDICARE

## 2023-08-08 ENCOUNTER — LAB VISIT (OUTPATIENT)
Dept: LAB | Facility: HOSPITAL | Age: 88
End: 2023-08-08
Attending: FAMILY MEDICINE
Payer: MEDICARE

## 2023-08-08 DIAGNOSIS — E11.59 HYPERTENSION ASSOCIATED WITH DIABETES: ICD-10-CM

## 2023-08-08 DIAGNOSIS — I15.2 HYPERTENSION ASSOCIATED WITH DIABETES: ICD-10-CM

## 2023-08-08 LAB
ALBUMIN/CREAT UR: 11.7 UG/MG (ref 0–30)
CREAT UR-MCNC: 77 MG/DL (ref 23–375)
MICROALBUMIN UR DL<=1MG/L-MCNC: 9 UG/ML

## 2023-08-08 PROCEDURE — 82570 ASSAY OF URINE CREATININE: CPT | Mod: HCNC | Performed by: FAMILY MEDICINE

## 2023-08-18 ENCOUNTER — PES CALL (OUTPATIENT)
Dept: ADMINISTRATIVE | Facility: CLINIC | Age: 88
End: 2023-08-18
Payer: MEDICARE

## 2023-08-22 ENCOUNTER — TELEPHONE (OUTPATIENT)
Dept: FAMILY MEDICINE | Facility: CLINIC | Age: 88
End: 2023-08-22

## 2023-08-22 DIAGNOSIS — E11.43 TYPE 2 DIABETES MELLITUS WITH DIABETIC AUTONOMIC NEUROPATHY, WITHOUT LONG-TERM CURRENT USE OF INSULIN: ICD-10-CM

## 2023-08-22 DIAGNOSIS — M51.36 DDD (DEGENERATIVE DISC DISEASE), LUMBAR: Primary | ICD-10-CM

## 2023-08-23 DIAGNOSIS — M48.062 SPINAL STENOSIS OF LUMBAR REGION WITH NEUROGENIC CLAUDICATION: ICD-10-CM

## 2023-08-23 DIAGNOSIS — M54.16 LUMBAR RADICULOPATHY: ICD-10-CM

## 2023-08-23 DIAGNOSIS — M15.8 OTHER OSTEOARTHRITIS INVOLVING MULTIPLE JOINTS: ICD-10-CM

## 2023-08-23 RX ORDER — GABAPENTIN 800 MG/1
800 TABLET ORAL 2 TIMES DAILY
Qty: 180 TABLET | Refills: 3 | Status: SHIPPED | OUTPATIENT
Start: 2023-08-23

## 2023-08-23 NOTE — TELEPHONE ENCOUNTER
Care Due:                  Date            Visit Type   Department     Provider  --------------------------------------------------------------------------------                                EP -                              PRIMARY      [facility]  Last Visit: 02-      CARE ([facility])   DIOMEDES Marrufo                              EP -                              PRIMARY      [facility]  Next Visit: 10-      CARE ([facility])   DIOMEDES Marrufo                                                            Last  Test          Frequency    Reason                     Performed    Due Date  --------------------------------------------------------------------------------    TSH.........  12 months..  levothyroxine............  08- 08-    [facility] Embedded Care Due Messages. Reference number: [id number].   8/23/2023 10:04:54 AM CDT

## 2023-08-28 ENCOUNTER — HOSPITAL ENCOUNTER (EMERGENCY)
Facility: HOSPITAL | Age: 88
Discharge: HOME | End: 2023-08-28
Attending: EMERGENCY MEDICINE
Payer: MEDICARE

## 2023-08-28 VITALS
HEART RATE: 109 BPM | RESPIRATION RATE: 18 BRPM | OXYGEN SATURATION: 96 % | SYSTOLIC BLOOD PRESSURE: 116 MMHG | TEMPERATURE: 100 F | DIASTOLIC BLOOD PRESSURE: 65 MMHG

## 2023-08-28 DIAGNOSIS — M54.42 CHRONIC LEFT-SIDED LOW BACK PAIN WITH LEFT-SIDED SCIATICA: Primary | ICD-10-CM

## 2023-08-28 DIAGNOSIS — M54.16 LUMBAR RADICULOPATHY: ICD-10-CM

## 2023-08-28 DIAGNOSIS — R00.0 TACHYCARDIA: ICD-10-CM

## 2023-08-28 DIAGNOSIS — G89.29 CHRONIC LEFT-SIDED LOW BACK PAIN WITH LEFT-SIDED SCIATICA: Primary | ICD-10-CM

## 2023-08-28 LAB
ALBUMIN SERPL BCP-MCNC: 4.1 G/DL (ref 3.5–5.2)
ALP SERPL-CCNC: 98 U/L (ref 55–135)
ALT SERPL W/O P-5'-P-CCNC: 23 U/L (ref 10–44)
ANION GAP SERPL CALC-SCNC: 13 MMOL/L (ref 8–16)
AST SERPL-CCNC: 26 U/L (ref 10–40)
BACTERIA #/AREA URNS HPF: NORMAL /HPF
BASOPHILS # BLD AUTO: 0.03 K/UL (ref 0–0.2)
BASOPHILS NFR BLD: 0.3 % (ref 0–1.9)
BILIRUB SERPL-MCNC: 0.5 MG/DL (ref 0.1–1)
BILIRUB UR QL STRIP: NEGATIVE
BUN SERPL-MCNC: 15 MG/DL (ref 8–23)
CALCIUM SERPL-MCNC: 9.4 MG/DL (ref 8.7–10.5)
CHLORIDE SERPL-SCNC: 101 MMOL/L (ref 95–110)
CLARITY UR: CLEAR
CO2 SERPL-SCNC: 24 MMOL/L (ref 23–29)
COLOR UR: YELLOW
CREAT SERPL-MCNC: 0.9 MG/DL (ref 0.5–1.4)
DIFFERENTIAL METHOD: ABNORMAL
EOSINOPHIL # BLD AUTO: 0.1 K/UL (ref 0–0.5)
EOSINOPHIL NFR BLD: 0.9 % (ref 0–8)
ERYTHROCYTE [DISTWIDTH] IN BLOOD BY AUTOMATED COUNT: 13.1 % (ref 11.5–14.5)
EST. GFR  (NO RACE VARIABLE): >60 ML/MIN/1.73 M^2
GLUCOSE SERPL-MCNC: 256 MG/DL (ref 70–110)
GLUCOSE UR QL STRIP: ABNORMAL
HCT VFR BLD AUTO: 40.1 % (ref 40–54)
HGB BLD-MCNC: 13.7 G/DL (ref 14–18)
HGB UR QL STRIP: NEGATIVE
IMM GRANULOCYTES # BLD AUTO: 0.03 K/UL (ref 0–0.04)
IMM GRANULOCYTES NFR BLD AUTO: 0.3 % (ref 0–0.5)
INFLUENZA A, MOLECULAR: NEGATIVE
INFLUENZA B, MOLECULAR: NEGATIVE
KETONES UR QL STRIP: NEGATIVE
LACTATE SERPL-SCNC: 1.8 MMOL/L (ref 0.5–2.2)
LACTATE SERPL-SCNC: 2.4 MMOL/L (ref 0.5–2.2)
LEUKOCYTE ESTERASE UR QL STRIP: NEGATIVE
LYMPHOCYTES # BLD AUTO: 1.2 K/UL (ref 1–4.8)
LYMPHOCYTES NFR BLD: 13.3 % (ref 18–48)
MAGNESIUM SERPL-MCNC: 1.6 MG/DL (ref 1.6–2.6)
MCH RBC QN AUTO: 31 PG (ref 27–31)
MCHC RBC AUTO-ENTMCNC: 34.2 G/DL (ref 32–36)
MCV RBC AUTO: 91 FL (ref 82–98)
MICROSCOPIC COMMENT: NORMAL
MONOCYTES # BLD AUTO: 0.8 K/UL (ref 0.3–1)
MONOCYTES NFR BLD: 9.6 % (ref 4–15)
NEUTROPHILS # BLD AUTO: 6.6 K/UL (ref 1.8–7.7)
NEUTROPHILS NFR BLD: 75.6 % (ref 38–73)
NITRITE UR QL STRIP: NEGATIVE
NRBC BLD-RTO: 0 /100 WBC
PH UR STRIP: 7 [PH] (ref 5–8)
PHOSPHATE SERPL-MCNC: 2.8 MG/DL (ref 2.7–4.5)
PLATELET # BLD AUTO: 146 K/UL (ref 150–450)
PMV BLD AUTO: 11.2 FL (ref 9.2–12.9)
POTASSIUM SERPL-SCNC: 4 MMOL/L (ref 3.5–5.1)
PROCALCITONIN SERPL IA-MCNC: 0.02 NG/ML
PROT SERPL-MCNC: 7.5 G/DL (ref 6–8.4)
PROT UR QL STRIP: NEGATIVE
RBC # BLD AUTO: 4.42 M/UL (ref 4.6–6.2)
RBC #/AREA URNS HPF: 1 /HPF (ref 0–4)
SODIUM SERPL-SCNC: 138 MMOL/L (ref 136–145)
SP GR UR STRIP: 1.01 (ref 1–1.03)
SPECIMEN SOURCE: NORMAL
URN SPEC COLLECT METH UR: ABNORMAL
UROBILINOGEN UR STRIP-ACNC: NEGATIVE EU/DL
WBC # BLD AUTO: 8.75 K/UL (ref 3.9–12.7)
WBC #/AREA URNS HPF: 0 /HPF (ref 0–5)
YEAST URNS QL MICRO: NORMAL

## 2023-08-28 PROCEDURE — 93010 ELECTROCARDIOGRAM REPORT: CPT | Mod: HCNC,,, | Performed by: INTERNAL MEDICINE

## 2023-08-28 PROCEDURE — 84145 PROCALCITONIN (PCT): CPT | Mod: HCNC | Performed by: EMERGENCY MEDICINE

## 2023-08-28 PROCEDURE — 87502 INFLUENZA DNA AMP PROBE: CPT | Mod: HCNC | Performed by: EMERGENCY MEDICINE

## 2023-08-28 PROCEDURE — 93005 ELECTROCARDIOGRAM TRACING: CPT | Mod: HCNC

## 2023-08-28 PROCEDURE — 87040 BLOOD CULTURE FOR BACTERIA: CPT | Mod: 59,HCNC | Performed by: EMERGENCY MEDICINE

## 2023-08-28 PROCEDURE — 96365 THER/PROPH/DIAG IV INF INIT: CPT | Mod: HCNC

## 2023-08-28 PROCEDURE — 93010 EKG 12-LEAD: ICD-10-PCS | Mod: HCNC,,, | Performed by: INTERNAL MEDICINE

## 2023-08-28 PROCEDURE — 63600175 PHARM REV CODE 636 W HCPCS: Mod: HCNC | Performed by: EMERGENCY MEDICINE

## 2023-08-28 PROCEDURE — 96375 TX/PRO/DX INJ NEW DRUG ADDON: CPT | Mod: HCNC

## 2023-08-28 PROCEDURE — 25000003 PHARM REV CODE 250: Mod: HCNC | Performed by: EMERGENCY MEDICINE

## 2023-08-28 PROCEDURE — 80053 COMPREHEN METABOLIC PANEL: CPT | Mod: HCNC | Performed by: EMERGENCY MEDICINE

## 2023-08-28 PROCEDURE — 83605 ASSAY OF LACTIC ACID: CPT | Mod: 91,HCNC | Performed by: EMERGENCY MEDICINE

## 2023-08-28 PROCEDURE — 83735 ASSAY OF MAGNESIUM: CPT | Mod: HCNC | Performed by: EMERGENCY MEDICINE

## 2023-08-28 PROCEDURE — 99285 EMERGENCY DEPT VISIT HI MDM: CPT | Mod: 25,HCNC

## 2023-08-28 PROCEDURE — 85025 COMPLETE CBC W/AUTO DIFF WBC: CPT | Mod: HCNC | Performed by: EMERGENCY MEDICINE

## 2023-08-28 PROCEDURE — 81000 URINALYSIS NONAUTO W/SCOPE: CPT | Mod: HCNC | Performed by: EMERGENCY MEDICINE

## 2023-08-28 PROCEDURE — 84100 ASSAY OF PHOSPHORUS: CPT | Mod: HCNC | Performed by: EMERGENCY MEDICINE

## 2023-08-28 RX ORDER — LIDOCAINE 50 MG/G
1 PATCH TOPICAL DAILY
Qty: 7 PATCH | Refills: 0 | Status: SHIPPED | OUTPATIENT
Start: 2023-08-28 | End: 2023-09-04

## 2023-08-28 RX ORDER — HYDROCODONE BITARTRATE AND ACETAMINOPHEN 5; 325 MG/1; MG/1
1 TABLET ORAL
Status: COMPLETED | OUTPATIENT
Start: 2023-08-28 | End: 2023-08-28

## 2023-08-28 RX ORDER — METHOCARBAMOL 500 MG/1
500 TABLET, FILM COATED ORAL 3 TIMES DAILY
Qty: 15 TABLET | Refills: 0 | Status: SHIPPED | OUTPATIENT
Start: 2023-08-28 | End: 2023-08-30 | Stop reason: SDUPTHER

## 2023-08-28 RX ORDER — KETOROLAC TROMETHAMINE 30 MG/ML
15 INJECTION, SOLUTION INTRAMUSCULAR; INTRAVENOUS
Status: COMPLETED | OUTPATIENT
Start: 2023-08-28 | End: 2023-08-28

## 2023-08-28 RX ADMIN — KETOROLAC TROMETHAMINE 15 MG: 30 INJECTION, SOLUTION INTRAMUSCULAR; INTRAVENOUS at 08:08

## 2023-08-28 RX ADMIN — CEFTRIAXONE SODIUM 2 G: 2 INJECTION, POWDER, FOR SOLUTION INTRAMUSCULAR; INTRAVENOUS at 04:08

## 2023-08-28 RX ADMIN — HYDROCODONE BITARTRATE AND ACETAMINOPHEN 1 TABLET: 5; 325 TABLET ORAL at 06:08

## 2023-08-28 NOTE — ED NOTES
Patient presents to the ED with family at bedside for translation. Patient explains that for the last 4 days he has had severe back pain that has progressively worsened. Patient son explains that today he was hurting so badly that he was unable to ambulate; states he was crying and had to have his son carry him. Patient denies any injury/fall/trauma. Denies chronic back pain but does endorse sciatica. Patient denies any issues with bowels or urinating. Denies fever, abd pain, n/v/d. Patient placed on monitoring. Updated on care plan. Denies needs. Family at bedside. Wctm.

## 2023-08-28 NOTE — ED NOTES
Patient made aware of urine order. States unable to void at this time. Provided with water, urinal, and call light. Will check back.

## 2023-08-28 NOTE — ED PROVIDER NOTES
Emergency Department Encounter  Provider Note    Kang Herbert  [id number]  8/28/2023    Evaluation:    History Acquisition:     Chief Complaint   Patient presents with    Back Pain     Lower back pain x 3 days. Son at bedside reporting patient has been unable to walk.        History of Present Illness:  Kang Herbert is a 88 y.o. 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    The patient presents to the ED due to back pain.  Patient is a poor historian, and family is present at bedside for additional information.    Son states the patient has been complaining of lower back pain and leg pain for at least the last week.  Patient has history of sciatica that improved with nerve injections several years ago.  He normally has a high pain tolerance and does not complain of pain, but recently he has been endorsing pain despite taking OTC medications.  No history of back surgery. No focal weakness/numbness or incontinence. No recent fall or injury. No fever, N/V/D, or any other complaints.        #[id number] used for professional medical interpretation.       Additional historians utilized:  Family present at bedside    Prior medical records were reviewed:   Followed by Pain Management, prior visit 02/2022 for lumbar radiculopathy    The patient's list of active medical problems, social history, medications, and allergies as documented has been reviewed.     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    Seizures     Spondylosis without  myelopathy 5/8/2013     Past Surgical History:   Procedure Laterality Date    CATARACT EXTRACTION  11-/    od/os    CHOLECYSTECTOMY      EPIDURAL STEROID INJECTION INTO LUMBAR SPINE N/A 11/30/2021    Procedure: Lumbar Epidural Steroid Injection L4-5 (No Sedation);  Surgeon: Sisi Lua Jr., MD;  Location: [facility];  Service: Pain Management;  Laterality: N/A;  Diabetic    EPIDURAL STEROID INJECTION INTO LUMBAR SPINE N/A 1/25/2022    Procedure: Lumbar Epidural Steroid Injection L4-5;  Surgeon: Sisi Lua Jr., MD;  Location: [facility];  Service: Pain Management;  Laterality: N/A;  diabetic     HERNIA REPAIR       Family History   Problem Relation Age of Onset    No Known Problems Mother     No Known Problems Father     Glaucoma Sister     Diabetes Sister     Diabetes Brother     No Known Problems Maternal Aunt     No Known Problems Maternal Uncle     No Known Problems Paternal Aunt     No Known Problems Paternal Uncle     No Known Problems Maternal Grandmother     No Known Problems Maternal Grandfather     No Known Problems Paternal Grandmother     No Known Problems Paternal Grandfather     Amblyopia Neg Hx     Blindness Neg Hx     Cancer Neg Hx     Cataracts Neg Hx     Hypertension Neg Hx     Macular degeneration Neg Hx     Retinal detachment Neg Hx     Strabismus Neg Hx     Stroke Neg Hx     Thyroid disease Neg Hx     Prostate cancer Neg Hx     Kidney disease Neg Hx      Social History     Socioeconomic History    Marital status:    Occupational History    Occupation: retired   Tobacco Use    Smoking status: Never    Smokeless tobacco: Never   Substance and Sexual Activity    Alcohol use: No    Drug use: No     Review of patient's allergies indicates:  No Known Allergies    Review of Systems   Musculoskeletal:  Positive for arthralgias, back pain and myalgias.         Physical Exam:     Initial Vitals [08/28/23 1521]   BP Pulse Resp Temp SpO2   125/70 (!) 115 (!) 21 99.9 °F (37.7  °C) (!) 93 %      MAP       --         Physical Exam    Nursing note and vitals reviewed.  Constitutional: He appears well-developed and well-nourished. He is not diaphoretic. No distress.   HENT:   Head: Normocephalic and atraumatic.   Mouth/Throat: Oropharynx is clear and moist.   Eyes: EOM are normal. Pupils are equal, round, and reactive to light.   Neck: No tracheal deviation present.   Cardiovascular:  Normal rate, regular rhythm, normal heart sounds and intact distal pulses.           Pulmonary/Chest: Breath sounds normal. No stridor. No respiratory distress.   Abdominal: Abdomen is soft. He exhibits no distension and no mass. There is no abdominal tenderness.   Musculoskeletal:         General: No edema. Normal range of motion.      Cervical back: No bony tenderness.      Thoracic back: No bony tenderness.      Lumbar back: No bony tenderness.        Back:       Comments: No deformity. No leg swelling or skin changes.   L lower back muscular tenderness.      Neurological: He is alert and oriented to person, place, and time. He has normal strength. He is not disoriented. No cranial nerve deficit or sensory deficit. He exhibits normal muscle tone. GCS eye subscore is 4. GCS verbal subscore is 5. GCS motor subscore is 6.   Full ROM and symmetric strength to BLE.   Skin: Skin is warm and dry. Capillary refill takes less than 2 seconds. No rash noted.   Psychiatric: He has a normal mood and affect. His behavior is normal. Thought content normal.         ED Course:   Procedures  Medical Decision Making:    Differential Diagnoses:   Based on available information and initial assessment, Differential Diagnosis includes, but is not limited to:  Cauda equina syndrome, diskitis/osteomyelitis, epidural/paraspinal abscess, AAA, aortic dissection, post-op/hardware infection, trauma/vertebral fracture, spinal cord injury, disc herniation, spinal stenosis, sciatica, radiculopathy, neoplasm, lumbar muscle strain, muscle  spasm, neuropathic pain, UTI/pyelonephritis, nephrolithiasis.        EKG:   EKG interpretation by ED attending physician:  Sinus tach, rate 106, no ST changes, no ischemia, normal intervals.  Compared with prior EKG dated 10/2021, grossly stable without significant change.      Labs:     Labs Reviewed   CBC W/ AUTO DIFFERENTIAL - Abnormal; Notable for the following components:       Result Value    RBC 4.42 (*)     Hemoglobin 13.7 (*)     Platelets 146 (*)     Gran % 75.6 (*)     Lymph % 13.3 (*)     All other components within normal limits   COMPREHENSIVE METABOLIC PANEL - Abnormal; Notable for the following components:    Glucose 256 (*)     All other components within normal limits   LACTIC ACID, PLASMA - Abnormal; Notable for the following components:    Lactate (Lactic Acid) 2.4 (*)     All other components within normal limits   URINALYSIS, REFLEX TO URINE CULTURE - Abnormal; Notable for the following components:    Glucose, UA 4+ (*)     All other components within normal limits    Narrative:     Specimen Source->Urine   CULTURE, BLOOD    Narrative:     Aerobic and anaerobic   CULTURE, BLOOD    Narrative:     Aerobic and anaerobic   INFLUENZA A & B BY MOLECULAR   LACTIC ACID, PLASMA   PHOSPHORUS   MAGNESIUM   PROCALCITONIN   URINALYSIS MICROSCOPIC    Narrative:     Specimen Source->Urine     Independent review of the labs ordered include:   See ED course    Imaging:     Imaging Results              X-Ray Chest AP Portable (Final result)  Result time 08/28/23 17:09:38      Final result by Martinez Verma DO (08/28/23 17:09:38)                   Impression:      No acute cardiopulmonary abnormality.      Electronically signed by: Martinez Verma  Date:    08/28/2023  Time:    17:09               Narrative:    EXAMINATION:  XR CHEST AP PORTABLE    CLINICAL HISTORY:  Sepsis;    TECHNIQUE:  Single frontal view of the chest was performed.    COMPARISON:  12/30/2014.    FINDINGS:  The lungs are hypoexpanded and  clear.  No focal opacities are seen.  The pleural spaces are clear.  The cardiac silhouette is unremarkable.  There are calcifications of the aortic arch.  Osseous structures demonstrate degenerative changes.  There are right upper quadrant surgical clips.                                         Medications Given:     Medications   cefTRIAXone (ROCEPHIN) 2 g in dextrose 5 % in water (D5W) 100 mL IVPB (MB+) (0 g Intravenous Stopped 8/28/23 1715)   HYDROcodone-acetaminophen 5-325 mg per tablet 1 tablet (1 tablet Oral Given 8/28/23 1805)   ketorolac injection 15 mg (15 mg Intravenous Given 8/28/23 2051)        Additional Consideration:   Additional testing considered during clinical course: considered back imaging, but due to no neurologic deficit and reassuring labs, imaging not indicated    Social determinants of health considered during development of treatment plan include: poor access to care, language barrier    Current co-morbidities considered which impacted clinical decision making: lumbar degenerative disc disease, scoliosis, lumbar radiculopathy, DM, peripheral neuropathy, HTN, HLD, hypothyroidism    Case discussed with additional provider: none    ED Course as of 08/29/23 1720   Mon Aug 28, 2023   2017 SpO2(!): 93 % [SS]   2017 Resp(!): 21 [SS]   2017 Pulse(!): 115 [SS]   2017 Temp Source: Oral [SS]   2017 Temp: 99.9 °F (37.7 °C) [SS]   2017 BP: 125/70  Initial vitals concerning for low-grade temp and tachycardia.  Sepsis order set utilized. Will obtain infectious evaluation including labs, UA, lactate, blood cultures, CXR.  IVF and broad-spectrum ABX initiated.  Will continue to monitor closely and reassess. [SS]   2018 Influenza A & B by Molecular  Negative  [SS]   2018 CBC auto differential(!)  Unremarkable  [SS]   2018 Comprehensive metabolic panel(!)  Glucose elevated, otherwise unremarkable  [SS]   2018 Phosphorus  WNL [SS]   2018 Magnesium  WNL [SS]   2018 Procalcitonin  WNL, infection unlikely [SS]    2019 Urinalysis, Reflex to Urine Culture Urine, Clean Catch(!)  Inconsistent with UTI [SS]   2019 Lactic acid, plasma #2  Initial lactate mildly elevated, repeat normal after IVF [SS]   2019 Urinalysis Microscopic  WNL [SS]   2019 SpO2: 95 % [SS]   2019 Pulse(!): 111 [SS]   2019 BP: 112/60  Vitals stable on reassessment  [SS]      ED Course User Index  [SS] Shane Bagley MD            Medical Decision Making  89 yo M with L lower back pain for the last week. History of sciatica.  Exam benign, though low grade fever on arrival.  Infectious workup obtained, unremarkable.   No indication for advanced imaging at this time.   Will DC with supportive care. PT referral placed.   Given return precautions including neuro deficits, fever, or any other concerns.     Problems Addressed:  Chronic left-sided low back pain with left-sided sciatica: acute illness or injury  Lumbar radiculopathy: acute illness or injury  Tachycardia: complicated acute illness or injury with systemic symptoms    Amount and/or Complexity of Data Reviewed  Independent Historian: guardian and caregiver  External Data Reviewed: notes.  Labs: ordered. Decision-making details documented in ED Course.  Radiology: ordered and independent interpretation performed.  ECG/medicine tests: ordered and independent interpretation performed.    Risk  OTC drugs.  Prescription drug management.  Diagnosis or treatment significantly limited by social determinants of health.        Clinical Impression:       ICD-10-CM ICD-9-CM   1. Chronic left-sided low back pain with left-sided sciatica  M54.42 724.2    G89.29 724.3     338.29   2. Lumbar radiculopathy  M54.16 724.4   3. Tachycardia  R00.0 785.0       Discharge Medications:  Discharge Medication List as of 8/28/2023  8:43 PM        START taking these medications    Details   LIDOcaine (LIDODERM) 5 % Place 1 patch onto the skin once daily. Remove & Discard patch within 12 hours or as directed by MD for 7 days,  Starting Mon 8/28/2023, Until Mon 9/4/2023, Print      methocarbamoL (ROBAXIN) 500 MG Tab Take 1 tablet (500 mg total) by mouth 3 (three) times daily. for 5 days, Starting Mon 8/28/2023, Until Sat 9/2/2023, Print               Follow-up Information       Follow up With Specialties Details Why Contact Info Additional Information    Zeke Marrufo MD Family Medicine Schedule an appointment as soon as possible for a visit   [address] [facility] [address]  [phone]       [facility] ([facility]) Pain Medicine Schedule an appointment as soon as possible for a visit   [address]  [facility] [address] [phone] Please park in [address] and use Hospital main entrance. Check in at hospital registration.             ED Disposition Condition    Discharge Stable              On re-evaluation, the patient's status has improved.  After complete ED evaluation, clinical impression is most consistent with lumbar radiculopathy.  PCP follow-up as soon as possible was recommended.    After taking into careful account the patient's history, physical exam findings, as well as empirical and objective data obtained throughout ED workup, I feel no emergent medical condition has been identified. No further evaluation or admission was felt to be required, and the patient is stable for discharge from the ED. The patient and any additional family present were updated with test results, overall clinical impression, and recommended further plan of care, including discharge instructions as provided and outpatient follow-up for continued evaluation and management as needed. All questions were answered. The patient expressed understanding and agreed with current plan for discharge and follow-up plan of care. Strict ED return precautions were provided, including return/worsening of current symptoms, new symptoms, or any other concerns.       Shane Bagley MD  08/29/23 1333       Shane Bagley.,  MD  09/05/23 0833

## 2023-08-29 NOTE — ED NOTES
Patient provided with and educated on discharge instructions, prescriptions, and follow up care. Patient verbalized understanding. Advised to return to ED as needed. Stable upon departure and wheeled out of department per this RN.

## 2023-08-29 NOTE — PHARMACY MED REC
"[facility]           Pharmacy  Admission Medication History     The home medication history was taken by Radha Milian.      Medication history obtained from Medications listed below were obtained from: Patient/family    Based on information gathered for medication list, you may go to "Admission" then "Reconcile Home Medications" tabs to review and/or act upon those items.     The home medication list has been updated by the Pharmacy department.   Please read ALL comments highlighted in yellow.   Please address this information as you see fit.    Feel free to contact us if you have any questions or require assistance.        No current facility-administered medications on file prior to encounter.     Current Outpatient Medications on File Prior to Encounter   Medication Sig Dispense Refill    atorvastatin (LIPITOR) 10 MG tablet Take 1 tablet (10 mg total) by mouth once daily. 90 tablet 3    DULoxetine (CYMBALTA) 30 MG capsule Take 1 capsule (30 mg total) by mouth once daily. 90 capsule 3    finasteride (PROSCAR) 5 mg tablet Take 1 tablet (5 mg total) by mouth once daily. 90 tablet 3    gabapentin (NEURONTIN) 800 MG tablet TAKE 1 TABLET BY MOUTH 2 TIMES DAILY. 180 tablet 3    levothyroxine (SYNTHROID) 50 MCG tablet Take 1 tablet (50 mcg total) by mouth before breakfast. 90 tablet 3    losartan (COZAAR) 25 MG tablet Take 1 tablet (25 mg total) by mouth once daily. 90 tablet 3    meloxicam (MOBIC) 15 MG tablet TAKE 1 TABLET BY MOUTH EVERY DAY AS NEEDED FOR PAIN MAY TAKE 1/2 TABLET TWICE DAILY (Patient taking differently: Take 7.5 mg by mouth daily as needed for Pain.  MAY TAKE 1/2 TABLET TWICE DAILY) 30 tablet 2    metFORMIN (GLUCOPHAGE) 1000 MG tablet Take 1 tablet (1,000 mg total) by mouth 2 (two) times daily with meals. Appointment needed for additional refills. 180 tablet 3    omeprazole (PRILOSEC) 20 MG capsule Take 1 capsule (20 mg total) by mouth once daily. 90 capsule 3    rOPINIRole " (REQUIP) 0.25 MG tablet Take 1 tablet (0.25 mg total) by mouth every evening. 90 tablet 3    blood-glucose meter (ACCU-CHEK ARUNA PLUS METER) Misc 1 Units by Misc.(Non-Drug; Combo Route) route once daily. 1 each 0    lancets (ONETOUCH ULTRASOFT LANCETS) Misc 1 lancet by Misc.(Non-Drug; Combo Route) route once daily. 100 each 3    TRUE METRIX GLUCOSE TEST STRIP Strp 1 STRIP BY [facility]NON-DRUG COMBO ROUTE) ROUTE ONCE DAILY. 100 strip 3       Please address this information as you see fit.  Feel free to contact us if you have any questions or require assistance.    Radha Milian  [phone]                  .

## 2023-08-29 NOTE — DISCHARGE INSTRUCTIONS
Thank you for choosing [facility]!     Our goal in the Emergency Department is to always provide outstanding medical care. You may receive a survey by mail or e-mail in the next week regarding your experience today. We would greatly appreciate you completing and returning the survey. Your feedback provides us with a way to recognize our staff who provide very good care, and it helps us learn how to improve when your experience was below our aspiration of excellence.      It is important to remember that some problems are difficult to diagnose and may not be found during your first visit. Be sure to follow up with your primary care doctor and review any labs/imaging that was performed during your visit with them. If you do not have a primary care doctor, you may contact the one listed on your discharge paperwork, or you may also call the [facility] Appointment Desk at [phone] to schedule an appointment.     All medications may potentially have side effects and it is impossible to predict which medications may give you side effects. If you feel that you are having a negative effect of any medication you should immediately stop taking them and seek medical attention.  Do not drive or make any important decisions for 24 hours if you have received any pain medications, sedatives or mood altering drugs during your ER visit.    We appreciate you trusting us with your medical care. We will be happy to take care of you for all of your future medical needs. You may return to the ER at any time for any new/concerning symptoms, worsening condition, or failure to improve. We hope you feel better soon.     Sincerely,    Shane Bagely Jr., MD  Board-Certified Emergency Medicine Physician  [facility]

## 2023-08-30 ENCOUNTER — OFFICE VISIT (OUTPATIENT)
Dept: PAIN MEDICINE | Facility: CLINIC | Age: 88
End: 2023-08-30
Payer: MEDICARE

## 2023-08-30 VITALS
BODY MASS INDEX: 21.01 KG/M2 | HEART RATE: 89 BPM | SYSTOLIC BLOOD PRESSURE: 119 MMHG | DIASTOLIC BLOOD PRESSURE: 71 MMHG | WEIGHT: 114.88 LBS

## 2023-08-30 DIAGNOSIS — E11.65 TYPE 2 DIABETES MELLITUS WITH HYPERGLYCEMIA, WITHOUT LONG-TERM CURRENT USE OF INSULIN: ICD-10-CM

## 2023-08-30 DIAGNOSIS — G89.29 CHRONIC LEFT-SIDED LOW BACK PAIN WITH LEFT-SIDED SCIATICA: ICD-10-CM

## 2023-08-30 DIAGNOSIS — M47.819 OSTEOARTHRITIS OF SPINE WITHOUT MYELOPATHY OR RADICULOPATHY, UNSPECIFIED SPINAL REGION: Primary | ICD-10-CM

## 2023-08-30 DIAGNOSIS — M54.16 LUMBAR RADICULOPATHY: ICD-10-CM

## 2023-08-30 DIAGNOSIS — M54.42 CHRONIC LEFT-SIDED LOW BACK PAIN WITH LEFT-SIDED SCIATICA: ICD-10-CM

## 2023-08-30 PROCEDURE — 1125F PR PAIN SEVERITY QUANTIFIED, PAIN PRESENT: ICD-10-PCS | Mod: HCNC,CPTII,S$GLB, | Performed by: STUDENT IN AN ORGANIZED HEALTH CARE EDUCATION/TRAINING PROGRAM

## 2023-08-30 PROCEDURE — 1160F RVW MEDS BY RX/DR IN RCRD: CPT | Mod: HCNC,CPTII,S$GLB, | Performed by: STUDENT IN AN ORGANIZED HEALTH CARE EDUCATION/TRAINING PROGRAM

## 2023-08-30 PROCEDURE — 99999 PR PBB SHADOW E&M-EST. PATIENT-LVL V: CPT | Mod: PBBFAC,HCNC,, | Performed by: STUDENT IN AN ORGANIZED HEALTH CARE EDUCATION/TRAINING PROGRAM

## 2023-08-30 PROCEDURE — 1160F PR REVIEW ALL MEDS BY PRESCRIBER/CLIN PHARMACIST DOCUMENTED: ICD-10-PCS | Mod: HCNC,CPTII,S$GLB, | Performed by: STUDENT IN AN ORGANIZED HEALTH CARE EDUCATION/TRAINING PROGRAM

## 2023-08-30 PROCEDURE — 1159F MED LIST DOCD IN RCRD: CPT | Mod: HCNC,CPTII,S$GLB, | Performed by: STUDENT IN AN ORGANIZED HEALTH CARE EDUCATION/TRAINING PROGRAM

## 2023-08-30 PROCEDURE — 1159F PR MEDICATION LIST DOCUMENTED IN MEDICAL RECORD: ICD-10-PCS | Mod: HCNC,CPTII,S$GLB, | Performed by: STUDENT IN AN ORGANIZED HEALTH CARE EDUCATION/TRAINING PROGRAM

## 2023-08-30 PROCEDURE — 99214 OFFICE O/P EST MOD 30 MIN: CPT | Mod: HCNC,S$GLB,, | Performed by: STUDENT IN AN ORGANIZED HEALTH CARE EDUCATION/TRAINING PROGRAM

## 2023-08-30 PROCEDURE — 1125F AMNT PAIN NOTED PAIN PRSNT: CPT | Mod: HCNC,CPTII,S$GLB, | Performed by: STUDENT IN AN ORGANIZED HEALTH CARE EDUCATION/TRAINING PROGRAM

## 2023-08-30 PROCEDURE — 99999 PR PBB SHADOW E&M-EST. PATIENT-LVL V: ICD-10-PCS | Mod: PBBFAC,HCNC,, | Performed by: STUDENT IN AN ORGANIZED HEALTH CARE EDUCATION/TRAINING PROGRAM

## 2023-08-30 PROCEDURE — 99214 PR OFFICE/OUTPT VISIT, EST, LEVL IV, 30-39 MIN: ICD-10-PCS | Mod: HCNC,S$GLB,, | Performed by: STUDENT IN AN ORGANIZED HEALTH CARE EDUCATION/TRAINING PROGRAM

## 2023-08-30 RX ORDER — METHOCARBAMOL 500 MG/1
500 TABLET, FILM COATED ORAL 3 TIMES DAILY PRN
Qty: 90 TABLET | Refills: 2 | Status: SHIPPED | OUTPATIENT
Start: 2023-08-30 | End: 2023-10-16

## 2023-08-30 RX ORDER — DEXTROSE 4 G
1 TABLET,CHEWABLE ORAL DAILY
Qty: 1 EACH | Refills: 0 | Status: SHIPPED | OUTPATIENT
Start: 2023-08-30 | End: 2024-08-29

## 2023-08-30 RX ORDER — METHOCARBAMOL 500 MG/1
500 TABLET, FILM COATED ORAL 3 TIMES DAILY PRN
Qty: 15 TABLET | Refills: 0 | Status: SHIPPED | OUTPATIENT
Start: 2023-08-30 | End: 2023-08-30

## 2023-08-30 NOTE — PROGRESS NOTES
[facility] established clinic visit    Referred by: Dr. Shane Bagley   Reason for referral: Chronic left-sided low back pain with left-sided sciatica  Lumbar radiculopathy     CC:   Chief Complaint   Patient presents with    Low-back Pain       Interval updates:   8/30/2023 - Mr. Herbert returns to clinic today for severe back pain. He continues to have have pain in the back, which radiates down the left leg, across his left knee, and down to his foot. His pain is worse when he stands and walks, and was so unbearable that he had to present to the ED Wednesday. He was given 1 pill of norco in the ED and was sent home with a short course of robaxin and a lidocaine patch. He had meloxicam at home which he had already.  He has had injections in the lower back, but he doesn't remember them well.  His son, who speaks on his behalf, states that he has not complained of back/leg pain until this month.  He admits to home exercises (walking) and physical therapy (home therapy years ago).    02/08/2022 -  Keon returns to clinic s/p Lumbar Epidural Steroid Injection at L4-5 on 01/25/22 with 0% relief.  He reports a pain intensity 7/10 today with a weekly range of 7-/10.  The pain is described as Aching.  Pain is worsened by: nothing in particular and improved by: nothing.  Mr. Herbert reports continued pain in his left lower extremity, pain is described as tingling pain.  He has attempted x2 lumbar DANIEL has that did not provide him with any relief.  Denies any profound weakness, denies any bowel bladder dysfunction, denies any recent incident or trauma denies any saddle anesthesia.    01/05/2022 -Keon returns to clinic s/p Lumbar Epidural Steroid Injection at L4-5 on 1/5/22 with 70% relief.  He reports a pain intensity 5/10 today with a weekly range of 5-5/10.    Additionally he did report continued pain in the left lower extremity however his overall relief from the injection was very  good.    Subjective:   Kang Herbert is a 88 y.o. male who has a past medical history of Cataract, Diabetes mellitus, Diabetes mellitus, type 2, Hyperlipidemia, Hypertension, Hypertropia of left eye, Open angle with borderline findings and low glaucoma risk in both eyes, Seizures, and Spondylosis without myelopathy. He complains of pain as described below.    Location: low back   Onset: years  Radiation: bilateral legs, left is worse  Timing: intermittent  Current Pain Score: 5/10  Weekly Pain Range: 4-10/10  Quality: Aching, Tingling and Numb  Worsened by: walking for more than several minutes  Improved by: rest    Previous Therapies:  PT/OT: Yes, previously  HEP: not currently  TENS:     Interventions:   -11/30/2021 Lumbar Epidural Steroid Injection at L4-5  - 1/25/2022 Lumbar  Epidural Steroid Injection at L4-5 - no relief    Hx of LESI in 2012 with excellent relief for pain shooting down leg at that time  Surgery: None for spine  Opioids: None  Adjuvants: None    Current Pain Medications:  Gabapentin 800 b.i.d.  Mobic 15 daily  Robaxin 500mg three times a day    Assessment & Plan:  Problem List Items Addressed This Visit          Neuro    Lumbar radiculopathy       Orthopedic    Lumbago     11/22/21 - Kang Herbert is a 88 y.o. male who  has a past medical history of Cataract, Diabetes mellitus, Diabetes mellitus, type 2, Hyperlipidemia, Hypertension, Hypertropia of left eye (6/22/2016), Open angle with borderline findings and low glaucoma risk in both eyes (9/9/2014), Seizures, and Spondylosis without myelopathy (5/8/2013).  By history and examination this patient has chronic low back pain with radiculopathy.  The underlying cause cause is degenerative disc disease and central canal stenosis.  Pathology is confirmed by imaging.  We discussed the underlying diagnoses and multiple treatment options including non-opioid medications, injections and physical therapy.  He is currently taking 2 good  medications for this condition and should continue both- gabapentin and mobic.  The risks and benefits of each treatment option were discussed and all questions were answered.  I think it would be best to try an epidural steroid injection before adding on new medications, though I did explain that central canal stenosis cannot be cured by a epidural, only managed.      1/5/202- Kang Herbert is a 88 y.o. male who  has a past medical history of Cataract, Diabetes mellitus, Diabetes mellitus, type 2, Hyperlipidemia, Hypertension, Hypertropia of left eye (6/22/2016), Open angle with borderline findings and low glaucoma risk in both eyes (9/9/2014), Seizures, and Spondylosis without myelopathy (5/8/2013).  By history and examination this patient has chronic low back pain with radiculopathy.  The underlying cause cause is degenerative disc disease and central canal stenosis.  Pathology is confirmed by imaging.  We discussed the underlying diagnoses and multiple treatment options including non-opioid medications, interventional procedures and physical therapy.  The risks and benefits of each treatment option were discussed and all questions were answered.    Patient did receive 70% from L4-5 LESI he is still having pain in his left LE likely caused by his central canal stenosis. He continues on Gabapentin per his PCP recommended he continued, additionally he has not been taking Mobic for flare ups, I will recommend restarting Mobic    02/08/2022-Kang Herbert is a 88 y.o. male who  has a past medical history of Cataract, Diabetes mellitus, Diabetes mellitus, type 2, Hyperlipidemia, Hypertension, Hypertropia of left eye (6/22/2016), Open angle with borderline findings and low glaucoma risk in both eyes (9/9/2014), Seizures, and Spondylosis without myelopathy (5/8/2013).  By history and examination this patient has chronic Left LE  Radiculopathy. Pain begins at the knee and radiates down into his left foot.  The  underlying cause cause is degenerative disc disease, foraminal stenosis and central canal stenosis.  Pathology is confirmed by imaging.  We discussed the underlying diagnoses and multiple treatment options including non-opioid medications, interventional procedures, physical therapy, home exercise and activity modification.  The risks and benefits of each treatment option were discussed and all questions were answered.      Mr. Herbert's pain has been refractory to multiple injections and medications, I recommended we do not repeat any more pain interventions at this time I discussed with him via the  that we will consider optimizing his medications for example starting him on Cymbalta.  He is currently taking Lexapro I discussed with the Keon that I will need to contact his PCP to further discuss discontinuation  Lexapro and possibly starting Cymbalta.    8/30/2023 - Mr. Herbert presents today with chronic low back pain.  He was previously under the care of Dr. Lua, and his last L4/5 epidural injection was in January of 2022.  At his follow-up appointment the following month in February, he reported no relief from his pain.  He was told to continue medication management and consider physical therapy.  Per the family, since that time, he has not complained of significant back pain or leg pain until this past month.  From their reports, it appears that the epidural injection that he had previously did give him good relief.  The family wishes to repeat the injection that he had last time.    Refill given for robaxin 500mg three times a day as needed for muscular pain  I have stressed the importance of physical activity and a home exercise plan to help with pain and improve health.  I provided Mr. Herbert and his son with the hospitals [facility] program to aid in his home exercise program.  Patient can continue with medications for now since they are providing benefits, using them appropriately, and  without side effects.  Schedule for L4/5 (left directed) lumbar epidural steroid injection  Counseled patient regarding the importance of activity modification and physical therapy.    Follow Up: after epidural steroid injection    Miquel Clinton MD  [facility]      Disclaimer: This note was partly generated using dictation software which may occasionally result in transcription errors.    [email or web address]  MRI Lumbar Spine Without Contrast  FINDINGS:  Alignment: Mild lumbar levoscoliosis.  Minimal grade 1 retrolisthesis of L1 on L2 and L2 on L3.  Vertebrae: Degenerative endplate changes are seen throughout the lumbar spine.  No fracture or marrow infiltrative process.  Discs: Multilevel disc height loss and desiccation, most severe at T12-L1 through to L3-L4. no evidence for discitis.  Cord: Normal.  Conus terminates at L1.  Degenerative findings:  T12-L1: Circumferential disc bulge and moderate facet arthropathy result in mild right neural foraminal narrowing.  L1-L2: Circumferential disc bulge and mild facet arthropathy result in mild spinal canal stenosis and moderate bilateral neural foraminal narrowing.  L2-L3: Circumferential disc bulge and mild facet arthropathy result in mild spinal canal stenosis and moderate bilateral neural foraminal narrowing.  L3-L4: Circumferential disc bulge and mild facet arthropathy result in mild effacement of the thecal sac and mild right neural foraminal narrowing.  L4-L5: Circumferential disc bulge and moderate facet arthropathy result in severe left neural foraminal narrowing.  L5-S1: No spinal canal stenosis or neural foraminal narrowing.  Paraspinal muscles & soft tissues: Partially imaged left renal cyst.  Impression:  Multilevel degenerative changes of the lumbar spine detailed above.  Mild spinal canal stenosis noted at L1-L4.  Moderate to severe neural foraminal narrowing noted at L1-L3 and L4-L5.      Review of Systems:  Review of Systems    Constitutional:  Negative for chills and fever.   HENT:  Negative for nosebleeds.    Eyes:  Negative for blurred vision and pain.   Respiratory:  Negative for hemoptysis.    Cardiovascular:  Negative for chest pain and palpitations.   Gastrointestinal:  Negative for heartburn, nausea and vomiting.   Genitourinary:  Negative for dysuria and hematuria.   Musculoskeletal:  Positive for back pain and joint pain. Negative for falls and myalgias.   Skin:  Negative for rash.   Neurological:  Negative for dizziness, seizures and loss of consciousness.   Endo/Heme/Allergies:  Does not bruise/bleed easily.   Psychiatric/Behavioral:  Negative for hallucinations.        Physical Exam:  Vitals:    08/30/23 1542   Weight: 52.1 kg (114 lb 13.8 oz)     General    Nursing note and vitals reviewed.  Constitutional: He is oriented to person, place, and time. He appears well-developed and well-nourished. No distress.   HENT:   Head: Normocephalic and atraumatic.   Nose: Nose normal.   Eyes: Conjunctivae and EOM are normal. Pupils are equal, round, and reactive to light. Right eye exhibits no discharge. Left eye exhibits no discharge. No scleral icterus.   Neck: No JVD present.   Cardiovascular:  Intact distal pulses.            Pulmonary/Chest: Effort normal. No respiratory distress.   Abdominal: He exhibits no distension.   Neurological: He is alert and oriented to person, place, and time. Coordination normal.   Psychiatric: He has a normal mood and affect. His behavior is normal. Judgment and thought content normal.     General Musculoskeletal Exam   Gait: normal     Back (L-Spine & T-Spine) / Neck (C-Spine) Exam     Tenderness Right paramedian tenderness of the Lower L-Spine. Left paramedian tenderness of the Lower L-Spine.     Back (L-Spine & T-Spine) Range of Motion   Back extension: facet loading is positive and exacerabtes/reproduces the patient's typical low back pain    Back flexion: limited ROM but partial relief of low  back pain noted.     Spinal Sensation   Right Side Sensation  L-Spine Level: normal  Left Side Sensation  L-Spine Level: normal    Other   He has no scoliosis .      Muscle Strength   Right Lower Extremity   Hip Flexion: 5/5   Hip Extensors: 5/5  Quadriceps:  5/5   Hamstrin/5   Gastrocsoleus:  5/5   Left Lower Extremity   Hip Flexion: 5/5   Hip Extensors: 5/5  Quadriceps:  5/5   Hamstrin/5   Gastrocsoleus:  5/5     Reflexes     Left Side  Achilles:  2+  Quadriceps:  2+    Right Side   Achilles:  2+  Quadriceps:  2+

## 2023-08-30 NOTE — TELEPHONE ENCOUNTER
No care due was identified.  Health [facility] Embedded Care Due Messages. Reference number: [id number].   8/30/2023 9:55:42 AM CDT

## 2023-09-02 LAB
BACTERIA BLD CULT: NORMAL
BACTERIA BLD CULT: NORMAL

## 2023-09-07 ENCOUNTER — OFFICE VISIT (OUTPATIENT)
Dept: FAMILY MEDICINE | Facility: CLINIC | Age: 88
End: 2023-09-07
Payer: MEDICARE

## 2023-09-07 VITALS
HEART RATE: 87 BPM | HEIGHT: 62 IN | BODY MASS INDEX: 20.53 KG/M2 | DIASTOLIC BLOOD PRESSURE: 60 MMHG | WEIGHT: 111.56 LBS | OXYGEN SATURATION: 96 % | SYSTOLIC BLOOD PRESSURE: 112 MMHG

## 2023-09-07 DIAGNOSIS — E11.43 TYPE 2 DIABETES MELLITUS WITH DIABETIC AUTONOMIC NEUROPATHY, WITHOUT LONG-TERM CURRENT USE OF INSULIN: ICD-10-CM

## 2023-09-07 DIAGNOSIS — M54.41 CHRONIC BILATERAL LOW BACK PAIN WITH BILATERAL SCIATICA: Primary | ICD-10-CM

## 2023-09-07 DIAGNOSIS — G89.29 CHRONIC BILATERAL LOW BACK PAIN WITH BILATERAL SCIATICA: Primary | ICD-10-CM

## 2023-09-07 DIAGNOSIS — M54.42 CHRONIC BILATERAL LOW BACK PAIN WITH BILATERAL SCIATICA: Primary | ICD-10-CM

## 2023-09-07 PROCEDURE — 1160F RVW MEDS BY RX/DR IN RCRD: CPT | Mod: HCNC,CPTII,S$GLB, | Performed by: NURSE PRACTITIONER

## 2023-09-07 PROCEDURE — 99999 PR PBB SHADOW E&M-EST. PATIENT-LVL V: CPT | Mod: PBBFAC,HCNC,, | Performed by: NURSE PRACTITIONER

## 2023-09-07 PROCEDURE — 1125F PR PAIN SEVERITY QUANTIFIED, PAIN PRESENT: ICD-10-PCS | Mod: HCNC,CPTII,S$GLB, | Performed by: NURSE PRACTITIONER

## 2023-09-07 PROCEDURE — 1160F PR REVIEW ALL MEDS BY PRESCRIBER/CLIN PHARMACIST DOCUMENTED: ICD-10-PCS | Mod: HCNC,CPTII,S$GLB, | Performed by: NURSE PRACTITIONER

## 2023-09-07 PROCEDURE — 1159F MED LIST DOCD IN RCRD: CPT | Mod: HCNC,CPTII,S$GLB, | Performed by: NURSE PRACTITIONER

## 2023-09-07 PROCEDURE — 99499 UNLISTED E&M SERVICE: CPT | Mod: HCNC,S$GLB,, | Performed by: NURSE PRACTITIONER

## 2023-09-07 PROCEDURE — 1159F PR MEDICATION LIST DOCUMENTED IN MEDICAL RECORD: ICD-10-PCS | Mod: HCNC,CPTII,S$GLB, | Performed by: NURSE PRACTITIONER

## 2023-09-07 PROCEDURE — 1125F AMNT PAIN NOTED PAIN PRSNT: CPT | Mod: HCNC,CPTII,S$GLB, | Performed by: NURSE PRACTITIONER

## 2023-09-07 PROCEDURE — 99214 OFFICE O/P EST MOD 30 MIN: CPT | Mod: HCNC,S$GLB,, | Performed by: NURSE PRACTITIONER

## 2023-09-07 PROCEDURE — 99214 PR OFFICE/OUTPT VISIT, EST, LEVL IV, 30-39 MIN: ICD-10-PCS | Mod: HCNC,S$GLB,, | Performed by: NURSE PRACTITIONER

## 2023-09-07 PROCEDURE — 99999 PR PBB SHADOW E&M-EST. PATIENT-LVL V: ICD-10-PCS | Mod: PBBFAC,HCNC,, | Performed by: NURSE PRACTITIONER

## 2023-09-07 NOTE — PROGRESS NOTES
Subjective     Patient ID: Kang Herbert is a 88 y.o. male.    Chief Complaint: Follow-up    This is a pleasant 89 yo  male patient of Dr. Pritchett who is new to me. He presents today accompanied by his wife and daughter with c/o back pain. PMH includes    Patient Active Problem List:     DDD (degenerative disc disease), lumbar     Lumbago     Degeneration of cervical intervertebral disc     DJD (degenerative joint disease) of cervical spine     Acquired scoliosis     Lumbar radiculopathy     DM type 2 without retinopathy     Aortic atherosclerosis     Type 2 diabetes mellitus with diabetic autonomic neuropathy, without long-term current use of insulin     Diabetic peripheral neuropathy associated with type 2 diabetes mellitus     Essential hypertension     Mixed dyslipidemia     Acquired hypothyroidism     Glaucoma suspect of both eyes     Status post cataract extraction and insertion of intraocular lens     Insufficiency of tear film of both eyes     Hypertropia of left eye     Binocular vision disorder with diplopia     Rash and nonspecific skin eruption     Hypertension associated with diabetes     Partial symptomatic epilepsy with complex partial seizures, not intractable, without status epilepticus     Vascular claudication     Chronic back pain greater than 3 months duration     Spinal stenosis of lumbar region with neurogenic claudication     Vitreous degeneration of both eyes     Capsular bag distension syndrome     Pseudophakia of both eyes    VSS today. Reports he has been suffering with worsening lower back pain, L>R. Typically able to tolerate pain but has gotten worse. Pain is radiating down both legs with intermittent numbness/tingling. Denies weakness, swelling or discoloration. Was evaluated in ED for severe back pain on 8/28/23. Was given Toradol IM and hydrocodone that offered relief; sent home with rx muscle relaxant and lidocaine patch. Was then evaluated by Pain Medicine on 8/30 and  is scheduled to have epidural injection. Patient agrees to trying PT and requesting referral today. Pt is unable to leave the home to go to PT as he cares for his wife who suffers with dementia. Requesting referral for  PT.     Also due for annual eye exam; referral requested.      Review of Systems   Musculoskeletal:  Positive for back pain and leg pain.   Neurological:  Positive for numbness (ang tingling).   Otherwise negative       Objective     Physical Exam  Vitals reviewed.   Constitutional:       General: He is not in acute distress.     Appearance: Normal appearance. He is well-developed and well-groomed.   HENT:      Head: Normocephalic.   Eyes:      General:         Right eye: No discharge.         Left eye: No discharge.   Neck:      Vascular: No carotid bruit.   Cardiovascular:      Rate and Rhythm: Normal rate and regular rhythm.      Heart sounds: No murmur heard.  Pulmonary:      Effort: Pulmonary effort is normal. No respiratory distress.      Breath sounds: No wheezing or rhonchi.   Musculoskeletal:      Lumbar back: Spasms and tenderness present.        Back:       Right lower leg: No edema.      Left lower leg: No edema.      Comments: Pain and TTP to area outlined in red, L>R   Skin:     General: Skin is warm and dry.      Coloration: Skin is not pale.   Neurological:      Mental Status: He is alert and oriented to person, place, and time.      Coordination: Coordination normal.      Gait: Gait abnormal (slightly slowed but stable).   Psychiatric:         Attention and Perception: Attention normal.         Mood and Affect: Mood and affect normal.         Speech: Speech normal.         Behavior: Behavior normal. Behavior is cooperative.            Assessment and Plan     1. Chronic bilateral low back pain with bilateral sciatica  -     Ambulatory referral/consult to Home Health; Future; Expected date: 09/08/2023    2. Type 2 diabetes mellitus with diabetic autonomic neuropathy, without  long-term current use of insulin  -     Ambulatory referral/consult to Optometry; Future; Expected date: 09/14/2023        - Avoid heavy lifting or strenuous activity  - Can try alternating ice and heating pad to site  - Instructed on proper use of rx medications and notified of possible side effects  - Referral to HH placed today  - Follow up with Pain Medicine and Optometry         Follow up if symptoms worsen or fail to improve.            I spent a total of 30 minutes on the day of the visit.  This includes face to face time and non-face to face time preparing to see the patient (eg, review of tests), obtaining and/or reviewing separately obtained history, documenting clinical information in the electronic or other health record, independently interpreting results and communicating results to the patient/family/caregiver, or care coordinator.

## 2023-09-08 DIAGNOSIS — E03.9 HYPOTHYROIDISM, UNSPECIFIED TYPE: ICD-10-CM

## 2023-09-08 NOTE — TELEPHONE ENCOUNTER
Refill Routing Note   Medication(s) are not appropriate for processing by [facility] for the following reason(s):      Patient seen in ED/Hospital since LOV with provider  Required labs outdated    [facility] action(s):  Defer Care Due:  None identified              Appointments  past 12m or future 3m with PCP    Date Provider   Last Visit   2/14/2023 Zeke Marrufo MD   Next Visit   10/16/2023 Zeke Marrufo MD   ED visits in past 90 days: 1        Note composed:11:54 AM 09/08/2023

## 2023-09-08 NOTE — TELEPHONE ENCOUNTER
----- Message from Nohemi Dykes sent at 9/8/2023 10:48 AM CDT -----  Contact: Tammi  Type:  Needs Medical Advice    Who Called: [facility]   Symptoms (please be specific): discuss home health admit    Would the patient rather a call back or a response via [facility]? Call   Best Call Back Number: [phone]  Additional Information:

## 2023-09-08 NOTE — TELEPHONE ENCOUNTER
Spoke to Tammi--[facility], pt is independent and home health will not cover just for pain so he will not be admitted to home health

## 2023-09-08 NOTE — TELEPHONE ENCOUNTER
No care due was identified.  Health [facility] Embedded Care Due Messages. Reference number: [id number].   9/08/2023 1:45:33 AM CDT

## 2023-09-11 RX ORDER — LEVOTHYROXINE SODIUM 50 UG/1
50 TABLET ORAL
Qty: 90 TABLET | Refills: 3 | Status: SHIPPED | OUTPATIENT
Start: 2023-09-11

## 2023-09-12 ENCOUNTER — TELEPHONE (OUTPATIENT)
Dept: OPTOMETRY | Facility: CLINIC | Age: 88
End: 2023-09-12
Payer: MEDICARE

## 2023-09-12 NOTE — TELEPHONE ENCOUNTER
Spoke with pt's daughter to inform her that Dr. Loya next available will be 12/19 for both parents. She will keep appt with Dr. Osullivan.

## 2023-09-12 NOTE — TELEPHONE ENCOUNTER
----- Message from Porsche Ling sent at 9/11/2023  5:06 PM CDT -----  Good afternoon  Patient ep with Dr Loya.  On my end I was not able to find anything before April.  Patient does not want to wait so I scheduled him with Dr Osullivan in December. If you can offer him  something before December 13 for him and his wife Ms Trinidad Herbert MRN  [id number] he would prefer to see Dr Loya and they usually go together.  Please assist patient  Thanks    Porsche

## 2023-09-13 ENCOUNTER — TELEPHONE (OUTPATIENT)
Dept: FAMILY MEDICINE | Facility: CLINIC | Age: 88
End: 2023-09-13
Payer: MEDICARE

## 2023-09-13 DIAGNOSIS — M51.36 DDD (DEGENERATIVE DISC DISEASE), LUMBAR: Primary | ICD-10-CM

## 2023-09-13 NOTE — TELEPHONE ENCOUNTER
----- Message from Jalen Rojo sent at 9/13/2023  9:03 AM CDT -----    Type:  Patient Requesting Referral    Who Called: pt's daughter  Does the patient already have the specialty appointment scheduled?: no  If yes, what is the date of that appointment?:  Referral to What Specialty: Physical Therapy (close to [facility])  Reason for Referral: pt was denied home health  Does the patient want the referral with a specific physician?: no  Is the specialist an [facility]?:   Patient Requesting a Response?: yes  Would the patient rather a call back or a response via [facility]?  call  Best Call Back Number: [phone]  Additional Information:

## 2023-09-13 NOTE — TELEPHONE ENCOUNTER
Daughter is requesting outpt phys therapy for dad for his sciatic nerve pain, he was denied home health because he need not qualify

## 2023-09-15 ENCOUNTER — TELEPHONE (OUTPATIENT)
Dept: ADMINISTRATIVE | Facility: CLINIC | Age: 88
End: 2023-09-15
Payer: MEDICARE

## 2023-09-15 NOTE — TELEPHONE ENCOUNTER
Called pt, informed pt's daughter I was calling to remind pt of his in office EAWV on 9/18/23; clinic location provided; pt's daughter confirmed appointment.

## 2023-09-18 DIAGNOSIS — E11.9 TYPE 2 DIABETES MELLITUS WITHOUT COMPLICATION, WITHOUT LONG-TERM CURRENT USE OF INSULIN: ICD-10-CM

## 2023-09-18 RX ORDER — METFORMIN HYDROCHLORIDE 1000 MG/1
1000 TABLET ORAL 2 TIMES DAILY WITH MEALS
Qty: 180 TABLET | Refills: 3 | Status: SHIPPED | OUTPATIENT
Start: 2023-09-18

## 2023-09-18 NOTE — TELEPHONE ENCOUNTER
No care due was identified.  [facility] Embedded Care Due Messages. Reference number: [id number].   9/18/2023 4:46:45 PM CDT

## 2023-09-19 ENCOUNTER — CLINICAL SUPPORT (OUTPATIENT)
Dept: REHABILITATION | Facility: HOSPITAL | Age: 88
End: 2023-09-19
Payer: MEDICARE

## 2023-09-19 DIAGNOSIS — M54.41 CHRONIC BILATERAL LOW BACK PAIN WITH RIGHT-SIDED SCIATICA: ICD-10-CM

## 2023-09-19 DIAGNOSIS — R53.1 DECREASED STRENGTH: ICD-10-CM

## 2023-09-19 DIAGNOSIS — M53.86 DECREASED ROM OF LUMBAR SPINE: ICD-10-CM

## 2023-09-19 DIAGNOSIS — G89.29 CHRONIC BILATERAL LOW BACK PAIN WITH RIGHT-SIDED SCIATICA: ICD-10-CM

## 2023-09-19 PROCEDURE — 97530 THERAPEUTIC ACTIVITIES: CPT | Mod: HCNC,PN

## 2023-09-19 PROCEDURE — 97162 PT EVAL MOD COMPLEX 30 MIN: CPT | Mod: HCNC,PN

## 2023-09-19 NOTE — PLAN OF CARE
--CONSULTATION:



REASON FOR CONSULT: DEMENTIA



CONSULTING PHYSICIAN: JOSE L PIPER



ORDERED BY: KIMBERLY



SPOKE WITH MARCELL



[phone]



FELECIA FELTON IS ON CALL







[facility]   Physical Therapy Initial Evaluation      Name: Kang Herbert  [facility] Number: [id number]    Therapy Diagnosis:   Encounter Diagnoses   Name Primary?    Chronic bilateral low back pain with right-sided sciatica     Decreased ROM of lumbar spine     Decreased strength         Physician: Zeke Marrufo*    Physician Orders: PT Eval and Treat   Medical Diagnosis from Referral: DDD (degenerative disc disease), lumbar [M51.36]  Evaluation Date: 9/19/2023  Authorization Period Expiration: 12/31/2023  Plan of Care Expiration: 10/20/2023  Progress Note Due: 10/20/2023  Visit # / Visits authorized: 1/1   FOTO: 1/3    Precautions: Standard; type 2 diabetes and hypertension; history of seizures  Patient's wife afraid to be alone - requests to be with patient during visits.    Time In: 1111  Time Out: 1200  Total Billable Time: 49 minutes    Subjective     Date of onset: Chronic    History of current condition - [place] reports: chronic history of low back and right lower lower pain. Patient reports pain recently worsened a month ago resulting in ED visit, at which he received an injection. Back and right lower leg pain resolved from the injection. Patient currently experiencing right hip pain. Patient later reports current sacral versus right hip pain. Patient also reports right lower leg pain towards the end of the evaluation although he does not confirm current presence upon follow up questioning. Patient denies history of cancer, pain with rest, recent weight loss.    Falls: None    Imaging: US lower extremity 4/2023: Lower extremity peripheral vascular disease with velocity parameters as above.  No abnormal velocity doubling to suggest hemodynamically significant stenosis.  Nearby collaterals noted at the bilateral infrapopliteal vessels with limited visualization of the distal left anterior tibial artery, possibly diminutive or occluded.    MRI Lumbar Spine 10/2021:  Multilevel degenerative changes of the lumbar spine detailed above.  Mild spinal canal stenosis noted at L1-L4.  Moderate to severe neural foraminal narrowing noted at L1-L3 and L4-L5.    Prior Therapy: In 2021 without change in radicular lower leg pain  Social History: Patient lives with his wife  Occupation: Retired  Prior Level of Function: Independent  Current Level of Function: Independent. Pain occasionally waking patient 1-4 times a night    Pain:  Current 7-8/10, worst 8/10, best 5-6/10   Location: Right hip  Description: Sharp  Aggravating Factors: At night. Prolonged sitting.  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    Medications:   [place] has a current medication list which includes the following prescription(s): atorvastatin, blood-glucose meter, duloxetine, finasteride, gabapentin, lancets, levothyroxine, losartan, meloxicam, metformin, methocarbamol, omeprazole, ropinirole, and true metrix glucose test strip.    Allergies:   Review of patient's allergies indicates:  No Known Allergies     Objective      Posture: Right thoracic curve, left lumbar curve, elevated right iliac crest. Flattening of lumbar lordosis    Palpation: Tenderness to bilateral QL     Lumbar Active range of motion  Pain/dysfunction with movement:    Flexion 34    Extension 15 Assistance from ther   Right side bending 14    Left side bending 16    Right rotation 50% Measured in sitting   Left rotation 50% Measured in sitting     Lower extremity active range of motion:   Hip: Grossly within functional limits bilateral   Knee: Within normal limits bilateral     Lower extremity manual muscle tests  Right Left Pain/dysfunction with movement   Hip flexion 4+/5 4/5    Hip extension 4/5 4/5    Hip abduction 4/5 4/5    Hip adduction 4/5 4-/5    Hip internal rotation 4+/5 4/5    Hip external rotation 5/5 4+/5    Knee flexion 5/5 5/5    Knee extension 5/5 5/5    Ankle dorsiflexion 4+/5 4/5    Ankle plantarflexion 5/5 5/5    Ankle inversion 5/5 5/5    Ankle eversion 5/5 5/5      Gait analysis:  Deviations: Decreased trunk rotation and arm swing. Decreased hip and knee flexion in swing    Intake Outcome Measure for FOTO Lumbar Spine Survey    Therapist reviewed FOTO scores for Kang Herbert on 9/19/2023.   FOTO report - see Media section or FOTO account episode details.    Intake Score: 50%     Treatment     Total Treatment time (time-based codes) separate from Evaluation: 15 minutes     [place] received the treatments listed below:      therapeutic activities to improve functional performance for 15  minutes, including:    Wall push ups: 3x10  Sit<>stands: 3x10 without upper extremity support  Walking for program initiation: Reviewed for home exercise program: 30 minutes, 2 times a day  Repeated lumbar flexion: Reviewed for home exercise program to extend standing and walking durations    Patient Education and Home Exercises     Education provided:   - home exercise program. Importance of increased physical activity during the day to reduce pain at night  - findings; prognosis and plan of care     Written Home Exercises Provided: yes. Exercises were reviewed and [place] was able to demonstrate them prior to the end of the session.  [place] demonstrated  good  understanding of the education provided. See EMR under Patient Instructions for exercises provided during therapy sessions.    Assessment     [place] is a 88 y.o. male referred to outpatient Physical Therapy with a medical diagnosis of lumbar degenerative disc disease. Patient presents with chronic history of low back pain and right sided sciatica. MRI in 2021 reveals stenosis. Current complaint of localized sacral pain and/or right hip pain since injection one month ago.     Patient prognosis is Fair.   Patient will benefit from skilled outpatient Physical Therapy to address the deficits stated above and in the chart below, provide patient /family education, and to maximize patientt's level of independence.     Plan of care discussed with patient: Yes  Patient's spiritual, cultural and educational needs considered and patient is agreeable to the plan of care and goals as stated below:     Anticipated Barriers for therapy: History provided; sedentary lifestyle; caregiver role.    Medical Necessity is demonstrated by the following  History  Co-morbidities and personal factors that may impact the plan of care [] LOW: no personal factors / co-morbidities  [] MODERATE: 1-2 personal factors / co-morbidities  [x] HIGH: 3+ personal factors / co-morbidities    Moderate / High Support Documentation:   Co-morbidities affecting plan of care: Diabetes, hypertension, vision loss, seizures    Personal Factors:   lifestyle     Examination  Body Structures and Functions, activity limitations and participation restrictions that may impact the plan of care [] LOW: addressing 1-2 elements  [x] MODERATE: 3+ elements  [] HIGH: 4+ elements (please support below)    Moderate / High Support Documentation: Above     Clinical Presentation [] LOW: stable  [x] MODERATE: Evolving  [] HIGH: Unstable     Decision Making/ Complexity Score: moderate       Short = Long Term Goals (4 Weeks):  1. Patient will be compliant with home exercise  "program to supplement therapy in promoting functional mobility.  2. Patient will improve FOTO score to </= 43% limited to decrease perceived limitation with maintaining/changing body position.   2. Patient will perform 6' walk at 0.85 m/s to promote increased physical activity.  3. Patient will perform dynamic modified plank for 60" without pain to demonstrate increased core strength for daily activities.   4. Patient will report full nights sleep for 2 consecutive sessions secondary to improved low back pain to improve quality of life.    Plan     Plan of care Certification: 9/19/2023 to 10/20/2023.    Outpatient Physical Therapy 2 times weekly for 4 weeks to include the following interventions: Cervical/Lumbar Traction, Electrical Stimulation -, Gait Training, Manual Therapy, Moist Heat/ Ice, Neuromuscular Re-ed, Patient Education, Self Care, Therapeutic Activities, and Therapeutic Exercise.     Sarah Gutierrez, PT, DPT, OCS        Physician's Signature: _________________________________________ Date: ________________  "

## 2023-09-20 ENCOUNTER — CLINICAL SUPPORT (OUTPATIENT)
Dept: REHABILITATION | Facility: HOSPITAL | Age: 88
End: 2023-09-20
Payer: MEDICARE

## 2023-09-20 DIAGNOSIS — G89.29 CHRONIC BILATERAL LOW BACK PAIN WITH RIGHT-SIDED SCIATICA: Primary | ICD-10-CM

## 2023-09-20 DIAGNOSIS — M53.86 DECREASED ROM OF LUMBAR SPINE: ICD-10-CM

## 2023-09-20 DIAGNOSIS — M54.41 CHRONIC BILATERAL LOW BACK PAIN WITH RIGHT-SIDED SCIATICA: Primary | ICD-10-CM

## 2023-09-20 DIAGNOSIS — R53.1 DECREASED STRENGTH: ICD-10-CM

## 2023-09-20 PROCEDURE — 97112 NEUROMUSCULAR REEDUCATION: CPT | Mod: HCNC,PN

## 2023-09-20 PROCEDURE — 97110 THERAPEUTIC EXERCISES: CPT | Mod: HCNC,PN

## 2023-09-20 NOTE — PROGRESS NOTES
"[facility]   Physical Therapy Treatment Note      Name: Kang Herbert  Clinic Number: [id number]    Therapy Diagnosis:   Encounter Diagnoses   Name Primary?    Chronic bilateral low back pain with right-sided sciatica Yes    Decreased ROM of lumbar spine     Decreased strength      Physician: Zeke Marrufo*    Visit Date: 9/20/2023    Physician Orders: PT Eval and Treat   Medical Diagnosis from Referral: DDD (degenerative disc disease), lumbar [M51.36]  Evaluation Date: 9/19/2023  Authorization Period Expiration: 12/31/2023  Plan of Care Expiration: 10/20/2023  Progress Note Due: 10/20/2023  Visit # / Visits authorized: 1/20 + 1  FOTO: 2/3  PTA Visit #: 0/5      Precautions: Standard; type 2 diabetes and hypertension; history of seizures  Patient's wife afraid to be alone - requests to be with patient during visits. Language barrier - AMN  used     Time In: 1005  Time Out: 1041  Total Billable Time: 26 minutes    Subjective     Patient reports: he is used to his pain being this thigh  He was not compliant with home exercise program.  Response to previous treatment: similar pain  Functional change: patient has not initiated home exercise program; printout is still in his car    Pain: 8/10  Location: bilateral low back and sacrum, greater on the right     Objective      Objective Measures updated at progress report unless specified.     Treatment     [place] received the treatments listed below:      therapeutic exercises to develop strength, endurance, ROM, and flexibility for 17 minutes including:    Seated trunk flexion: 2x10 with hands on Swiss ball    Standing:  Hip abduction: yellow theraband 2x10  Hip extension: yellow theraband 2x10  Sink flexion stretch: 2" 2x10    neuromuscular re-education activities to improve: Kinesthetic and Posture for 14 minutes. The following activities were included:    Alternate march, seated on Swiss ball: 3x10 bilateral with " contact guard to minimal assistance to guide balance Adjust to incline plank next    Freemotion:  Straight arm pull downs: 3 lbs 3x10  Rows: 3 lbs 3x10    therapeutic activities to improve functional performance for 5 minutes, including:    Sit<>stands: 3 kg ball, 3x10    Patient Education and Home Exercises       Education provided:   - Continue home exercise program     Written Home Exercises Provided: On 9/19/2023. Exercises were reviewed and [place] was able to demonstrate them prior to the end of the session.  [place] demonstrated good  understanding of the education provided. See Electronic Medical Record under Patient Instructions for exercises provided during therapy sessions    Assessment     [place] is a 88 y.o. male referred to outpatient Physical Therapy with a medical diagnosis of lumbar degenerative disc disease. High pain levels upon arrival that decreased after kinesthetic training and repeated flexion. Increased fatigue with weighted sit<>stands. Session slightly limited to patient's wife's concern while patient was performing Free Motion exercises while she was seated on mat.     [place] Is progressing well towards his goals.   Patient prognosis is Fair.   Patient will continue to benefit from skilled outpatient physical therapy to address the deficits listed in the problem list box on initial evaluation, provide pt/family education and to maximize pt's level of independence in the home and community environment.     Patient's spiritual, cultural and educational needs considered and pt agreeable to plan of care and goals.  Anticipated barriers to physical therapy: History provided; sedentary lifestyle; language barrier and caregiver role.     Short = Long Term Goals (4 Weeks):  1. Patient will be compliant with home exercise program to supplement therapy in promoting functional mobility. Progressing, not met   2. Patient will improve FOTO score to </= 43% limited to decrease perceived  "limitation with maintaining/changing body position. Progressing, not met   2. Patient will perform 6' walk at 0.85 m/s to promote increased physical activity. Progressing, not met   3. Patient will perform dynamic modified plank for 60" without pain to demonstrate increased core strength for daily activities. Progressing, not met   4. Patient will report full nights sleep for 2 consecutive sessions secondary to improved low back pain to improve quality of life. Progressing, not met     Plan     Flexion preference.   Improve core and posterior chain strength/endurance/kinesthetic.  Promote standing throughout session.     Sarah Gutierrez, PT, DPT, OCS    "

## 2023-09-25 ENCOUNTER — CLINICAL SUPPORT (OUTPATIENT)
Dept: REHABILITATION | Facility: HOSPITAL | Age: 88
End: 2023-09-25
Payer: MEDICARE

## 2023-09-25 DIAGNOSIS — M54.41 CHRONIC BILATERAL LOW BACK PAIN WITH RIGHT-SIDED SCIATICA: Primary | ICD-10-CM

## 2023-09-25 DIAGNOSIS — R53.1 DECREASED STRENGTH: ICD-10-CM

## 2023-09-25 DIAGNOSIS — M53.86 DECREASED ROM OF LUMBAR SPINE: ICD-10-CM

## 2023-09-25 DIAGNOSIS — G89.29 CHRONIC BILATERAL LOW BACK PAIN WITH RIGHT-SIDED SCIATICA: Primary | ICD-10-CM

## 2023-09-25 PROCEDURE — 97530 THERAPEUTIC ACTIVITIES: CPT | Mod: HCNC,PN | Performed by: PHYSICAL THERAPIST

## 2023-09-25 PROCEDURE — 97112 NEUROMUSCULAR REEDUCATION: CPT | Mod: HCNC,PN | Performed by: PHYSICAL THERAPIST

## 2023-09-25 PROCEDURE — 97110 THERAPEUTIC EXERCISES: CPT | Mod: HCNC,PN | Performed by: PHYSICAL THERAPIST

## 2023-09-25 NOTE — PROGRESS NOTES
"[facility]   Physical Therapy Treatment Note      Name: Kang Herbert  Clinic Number: [id number]    Therapy Diagnosis:   Encounter Diagnoses   Name Primary?    Chronic bilateral low back pain with right-sided sciatica Yes    Decreased ROM of lumbar spine     Decreased strength      Physician: Zeke Marrufo*    Visit Date: 9/25/2023    Physician Orders: PT Eval and Treat   Medical Diagnosis from Referral: DDD (degenerative disc disease), lumbar [M51.36]  Evaluation Date: 9/19/2023  Authorization Period Expiration: 12/31/2023  Plan of Care Expiration: 10/20/2023  Progress Note Due: 10/20/2023  Visit # / Visits authorized: 2/20 + 1  FOTO: 3/5  PTA Visit #: 0/5      Precautions: Standard; type 2 diabetes and hypertension; history of seizures  Patient's wife afraid to be alone - requests to be with patient during visits. Language barrier - AMN  used     Time In: 1510  Time Out: 1553  Total Billable Time: 43 minutes (TE, TA, NMR)     Subjective      Patient reports: increased pain in right lumbosacral area last night however it is much better since beginning therapy; 5-6/10 at worst. Patient reports he had some increased calf pain after evaluation but that hasn't been present since first follow up.     He was not compliant with home exercise program.  Response to previous treatment: decreased pain  Functional change: decreased pain at night      Pain: 0/10  Location: bilateral low back and sacrum, greater on the right      Objective       Objective Measures updated at progress report unless specified.      Treatment      [place] received the treatments listed below:       therapeutic exercises to develop strength, endurance, ROM, and flexibility for 16 minutes including:     Seated trunk flexion: 2x10 with hands on Swiss ball     Standing:  Hip abduction: red theraband 2x10  Hip extension: red theraband 2x10  Sink flexion stretch: 2" 2x10     neuromuscular " re-education activities to improve: Kinesthetic and Posture for 20 minutes. The following activities were included:     Alternate march Adjust to incline plank: 3x10 bilateral      Freemotion:  Straight arm pull downs: 7 lbs 2x10  Rows: 7 lbs 3x10  Pallof press: 7 lbs 2x10 bilateral      therapeutic activities to improve functional performance for 7 minutes, including:     Sit<>stands: 9 lb weight, 3x10     Patient Education and Home Exercises        Education provided:   - Continue home exercise program      Written Home Exercises Provided: On 9/19/2023. Exercises were reviewed and [place] was able to demonstrate them prior to the end of the session.  [place] demonstrated good  understanding of the education provided. See Electronic Medical Record under Patient Instructions for exercises provided during therapy sessions     Assessment      [place] is a 88 y.o. male referred to outpatient Physical Therapy with a medical diagnosis of lumbar degenerative disc disease.   Improved tolerance and performance of treatment today with increased weights/resistance as seen in bold. Improved coordination with standing incline plank marches compared to seated swiss ball marches.      [place] Is progressing well towards his goals.   Patient prognosis is Fair.   Patient will continue to benefit from skilled outpatient physical therapy to address the deficits listed in the problem list box on initial evaluation, provide pt/family education and to maximize pt's level of independence in the home and community environment.      Patient's spiritual, cultural and educational needs considered and pt agreeable to plan of care and goals.  Anticipated barriers to physical therapy: History provided; sedentary lifestyle; language barrier and caregiver role.      Short = Long Term Goals (4 Weeks):  1. Patient will be compliant with home exercise program to supplement therapy in promoting functional mobility. Progressing, not met  "  2. Patient will improve FOTO score to </= 43% limited to decrease perceived limitation with maintaining/changing body position. Progressing, not met   2. Patient will perform 6' walk at 0.85 m/s to promote increased physical activity. Progressing, not met   3. Patient will perform dynamic modified plank for 60" without pain to demonstrate increased core strength for daily activities. Progressing, not met   4. Patient will report full nights sleep for 2 consecutive sessions secondary to improved low back pain to improve quality of life. Progressing, not met      Plan      Flexion preference.   Improve core and posterior chain strength/endurance/kinesthetic.  Promote standing throughout session.      Yayo Hinojosa, DPT  "

## 2023-09-25 NOTE — PROGRESS NOTES
"[facility]   Physical Therapy Treatment Note      Name: Kang Herbert  Clinic Number: [id number]    Therapy Diagnosis:   Encounter Diagnoses   Name Primary?    Chronic bilateral low back pain with right-sided sciatica Yes    Decreased ROM of lumbar spine     Decreased strength      Physician: Zeke Marrufo    Visit Date: 9/25/2023    Physician Orders: PT Eval and Treat   Medical Diagnosis from Referral: DDD (degenerative disc disease), lumbar [M51.36]  Evaluation Date: 9/19/2023  Authorization Period Expiration: 12/31/2023  Plan of Care Expiration: 10/20/2023  Progress Note Due: 10/20/2023  Visit # / Visits authorized: 2/20 + 1  FOTO: 3/5  PTA Visit #: 0/5      Precautions: Standard; type 2 diabetes and hypertension; history of seizures  Patient's wife afraid to be alone - requests to be with patient during visits. Language barrier - AMN  used     Time In: 1510  Time Out: ***  Total Billable Time: *** minutes    Subjective     Patient reports: increased pain in right lumbosacral area last night however it is much better since beginning therapy; 5-6/10 at worst. Patient reports he had some increased calf pain after evaluation but that hasn't been present since first follow up.   He was not compliant with home exercise program.  Response to previous treatment: decreased pain  Functional change: decreased pain at night     Pain: 0/10  Location: bilateral low back and sacrum, greater on the right     Objective      Objective Measures updated at progress report unless specified.     Treatment     [place] received the treatments listed below:      therapeutic exercises to develop strength, endurance, ROM, and flexibility for *** minutes including:    Seated trunk flexion: 2x10 with hands on Swiss ball    Standing:  Hip abduction: yellow theraband 2x10  Hip extension: yellow theraband 2x10  Sink flexion stretch: 2" 2x10    neuromuscular re-education activities to " improve: Kinesthetic and Posture for *** minutes. The following activities were included:    Alternate march, seated on Swiss ball: 3x10 bilateral with contact guard to minimal assistance to guide balance Adjust to incline plank next    Freemotion:  Straight arm pull downs: 7 lbs 2x10  Rows: 7 lbs 3x10  Pallof press: 7 lbs 2x10 bilateral     therapeutic activities to improve functional performance for *** minutes, including:    Sit<>stands: 3 kg ball, 3x10    Patient Education and Home Exercises       Education provided:   - Continue home exercise program     Written Home Exercises Provided: On 9/19/2023. Exercises were reviewed and [place] was able to demonstrate them prior to the end of the session.  [place] demonstrated good  understanding of the education provided. See Electronic Medical Record under Patient Instructions for exercises provided during therapy sessions    Assessment     [place] is a 88 y.o. male referred to outpatient Physical Therapy with a medical diagnosis of lumbar degenerative disc disease.   ***High pain levels upon arrival that decreased after kinesthetic training and repeated flexion. Increased fatigue with weighted sit<>stands. Session slightly limited to patient's wife's concern while patient was performing Free Motion exercises while she was seated on mat.     [place] Is progressing well towards his goals.   Patient prognosis is Fair.   Patient will continue to benefit from skilled outpatient physical therapy to address the deficits listed in the problem list box on initial evaluation, provide pt/family education and to maximize pt's level of independence in the home and community environment.     Patient's spiritual, cultural and educational needs considered and pt agreeable to plan of care and goals.  Anticipated barriers to physical therapy: History provided; sedentary lifestyle; language barrier and caregiver role.     Short = Long Term Goals (4 Weeks):  1. Patient will  "be compliant with home exercise program to supplement therapy in promoting functional mobility. Progressing, not met   2. Patient will improve FOTO score to </= 43% limited to decrease perceived limitation with maintaining/changing body position. Progressing, not met   2. Patient will perform 6' walk at 0.85 m/s to promote increased physical activity. Progressing, not met   3. Patient will perform dynamic modified plank for 60" without pain to demonstrate increased core strength for daily activities. Progressing, not met   4. Patient will report full nights sleep for 2 consecutive sessions secondary to improved low back pain to improve quality of life. Progressing, not met     Plan     Flexion preference.   Improve core and posterior chain strength/endurance/kinesthetic.  Promote standing throughout session.     Sarah Gutierrez, PT, DPT, OCS    "

## 2023-09-27 ENCOUNTER — CLINICAL SUPPORT (OUTPATIENT)
Dept: REHABILITATION | Facility: HOSPITAL | Age: 88
End: 2023-09-27
Payer: MEDICARE

## 2023-09-27 DIAGNOSIS — R53.1 DECREASED STRENGTH: ICD-10-CM

## 2023-09-27 DIAGNOSIS — M54.41 CHRONIC BILATERAL LOW BACK PAIN WITH RIGHT-SIDED SCIATICA: Primary | ICD-10-CM

## 2023-09-27 DIAGNOSIS — M53.86 DECREASED ROM OF LUMBAR SPINE: ICD-10-CM

## 2023-09-27 DIAGNOSIS — G89.29 CHRONIC BILATERAL LOW BACK PAIN WITH RIGHT-SIDED SCIATICA: Primary | ICD-10-CM

## 2023-09-27 PROCEDURE — 97112 NEUROMUSCULAR REEDUCATION: CPT | Mod: HCNC,PN

## 2023-09-27 PROCEDURE — 97110 THERAPEUTIC EXERCISES: CPT | Mod: HCNC,PN

## 2023-09-27 PROCEDURE — 97530 THERAPEUTIC ACTIVITIES: CPT | Mod: HCNC,PN

## 2023-09-27 NOTE — PROGRESS NOTES
[facility]   Physical Therapy Treatment Note      Name: Kang Herbert  Clinic Number: [id number]    Therapy Diagnosis:   Encounter Diagnoses   Name Primary?    Chronic bilateral low back pain with right-sided sciatica Yes    Decreased ROM of lumbar spine     Decreased strength      Physician: Zeke Marrufo*    Visit Date: 9/27/2023    Physician Orders: PT Eval and Treat   Medical Diagnosis from Referral: DDD (degenerative disc disease), lumbar [M51.36]  Evaluation Date: 9/19/2023  Authorization Period Expiration: 12/31/2023  Plan of Care Expiration: 10/20/2023  Progress Note Due: 10/20/2023  Visit # / Visits authorized: 3/20 + 1  FOTO: 2nd performed 9/27. Final to be performed at discharge  PTA Visit #: 0/5      Precautions: Standard; type 2 diabetes and hypertension; history of seizures  Patient's wife afraid to be alone - requests to be with patient during visits. Language barrier - AMN  used     Time In: 1007  Time Out: 1101  Total Billable Time: 54 minutes      Subjective      Patient reports: pain has not increased past 2/10 since Monday. Multiple repetitions by therapist/ to confirm. Patient reports 60-70% improvement in pain since beginning therapy. Pie chart visual used by therapist/ to confirm    He was compliant with home exercise program (change from noncompliance noted on 9/27)  Response to previous treatment: low grade pain during the day. No pain at night  Functional change: sleeping through the night     Pain: 2/10 at beginning; 0/10 at end  Location: bilateral low back and sacrum, greater on the right      Objective       CMS Impairment/Limitation/Restriction for FOTO Lumbar Spine Survey    Therapist reviewed FOTO scores for Kang Herbert on 9/27/2023.   FOTO documents entered into [employer] - see Media section.    Limitation Score: 51%  Category: Mobility      Treatment      [place] received the treatments listed below:   "     therapeutic exercises to develop strength, endurance, ROM, and flexibility for 16 minutes including FOTO survey and assistance to complete:      Standing:  Hip abduction: red theraband 2x15  Hip extension: red theraband 2x15    neuromuscular re-education activities to improve: Kinesthetic and Posture for 14 minutes. The following activities were included:      Free Motion machine:  Straight arm pull downs: 7 lbs x30  Rows: 7 lbs x30+  Pallof press: 7 lbs x20 bilateral      therapeutic activities to improve functional performance for 24 minutes, including:     Free Motion chops: 7 lbs x20 bilateral   Incline push ups: x10, x15  Plank on hands and feet: 15", 2x20"  Sit<>stands: 10 lb weight, 2x15     Patient Education and Home Exercises        Education provided:   - Continue home exercise program      Written Home Exercises Provided: On 9/19/2023. Exercises were reviewed and [place] was able to demonstrate them prior to the end of the session.  [place] demonstrated good  understanding of the education provided. See Electronic Medical Record under Patient Instructions for exercises provided during therapy sessions     Assessment      [place] is a 88 y.o. male referred to outpatient Physical Therapy with a medical diagnosis of lumbar degenerative disc disease. Progressed core and lower extremity interventions with appropriate fatigue considering age and prior activity level. Increased time devoted to subjective each visit. Similar difficulty completing FOTO Survey and clarifying overall improvement since beginning therapy; 1 point regression in survey score although patient reports 60-70% improvement in pain and function.      [place] Is progressing well towards his goals.   Patient prognosis is Fair.   Patient will continue to benefit from skilled outpatient physical therapy to address the deficits listed in the problem list box on initial evaluation, provide pt/family education and to maximize pt's " "level of independence in the home and community environment.      Patient's spiritual, cultural and educational needs considered and pt agreeable to plan of care and goals.  Anticipated barriers to physical therapy: History provided; sedentary lifestyle; language barrier and caregiver role.      Short = Long Term Goals (4 Weeks):  1. Patient will be compliant with home exercise program to supplement therapy in promoting functional mobility. Partially met 9/27  2. Patient will improve FOTO score to </= 43% limited to decrease perceived limitation with maintaining/changing body position. Progressing, not met   2. Patient will perform 6' walk at 0.85 m/s to promote increased physical activity. Progressing, not met   3. Patient will perform dynamic modified plank for 60" without pain to demonstrate increased core strength for daily activities. Adjust to static plank on floor for 35 seconds.  4. Patient will report full nights sleep for 2 consecutive sessions secondary to improved low back pain to improve quality of life. One of two sessions met 9/27     Plan      Flexion preference.   Improve core and posterior chain strength/endurance/kinesthetic.  Promote standing throughout session.      Sarah Gutierrez, PT, DPT, OCS     "

## 2023-10-03 ENCOUNTER — CLINICAL SUPPORT (OUTPATIENT)
Dept: REHABILITATION | Facility: HOSPITAL | Age: 88
End: 2023-10-03
Payer: MEDICARE

## 2023-10-03 DIAGNOSIS — M54.41 CHRONIC BILATERAL LOW BACK PAIN WITH RIGHT-SIDED SCIATICA: Primary | ICD-10-CM

## 2023-10-03 DIAGNOSIS — G89.29 CHRONIC BILATERAL LOW BACK PAIN WITH RIGHT-SIDED SCIATICA: Primary | ICD-10-CM

## 2023-10-03 DIAGNOSIS — R53.1 DECREASED STRENGTH: ICD-10-CM

## 2023-10-03 DIAGNOSIS — M53.86 DECREASED ROM OF LUMBAR SPINE: ICD-10-CM

## 2023-10-03 PROCEDURE — 97530 THERAPEUTIC ACTIVITIES: CPT | Mod: HCNC,PN

## 2023-10-03 PROCEDURE — 97110 THERAPEUTIC EXERCISES: CPT | Mod: HCNC,PN

## 2023-10-03 NOTE — PROGRESS NOTES
[facility]   Physical Therapy Treatment Note      Name: Kang Herbert  [facility] Number: [id number]    Therapy Diagnosis:   Encounter Diagnoses   Name Primary?    Chronic bilateral low back pain with right-sided sciatica Yes    Decreased ROM of lumbar spine     Decreased strength      Physician: Zeke Marrufo*    Visit Date: 10/3/2023    Physician Orders: PT Eval and Treat   Medical Diagnosis from Referral: DDD (degenerative disc disease), lumbar [M51.36]  Evaluation Date: 9/19/2023  Authorization Period Expiration: 12/31/2023  Plan of Care Expiration: 10/20/2023  Progress Note Due: 10/20/2023  Visit # / Visits authorized: 4/20 + 1  FOTO: 2nd performed 9/27. Final to be performed at discharge  PTA Visit #: 0/5      Precautions: Standard; type 2 diabetes and hypertension; history of seizures  Patient's wife afraid to be alone - requests to be with patient during visits. Language barrier - AMN  used     Time In: 1403  Time Out: 1442  Total Billable Time: 39 minutes      Subjective      Patient reports: 6/10 pain for the last three days, affecting mobility and sleep. Patient took Tramadol and attempted to perform some exercise (see functional change), including walking 30 minutes, to reduce pain.     He was partially compliant with home exercise program on 10/3. (Fully compliant on 9/27)  Response to previous treatment: no pain  Functional change: decreased home exercise program compliance in response to pain over the last three days. Patient performed 5 reps of rows and straight arm pull downs over the last few days.     Pain: 6/10 at beginning; 0/10 at end  Location: bilateral low back and sacrum, greater on the right      Objective       10/3/2023:  Transfers: Moderate assistance during floor <> stand via lunge. Moderate cues    Treatment      [place] received the treatments listed below:       neuromuscular re-education activities to improve: Kinesthetic and  "Posture for 13 minutes. The following activities were included:      Free Motion machine:  Straight arm pull downs: 7 lbs 3x10  Rows: 7 lbs 2x10 *  Low rows: 3 lbs 2x10     therapeutic activities to improve functional performance for 26 minutes, including:     Free Motion chops: 7 lbs x20 bilateral   Cybex leg press: 6.0 plates, 2x20 double leg  Incline push ups: 3x10  Plank on hands and feet: 30", 2x10" adjust to incline on forearms next  Sit<>stands: 10 lb weight, 2x15     Patient Education and Home Exercises        Education provided:   - Updated home exercise program   - Pain reduction if not relief soon after starting exercises each session. Importance of home exercise program compliance versus medication and walking to decrease pain.      Written Home Exercises Provided: On 10/3/2023. Exercises were reviewed and [place] was able to demonstrate them prior to the end of the session.  [place] demonstrated good  understanding of the education provided. See Electronic Medical Record under Patient Instructions for exercises provided during therapy sessions     Assessment      [place] is a 88 y.o. male referred to outpatient Physical Therapy with a medical diagnosis of lumbar degenerative disc disease. Increased pain over the last few days, treated more with medicine and walking vs home exercise program. Greater difficulty performing planks today secondary to pressure in wrists. Assistance with transition to stand seemed related to language barrier and patient attempt to modify exercise versus inability to return to stand.      [place] Is progressing well towards his goals.   Patient prognosis is Fair.   Patient will continue to benefit from skilled outpatient physical therapy to address the deficits listed in the problem list box on initial evaluation, provide pt/family education and to maximize pt's level of independence in the home and community environment.      Patient's spiritual, cultural and " "educational needs considered and pt agreeable to plan of care and goals.  Anticipated barriers to physical therapy: History provided; sedentary lifestyle; language barrier and caregiver role.      Short = Long Term Goals (4 Weeks):  1. Patient will be compliant with home exercise program to supplement therapy in promoting functional mobility. Partially met 9/27  2. Patient will improve FOTO score to </= 43% limited to decrease perceived limitation with maintaining/changing body position. Progressing, not met   2. Patient will perform 6' walk at 0.85 m/s to promote increased physical activity. Progressing, not met   3. Patient will perform dynamic modified plank for 60" without pain to demonstrate increased core strength for daily activities. Adjust to static plank on floor for 35 seconds.  4. Patient will report full nights sleep for 2 consecutive sessions secondary to improved low back pain to improve quality of life. One of two sessions met 9/27     Plan      Flexion preference.   Improve core and posterior chain strength/endurance/kinesthetic.  Promote standing throughout session.      Sarah Gutierrez, PT, DPT, OCS   "

## 2023-10-05 ENCOUNTER — CLINICAL SUPPORT (OUTPATIENT)
Dept: REHABILITATION | Facility: HOSPITAL | Age: 88
End: 2023-10-05
Payer: MEDICARE

## 2023-10-05 DIAGNOSIS — G89.29 CHRONIC BILATERAL LOW BACK PAIN WITH RIGHT-SIDED SCIATICA: Primary | ICD-10-CM

## 2023-10-05 DIAGNOSIS — M54.41 CHRONIC BILATERAL LOW BACK PAIN WITH RIGHT-SIDED SCIATICA: Primary | ICD-10-CM

## 2023-10-05 DIAGNOSIS — M53.86 DECREASED ROM OF LUMBAR SPINE: ICD-10-CM

## 2023-10-05 DIAGNOSIS — R53.1 DECREASED STRENGTH: ICD-10-CM

## 2023-10-05 PROCEDURE — 97530 THERAPEUTIC ACTIVITIES: CPT | Mod: HCNC,PN,CQ

## 2023-10-05 PROCEDURE — 97112 NEUROMUSCULAR REEDUCATION: CPT | Mod: HCNC,PN,CQ

## 2023-10-05 NOTE — PROGRESS NOTES
"  [facility] OUTPATIENT THERAPY AND WELLNESS   Physical Therapy Treatment Note      Name: Kang Herbert  Clinic Number: [id number]    Therapy Diagnosis:   Encounter Diagnoses   Name Primary?    Chronic bilateral low back pain with right-sided sciatica Yes    Decreased ROM of lumbar spine     Decreased strength      Physician: Zeke Marrufo*    Visit Date: 10/5/2023    Physician Orders: PT Eval and Treat   Medical Diagnosis from Referral: DDD (degenerative disc disease), lumbar [M51.36]  Evaluation Date: 9/19/2023  Authorization Period Expiration: 12/31/2023  Plan of Care Expiration: 10/20/2023  Progress Note Due: 10/20/2023  Visit # / Visits authorized: 5/20 + 1  FOTO: 2nd performed 9/27. Final to be performed at discharge  PTA Visit #: 1/5      Precautions: Standard; type 2 diabetes and hypertension; history of seizures  Patient's wife afraid to be alone - requests to be with patient during visits. Language barrier - AMN  used     Time In: 2:00 PM   Time Out: 3:00 PM  Total Billable Time:55 minutes (2 NMR, 2 TA)     Subjective      Patient reports:"The back pain goes away after therapy but it always comes back". Pt agreeable to PT session today.     He was partially compliant with home exercise program on 10/3. (Fully compliant on 9/27)  Response to previous treatment: no pain  Functional change: decreased home exercise program compliance in response to pain over the last three days. Patient performed 5 reps of rows and straight arm pull downs over the last few days.     Pain: 8/10 pre session,  nr/10 at end  Location: bilateral lumbar R > L     Objective       10/3/2023:  Transfers: Moderate assistance during floor <> stand via lunge. Moderate cues    Treatment      [place] received the treatments listed below:       neuromuscular re-education activities to improve: Kinesthetic and Posture for 20 minutes. The following activities were included:      Free Motion machine:  Straight arm pull " "downs: 3 lbs 3x10  Rows: 3 lbs 2x10 *  Low rows: 3 lbs 2x10     therapeutic activities to improve functional performance for 35 minutes, including:     Free Motion chops: 7 lbs x 20 bilateral   Cybex leg press: 4.0 plates, 2x20 double leg  Incline push ups: 3x10  Plank on hands and feet: 30", 2x10" adjust to incline on forearms on Foam block  Sit<>stands: 10 lb weight, 2x15     Patient Education and Home Exercises        Education provided:   - Updated home exercise program   - Pain reduction if not relief soon after starting exercises each session. Importance of home exercise program compliance versus medication and walking to decrease pain.      Written Home Exercises Provided: On 10/3/2023. Exercises were reviewed and [place] was able to demonstrate them prior to the end of the session.  [place] demonstrated good  understanding of the education provided. See Electronic Medical Record under Patient Instructions for exercises provided during therapy sessions     Assessment      [place] is a 88 y.o. male referred to outpatient Physical Therapy with a medical diagnosis of lumbar degenerative disc disease. Pt disclosed his lower back pain isn't always localized on one side, but sporadic episodes of B lumbar pain noted. He was able to complete session with no adverse response. Verbal / tactile instructions provided on technique with planks to perform with abdominal bracing. 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 Reduced weight/ resistance today performed on exercises in bold today. .      [place] Is progressing well towards his goals.   Patient prognosis is Fair.   Patient will continue to benefit from skilled outpatient physical therapy to address the deficits listed in the problem list box on initial evaluation, provide pt/family education and to maximize pt's level of independence in the home and community environment.      Patient's spiritual, cultural and " "educational needs considered and pt agreeable to plan of care and goals.  Anticipated barriers to physical therapy: History provided; sedentary lifestyle; language barrier and caregiver role.      Short = Long Term Goals (4 Weeks):  1. Patient will be compliant with home exercise program to supplement therapy in promoting functional mobility. Partially met 9/27  2. Patient will improve FOTO score to </= 43% limited to decrease perceived limitation with maintaining/changing body position. Progressing, not met   2. Patient will perform 6' walk at 0.85 m/s to promote increased physical activity. Progressing, not met   3. Patient will perform dynamic modified plank for 60" without pain to demonstrate increased core strength for daily activities. Adjust to static plank on floor for 35 seconds.  4. Patient will report full nights sleep for 2 consecutive sessions secondary to improved low back pain to improve quality of life. One of two sessions met 9/27     Plan      Flexion preference.   Improve core and posterior chain strength/endurance/kinesthetic.      Paul Sethi, CHERYL      "

## 2023-10-06 ENCOUNTER — TELEPHONE (OUTPATIENT)
Dept: PAIN MEDICINE | Facility: CLINIC | Age: 88
End: 2023-10-06
Payer: MEDICARE

## 2023-10-06 ENCOUNTER — TELEPHONE (OUTPATIENT)
Dept: SPINE | Facility: CLINIC | Age: 88
End: 2023-10-06
Payer: MEDICARE

## 2023-10-06 NOTE — TELEPHONE ENCOUNTER
----- Message from Mirta Babin MA sent at 10/6/2023  1:21 PM CDT -----  Regarding: FW: Procedure    ----- Message -----  From: Roland Moran  Sent: 10/6/2023  12:31 PM CDT  To: [facility] Staff  Subject: Procedure                                        Name of Who is Calling:  Nanette, Patient daughter          What is the request in detail:  Patient daughter stated her father was not told his procedure was changed to another date and they can not make the appointment procedure for 10/13/2023            Can the clinic reply by [facility]: No            What Number to Call Back if not [facility]:[phone]

## 2023-10-06 NOTE — TELEPHONE ENCOUNTER
Staff spoke with patient daughter regarding patient wanting information on procedure time, staff informed the patient daughter that his procedure is scheduled for 8:40 am on 10/13/23 with MD Chula.

## 2023-10-06 NOTE — TELEPHONE ENCOUNTER
----- Message from Luci Salgado sent at 10/6/2023  3:36 PM CDT -----  Type:  Patient Returning Call    Who Called:pt  Who Left Message for Patient:vee  Does the patient know what this is regarding?:returning call  Would the patient rather a call back or a response via [employer]? call  Best Call Back Number:[phone]  Additional Information:

## 2023-10-06 NOTE — TELEPHONE ENCOUNTER
----- Message from Mehnaz Yancey sent at 10/6/2023  3:46 PM CDT -----  Type:  Patient Returning Call    Who Called:daughter   Who Left Message for Patient:office  Does the patient know what this is regarding?:procedure  Would the patient rather a call back or a response via [facility]? call  Best Call Back Number:[phone]  Additional Information:

## 2023-10-06 NOTE — TELEPHONE ENCOUNTER
Can you please call this pt daughter? Pt is schedule with Dr Clinton on 11/10 but pt daughter is not comfortable with Dr Clinton doing her dad procedure due to him just starting out. Pt would like to know how long Dr Alvarez have been doing procedures.

## 2023-10-09 ENCOUNTER — TELEPHONE (OUTPATIENT)
Dept: PAIN MEDICINE | Facility: CLINIC | Age: 88
End: 2023-10-09
Payer: MEDICARE

## 2023-10-09 DIAGNOSIS — I10 ESSENTIAL HYPERTENSION: ICD-10-CM

## 2023-10-09 NOTE — TELEPHONE ENCOUNTER
----- Message from Mehnaz Yancey sent at 10/6/2023  4:35 PM CDT -----  Type:  Needs Medical Advice    Who Called: daughter     Would the patient rather a call back or a response via [facility]? call  Best Call Back Number: [phone]  Additional Information: daughter requesting have dre carrera perform procedure .

## 2023-10-09 NOTE — TELEPHONE ENCOUNTER
Returned call to pt's daughter that Azam can't perform the procedure only the doctor can, Pt's daughter Aislinn stated that she didn't feel comfortable with Dr. Clinton doing her dad's surgery & would like to wait until Dr. Mondragon returns

## 2023-10-09 NOTE — TELEPHONE ENCOUNTER
No care due was identified.  [facility] Embedded Care Due Messages. Reference number: [id number].   10/09/2023 12:26:40 AM CDT

## 2023-10-10 ENCOUNTER — CLINICAL SUPPORT (OUTPATIENT)
Dept: REHABILITATION | Facility: HOSPITAL | Age: 88
End: 2023-10-10
Payer: MEDICARE

## 2023-10-10 DIAGNOSIS — G89.29 CHRONIC BILATERAL LOW BACK PAIN WITH RIGHT-SIDED SCIATICA: Primary | ICD-10-CM

## 2023-10-10 DIAGNOSIS — R53.1 DECREASED STRENGTH: ICD-10-CM

## 2023-10-10 DIAGNOSIS — M54.41 CHRONIC BILATERAL LOW BACK PAIN WITH RIGHT-SIDED SCIATICA: Primary | ICD-10-CM

## 2023-10-10 DIAGNOSIS — M53.86 DECREASED ROM OF LUMBAR SPINE: ICD-10-CM

## 2023-10-10 PROCEDURE — 97112 NEUROMUSCULAR REEDUCATION: CPT | Mod: HCNC,PN

## 2023-10-10 PROCEDURE — 97140 MANUAL THERAPY 1/> REGIONS: CPT | Mod: HCNC,PN

## 2023-10-10 PROCEDURE — 97530 THERAPEUTIC ACTIVITIES: CPT | Mod: HCNC,PN

## 2023-10-10 NOTE — PROGRESS NOTES
[facility] OUTPATIENT THERAPY AND WELLNESS   Physical Therapy Treatment Note      Name: Kang Herbert  [facility] Number: [id number]    Therapy Diagnosis:   Encounter Diagnoses   Name Primary?    Chronic bilateral low back pain with right-sided sciatica Yes    Decreased ROM of lumbar spine     Decreased strength      Physician: Zeke Marrufo*    Visit Date: 10/10/2023    Physician Orders: PT Eval and Treat   Medical Diagnosis from Referral: DDD (degenerative disc disease), lumbar [M51.36]  Evaluation Date: 9/19/2023  Authorization Period Expiration: 12/31/2023  Plan of Care Expiration: 10/20/2023  Progress Note Due: 10/20/2023  Visit # / Visits authorized: 6/20 + 1  FOTO: 2nd performed 9/27. Final to be performed at discharge  PTA Visit #: 0/5      Precautions: Standard; type 2 diabetes and hypertension; history of seizures  Patient's wife afraid to be alone - requests to be with patient during visits. Language barrier - AMN  used     Time In: 1400  Time Out: 1453  Total Billable Time: 53 minutes      Subjective      Patient reports: right sided thoracic pain traveling anteriorly to posteriorly over the weekend. Patient reports Tylenol did not help. Patient hurts more when he turns to the right and with deep inhales. Pain denies onset of pain with specific activity this weekend but reports movement including assisting wife and cooking increase pain. Patient denies left upper extremity pain or heaviness in chest.    He was NOT compliant with home exercise program. (Partially compliant on 10/3)  Response to previous treatment: relief of pain  Functional change: ceased home exercise program with onset of right sided thoracic thoracic pain     Pain: 8/10 -> 5-6/10 right side of thorax (initial rating -> rating after education); 1/10 bilateral low back     Objective      Palpation: Tenderness to right quadratus lumborum and intercostals between ribs 6-7, 7-8, 8-9, and 9-10    Treatment      [place]  received the treatments listed below:      manual therapy techniques: were applied to the: thoracic spine for 17 minutes, including subjective and palpation:    Costovertebral rotation muscle energy technique with hand placement on right side at ~T8. Anterolateral directed force each time. Left rotation muscle energy technique performed 2x, right performed 1x.      neuromuscular re-education activities to improve: Kinesthetic and Posture for 23 minutes. The following activities were included:      Free Motion pallof press: 7 lbs x15 bilateral. Tactile cues for range  Free Motion pallof press + contralateral rotation: 7 lbs x10 bilateral. Minimal assistance to guide range  Wall open books: x10 bilateral. Tactile cues for range     therapeutic activities to improve functional performance for 13 minutes, including:     Free Motion chops: 7 lbs x 15 bilateral   Shoulder press: red theraband x20  Cybex leg press: Not performed, resume next  Incline push ups: Not performed, resume next    Patient Education and Home Exercises        Education provided:   - Begin updated home exercise program. Patient experiences relief at end of each visit. Compliance with exercise program will help for long term pain reduction  - Reviewed updated home exercise program, indicating pictures, instructions and reps and sets in [place]  - Pain scale     Written Home Exercises Provided: On 10/3/2023. Exercises were reviewed and [place] was able to demonstrate them prior to the end of the session.  [place] demonstrated good  understanding of the education provided. See Electronic Medical Record under Patient Instructions for exercises provided during therapy sessions     Assessment      [place] is a 88 y.o. male referred to outpatient Physical Therapy with a medical diagnosis of lumbar degenerative disc disease. New onset thoracic pain over the weekend that responded well to left rotation muscle energy technique. Tendency to rate pain  "as 8/10 although affect and physical presentation do not reflect this intensity. Subjective tends to take increased time for clarity.     [place] Is progressing well towards his goals.   Patient prognosis is Fair.   Patient will continue to benefit from skilled outpatient physical therapy to address the deficits listed in the problem list box on initial evaluation, provide pt/family education and to maximize pt's level of independence in the home and community environment.      Patient's spiritual, cultural and educational needs considered and pt agreeable to plan of care and goals.  Anticipated barriers to physical therapy: History provided; sedentary lifestyle; language barrier and caregiver role.      Short = Long Term Goals (4 Weeks):  1. Patient will be compliant with home exercise program to supplement therapy in promoting functional mobility. Partially met 9/27  2. Patient will improve FOTO score to </= 43% limited to decrease perceived limitation with maintaining/changing body position. Progressing, not met   2. Patient will perform 6' walk at 0.85 m/s to promote increased physical activity. Progressing, not met   3. Patient will perform dynamic modified plank for 60" without pain to demonstrate increased core strength for daily activities. Adjust to static plank on floor for 35 seconds.  4. Patient will report full nights sleep for 2 consecutive sessions secondary to improved low back pain to improve quality of life. One of two sessions met 9/27     Plan      Flexion preference - low back. Monitor costovertebral pain.    Improve core and posterior chain strength/endurance/kinesthetic.    Sarah Gutierrez, PT, DPT, OCS      "

## 2023-10-11 RX ORDER — LOSARTAN POTASSIUM 25 MG/1
25 TABLET ORAL
Qty: 90 TABLET | Refills: 3 | Status: SHIPPED | OUTPATIENT
Start: 2023-10-11

## 2023-10-13 ENCOUNTER — TELEPHONE (OUTPATIENT)
Dept: ADMINISTRATIVE | Facility: CLINIC | Age: 88
End: 2023-10-13
Payer: MEDICARE

## 2023-10-13 NOTE — TELEPHONE ENCOUNTER
Called pt, informed pt's daughter I was calling to remind pt of his in office EAWV on 10/16/23; clinic location provided; pt's daughter confirmed appointment.

## 2023-10-13 NOTE — PROGRESS NOTES
Called pt to confirm 10/16/23 appt with  id [id number]- pt's daughter answered and stated she did not need the . Appt was confirmed.

## 2023-10-16 ENCOUNTER — OFFICE VISIT (OUTPATIENT)
Dept: FAMILY MEDICINE | Facility: CLINIC | Age: 88
End: 2023-10-16
Payer: MEDICARE

## 2023-10-16 VITALS
DIASTOLIC BLOOD PRESSURE: 60 MMHG | HEIGHT: 62 IN | BODY MASS INDEX: 20.69 KG/M2 | WEIGHT: 112.44 LBS | SYSTOLIC BLOOD PRESSURE: 110 MMHG | BODY MASS INDEX: 20.69 KG/M2 | OXYGEN SATURATION: 98 % | HEIGHT: 62 IN | HEART RATE: 87 BPM | OXYGEN SATURATION: 98 % | WEIGHT: 112.44 LBS | DIASTOLIC BLOOD PRESSURE: 60 MMHG | SYSTOLIC BLOOD PRESSURE: 110 MMHG | HEART RATE: 87 BPM

## 2023-10-16 DIAGNOSIS — I70.0 AORTIC ATHEROSCLEROSIS: ICD-10-CM

## 2023-10-16 DIAGNOSIS — I10 ESSENTIAL HYPERTENSION: Primary | ICD-10-CM

## 2023-10-16 DIAGNOSIS — Z23 NEEDS FLU SHOT: ICD-10-CM

## 2023-10-16 DIAGNOSIS — H43.11 VITREOUS HEMORRHAGE OF RIGHT EYE: ICD-10-CM

## 2023-10-16 DIAGNOSIS — F03.90 SENILE DEMENTIA: ICD-10-CM

## 2023-10-16 DIAGNOSIS — Z74.09 OTHER REDUCED MOBILITY: ICD-10-CM

## 2023-10-16 DIAGNOSIS — E11.43 TYPE 2 DIABETES MELLITUS WITH DIABETIC AUTONOMIC NEUROPATHY, WITHOUT LONG-TERM CURRENT USE OF INSULIN: ICD-10-CM

## 2023-10-16 DIAGNOSIS — R41.3 MEMORY CHANGES: ICD-10-CM

## 2023-10-16 DIAGNOSIS — E78.2 MIXED DYSLIPIDEMIA: ICD-10-CM

## 2023-10-16 DIAGNOSIS — G40.209 PARTIAL SYMPTOMATIC EPILEPSY WITH COMPLEX PARTIAL SEIZURES, NOT INTRACTABLE, WITHOUT STATUS EPILEPTICUS: ICD-10-CM

## 2023-10-16 DIAGNOSIS — M54.42 BILATERAL LOW BACK PAIN WITH LEFT-SIDED SCIATICA, UNSPECIFIED CHRONICITY: ICD-10-CM

## 2023-10-16 DIAGNOSIS — Z00.00 ENCOUNTER FOR MEDICARE ANNUAL WELLNESS EXAM: ICD-10-CM

## 2023-10-16 DIAGNOSIS — E03.9 ACQUIRED HYPOTHYROIDISM: ICD-10-CM

## 2023-10-16 DIAGNOSIS — L29.9 ITCHY SCALP: ICD-10-CM

## 2023-10-16 DIAGNOSIS — Z00.00 ENCOUNTER FOR PREVENTIVE HEALTH EXAMINATION: Primary | ICD-10-CM

## 2023-10-16 DIAGNOSIS — E11.42 DIABETIC PERIPHERAL NEUROPATHY ASSOCIATED WITH TYPE 2 DIABETES MELLITUS: ICD-10-CM

## 2023-10-16 DIAGNOSIS — D69.6 THROMBOCYTOPENIA: ICD-10-CM

## 2023-10-16 PROCEDURE — G0008 ADMIN INFLUENZA VIRUS VAC: HCPCS | Mod: HCNC,S$GLB,, | Performed by: FAMILY MEDICINE

## 2023-10-16 PROCEDURE — 1101F PT FALLS ASSESS-DOCD LE1/YR: CPT | Mod: HCNC,CPTII,S$GLB, | Performed by: FAMILY MEDICINE

## 2023-10-16 PROCEDURE — 1170F FXNL STATUS ASSESSED: CPT | Mod: HCNC,CPTII,S$GLB, | Performed by: NURSE PRACTITIONER

## 2023-10-16 PROCEDURE — 99999 PR PBB SHADOW E&M-EST. PATIENT-LVL V: ICD-10-PCS | Mod: PBBFAC,HCNC,, | Performed by: NURSE PRACTITIONER

## 2023-10-16 PROCEDURE — G0008 FLU VACCINE - QUADRIVALENT - ADJUVANTED: ICD-10-PCS | Mod: HCNC,S$GLB,, | Performed by: FAMILY MEDICINE

## 2023-10-16 PROCEDURE — 99999 PR PBB SHADOW E&M-EST. PATIENT-LVL V: CPT | Mod: PBBFAC,HCNC,, | Performed by: NURSE PRACTITIONER

## 2023-10-16 PROCEDURE — 99999 PR PBB SHADOW E&M-EST. PATIENT-LVL IV: CPT | Mod: PBBFAC,HCNC,, | Performed by: FAMILY MEDICINE

## 2023-10-16 PROCEDURE — 1170F PR FUNCTIONAL STATUS ASSESSED: ICD-10-PCS | Mod: HCNC,CPTII,S$GLB, | Performed by: NURSE PRACTITIONER

## 2023-10-16 PROCEDURE — 99215 PR OFFICE/OUTPT VISIT, EST, LEVL V, 40-54 MIN: ICD-10-PCS | Mod: HCNC,S$GLB,, | Performed by: FAMILY MEDICINE

## 2023-10-16 PROCEDURE — 1125F AMNT PAIN NOTED PAIN PRSNT: CPT | Mod: HCNC,CPTII,S$GLB, | Performed by: FAMILY MEDICINE

## 2023-10-16 PROCEDURE — 1160F RVW MEDS BY RX/DR IN RCRD: CPT | Mod: HCNC,CPTII,S$GLB, | Performed by: FAMILY MEDICINE

## 2023-10-16 PROCEDURE — 90694 VACC AIIV4 NO PRSRV 0.5ML IM: CPT | Mod: HCNC,S$GLB,, | Performed by: FAMILY MEDICINE

## 2023-10-16 PROCEDURE — 3288F PR FALLS RISK ASSESSMENT DOCUMENTED: ICD-10-PCS | Mod: HCNC,CPTII,S$GLB, | Performed by: FAMILY MEDICINE

## 2023-10-16 PROCEDURE — 1159F MED LIST DOCD IN RCRD: CPT | Mod: HCNC,CPTII,S$GLB, | Performed by: FAMILY MEDICINE

## 2023-10-16 PROCEDURE — 1159F PR MEDICATION LIST DOCUMENTED IN MEDICAL RECORD: ICD-10-PCS | Mod: HCNC,CPTII,S$GLB, | Performed by: FAMILY MEDICINE

## 2023-10-16 PROCEDURE — 1125F PR PAIN SEVERITY QUANTIFIED, PAIN PRESENT: ICD-10-PCS | Mod: HCNC,CPTII,S$GLB, | Performed by: FAMILY MEDICINE

## 2023-10-16 PROCEDURE — 99999 PR PBB SHADOW E&M-EST. PATIENT-LVL IV: ICD-10-PCS | Mod: PBBFAC,HCNC,, | Performed by: FAMILY MEDICINE

## 2023-10-16 PROCEDURE — 90694 FLU VACCINE - QUADRIVALENT - ADJUVANTED: ICD-10-PCS | Mod: HCNC,S$GLB,, | Performed by: FAMILY MEDICINE

## 2023-10-16 PROCEDURE — 99215 OFFICE O/P EST HI 40 MIN: CPT | Mod: HCNC,S$GLB,, | Performed by: FAMILY MEDICINE

## 2023-10-16 PROCEDURE — 3288F FALL RISK ASSESSMENT DOCD: CPT | Mod: HCNC,CPTII,S$GLB, | Performed by: FAMILY MEDICINE

## 2023-10-16 PROCEDURE — 1160F PR REVIEW ALL MEDS BY PRESCRIBER/CLIN PHARMACIST DOCUMENTED: ICD-10-PCS | Mod: HCNC,CPTII,S$GLB, | Performed by: FAMILY MEDICINE

## 2023-10-16 PROCEDURE — G0439 PR MEDICARE ANNUAL WELLNESS SUBSEQUENT VISIT: ICD-10-PCS | Mod: HCNC,S$GLB,, | Performed by: NURSE PRACTITIONER

## 2023-10-16 PROCEDURE — 1101F PR PT FALLS ASSESS DOC 0-1 FALLS W/OUT INJ PAST YR: ICD-10-PCS | Mod: HCNC,CPTII,S$GLB, | Performed by: FAMILY MEDICINE

## 2023-10-16 PROCEDURE — G0439 PPPS, SUBSEQ VISIT: HCPCS | Mod: HCNC,S$GLB,, | Performed by: NURSE PRACTITIONER

## 2023-10-16 RX ORDER — MEMANTINE HYDROCHLORIDE 5 MG/1
5 TABLET ORAL 2 TIMES DAILY
Qty: 180 TABLET | Refills: 3 | Status: SHIPPED | OUTPATIENT
Start: 2023-10-16 | End: 2024-10-15

## 2023-10-16 NOTE — PROGRESS NOTES
Subjective     Patient ID: Kang Herbert is a 88 y.o. male.    Chief Complaint: Follow-up    88 years old male to the clinic for blood pressure check.  Blood pressure today was stable.  No chest pain, palpitation, orthopnea or PND.  Last A1c was 7.2.  Patient with good compliance with thyroid regimen.  Patient currently taking statin.  He was previously diagnosed with aortic atherosclerosis .  No flare ups of seizures.  Patient was previously diagnosed with eye bleeding no recent flare ups.  Patient with follow-up with the ophthalmologist in December.  Patient was recently evaluated at the emergency room with blood work.    Follow-up  Pertinent negatives include no chest pain.     Review of Systems   Constitutional: Negative.    HENT: Negative.     Eyes: Negative.    Respiratory: Negative.     Cardiovascular: Negative.  Negative for chest pain, palpitations, leg swelling and claudication.   Gastrointestinal: Negative.    Endocrine: Negative for polydipsia, polyphagia and polyuria.   Genitourinary: Negative.    Musculoskeletal: Negative.    Integumentary:  Negative.   Neurological: Negative.    Psychiatric/Behavioral: Negative.            Objective     Physical Exam  Vitals and nursing note reviewed.   Constitutional:       General: He is not in acute distress.     Appearance: He is well-developed. He is not diaphoretic.   HENT:      Head: Normocephalic and atraumatic.      Right Ear: External ear normal.      Left Ear: External ear normal.      Nose: Nose normal.      Mouth/Throat:      Pharynx: No oropharyngeal exudate.   Eyes:      General: No scleral icterus.        Right eye: No discharge.         Left eye: No discharge.      Conjunctiva/sclera: Conjunctivae normal.      Pupils: Pupils are equal, round, and reactive to light.   Neck:      Thyroid: No thyromegaly.      Vascular: No JVD.      Trachea: No tracheal deviation.   Cardiovascular:      Rate and Rhythm: Normal rate and regular rhythm.      Heart  sounds: Normal heart sounds. No murmur heard.     No friction rub. No gallop.   Pulmonary:      Effort: Pulmonary effort is normal. No respiratory distress.      Breath sounds: Normal breath sounds. No stridor. No wheezing or rales.   Chest:      Chest wall: No tenderness.   Abdominal:      General: Bowel sounds are normal. There is no distension.      Palpations: Abdomen is soft. There is no mass.      Tenderness: There is no abdominal tenderness. There is no guarding or rebound.   Musculoskeletal:         General: No tenderness. Normal range of motion.      Cervical back: Normal range of motion and neck supple.   Lymphadenopathy:      Cervical: No cervical adenopathy.   Skin:     General: Skin is warm and dry.      Coloration: Skin is not pale.      Findings: No erythema or rash.   Neurological:      Mental Status: He is alert and oriented to person, place, and time.      Cranial Nerves: No cranial nerve deficit.      Motor: No abnormal muscle tone.      Coordination: Coordination normal.      Deep Tendon Reflexes: Reflexes are normal and symmetric. Reflexes normal.   Psychiatric:         Behavior: Behavior normal.         Thought Content: Thought content normal.         Cognition and Memory: Cognition is impaired. Memory is impaired. He does not exhibit impaired recent memory or impaired remote memory.         Judgment: Judgment normal.      Comments: Mini-mental test 25/30.            Assessment and Plan     1. Essential hypertension  -     Comprehensive Metabolic Panel; Future; Expected date: 10/16/2023  -     Lipid Panel; Future; Expected date: 10/16/2023  -     TSH; Future; Expected date: 10/16/2023  -     CBC Auto Differential; Future; Expected date: 10/16/2023  -     Ambulatory referral/consult to Outpatient Case Management    2. Mixed dyslipidemia  -     Lipid Panel; Future; Expected date: 10/16/2023    3. Acquired hypothyroidism  -     TSH; Future; Expected date: 10/16/2023    4. 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 Needs flu shot  -     Influenza (FLUAD) - Quadrivalent (Adjuvanted) *Preferred* (65+) (PF)    6. Bilateral low back pain with left-sided sciatica, unspecified chronicity  -     Ambulatory referral/consult to Outpatient Case Management    7. Vitreous hemorrhage of right eye    8. Partial symptomatic epilepsy with complex partial seizures, not intractable, without status epilepticus    9. Aortic atherosclerosis        Continue monitoring blood sugar at home,ADA diet.   Continue monitoring blood pressure at home, low sodium diet.      Namenda was ordered.  Follow up in about 4 months (around 2/16/2024), or if symptoms worsen or fail to improve.

## 2023-10-16 NOTE — PROGRESS NOTES
"Kang Herbert presented for a  Medicare AWV and comprehensive Health Risk Assessment today. The following components were reviewed and updated:    Medical history  Family History  Social history  Allergies and Current Medications  Health Risk Assessment  Health Maintenance  Care Team         ** See Completed Assessments for Annual Wellness Visit within the encounter summary.**         The following assessments were completed:  Living Situation  CAGE  Depression Screening  Timed Get Up and Go  Whisper Test  Cognitive Function Screening - unable to complete clock drawing test; asked to spell "world" in [place] backwards, done correctly  Nutrition Screening  ADL Screening  PAQ Screening        Vitals:    10/16/23 1430   BP: 110/60   BP Location: Left arm   Patient Position: Sitting   BP Method: Medium (Manual)   Pulse: 87   SpO2: 98%   Weight: 51 kg (112 lb 7 oz)   Height: 5' 2" (1.575 m)     Body mass index is 20.56 kg/m².    Physical Exam  Vitals reviewed.   Constitutional:       General: He is not in acute distress.     Appearance: Normal appearance. He is well-developed, well-groomed and normal weight.   HENT:      Head: Normocephalic.   Cardiovascular:      Rate and Rhythm: Normal rate.   Pulmonary:      Effort: Pulmonary effort is normal. No respiratory distress.   Skin:     Coloration: Skin is not pale.   Neurological:      Mental Status: He is alert and oriented to person, place, and time.      Gait: Gait abnormal.      Comments: Slowed gait   Psychiatric:         Attention and Perception: Attention normal.         Mood and Affect: Mood and affect normal.         Speech: Speech normal.         Behavior: Behavior normal. Behavior is cooperative.         Cognition and Memory: He exhibits impaired recent memory.             Diagnoses and health risks identified today and associated recommendations/orders:    1. Encounter for preventive health examination  Pain level assessed and reviewed. Not taking any " opioids; at low risk for opioid use disorder. Follow up with PCP.    2. Senile dementia  Hx of memory changes; started on treatment today by PCP.     3. Type 2 diabetes mellitus with diabetic autonomic neuropathy, without long-term current use of insulin  Chronic; stable on medication. Follow up with PCP.    4. Diabetic peripheral neuropathy associated with type 2 diabetes mellitus  Chronic; stable on medication. Follow up with PCP.    5. Aortic atherosclerosis  Chronic; stable on medication. As seen on previous imaging. Follow up with PCP.    6. Vitreous hemorrhage of right eye  History of vitreous degeneration of both eyes; followed by Ophthalmology.    7. Partial symptomatic epilepsy with complex partial seizures, not intractable, without status epilepticus  Chronic; stable. Follow up with PCP    8. Thrombocytopenia  As seen on recent lab test; follow up with PCP.    9. Memory changes  - Ambulatory referral/consult to Neurology; Future    10. Itchy scalp  - Ambulatory referral/consult to Dermatology; Future    11. Other reduced mobility  Ambulating independently; has cane at home but does not regularly use this; follow up with PCP.    12. Encounter for Medicare annual wellness exam  - Ambulatory Referral/Consult to Enhanced Annual Wellness Visit (eAWV)      Provided [place] with a 5-10 year written screening schedule and personal prevention plan. Recommendations were developed using the USPSTF age appropriate recommendations. Education, counseling, and referrals were provided as needed. After Visit Summary printed and given to patient which includes a list of additional screenings/tests needed.    Follow up for your next annual wellness visit.    Tammi Vallecillo NP      Advance Care Planning     I offered to discuss advanced care planning, including how to pick a person who would make decisions for you if you were unable to make them for yourself, called a health care power of , and what kind of  decisions you might make such as use of life sustaining treatments such as ventilators and tube feeding when faced with a life limiting illness recorded on a living will that they will need to know. (How you want to be cared for as you near the end of your natural life)     X  Patient is unwilling to engage in a discussion regarding advance directives at this time. Rather hold off on this conversation until next year.

## 2023-10-16 NOTE — PATIENT INSTRUCTIONS
Counseling and Referral of Other Preventative  (Italic type indicates deductible and co-insurance are waived)    Patient Name: Kang Herbert  Today's Date: 10/16/2023    Health Maintenance       Date Due Completion Date    Shingles Vaccine (1 of 2) Never done ---    Influenza Vaccine (1) 09/01/2023 2/14/2023    Override on 11/12/2017: Done (at the pharmacy)    COVID-19 Vaccine (4 - 2023-24 season) 09/01/2023 11/12/2021    Eye Exam 12/13/2023 (Originally 1/4/2023) 1/4/2022    Hemoglobin A1c 02/08/2024 8/8/2023    Diabetes Urine Screening 08/08/2024 8/8/2023    Lipid Panel 08/08/2024 8/8/2023    TETANUS VACCINE 03/17/2026 3/17/2016        No orders of the defined types were placed in this encounter.      The following information is provided to all patients.  This information is to help you find resources for any of the problems found today that may be affecting your health:                Living healthy guide: www.[facility].[place].gov      Understanding Diabetes: www.diabetes.org      Eating healthy: www.cdc.gov/healthyweight      CDC home safety checklist: www.cdc.gov/steadi/patient.html      Agency on Aging: www.goea.[place].gov      Alcoholics anonymous (AA): www.aa.org      Physical Activity: www.alok.nih.gov/zz7fbup      Tobacco use: www.quitwithusla.org

## 2023-10-17 ENCOUNTER — TELEPHONE (OUTPATIENT)
Dept: FAMILY MEDICINE | Facility: CLINIC | Age: 88
End: 2023-10-17
Payer: MEDICARE

## 2023-10-17 ENCOUNTER — CLINICAL SUPPORT (OUTPATIENT)
Dept: REHABILITATION | Facility: HOSPITAL | Age: 88
End: 2023-10-17
Payer: MEDICARE

## 2023-10-17 DIAGNOSIS — R53.1 DECREASED STRENGTH: ICD-10-CM

## 2023-10-17 DIAGNOSIS — M54.41 CHRONIC BILATERAL LOW BACK PAIN WITH RIGHT-SIDED SCIATICA: Primary | ICD-10-CM

## 2023-10-17 DIAGNOSIS — R41.3 MEMORY CHANGES: Primary | ICD-10-CM

## 2023-10-17 DIAGNOSIS — G89.29 CHRONIC BILATERAL LOW BACK PAIN WITH RIGHT-SIDED SCIATICA: Primary | ICD-10-CM

## 2023-10-17 PROBLEM — D69.6 THROMBOCYTOPENIA: Status: ACTIVE | Noted: 2023-10-17

## 2023-10-17 PROBLEM — F03.90 SENILE DEMENTIA: Status: ACTIVE | Noted: 2023-10-17

## 2023-10-17 PROCEDURE — 97530 THERAPEUTIC ACTIVITIES: CPT | Mod: HCNC,PN

## 2023-10-17 PROCEDURE — 97112 NEUROMUSCULAR REEDUCATION: CPT | Mod: HCNC,PN

## 2023-10-17 NOTE — TELEPHONE ENCOUNTER
"----- Message from Porsche Ling sent at 10/17/2023  3:28 PM CDT -----  Good afternoon Ms Cadena,   Please read below    Thanks      ----- Message -----  From: Amanda Garcia MA  Sent: 10/16/2023   4:27 PM CDT  To: Porsche Kincaid! This pt will need [id number] to see Neuropsycholgy dept's 85+ clinic.  Thanks!  Carlyn   ----- Message -----  From: Porsche Ling  Sent: 10/16/2023   4:01 PM CDT  To: Lamar QUIÑONEZ Staff    Good afternoon  Patient with a Referral to Neurology from Ms Tammi Chu NP.  Diagnosis "memory problems"  Please assist scheduling  Thanks    Porsche            "

## 2023-10-17 NOTE — PROGRESS NOTES
[facility]   Physical Therapy Treatment Note      Name: Kang Herbert  Clinic Number: [id number]    Therapy Diagnosis:   Encounter Diagnoses   Name Primary?    Chronic bilateral low back pain with right-sided sciatica Yes    Decreased strength      Physician: Zeke Marrufo*    Visit Date: 10/17/2023    Physician Orders: PT Eval and Treat   Medical Diagnosis from Referral: DDD (degenerative disc disease), lumbar [M51.36]  Evaluation Date: 9/19/2023  Authorization Period Expiration: 12/31/2023  Plan of Care Expiration: 10/20/2023  Progress Note Due: 10/20/2023  Visit # / Visits authorized: 6/20 + 1  FOTO: 2nd performed 9/27. Final to be performed at discharge  PTA Visit #: 0/5      Precautions: Standard; type 2 diabetes and hypertension; history of seizures  Patient's wife afraid to be alone - requests to be with patient during visits. Language barrier - AMN  used     Time In: 1402  Time Out: 1444  Total Billable Time: 42 minutes      Subjective      Patient reports: right sided thoracic pain has resolved. Patient reports mild bilateral low back pain more so at night.    He was partially compliant with home exercise program. He performs 1 set of 10 of each exercise because he gets tired. (Noncompliant on 10/10)   Response to previous treatment: resolution of thoracic pain  Functional change: partial home exercise program compliance     Pain: 3-4/10 bilateral low back     Objective      Objective Measures updated at progress report unless specified.      Treatment      [place] received the treatments listed below:       neuromuscular re-education activities to improve: Kinesthetic and Posture for 8 minutes. The following activities were included:      Free Motion pallof press: 7 lbs x20 bilateral. Initial tactile cues for sequence  Free Motion pallof press + contralateral rotation: 7 lbs x20 bilateral. Initial tactile cues for sequence     therapeutic activities  "to improve functional performance for 34 minutes, including:     Free Motion chops: 7 lbs x 20 bilateral   Free Motion shoulder press: 3 lbs 2x10  Shuttle leg press: 3.0 cords 3x10 double leg  Goblet chair squat: 10 lbs, 3x10 on high/low mat set to 20"  Incline push ups: 3x10 on high/low mat set to 21"   Incline plank: 2x60", 80" on high/low mat set to 19"    Patient Education and Home Exercises        Education provided:   - Discharge planning  - Perform updated home exercise program as prescribed. Able to perform several sets with higher weight here; no reason he cannot at home.      Written Home Exercises Provided: On 10/3/2023. Exercises were reviewed and [place] was able to demonstrate them prior to the end of the session.  [place] demonstrated good  understanding of the education provided. See Electronic Medical Record under Patient Instructions for exercises provided during therapy sessions     Assessment      [place] is a 88 y.o. male referred to outpatient Physical Therapy with a medical diagnosis of lumbar degenerative disc disease. Thoracic pain reported last visit has resolved. Milder to moderate low back pain that consistently resolves by end of visit. Full home exercise program compliance may promote greater night time improvement.      [place] Is progressing well towards his goals.   Patient prognosis is Fair.   Patient will continue to benefit from skilled outpatient physical therapy to address the deficits listed in the problem list box on initial evaluation, provide pt/family education and to maximize pt's level of independence in the home and community environment.      Patient's spiritual, cultural and educational needs considered and pt agreeable to plan of care and goals.  Anticipated barriers to physical therapy: History provided; sedentary lifestyle; language barrier and caregiver role.      Short = Long Term Goals (4 Weeks):  1. Patient will be compliant with home exercise " "program to supplement therapy in promoting functional mobility. Partially met 9/27  2. Patient will improve FOTO score to </= 43% limited to decrease perceived limitation with maintaining/changing body position. Progressing, not met   2. Patient will perform 6' walk at 0.85 m/s to promote increased physical activity. Progressing, not met   3. Patient will perform dynamic modified plank for 60" without pain to demonstrate increased core strength for daily activities. Adjust to incline plank for floor for 60 seconds. Met 10/17/2023  4. Patient will report full nights sleep for 2 consecutive sessions secondary to improved low back pain to improve quality of life. One of two sessions met 9/27     Plan      Discharge planning.   Flexion preference - low back.  Improve core and posterior chain strength/endurance/kinesthetic.    Sarah Gutierrez, PT, DPT, OCS      "

## 2023-10-19 ENCOUNTER — TELEPHONE (OUTPATIENT)
Dept: FAMILY MEDICINE | Facility: CLINIC | Age: 88
End: 2023-10-19
Payer: MEDICARE

## 2023-10-19 ENCOUNTER — PATIENT OUTREACH (OUTPATIENT)
Dept: ADMINISTRATIVE | Facility: OTHER | Age: 88
End: 2023-10-19
Payer: MEDICARE

## 2023-10-19 ENCOUNTER — CLINICAL SUPPORT (OUTPATIENT)
Dept: REHABILITATION | Facility: HOSPITAL | Age: 88
End: 2023-10-19
Payer: MEDICARE

## 2023-10-19 DIAGNOSIS — M54.41 CHRONIC BILATERAL LOW BACK PAIN WITH RIGHT-SIDED SCIATICA: Primary | ICD-10-CM

## 2023-10-19 DIAGNOSIS — R53.1 DECREASED STRENGTH: ICD-10-CM

## 2023-10-19 DIAGNOSIS — G89.29 CHRONIC BILATERAL LOW BACK PAIN WITH RIGHT-SIDED SCIATICA: Primary | ICD-10-CM

## 2023-10-19 PROCEDURE — 97530 THERAPEUTIC ACTIVITIES: CPT | Mod: HCNC,PN

## 2023-10-19 NOTE — PROGRESS NOTES
[facility]   Physical Therapy Treatment Note      Name: Kang Herbert  Clinic Number: [id number]    Therapy Diagnosis:   Encounter Diagnoses   Name Primary?    Chronic bilateral low back pain with right-sided sciatica Yes    Decreased strength      Physician: Zeke Marrufo*    Visit Date: 10/19/2023    Physician Orders: PT Eval and Treat   Medical Diagnosis from Referral: DDD (degenerative disc disease), lumbar [M51.36]  Evaluation Date: 9/19/2023  Authorization Period Expiration: 12/31/2023  Plan of Care Expiration: 10/20/2023  Progress Note Due: 10/20/2023  Visit # / Visits authorized: 8/20 + 1  FOTO: Complete  PTA Visit #: 0/5      Precautions: Standard; type 2 diabetes and hypertension; history of seizures  Patient's wife afraid to be alone - requests to be with patient during visits. Language barrier - AMN  used     Time In: 1403  Time Out: 1441  Total Billable Time: 38 minutes      Subjective      Patient reports: mild bilateral low back pain currently. Patient is agreeable to making today his last day of physical therapy.     He was compliant with home exercise program. Patient understands he needs to comply with sets and reps on a daily basis for long term pain relief.   Response to previous treatment: continued absence of thoracic pain. Mild low back pain  Functional change: no pain interrupting sleep     Pain: 3-4/10 bilateral low back     Objective      10/19/2023     6' walk: 1.08 m/s    CMS Impairment/Limitation/Restriction for FOTO Lumbar Spine Survey    Therapist reviewed FOTO scores for Kang Herbert on 10/19/2023.   FOTO documents entered into [employer] - see Media section.    Limitation Score: 35%  Category: Mobility     Treatment      [place] received the treatments listed below:       neuromuscular re-education activities to improve: Kinesthetic and Posture for 2 minutes. The following activities were included:      Free Motion pallof  "press: Reviewed set up and performance for home exercise program      therapeutic activities to improve functional performance for 36 minutes, including objective:     Chops: Reviewed set up and performance for home exercise program   Shoulder press: Reviewed set up and performance for home exercise program   Goblet chair squat: 10 lbs, 3x10 on high/low mat set to 20"  Incline push ups: 3x10 on high/low mat  Incline plank: 3x60" on high/low mat    Patient Education and Home Exercises        Education provided:   - Discharge from physical therapy.   - Updated home exercise program. Perform daily and full sets and reps     Written Home Exercises Provided: On 10/19/2023. Exercises were reviewed and [place] was able to demonstrate them prior to the end of the session.  [place] demonstrated good  understanding of the education provided. See Electronic Medical Record under Patient Instructions for exercises provided during therapy sessions     Assessment      [place] is a 88 y.o. male referred to outpatient Physical Therapy with a medical diagnosis of lumbar degenerative disc disease. Pain is well controlled at mild levels; consistently reduces or alleviates with exercise performance. Majority of goals are met. Patient is agreeable to and appropriate for discharge at this time.      [place] Is progressing well towards his goals.   Patient prognosis is Fair-good  Patient will continue to benefit from skilled outpatient physical therapy to address the deficits listed in the problem list box on initial evaluation, provide pt/family education and to maximize pt's level of independence in the home and community environment.      Patient's spiritual, cultural and educational needs considered and pt agreeable to plan of care and goals.  Anticipated barriers to physical therapy: History provided; sedentary lifestyle; language barrier and caregiver role.      Short = Long Term Goals (4 Weeks):  1. Patient will be " "compliant with home exercise program to supplement therapy in promoting functional mobility. Partially met 10/19  2. Patient will improve FOTO score to </= 43% limited to decrease perceived limitation with maintaining/changing body position. Met 10/19/2023   2. Patient will perform 6' walk at 0.85 m/s to promote increased physical activity. Met 10/19/2023   3. Patient will perform dynamic modified plank for 60" without pain to demonstrate increased core strength for daily activities. Adjust to incline plank for floor for 60 seconds. Met 10/17/2023  4. Patient will report full nights sleep for 2 consecutive sessions secondary to improved low back pain to improve quality of life. Met 10/19/2023     Plan      Discharge from physical therapy.    Sarah Gutierrez, PT, DPT, OCS      "

## 2023-10-23 ENCOUNTER — PATIENT MESSAGE (OUTPATIENT)
Dept: ADMINISTRATIVE | Facility: OTHER | Age: 88
End: 2023-10-23
Payer: MEDICARE

## 2023-10-23 NOTE — PROGRESS NOTES
CHW - Outreach Attempt    Community Health Worker left a In Basket message for 1st attempt to contact patient regarding: SDOH- Referral  Community Health Worker to attempt to contact patient on: 10/30     [facility]  Occupational Therapy  Evaluation  Date: 2021  Patient Name: Loraine Hanks        MRN: [id number]    : 1929  (81 y.o.)  Gender: male   Referring Practitioner: Dr. Chano Jeter MD  Diagnosis: Cellulitis     Past Medical History:   Diagnosis Date    Arthritis     COPD (chronic obstructive pulmonary disease) ([address]     Dependent edema     Hyperlipidemia     Hypertension     Psoriasis      Past Surgical History:   Procedure Laterality Date    ANGIOPLASTY Left 2018    Steve Pichardo -- leg    HERNIA REPAIR      NASAL POLYP SURGERY Bilateral     TONSILLECTOMY      TURP N/A 2019    CYSTOSCOPY TRANSURETHRAL RESECTION -PROSTATE LASER- PVP GREENLIGHT performed by Renaldo Santzio MD at [address] [place] [address]       Restrictions/Precautions: General Precautions, Fall Risk        Subjective  Subjective: Patient denies pain this date. Comments: Lying in bed upon arrival, agreeable to OT evaluation. Orientation  Overall Orientation Status: Within Functional Limits  Vision  Vision: Within Functional Limits  Hearing  Hearing: Within functional limits  Social/Functional History  Lives With: Daughter  Type of Home: House  Home Layout: One level  Home Access: Stairs to enter with rails  Entrance Stairs - Number of Steps: 3  Entrance Stairs - Rails: Both  Bathroom Shower/Tub: Walk-in shower  Bathroom Equipment: Grab bars in shower, Shower chair  Home Equipment: Rolling walker  ADL Assistance: Independent  Homemaking Assistance: Independent  Ambulation Assistance: Independent  Transfer Assistance: Independent  Active : No  Additional Comments: Per report will do the cooking and can do ADLs; daughter assists with the cleaning.   Prior Function  ADL Assistance: Independent  Homemaking Assistance: Independent  Ambulation Assistance: Independent  Transfer Assistance: Independent  Additional Comments: Per report will do the cooking and can do ADLs; daughter assists with the cleaning. Objective                     Gross LUE Strength: WFL  Gross RUE Strength: WFL  ADL  Feeding: Modified independent   Grooming: Setup, Supervision  UE Bathing: Stand by assistance  LE Bathing: Contact guard assistance  UE Dressing: Setup, Stand by assistance  LE Dressing: Minimal assistance  Toileting: Contact guard assistance  Additional Comments: Completed ADL toileting/hygiene this date with CGA to and from bathroom without AD. Supine to Sit: Supervision  Sit to Supine: Supervision       Balance  Sitting Balance: Modified independent   Standing Balance: Contact guard assistance       Functional Mobility  Functional - Mobility Device: No device  Activity: To/from bathroom  Assist Level: Contact guard assistance  Functional Mobility Comments: CGA without AD- F+ safety noted, 1 cue for safety when turning         Assessment: Patient is a 82 y/o male admitted to the Swing Bed Unit due cellulitis in legs. Patient demonstrates decreased strength and endurance overall that is limiting his independence with ADLs at [facility]. Patient to benefit from OT services in order to address these deficits. Goals  Short term goals  Time Frame for Short term goals: 3 days  Short term goal 1: Patient to tolerate 15 minutes ther-ex/ther-act to improve strength/endurance for ADLs. Short term goal 2: Patient to complete functional mobility with LRAD with SUP. Short term goal 3: Patient to complete UB/LB bathing/dressing with setup. Short term goal 4: Patient to tolerate >5 minutes dynamic standing during functional tasks with SUP.     Plan  REQUIRES OT FOLLOW UP: Yes    Times per week: 7  Times per day: Daily (1-2 times)            Time In: 3:55 PM  Time Out: 4:22 PM  Timed Coded Minutes: 27 Minutes  Total Treatment Time: 15 Minutes    DEO Garcia  7/14/2021

## 2023-10-27 DIAGNOSIS — E78.5 HYPERLIPIDEMIA, UNSPECIFIED HYPERLIPIDEMIA TYPE: ICD-10-CM

## 2023-10-27 NOTE — TELEPHONE ENCOUNTER
Care Due:                  Date            Visit Type   Department     Provider  --------------------------------------------------------------------------------                                EP -                              PRIMARY      [facility]  Last Visit: 10-      CARE ([facility])   DIOMEDES Marrufo                              EP -                              PRIMARY      [facility]  Next Visit: 02-      CARE ([facility])   DIOMEEDS Marrufo                                                            Last  Test          Frequency    Reason                     Performed    Due Date  --------------------------------------------------------------------------------    TSH.........  12 months..  levothyroxine............  08- 08-    Health [facility] Embedded Care Due Messages. Reference number: [id number].   10/27/2023 5:53:47 PM CDT

## 2023-10-28 RX ORDER — ATORVASTATIN CALCIUM 10 MG/1
10 TABLET, FILM COATED ORAL DAILY
Qty: 90 TABLET | Refills: 3 | Status: SHIPPED | OUTPATIENT
Start: 2023-10-28

## 2023-10-28 RX ORDER — OMEPRAZOLE 20 MG/1
20 CAPSULE, DELAYED RELEASE ORAL DAILY
Qty: 90 CAPSULE | Refills: 3 | Status: SHIPPED | OUTPATIENT
Start: 2023-10-28

## 2023-10-28 NOTE — TELEPHONE ENCOUNTER
Refill Decision Note   Kang Herbert  is requesting a refill authorization.  Brief Assessment and Rationale for Refill:  Approve     Medication Therapy Plan:  FLOS 2.19.2024    Medication Reconciliation Completed: No    Comments:     No Care Gaps recommended.     Note composed:3:52 PM 10/28/2023

## 2023-10-30 ENCOUNTER — OUTPATIENT CASE MANAGEMENT (OUTPATIENT)
Dept: ADMINISTRATIVE | Facility: OTHER | Age: 88
End: 2023-10-30
Payer: MEDICARE

## 2023-10-30 NOTE — PROGRESS NOTES
Attempt #:  1  This LMSW attempted to reach patient/caregiver to provide resource , however patient Ms Alcaraz advised SW she's unavailable. SW provided daughter with her contact information for a return call.

## 2023-10-31 NOTE — PROGRESS NOTES
CHW - Initial Contact    This Community Health Worker completed verified updated the Social Determinant of Health questionnaire with caregiver via telephone today.    Pt identified barriers of most importance are: MOW, Homemaker   Referrals to community agencies completed with patient/caregiver consent outside of [facility] include:  [facility]  Referrals were put through [facility] - no: none  Support and Services: Application for services completed by CHW on behalf of pt  Other information discussed the patient needs / wants help with: CHW spoke with the daughter of pt and she stated that pt and his spouse are in need of assistance with food and Homemaker services. Cg requested that services should include someone who is Bilingual and patient, CHW included request on application.    Follow up required: Yes, CHW will continue to f/u  Follow-up Outreach - Due: 11/21/2023

## 2023-11-06 NOTE — PROGRESS NOTES
Outpatient Care Management   - Low Risk Patient Assessment    Patient: Kang Herbert  MRN:  [id number]  Date of Service:  11/6/2023  Completed by:  Mora Verma LMSW  Referral Date: 10/16/2023    Reason for Visit   Patient presents with    OPCM SW First Assessment Attempt     10/30/2023  1st attempt to complete Initial Assessment  for Outpatient Care Management, however not avilaible. Daughter will follow-up with this SW when available.     Social Work Assessment - Low/Mod Risk       Brief Summary:  received a referral from  patient PCP. SW completed assessment with patient daughter Daughter reports patient and spouse resides together. Daughter reports patient can complete ADL's but family assist with IADL's.   Daughter reports speaking with CHW on last week regarding MOW and  services. Daughter denied patient needs assistance with shelter, medication, medical or utilities but needs assistance with food. Daughter reports patient receives a little SNAP benefits. Daughter reports patient ambulates with a cane and walker. Daughter reports patient has slight memory concerns. Family check in with patient and spouse through out the day.  Daughter denied information on ACP. Care plan was created in collaboration with patient/caregiver input.

## 2023-11-13 ENCOUNTER — TELEPHONE (OUTPATIENT)
Dept: PAIN MEDICINE | Facility: CLINIC | Age: 88
End: 2023-11-13
Payer: MEDICARE

## 2023-11-13 NOTE — TELEPHONE ENCOUNTER
----- Message from Nohemi Dykes sent at 11/13/2023  9:20 AM CST -----  Contact: britton  Type:  Needs Medical Advice    Who Called: pt  Symptoms (please be specific): pt needs a call back to find out what time his procedure is on Wednesday due to transportation    Would the patient rather a call back or a response via [facility]? call  Best Call Back Number:  [phone]  Additional Information:

## 2023-11-14 RX ORDER — ALPRAZOLAM 0.5 MG/1
0.5 TABLET ORAL
Status: CANCELLED | OUTPATIENT
Start: 2023-11-14

## 2023-11-14 NOTE — PRE-PROCEDURE INSTRUCTIONS
The following was discussed with pt's daughter via phone and sent to pt's portal. Pt's daughter verbalized understanding.    Dear [place] ,     You are scheduled for a procedure with Dr. Rigo Cesar on 11/15/2023.  Your scheduled arrival time is 07:40 am.  This arrival time is roughly 1 hour before your anticipated procedure time to allow sufficient time for pre-op..  Please wear comfortable clothes.  Most patients do not need to change into a gown.  Please do not wear a dress.  This procedure will take place at the [facility] at the corner of [facility] and [facility].  It is in the [facility] next to [facility].  The address is:     [address] [place] [address].  [place] [id number]     After entering the building, you will proceed to the second floor where you can check in with registration. You should take any medications that you routinely take for blood pressure, heart medications, thyroid, cholesterol, etc.      The fasting restrictions are dependent on whether or not you are receiving sedation.  Sedation is not available for all procedures.      Your fasting instructions are as follow:  Nothing to eat or drink 8 hrs prior to your procedure.  Clear liquids up to 2 hrs before.     If you are on blood thinners, you need to follow the anticoagulation instructions that had been discussed previously.  You should only stop the blood thinners if it was approved by your primary care physician or your cardiologist.  In the event that you are not able to stop your blood thinners, a blood thinner was not listed on your medication list, or we were not able to get clearance from your cardiologist, then the procedure may have to be postponed/canceled.      IF you were told to stop your blood thinners, this is how long you should generally hold some of the more common ones.  Remember that stopping blood thinners is only necessary for certain procedures. If you are unsure of  your instructions, please call us.   Aspirin - 5 days  Plavix/Clopidogrel - 7 days  Warfarin / Coumadin - 5 days  Eliquis - 3 days  Pradaxa/Dabigatran - 4 days  Xarelto/Rivaroxaban - 3 days     If you are a diabetic, do not take your medication if you will be fasting, but bring it with you. Please plan on being here for roughly 2 hours.     Please call us if you have been sick (running fever, having any flu-like symptoms) or have been taking antibiotics in the past 2 weeks or had any outpatient procedures other than with us (colonoscopy, endoscopy, OBGYN, dental, etc.). If you have been previously COVID positive, you will need to hold off on your procedure until you are symptom free for 10 days. If you did not have any symptoms, you can have your procedure 10 days from your positive test result.       *HOLD ALL VITAMINS, MINERALS, HERBS (INCLUDING HERBAL TEAS) AND SUPPLEMENTS  *SHOWER WITH ANTIBACTERIAL SOAP (EX. DIAL) NIGHT BEFORE AND MORNING OF PROCEDURE  *DO NOT APPLY ANY LOTIONS, OILS, POWDERS, PERFUME/COLOGNE, OINTMENTS, GELS, CREAMS, MAKEUP OR DEODORANT TO YOUR SKIN MORNING OF PROCEDURE  *LEAVE JEWELRY AND ANY VALUABLES AT HOME  *WEAR LOOSE COMFORTABLE CLOTHING (PREFERABLY A BUTTON UP SHIRT)        Thank you,  [facility]  [place], LPN [facility]  Pre-Admit

## 2023-11-15 ENCOUNTER — HOSPITAL ENCOUNTER (OUTPATIENT)
Facility: HOSPITAL | Age: 88
Discharge: HOME | End: 2023-11-15
Attending: STUDENT IN AN ORGANIZED HEALTH CARE EDUCATION/TRAINING PROGRAM | Admitting: STUDENT IN AN ORGANIZED HEALTH CARE EDUCATION/TRAINING PROGRAM
Payer: MEDICARE

## 2023-11-15 VITALS
HEART RATE: 96 BPM | RESPIRATION RATE: 16 BRPM | OXYGEN SATURATION: 95 % | TEMPERATURE: 98 F | DIASTOLIC BLOOD PRESSURE: 81 MMHG | SYSTOLIC BLOOD PRESSURE: 145 MMHG

## 2023-11-15 DIAGNOSIS — M51.36 DDD (DEGENERATIVE DISC DISEASE), LUMBAR: Primary | ICD-10-CM

## 2023-11-15 DIAGNOSIS — G89.29 CHRONIC PAIN: ICD-10-CM

## 2023-11-15 LAB — POCT GLUCOSE: 150 MG/DL (ref 70–110)

## 2023-11-15 PROCEDURE — 62323 PR INJ LUMBAR/SACRAL, W/IMAGING GUIDANCE: ICD-10-PCS | Mod: HCNC,,, | Performed by: STUDENT IN AN ORGANIZED HEALTH CARE EDUCATION/TRAINING PROGRAM

## 2023-11-15 PROCEDURE — 25500020 PHARM REV CODE 255: Performed by: STUDENT IN AN ORGANIZED HEALTH CARE EDUCATION/TRAINING PROGRAM

## 2023-11-15 PROCEDURE — 62323 NJX INTERLAMINAR LMBR/SAC: CPT | Performed by: STUDENT IN AN ORGANIZED HEALTH CARE EDUCATION/TRAINING PROGRAM

## 2023-11-15 PROCEDURE — 82962 GLUCOSE BLOOD TEST: CPT | Performed by: STUDENT IN AN ORGANIZED HEALTH CARE EDUCATION/TRAINING PROGRAM

## 2023-11-15 PROCEDURE — 62323 NJX INTERLAMINAR LMBR/SAC: CPT | Mod: HCNC,,, | Performed by: STUDENT IN AN ORGANIZED HEALTH CARE EDUCATION/TRAINING PROGRAM

## 2023-11-15 PROCEDURE — 25000003 PHARM REV CODE 250: Performed by: STUDENT IN AN ORGANIZED HEALTH CARE EDUCATION/TRAINING PROGRAM

## 2023-11-15 PROCEDURE — 63600175 PHARM REV CODE 636 W HCPCS: Performed by: STUDENT IN AN ORGANIZED HEALTH CARE EDUCATION/TRAINING PROGRAM

## 2023-11-15 RX ORDER — DEXAMETHASONE SODIUM PHOSPHATE 10 MG/ML
INJECTION INTRAMUSCULAR; INTRAVENOUS
Status: DISCONTINUED | OUTPATIENT
Start: 2023-11-15 | End: 2023-11-15 | Stop reason: HOSPADM

## 2023-11-15 RX ORDER — LIDOCAINE HYDROCHLORIDE 10 MG/ML
INJECTION, SOLUTION EPIDURAL; INFILTRATION; INTRACAUDAL; PERINEURAL
Status: DISCONTINUED | OUTPATIENT
Start: 2023-11-15 | End: 2023-11-15 | Stop reason: HOSPADM

## 2023-11-15 RX ORDER — ALPRAZOLAM 0.5 MG/1
0.5 TABLET, ORALLY DISINTEGRATING ORAL ONCE
Status: COMPLETED | OUTPATIENT
Start: 2023-11-15 | End: 2023-11-15

## 2023-11-15 RX ORDER — LIDOCAINE HYDROCHLORIDE 20 MG/ML
INJECTION, SOLUTION EPIDURAL; INFILTRATION; INTRACAUDAL; PERINEURAL
Status: DISCONTINUED | OUTPATIENT
Start: 2023-11-15 | End: 2023-11-15 | Stop reason: HOSPADM

## 2023-11-15 RX ADMIN — ALPRAZOLAM 0.5 MG: 0.5 TABLET, ORALLY DISINTEGRATING ORAL at 09:11

## 2023-11-15 NOTE — PLAN OF CARE
Pt in [place] [address], [facility], meds held until consents signed. Pt denies any open wounds on body or the use of any immunizations or antibiotics in the past 2 weeks. Pt needs consents and an H&P, otherwise ready to roll.

## 2023-11-15 NOTE — DISCHARGE SUMMARY
Discharge Note  Short Stay      SUMMARY     Admit Date: 11/15/2023    Attending Physician: Mari Mondragon, DO    Discharge Physician: Mari Mondragon    Discharge Date: 11/15/2023 10:27 AM    Procedure(s) (LRB):  INJECTION, STEROID, SPINE, LUMBAR, EPIDURAL (N/A)    Final Diagnosis: Lumbar radiculopathy [M54.16]    Disposition: Home or self care    Patient Instructions:   Current Discharge Medication List        CONTINUE these medications which have NOT CHANGED    Details   atorvastatin (LIPITOR) 10 MG tablet Take 1 tablet (10 mg total) by mouth once daily.  Qty: 90 tablet, Refills: 3    Comments: Please note: diagnosis coding information is often on page 2 or 3 of e-scribed orders.  Associated Diagnoses: Hyperlipidemia, unspecified hyperlipidemia type      finasteride (PROSCAR) 5 mg tablet Take 1 tablet (5 mg total) by mouth once daily.  Qty: 90 tablet, Refills: 3      gabapentin (NEURONTIN) 800 MG tablet TAKE 1 TABLET BY MOUTH 2 TIMES DAILY.  Qty: 180 tablet, Refills: 3    Comments: DX Code Needed  .  Associated Diagnoses: Other osteoarthritis involving multiple joints; Spinal stenosis of lumbar region with neurogenic claudication; Lumbar radiculopathy      levothyroxine (SYNTHROID) 50 MCG tablet Take 1 tablet (50 mcg total) by mouth before breakfast.  Qty: 90 tablet, Refills: 3    Associated Diagnoses: Hypothyroidism, unspecified type      losartan (COZAAR) 25 MG tablet TAKE 1 TABLET BY MOUTH EVERY DAY  Qty: 90 tablet, Refills: 3    Comments: .  Associated Diagnoses: Essential hypertension      meloxicam (MOBIC) 15 MG tablet TAKE 1 TABLET BY MOUTH EVERY DAY AS NEEDED FOR PAIN MAY TAKE 1/2 TABLET TWICE DAILY  Qty: 30 tablet, Refills: 2    Comments: DX Code Needed  .  Associated Diagnoses: Other osteoarthritis involving multiple joints; Spinal stenosis of lumbar region with neurogenic claudication; Lumbar radiculopathy      memantine (NAMENDA) 5 MG Tab Take 1 tablet (5 mg total) by mouth 2 (two) times  daily.  Qty: 180 tablet, Refills: 3    Associated Diagnoses: Senile dementia      metFORMIN (GLUCOPHAGE) 1000 MG tablet TAKE 1 TABLET (1,000 MG TOTAL) BY MOUTH 2 (TWO) TIMES DAILY WITH MEALS. APPOINTMENT NEEDED FOR ADDITIONAL REFILLS.  Qty: 180 tablet, Refills: 3    Comments: DX Code Needed  .  Associated Diagnoses: Type 2 diabetes mellitus without complication, without long-term current use of insulin      omeprazole (PRILOSEC) 20 MG capsule Take 1 capsule (20 mg total) by mouth once daily.  Qty: 90 capsule, Refills: 3    Comments: Please note: diagnosis coding information is often on page 2 or 3 of e-scribed orders..      blood-glucose meter (ACCU-CHEK ARUNA PLUS METER) Misc 1 Units by Misc.(Non-Drug; Combo Route) route once daily.  Qty: 1 each, Refills: 0    Associated Diagnoses: Type 2 diabetes mellitus with hyperglycemia, without long-term current use of insulin      lancets (ONETOUCH ULTRASOFT LANCETS) Misc 1 lancet by Misc.(Non-Drug; Combo Route) route once daily.  Qty: 100 each, Refills: 3    Associated Diagnoses: Type 2 diabetes mellitus with hyperglycemia, without long-term current use of insulin      TRUE METRIX GLUCOSE TEST STRIP Strp 1 STRIP BY MISC.(NON-DRUG COMBO ROUTE) ROUTE ONCE DAILY.  Qty: 100 strip, Refills: 3    Associated Diagnoses: Type 2 diabetes mellitus with hyperglycemia, without long-term current use of insulin             Discharge Diagnosis: Lumbar radiculopathy [M54.16]  Condition on Discharge: Stable with no complications to procedure   Diet on Discharge: Same as before.  Activity: as per instruction sheet.  Discharge to: Home with a responsible adult.  Follow up: 2-4 weeks    Please call my office or pager at [phone] if experienced any weakness or loss of sensation, fever > 101.5, pain uncontrolled with oral medications, persistent nausea/vomiting/or diarrhea, redness or drainage from the incisions, or any other worrisome concerns. If physician on call was not reached or could  not communicate with our office for any reason please go to the nearest emergency department

## 2023-11-15 NOTE — PLAN OF CARE
Pt denies pain or discomfort @ this time. Pt states he is ready to be discharged home @ this time. Discharge care explained to patient and daughter. Dressing placed. Discharge instructions reviewed with patient, with time allotted for questions. Pt verbalizes understanding of instructions and states they have no further questions @ this time. Procedure site is clean, dry and intact. Band-aids in place. Vital signs are stable. No acute distress noted. Staff is at bedside to assist patient. Wheeled to discharge area. Home with family in private vehicle.

## 2023-11-15 NOTE — H&P
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    Seizures     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/2013     Past Surgical History:   Procedure Laterality Date    CATARACT EXTRACTION  11-/    od/os    CHOLECYSTECTOMY      EPIDURAL STEROID INJECTION INTO LUMBAR SPINE N/A 11/30/2021    Procedure: Lumbar Epidural Steroid Injection L4-5 (No Sedation);  Surgeon: Sisi Lua Jr., MD;  Location: [facility];  Service: Pain Management;  Laterality: N/A;  Diabetic    EPIDURAL STEROID INJECTION INTO LUMBAR SPINE N/A 1/25/2022    Procedure: Lumbar Epidural Steroid Injection L4-5;  Surgeon: Sisi Lua Jr., MD;  Location: [facility];  Service: Pain Management;  Laterality: N/A;  diabetic     HERNIA REPAIR       Review of patient's allergies indicates:  No Known Allergies   Current Facility-Administered Medications   Medication    alprazolam ODT dissolvable tablet 0.5 mg       PMHx, PSHx, Allergies, Medications reviewed in epic    ROS negative except pain complaints in HPI    OBJECTIVE:    BP (!) 145/66   Pulse 92   Temp 98.1 °F (36.7 °C) (Temporal)   Resp 16   SpO2 96%     PHYSICAL EXAMINATION:    GENERAL: Well appearing, in no acute distress, alert and oriented x3.  PSYCH:  Mood and affect appropriate.  SKIN: Skin color, texture, turgor normal, no rashes or lesions which will impact the procedure.  CV: RRR with palpation of the radial artery.  PULM: No evidence of respiratory difficulty, symmetric chest rise. Clear to auscultation.  NEURO: Cranial nerves grossly intact.    Plan:    Proceed with procedure as  planned Procedure(s) (LRB):  INJECTION, STEROID, SPINE, LUMBAR, EPIDURAL (N/A)    Mari Mondragon  11/15/2023

## 2023-11-15 NOTE — DISCHARGE INSTRUCTIONS
Home Care Instructions Pain Management:    1.  DIET:    You may resume your normal diet today.    2.  BATHING:    You may shower with luke warm water.    3.  DRESSING:    You may remove your bandage today.    4.  ACTIVITY LEVEL:      You may resume your normal activities 24 hours after your procedure.    5.  MEDICATIONS:    You may resume your normal medications today.    6.  SPECIAL INSTRUCTIONS:    No heat to the injection site for 24 hours including bath or shower, heating pad, moist heat or hot tubs.    Use an ice pack to the injection site for any pain or discomfort.  Apply ice packs for 20 minute intervals as needed.    If you have received any sedatives by mouth today, you can not drive for 12 hours.    If you have received sedation through an IV, you can not drive for 24 hours.    PLEASE CALL YOUR DOCTOR FOR THE FOLLOWIN.  Redness or swelling around the injection site.  2.  Fever of 101 degrees.  3.  Drainage (pus) from the injection site.  4.  For any continuous bleeding (some dried blood over the incision is normal.)    FOR EMERGENCIES:    If any unusual problems or difficulties occur during clinic hours, call [phone] or dial [phone].    Follow up with with your physician in 2-3 weeks.

## 2023-11-15 NOTE — OP NOTE
Lumbar Interlaminar Epidural Steroid Injection under Fluoroscopic Guidance    The procedure, risks, benefits, and options were discussed with the patient. There are no contraindications to the procedure. The patent expressed understanding and agreed to the procedure. Informed written consent was obtained prior to the start of the procedure and can be found in the patient's chart.    PATIENT NAME: Mr. Kang Herbert   MRN: [id number]     DATE OF PROCEDURE: 11/15/2023    PROCEDURE: Lumbar Interlaminar Epidural Steroid Injection L4/L5 under Fluoroscopic Guidance    PRE-OP DIAGNOSIS: Lumbar radiculopathy [M54.16] Lumbar radiculopathy [M54.16]    POST-OP DIAGNOSIS: Same    PHYSICIAN: Mari Mondragon DO    ASSISTANTS: None     MEDICATIONS INJECTED: Preservative-free Decadron 10mg with 4cc of Lidocaine 1% MPF and preservative free normal saline    LOCAL ANESTHETIC INJECTED: Xylocaine 2%     SEDATION: None    ESTIMATED BLOOD LOSS: None    COMPLICATIONS: None    TECHNIQUE: Time-out was performed to identify the patient and procedure to be performed. With the patient laying in a prone position, the surgical area was prepped and draped in the usual sterile fashion using ChloraPrep and a fenestrated drape. The level was determined under fluoroscopy guidance. Skin anesthesia was achieved by injecting Lidocaine 2% over the injection site. The interlaminar space was then approached with a 18 gauge,  3.5 inch Tuohy needle that was introduced under fluoroscopic guidance in the AP, lateral and/or contralateral oblique imaging. Once the Ligamentum flavum was encountered loss of resistance to saline was used to enter the epidural space. With positive loss of resistance and negative aspiration for CSF or Blood, contrast dye Omnipaque (240mg/mL) was injected to confirm placement and there was no vascular runoff. 5 mL of the medication mixture listed above was injected slowly. Displacement of the radio opaque contrast after injection  of the medication confirmed that the medication went into the area of the epidural space. The needles were removed and bleeding was nil. A sterile dressing was applied. No specimens collected. The patient tolerated the procedure well. \    The patient was monitored after the procedure in the recovery area. They were given post-procedure and discharge instructions to follow at home. The patient was discharged in a stable condition.      Mari Mondragon DO

## 2023-11-27 ENCOUNTER — TELEPHONE (OUTPATIENT)
Dept: FAMILY MEDICINE | Facility: CLINIC | Age: 88
End: 2023-11-27
Payer: MEDICARE

## 2023-11-27 ENCOUNTER — OFFICE VISIT (OUTPATIENT)
Dept: URGENT CARE | Facility: CLINIC | Age: 88
End: 2023-11-27
Payer: MEDICARE

## 2023-11-27 VITALS
HEIGHT: 62 IN | TEMPERATURE: 99 F | WEIGHT: 112.44 LBS | BODY MASS INDEX: 20.69 KG/M2 | HEART RATE: 99 BPM | RESPIRATION RATE: 20 BRPM | OXYGEN SATURATION: 95 % | SYSTOLIC BLOOD PRESSURE: 111 MMHG | DIASTOLIC BLOOD PRESSURE: 66 MMHG

## 2023-11-27 DIAGNOSIS — J34.89 SINUS PRESSURE: ICD-10-CM

## 2023-11-27 DIAGNOSIS — R09.81 NASAL CONGESTION: Primary | ICD-10-CM

## 2023-11-27 DIAGNOSIS — M54.30 SCIATICA, UNSPECIFIED LATERALITY: Primary | ICD-10-CM

## 2023-11-27 DIAGNOSIS — R05.9 COUGH, UNSPECIFIED TYPE: ICD-10-CM

## 2023-11-27 LAB
CTP QC/QA: YES
SARS-COV-2 AG RESP QL IA.RAPID: NEGATIVE

## 2023-11-27 PROCEDURE — 87811 SARS-COV-2 COVID19 W/OPTIC: CPT | Mod: QW,S$GLB,,

## 2023-11-27 PROCEDURE — 99213 OFFICE O/P EST LOW 20 MIN: CPT | Mod: S$GLB,,,

## 2023-11-27 PROCEDURE — 99213 PR OFFICE/OUTPT VISIT, EST, LEVL III, 20-29 MIN: ICD-10-PCS | Mod: S$GLB,,,

## 2023-11-27 PROCEDURE — 87811 SARS CORONAVIRUS 2 ANTIGEN POCT, MANUAL READ: ICD-10-PCS | Mod: QW,S$GLB,,

## 2023-11-27 RX ORDER — DOXYCYCLINE 100 MG/1
100 CAPSULE ORAL EVERY 12 HOURS
Qty: 14 CAPSULE | Refills: 0 | Status: SHIPPED | OUTPATIENT
Start: 2023-11-27 | End: 2023-12-04

## 2023-11-27 RX ORDER — BENZONATATE 100 MG/1
100 CAPSULE ORAL EVERY 6 HOURS PRN
Qty: 30 CAPSULE | Refills: 0 | Status: SHIPPED | OUTPATIENT
Start: 2023-11-27 | End: 2024-02-22

## 2023-11-27 RX ORDER — PROMETHAZINE HYDROCHLORIDE AND DEXTROMETHORPHAN HYDROBROMIDE 6.25; 15 MG/5ML; MG/5ML
5 SYRUP ORAL EVERY 4 HOURS PRN
Qty: 118 ML | Refills: 0 | Status: SHIPPED | OUTPATIENT
Start: 2023-11-27 | End: 2023-12-07

## 2023-11-27 RX ORDER — AZELASTINE 1 MG/ML
1 SPRAY, METERED NASAL 2 TIMES DAILY
Qty: 30 ML | Refills: 0 | Status: SHIPPED | OUTPATIENT
Start: 2023-11-27 | End: 2024-02-22

## 2023-11-27 NOTE — PROGRESS NOTES
"Subjective:      Patient ID: Kang Herbert is a 88 y.o. male.    Vitals:  height is 5' 2" (1.575 m) and weight is 51 kg (112 lb 7 oz). His oral temperature is 98.7 °F (37.1 °C). His blood pressure is 111/66 and his pulse is 99. His respiration is 20 and oxygen saturation is 95%.     Chief Complaint: Cough    Pt present to clinic with cough, sneezing. Sx started 2 days ago. Treatment include promethazine with mild relief.     Provider note:    89 yo M c/o cough, sneezing, congestion x 5 days. Taking promethazine which he would like a refill of. Denies sob, fever, fatigue, loss of appetite. Daughter is requesting a steroid shot.     Cough  This is a new problem. The current episode started in the past 7 days. The problem has been gradually worsening. The problem occurs constantly. The cough is Non-productive. Associated symptoms include nasal congestion and postnasal drip. Pertinent negatives include no chills, ear pain, fever, myalgias, sore throat, shortness of breath or wheezing. Nothing aggravates the symptoms. He has tried OTC cough suppressant for the symptoms. The treatment provided no relief.       Constitution: Negative for chills, sweating, fatigue and fever.   HENT:  Positive for congestion and postnasal drip. Negative for ear pain, sinus pain, sinus pressure, sore throat and trouble swallowing.    Respiratory:  Positive for cough. Negative for chest tightness, sputum production, shortness of breath and wheezing.    Gastrointestinal:  Negative for nausea, vomiting, constipation and diarrhea.   Musculoskeletal:  Negative for muscle ache.      Objective:     Physical Exam   Constitutional: He is oriented to person, place, and time.      Comments:Pt coughing during exam   normal  HENT:   Head: Normocephalic and atraumatic.   Ears:   Right Ear: Tympanic membrane, external ear and ear canal normal.   Left Ear: Tympanic membrane, external ear and ear canal normal.   Nose: Congestion present.   Mouth/Throat: " No oropharyngeal exudate or posterior oropharyngeal erythema.   Eyes: Conjunctivae are normal. Pupils are equal, round, and reactive to light. Extraocular movement intact   Cardiovascular: Normal rate, regular rhythm and normal heart sounds.   Pulmonary/Chest: Effort normal and breath sounds normal.   Musculoskeletal: Normal range of motion.         General: Normal range of motion.   Neurological: He is alert and oriented to person, place, and time.   Skin: Skin is warm and dry.       Assessment:     1. Nasal congestion    2. Cough, unspecified type    3. Sinus pressure        Plan:     Counseled pt and daughter on promethazine and side effects such as drowsiness, dizziness etc. Pt has taken frequently and tolerated well. Will prescribe but advised to use sparingly and monitor closely. ER and RTC precautions discussed. Daughter requesting steroid shot, discussed no medical indication for one today such as wheezing, sob, tonsillitis etc. Will prescribe doxy to take if symptoms do not improve in the next 48 hours. Discussed bacterial vs viral infections. Pt is not sob and no signs of pneumonia present today.     Nasal congestion    Cough, unspecified type  -     SARS Coronavirus 2 Antigen, POCT Manual Read  -     promethazine-dextromethorphan (PROMETHAZINE-DM) 6.25-15 mg/5 mL Syrp; Take 5 mLs by mouth every 4 (four) hours as needed.  Dispense: 118 mL; Refill: 0  -     benzonatate (TESSALON PERLES) 100 MG capsule; Take 1 capsule (100 mg total) by mouth every 6 (six) hours as needed for Cough.  Dispense: 30 capsule; Refill: 0  -     azelastine (ASTELIN) 137 mcg (0.1 %) nasal spray; 1 spray (137 mcg total) by Nasal route 2 (two) times daily.  Dispense: 30 mL; Refill: 0  -     doxycycline (VIBRAMYCIN) 100 MG Cap; Take 1 capsule (100 mg total) by mouth every 12 (twelve) hours. for 7 days  Dispense: 14 capsule; Refill: 0    Sinus pressure      Results for orders placed or performed in visit on 11/27/23   SARS Coronavirus 2  Antigen, POCT Manual Read   Result Value Ref Range    SARS Coronavirus 2 Antigen Negative Negative     Acceptable Yes

## 2023-11-27 NOTE — TELEPHONE ENCOUNTER
Pt's daughter states dad is having Sciatica in his leg again, she is requesting phys therapy again for his leg, please advise

## 2023-12-13 ENCOUNTER — PATIENT OUTREACH (OUTPATIENT)
Dept: ADMINISTRATIVE | Facility: OTHER | Age: 88
End: 2023-12-13
Payer: MEDICARE

## 2023-12-13 ENCOUNTER — OFFICE VISIT (OUTPATIENT)
Dept: OPTOMETRY | Facility: CLINIC | Age: 88
End: 2023-12-13
Payer: COMMERCIAL

## 2023-12-13 ENCOUNTER — PATIENT MESSAGE (OUTPATIENT)
Dept: ADMINISTRATIVE | Facility: OTHER | Age: 88
End: 2023-12-13
Payer: MEDICARE

## 2023-12-13 DIAGNOSIS — H52.4 MYOPIA OF BOTH EYES WITH ASTIGMATISM AND PRESBYOPIA: Primary | ICD-10-CM

## 2023-12-13 DIAGNOSIS — H52.203 MYOPIA OF BOTH EYES WITH ASTIGMATISM AND PRESBYOPIA: Primary | ICD-10-CM

## 2023-12-13 DIAGNOSIS — H50.22 HYPERTROPIA OF LEFT EYE: ICD-10-CM

## 2023-12-13 DIAGNOSIS — E11.43 TYPE 2 DIABETES MELLITUS WITH DIABETIC AUTONOMIC NEUROPATHY, WITHOUT LONG-TERM CURRENT USE OF INSULIN: ICD-10-CM

## 2023-12-13 DIAGNOSIS — H40.023 OPEN ANGLE WITH BORDERLINE FINDINGS AND HIGH GLAUCOMA RISK IN BOTH EYES: ICD-10-CM

## 2023-12-13 DIAGNOSIS — H52.13 MYOPIA OF BOTH EYES WITH ASTIGMATISM AND PRESBYOPIA: Primary | ICD-10-CM

## 2023-12-13 DIAGNOSIS — E11.9 TYPE 2 DIABETES MELLITUS WITHOUT RETINOPATHY: ICD-10-CM

## 2023-12-13 PROCEDURE — 92014 COMPRE OPH EXAM EST PT 1/>: CPT | Mod: S$GLB,,, | Performed by: OPTOMETRIST

## 2023-12-13 PROCEDURE — 99999 PR PBB SHADOW E&M-EST. PATIENT-LVL III: CPT | Mod: PBBFAC,,, | Performed by: OPTOMETRIST

## 2023-12-13 PROCEDURE — 92015 PR REFRACTION: ICD-10-PCS | Mod: S$GLB,,, | Performed by: OPTOMETRIST

## 2023-12-13 PROCEDURE — 92014 PR EYE EXAM, EST PATIENT,COMPREHESV: ICD-10-PCS | Mod: S$GLB,,, | Performed by: OPTOMETRIST

## 2023-12-13 PROCEDURE — 92015 DETERMINE REFRACTIVE STATE: CPT | Mod: S$GLB,,, | Performed by: OPTOMETRIST

## 2023-12-13 PROCEDURE — 99999 PR PBB SHADOW E&M-EST. PATIENT-LVL III: ICD-10-PCS | Mod: PBBFAC,,, | Performed by: OPTOMETRIST

## 2023-12-13 NOTE — PROGRESS NOTES
CHW - Case Closure    This Community Health Worker spoke to patient  via In Basket  today.   Pt/Caregiver reported: Case Closure notice sent to pt due to all resources being provided  Pt/Caregiver denied any additional needs at this time and agrees with episode closure at this time.  Provided patient with Community Health Worker's contact information and encouraged him/her to contact this Community Health Worker if additional needs arise.

## 2023-12-13 NOTE — PROGRESS NOTES
PELON    TILA: 1/10/2022  Chief complaint (CC): Patient here today with c/o foggy VA ou, no pain,   occasionally sees tiny red light in VA that comes and goes. No other   ocular complaints.  Glasses? yes  Contacts? no  H/o eye surgery, injections or laser: [place] ou  H/o eye injury: no  Known eye conditions? Low tension glaucoma suspect, myopia, presbyopia,   astigmatism  Family h/o eye conditions? none  Eye gtts? Uses a prescription drop twice daily will call us with name of   drop.      (+) Flashes (-)  Floaters (-) Mucous   (-)  Tearing (-) Itching (-) Burning   (-) Headaches (-) Eye Pain/discomfort (-) Irritation   (-)  Redness (-) Double vision (+) Blurry vision    Diabetic? yes  A1c? Hemoglobin A1C       Date                     Value               Ref Range             Status                08/08/2023               7.2 (H)             4.0 - 5.6 %           Final              Comment:    ADA Screening Guidelines:  5.7-6.4%  Consistent with   prediabetes  >or=6.5%  Consistent with diabetes    High levels of fetal   hemoglobin interfere with the HbA1C  assay. Heterozygous hemoglobin   variants (HbS, HgC, etc)do  not significantly interfere with this assay.     However, presence of multiple variants may affect accuracy.         02/08/2023               7.5 (H)             4.0 - 5.6 %           Final              Comment:    ADA Screening Guidelines:  5.7-6.4%  Consistent with   prediabetes  >or=6.5%  Consistent with diabetes    High levels of fetal   hemoglobin interfere with the HbA1C  assay. Heterozygous hemoglobin   variants (HbS, HgC, etc)do  not significantly interfere with this assay.     However, presence of multiple variants may affect accuracy.         08/08/2022               7.7 (H)             4.0 - 5.6 %           Final              Comment:    ADA Screening Guidelines:  5.7-6.4%  Consistent with   prediabetes  >or=6.5%  Consistent with diabetes    High levels of fetal   hemoglobin interfere with the  HbA1C  assay. Heterozygous hemoglobin   variants (HbS, HgC, etc)do  not significantly interfere with this assay.     However, presence of multiple variants may affect accuracy.    ----------      Last edited by Shellie Orellana on 12/13/2023  3:44 PM.            Assessment /Plan     For exam results, see Encounter Report.      Myopia of both eyes with astigmatism and presbyopia  SRx released to patient. Patient educated on lens options. Normal ocular health. RTC 1 year for routine exam.     Type 2 diabetes mellitus with diabetic autonomic neuropathy, without long-term current use of insulin  -     Ambulatory referral/consult to Optometry  Type 2 diabetes mellitus without retinopathy  BS control. No signs of diabetic retinopathy. Monitor with annual exam.    Hypertropia of left eye  No diplopia in office today w/current prism    Open angle with borderline findings and high glaucoma risk in both eyes  -     Posterior Segment OCT Optic Nerve- Both eyes; Future  -     Mohamud Visual Field - OU - Extended - Both Eyes; Future  Prev testing w/Dr Gagnon. (-) FHx. IOP c/d 0.6 OD, 0.65 OS. Educated pt and daughter on findings w/understanding. RTC 1-2 mo IOP/OCt/24-2 Krys faster

## 2024-02-19 ENCOUNTER — LAB VISIT (OUTPATIENT)
Dept: LAB | Facility: HOSPITAL | Age: 89
End: 2024-02-19
Attending: FAMILY MEDICINE
Payer: MEDICARE

## 2024-02-19 DIAGNOSIS — E11.43 TYPE 2 DIABETES MELLITUS WITH DIABETIC AUTONOMIC NEUROPATHY, WITHOUT LONG-TERM CURRENT USE OF INSULIN: ICD-10-CM

## 2024-02-19 DIAGNOSIS — E78.2 MIXED DYSLIPIDEMIA: ICD-10-CM

## 2024-02-19 DIAGNOSIS — E03.9 ACQUIRED HYPOTHYROIDISM: ICD-10-CM

## 2024-02-19 DIAGNOSIS — I10 ESSENTIAL HYPERTENSION: ICD-10-CM

## 2024-02-19 LAB
ALBUMIN SERPL BCP-MCNC: 4.1 G/DL (ref 3.5–5.2)
ALP SERPL-CCNC: 89 U/L (ref 55–135)
ALT SERPL W/O P-5'-P-CCNC: 20 U/L (ref 10–44)
ANION GAP SERPL CALC-SCNC: 9 MMOL/L (ref 8–16)
AST SERPL-CCNC: 22 U/L (ref 10–40)
BASOPHILS # BLD AUTO: 0.03 K/UL (ref 0–0.2)
BASOPHILS NFR BLD: 0.5 % (ref 0–1.9)
BILIRUB SERPL-MCNC: 0.3 MG/DL (ref 0.1–1)
BUN SERPL-MCNC: 16 MG/DL (ref 8–23)
CALCIUM SERPL-MCNC: 9.4 MG/DL (ref 8.7–10.5)
CHLORIDE SERPL-SCNC: 103 MMOL/L (ref 95–110)
CHOLEST SERPL-MCNC: 156 MG/DL (ref 120–199)
CHOLEST/HDLC SERPL: 4.5 {RATIO} (ref 2–5)
CO2 SERPL-SCNC: 27 MMOL/L (ref 23–29)
CREAT SERPL-MCNC: 0.7 MG/DL (ref 0.5–1.4)
DIFFERENTIAL METHOD BLD: ABNORMAL
EOSINOPHIL # BLD AUTO: 0.2 K/UL (ref 0–0.5)
EOSINOPHIL NFR BLD: 4 % (ref 0–8)
ERYTHROCYTE [DISTWIDTH] IN BLOOD BY AUTOMATED COUNT: 13.5 % (ref 11.5–14.5)
EST. GFR  (NO RACE VARIABLE): >60 ML/MIN/1.73 M^2
ESTIMATED AVG GLUCOSE: 166 MG/DL (ref 68–131)
GLUCOSE SERPL-MCNC: 144 MG/DL (ref 70–110)
HBA1C MFR BLD: 7.4 % (ref 4–5.6)
HCT VFR BLD AUTO: 40.7 % (ref 40–54)
HDLC SERPL-MCNC: 35 MG/DL (ref 40–75)
HDLC SERPL: 22.4 % (ref 20–50)
HGB BLD-MCNC: 13.5 G/DL (ref 14–18)
IMM GRANULOCYTES # BLD AUTO: 0.01 K/UL (ref 0–0.04)
IMM GRANULOCYTES NFR BLD AUTO: 0.2 % (ref 0–0.5)
LDLC SERPL CALC-MCNC: 41.8 MG/DL (ref 63–159)
LYMPHOCYTES # BLD AUTO: 2.4 K/UL (ref 1–4.8)
LYMPHOCYTES NFR BLD: 39.6 % (ref 18–48)
MCH RBC QN AUTO: 30.4 PG (ref 27–31)
MCHC RBC AUTO-ENTMCNC: 33.2 G/DL (ref 32–36)
MCV RBC AUTO: 92 FL (ref 82–98)
MONOCYTES # BLD AUTO: 0.5 K/UL (ref 0.3–1)
MONOCYTES NFR BLD: 7.4 % (ref 4–15)
NEUTROPHILS # BLD AUTO: 2.9 K/UL (ref 1.8–7.7)
NEUTROPHILS NFR BLD: 48.3 % (ref 38–73)
NONHDLC SERPL-MCNC: 121 MG/DL
NRBC BLD-RTO: 0 /100 WBC
PLATELET # BLD AUTO: 167 K/UL (ref 150–450)
PMV BLD AUTO: 11.9 FL (ref 9.2–12.9)
POTASSIUM SERPL-SCNC: 4.3 MMOL/L (ref 3.5–5.1)
PROT SERPL-MCNC: 6.9 G/DL (ref 6–8.4)
RBC # BLD AUTO: 4.44 M/UL (ref 4.6–6.2)
SODIUM SERPL-SCNC: 139 MMOL/L (ref 136–145)
TRIGL SERPL-MCNC: 396 MG/DL (ref 30–150)
TSH SERPL DL<=0.005 MIU/L-ACNC: 3.41 UIU/ML (ref 0.4–4)
WBC # BLD AUTO: 6.06 K/UL (ref 3.9–12.7)

## 2024-02-19 PROCEDURE — 84443 ASSAY THYROID STIM HORMONE: CPT | Mod: HCNC | Performed by: FAMILY MEDICINE

## 2024-02-19 PROCEDURE — 36415 COLL VENOUS BLD VENIPUNCTURE: CPT | Mod: HCNC,PO | Performed by: FAMILY MEDICINE

## 2024-02-19 PROCEDURE — 83036 HEMOGLOBIN GLYCOSYLATED A1C: CPT | Mod: HCNC | Performed by: FAMILY MEDICINE

## 2024-02-19 PROCEDURE — 80053 COMPREHEN METABOLIC PANEL: CPT | Mod: HCNC | Performed by: FAMILY MEDICINE

## 2024-02-19 PROCEDURE — 85025 COMPLETE CBC W/AUTO DIFF WBC: CPT | Mod: HCNC | Performed by: FAMILY MEDICINE

## 2024-02-19 PROCEDURE — 80061 LIPID PANEL: CPT | Mod: HCNC | Performed by: FAMILY MEDICINE

## 2024-02-22 ENCOUNTER — OFFICE VISIT (OUTPATIENT)
Dept: FAMILY MEDICINE | Facility: CLINIC | Age: 89
End: 2024-02-22
Payer: MEDICARE

## 2024-02-22 VITALS
HEART RATE: 94 BPM | DIASTOLIC BLOOD PRESSURE: 66 MMHG | WEIGHT: 112.44 LBS | OXYGEN SATURATION: 95 % | BODY MASS INDEX: 20.69 KG/M2 | HEIGHT: 62 IN | SYSTOLIC BLOOD PRESSURE: 124 MMHG

## 2024-02-22 DIAGNOSIS — E03.9 HYPOTHYROIDISM, UNSPECIFIED TYPE: ICD-10-CM

## 2024-02-22 DIAGNOSIS — M54.16 LUMBAR RADICULOPATHY: ICD-10-CM

## 2024-02-22 DIAGNOSIS — F03.90 SENILE DEMENTIA: ICD-10-CM

## 2024-02-22 DIAGNOSIS — J06.9 UPPER RESPIRATORY TRACT INFECTION, UNSPECIFIED TYPE: ICD-10-CM

## 2024-02-22 DIAGNOSIS — M15.8 OTHER OSTEOARTHRITIS INVOLVING MULTIPLE JOINTS: ICD-10-CM

## 2024-02-22 DIAGNOSIS — I10 ESSENTIAL HYPERTENSION: Primary | ICD-10-CM

## 2024-02-22 DIAGNOSIS — M48.062 SPINAL STENOSIS OF LUMBAR REGION WITH NEUROGENIC CLAUDICATION: ICD-10-CM

## 2024-02-22 DIAGNOSIS — J30.1 SEASONAL ALLERGIC RHINITIS DUE TO POLLEN: ICD-10-CM

## 2024-02-22 DIAGNOSIS — G40.209 PARTIAL SYMPTOMATIC EPILEPSY WITH COMPLEX PARTIAL SEIZURES, NOT INTRACTABLE, WITHOUT STATUS EPILEPTICUS: ICD-10-CM

## 2024-02-22 DIAGNOSIS — I70.0 AORTIC ATHEROSCLEROSIS: ICD-10-CM

## 2024-02-22 DIAGNOSIS — E11.43 TYPE 2 DIABETES MELLITUS WITH DIABETIC AUTONOMIC NEUROPATHY, WITHOUT LONG-TERM CURRENT USE OF INSULIN: ICD-10-CM

## 2024-02-22 PROBLEM — D69.6 THROMBOCYTOPENIA: Status: RESOLVED | Noted: 2023-10-17 | Resolved: 2024-02-22

## 2024-02-22 PROBLEM — H43.11 VITREOUS HEMORRHAGE OF RIGHT EYE: Status: RESOLVED | Noted: 2023-10-16 | Resolved: 2024-02-22

## 2024-02-22 PROCEDURE — 99215 OFFICE O/P EST HI 40 MIN: CPT | Mod: HCNC,S$GLB,, | Performed by: FAMILY MEDICINE

## 2024-02-22 PROCEDURE — 3288F FALL RISK ASSESSMENT DOCD: CPT | Mod: HCNC,CPTII,S$GLB, | Performed by: FAMILY MEDICINE

## 2024-02-22 PROCEDURE — 1101F PT FALLS ASSESS-DOCD LE1/YR: CPT | Mod: HCNC,CPTII,S$GLB, | Performed by: FAMILY MEDICINE

## 2024-02-22 PROCEDURE — 99999 PR PBB SHADOW E&M-EST. PATIENT-LVL III: CPT | Mod: PBBFAC,HCNC,, | Performed by: FAMILY MEDICINE

## 2024-02-22 PROCEDURE — 1160F RVW MEDS BY RX/DR IN RCRD: CPT | Mod: HCNC,CPTII,S$GLB, | Performed by: FAMILY MEDICINE

## 2024-02-22 PROCEDURE — 1159F MED LIST DOCD IN RCRD: CPT | Mod: HCNC,CPTII,S$GLB, | Performed by: FAMILY MEDICINE

## 2024-02-22 RX ORDER — MELOXICAM 15 MG/1
15 TABLET ORAL DAILY PRN
Qty: 30 TABLET | Refills: 1 | Status: SHIPPED | OUTPATIENT
Start: 2024-02-22 | End: 2024-05-02 | Stop reason: SDUPTHER

## 2024-02-22 RX ORDER — BENZONATATE 100 MG/1
100 CAPSULE ORAL 3 TIMES DAILY PRN
Qty: 90 CAPSULE | Refills: 0 | Status: SHIPPED | OUTPATIENT
Start: 2024-02-22 | End: 2024-03-23

## 2024-02-22 RX ORDER — FLUTICASONE PROPIONATE 50 MCG
2 SPRAY, SUSPENSION (ML) NASAL DAILY
Qty: 16 G | Refills: 12 | Status: SHIPPED | OUTPATIENT
Start: 2024-02-22 | End: 2024-03-23

## 2024-02-22 NOTE — PROGRESS NOTES
Subjective     Patient ID: Kang Herbert is a 88 y.o. male.    Chief Complaint: No chief complaint on file.    88 years old male who came to the clinic for blood pressure check.  Blood pressure today was stable.  No chest pain, palpitation, orthopnea or PND.  Patient with numbness and tingling in both lower extremities associated with diabetes neuropathy.  He is using meloxicam only as needed for arthritis in multiple joints.  Patient with lumbar radiculopathy.  No recent flare ups.  Patient with cough and congestion for the last month.  He reports using the Tessalon Perles with significant improvement.  No worsening of the dementia.  No flare ups of epilepsy.  He was previously diagnosed with aortic atherosclerosis.  Patient with good compliance with medical regimen.  Last TSH was normal.      Review of Systems   Constitutional: Negative.    HENT: Negative.     Eyes: Negative.    Respiratory: Negative.     Cardiovascular: Negative.  Negative for chest pain, palpitations, leg swelling and claudication.   Gastrointestinal: Negative.    Genitourinary: Negative.    Musculoskeletal: Negative.    Integumentary:  Negative.   Neurological: Negative.    Psychiatric/Behavioral: Negative.            Objective     Physical Exam  Vitals and nursing note reviewed.   Constitutional:       General: He is not in acute distress.     Appearance: He is well-developed. He is not diaphoretic.   HENT:      Head: Normocephalic and atraumatic.      Right Ear: External ear normal.      Left Ear: External ear normal.      Nose: Nose normal.      Mouth/Throat:      Pharynx: No oropharyngeal exudate.   Eyes:      General: No scleral icterus.        Right eye: No discharge.         Left eye: No discharge.      Conjunctiva/sclera: Conjunctivae normal.      Pupils: Pupils are equal, round, and reactive to light.   Neck:      Thyroid: No thyromegaly.      Vascular: No JVD.      Trachea: No tracheal deviation.   Cardiovascular:      Rate and  Rhythm: Normal rate and regular rhythm.      Heart sounds: Normal heart sounds. No murmur heard.     No friction rub. No gallop.   Pulmonary:      Effort: Pulmonary effort is normal. No respiratory distress.      Breath sounds: Normal breath sounds. No stridor. No wheezing or rales.   Chest:      Chest wall: No tenderness.   Abdominal:      General: Bowel sounds are normal. There is no distension.      Palpations: Abdomen is soft. There is no mass.      Tenderness: There is no abdominal tenderness. There is no guarding or rebound.   Musculoskeletal:         General: No tenderness. Normal range of motion.      Cervical back: Normal range of motion and neck supple.   Lymphadenopathy:      Cervical: No cervical adenopathy.   Skin:     General: Skin is warm and dry.      Coloration: Skin is not pale.      Findings: No erythema or rash.   Neurological:      Mental Status: He is alert and oriented to person, place, and time.      Cranial Nerves: No cranial nerve deficit.      Motor: No abnormal muscle tone.      Coordination: Coordination normal.      Deep Tendon Reflexes: Reflexes are normal and symmetric. Reflexes normal.   Psychiatric:         Behavior: Behavior normal.         Thought Content: Thought content normal.         Judgment: Judgment normal.            Assessment and Plan     1. Essential hypertension  -     Lipid Panel; Future; Expected date: 02/22/2024  -     Comprehensive Metabolic Panel; Future; Expected date: 02/22/2024  -     TSH; Future; Expected date: 02/22/2024  -     CBC Auto Differential; Future; Expected date: 02/22/2024    2. Other osteoarthritis involving multiple joints  -     meloxicam (MOBIC) 15 MG tablet; Take 1 tablet (15 mg total) by mouth daily as needed for Pain.  Dispense: 30 tablet; Refill: 1  -     WALKER FOR HOME USE    3. Spinal stenosis of lumbar region with neurogenic claudication  -     meloxicam (MOBIC) 15 MG tablet; Take 1 tablet (15 mg total) by mouth daily as needed for  Pain.  Dispense: 30 tablet; Refill: 1    4. Lumbar radiculopathy  -     meloxicam (MOBIC) 15 MG tablet; Take 1 tablet (15 mg total) by mouth daily as needed for Pain.  Dispense: 30 tablet; Refill: 1  -     WALKER FOR HOME USE    5. Seasonal allergic rhinitis due to pollen  -     fluticasone propionate (FLONASE) 50 mcg/actuation nasal spray; 2 sprays (100 mcg total) by Each Nostril route once daily.  Dispense: 16 g; Refill: 12    6. Upper respiratory tract infection, unspecified type  -     benzonatate (TESSALON) 100 MG capsule; Take 1 capsule (100 mg total) by mouth 3 (three) times daily as needed for Cough.  Dispense: 90 capsule; Refill: 0    7. Type 2 diabetes mellitus with diabetic autonomic neuropathy, without long-term current use of insulin  -     Microalbumin/Creatinine Ratio, Urine; Future; Expected date: 02/22/2024  -     Lipid Panel; Future; Expected date: 02/22/2024  -     Hemoglobin A1C; Future; Expected date: 02/22/2024  -     Comprehensive Metabolic Panel; Future; Expected date: 02/22/2024  -     WALKER FOR HOME USE    8. Senile dementia    9. Partial symptomatic epilepsy with complex partial seizures, not intractable, without status epilepticus    10. Aortic atherosclerosis  -     Lipid Panel; Future; Expected date: 02/22/2024    11. Hypothyroidism, unspecified type  -     TSH; Future; Expected date: 02/22/2024        Continue monitoring blood pressure at home, low sodium diet.   Continue monitoring blood sugar at home,ADA diet.   Patient needs the Rollator to do his activities of daily living.  The cane is not enough to help.  Patient with severe problems with walking associated with spinal stenosis  and lumbar radiculopathy.  Patient needs the Rollator daily function  at his home.       Follow up in about 6 months (around 8/22/2024), or if symptoms worsen or fail to improve.

## 2024-03-19 ENCOUNTER — OFFICE VISIT (OUTPATIENT)
Dept: OPTOMETRY | Facility: CLINIC | Age: 89
End: 2024-03-19
Payer: MEDICARE

## 2024-03-19 ENCOUNTER — CLINICAL SUPPORT (OUTPATIENT)
Dept: OPHTHALMOLOGY | Facility: CLINIC | Age: 89
End: 2024-03-19
Payer: MEDICARE

## 2024-03-19 DIAGNOSIS — H53.2 BINOCULAR VISION DISORDER WITH DIPLOPIA: ICD-10-CM

## 2024-03-19 DIAGNOSIS — H52.203 MYOPIA OF BOTH EYES WITH ASTIGMATISM AND PRESBYOPIA: ICD-10-CM

## 2024-03-19 DIAGNOSIS — H52.13 MYOPIA OF BOTH EYES WITH ASTIGMATISM AND PRESBYOPIA: ICD-10-CM

## 2024-03-19 DIAGNOSIS — H52.4 MYOPIA OF BOTH EYES WITH ASTIGMATISM AND PRESBYOPIA: ICD-10-CM

## 2024-03-19 DIAGNOSIS — H40.023 OPEN ANGLE WITH BORDERLINE FINDINGS AND HIGH GLAUCOMA RISK IN BOTH EYES: Primary | ICD-10-CM

## 2024-03-19 DIAGNOSIS — H59.099: ICD-10-CM

## 2024-03-19 PROCEDURE — 92083 EXTENDED VISUAL FIELD XM: CPT | Mod: HCNC,S$GLB,, | Performed by: OPTOMETRIST

## 2024-03-19 PROCEDURE — 99999 PR PBB SHADOW E&M-EST. PATIENT-LVL III: CPT | Mod: PBBFAC,HCNC,, | Performed by: OPTOMETRIST

## 2024-03-19 PROCEDURE — 1160F RVW MEDS BY RX/DR IN RCRD: CPT | Mod: HCNC,CPTII,S$GLB, | Performed by: OPTOMETRIST

## 2024-03-19 PROCEDURE — 3288F FALL RISK ASSESSMENT DOCD: CPT | Mod: HCNC,CPTII,S$GLB, | Performed by: OPTOMETRIST

## 2024-03-19 PROCEDURE — 92133 CPTRZD OPH DX IMG PST SGM ON: CPT | Mod: HCNC,S$GLB,, | Performed by: OPTOMETRIST

## 2024-03-19 PROCEDURE — 99213 OFFICE O/P EST LOW 20 MIN: CPT | Mod: HCNC,S$GLB,, | Performed by: OPTOMETRIST

## 2024-03-19 PROCEDURE — 1101F PT FALLS ASSESS-DOCD LE1/YR: CPT | Mod: HCNC,CPTII,S$GLB, | Performed by: OPTOMETRIST

## 2024-03-19 PROCEDURE — 2023F DILAT RTA XM W/O RTNOPTHY: CPT | Mod: HCNC,CPTII,S$GLB, | Performed by: OPTOMETRIST

## 2024-03-19 PROCEDURE — 1159F MED LIST DOCD IN RCRD: CPT | Mod: HCNC,CPTII,S$GLB, | Performed by: OPTOMETRIST

## 2024-03-19 NOTE — PROGRESS NOTES
HPI    Pt here for vision and iop   Trouble with reading vision with glasses  Glaucoma suspect  Prev [facility] pt  Last edited by Victor M Loya, OD on 3/19/2024  1:29 PM.            Assessment /Plan     For exam results, see Encounter Report.    Open angle with borderline findings and high glaucoma risk in both eyes    Binocular vision disorder with diplopia    Capsular bag distension syndrome    Myopia of both eyes with astigmatism and presbyopia       IOP up slightly, nerve eval stable, prev HVf without defects but today with defects, today OCT with some sup/inf loss but stable from prev,  Low tension suspect, OCt with thinning Ou, pachy thick, gonio open RTC glaucoma eval with [facility], pt may need treatment.   2. Single with prism  3. Resolved with yag  4, Rewrote spec rx , current glasses no bifocals

## 2024-03-19 NOTE — PROGRESS NOTES
HVF/OCT done ou./ rel/fix/coop. Good ou/ chart checked for latex allergy./lids taped ou./ interpeter [facility]  #[id number] was used for interpeting./ -1.75 + 1.75 x 165/od -1.25 + 1.25 x 180/os-h

## 2024-04-05 ENCOUNTER — TELEPHONE (OUTPATIENT)
Dept: OPHTHALMOLOGY | Facility: CLINIC | Age: 89
End: 2024-04-05
Payer: MEDICARE

## 2024-04-05 NOTE — TELEPHONE ENCOUNTER
Left message on voicemail    ----- Message from Shala Saabaneda sent at 4/5/2024 10:32 AM CDT -----  Type:  Sooner Appointment Request    Caller is requesting a sooner appointment.  Caller declined first available appointment listed below.  Caller will not accept being placed on the waitlist and is requesting a message be sent to doctor.  Name of Caller:daughter   When is the first available appointment?07/01/23  Symptoms:glaucoma   Would the patient rather a call back or a response via [facility]? call  Best Call Back Number:[phone]  Additional Information:     Caller stated no one called her regarding 07/01 and she was upset

## 2024-05-02 DIAGNOSIS — M54.16 LUMBAR RADICULOPATHY: ICD-10-CM

## 2024-05-02 DIAGNOSIS — M15.8 OTHER OSTEOARTHRITIS INVOLVING MULTIPLE JOINTS: ICD-10-CM

## 2024-05-02 DIAGNOSIS — M48.062 SPINAL STENOSIS OF LUMBAR REGION WITH NEUROGENIC CLAUDICATION: ICD-10-CM

## 2024-05-02 RX ORDER — MELOXICAM 15 MG/1
15 TABLET ORAL DAILY PRN
Qty: 30 TABLET | Refills: 1 | Status: SHIPPED | OUTPATIENT
Start: 2024-05-02

## 2024-05-02 NOTE — TELEPHONE ENCOUNTER
No care due was identified.  [facility] Embedded Care Due Messages. Reference number: [id number].   5/02/2024 10:18:00 AM CDT

## 2024-05-02 NOTE — TELEPHONE ENCOUNTER
Refill Routing Note   Medication(s) are not appropriate for processing by [facility] for the following reason(s):        Outside of protocol    [facility] action(s):  Route             Appointments  past 12m or future 3m with PCP    Date Provider   Last Visit   2/22/2024 Zeke Marrufo MD   Next Visit   8/26/2024 Zeke Marrufo MD   ED visits in past 90 days: 0        Note composed:11:11 AM 05/02/2024

## 2024-05-06 RX ORDER — FINASTERIDE 5 MG/1
5 TABLET, FILM COATED ORAL DAILY
Qty: 90 TABLET | Refills: 3 | Status: SHIPPED | OUTPATIENT
Start: 2024-05-06

## 2024-05-06 NOTE — TELEPHONE ENCOUNTER
Refill Decision Note   Kang Herbert  is requesting a refill authorization.  Brief Assessment and Rationale for Refill:  Approve     Medication Therapy Plan:         Comments:     Note composed:10:06 AM 05/06/2024

## 2024-05-08 DIAGNOSIS — M25.562 LEFT KNEE PAIN, UNSPECIFIED CHRONICITY: Primary | ICD-10-CM

## 2024-05-09 ENCOUNTER — HOSPITAL ENCOUNTER (OUTPATIENT)
Dept: RADIOLOGY | Facility: HOSPITAL | Age: 89
Discharge: HOME | End: 2024-05-09
Attending: ORTHOPAEDIC SURGERY
Payer: MEDICARE

## 2024-05-09 ENCOUNTER — OFFICE VISIT (OUTPATIENT)
Dept: ORTHOPEDICS | Facility: CLINIC | Age: 89
End: 2024-05-09
Payer: MEDICARE

## 2024-05-09 VITALS — WEIGHT: 112.44 LBS | BODY MASS INDEX: 20.69 KG/M2 | HEIGHT: 62 IN

## 2024-05-09 DIAGNOSIS — M54.10 RADICULAR PAIN OF LEFT LOWER EXTREMITY: ICD-10-CM

## 2024-05-09 DIAGNOSIS — M25.562 LEFT KNEE PAIN, UNSPECIFIED CHRONICITY: ICD-10-CM

## 2024-05-09 DIAGNOSIS — M51.36 DDD (DEGENERATIVE DISC DISEASE), LUMBAR: Primary | ICD-10-CM

## 2024-05-09 PROCEDURE — 3288F FALL RISK ASSESSMENT DOCD: CPT | Mod: CPTII,S$GLB,, | Performed by: ORTHOPAEDIC SURGERY

## 2024-05-09 PROCEDURE — 73564 X-RAY EXAM KNEE 4 OR MORE: CPT | Mod: 26,LT,, | Performed by: RADIOLOGY

## 2024-05-09 PROCEDURE — 1125F AMNT PAIN NOTED PAIN PRSNT: CPT | Mod: CPTII,S$GLB,, | Performed by: ORTHOPAEDIC SURGERY

## 2024-05-09 PROCEDURE — 73564 X-RAY EXAM KNEE 4 OR MORE: CPT | Mod: TC,PN,LT

## 2024-05-09 PROCEDURE — 1159F MED LIST DOCD IN RCRD: CPT | Mod: CPTII,S$GLB,, | Performed by: ORTHOPAEDIC SURGERY

## 2024-05-09 PROCEDURE — 1101F PT FALLS ASSESS-DOCD LE1/YR: CPT | Mod: CPTII,S$GLB,, | Performed by: ORTHOPAEDIC SURGERY

## 2024-05-09 PROCEDURE — 99214 OFFICE O/P EST MOD 30 MIN: CPT | Mod: S$GLB,,, | Performed by: ORTHOPAEDIC SURGERY

## 2024-05-09 PROCEDURE — 99999 PR PBB SHADOW E&M-EST. PATIENT-LVL III: CPT | Mod: PBBFAC,,, | Performed by: ORTHOPAEDIC SURGERY

## 2024-05-09 NOTE — PROGRESS NOTES
[facility] [address]          Subjective:     Patient ID: Kang Herbert is a 88 y.o. male.    Chief Complaint: Pain of the Left Knee      KNEE PAIN: Complains of pain that radiates up and down the left lower extremity up and down from the left knee  PAIN LOCATED: anterior and posterior  ONSET: 2 months ago.  QUALITY:  Patient states the pain is worsening  HISTORY: Previous knee injury/surgery: No   ASSOCIATED SYMPTOM AND TRIGGERS:  No popping, locking, clicking, catching  Has tried and failed cardiovascular activities such as walking, biking and resistance exercises  USES ASSISTIVE DEVICE: cane  RELIEVED BY:  He was tried Tylenol, Aleve, meloxicam which helped slightly.  Of note, he had had a epidural steroid injection at L4-5 on 11/15/2023 which provided him with relief temporarily  PATIENT DENIES: bruising, redness, deformity         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    Seizures     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/2013        Past Surgical History:   Procedure Laterality Date    CATARACT EXTRACTION  11-/    od/os    CHOLECYSTECTOMY      EPIDURAL STEROID INJECTION INTO LUMBAR SPINE N/A 11/30/2021    Procedure: Lumbar Epidural Steroid Injection L4-5 (No Sedation);  Surgeon: Sisi Lua Jr., MD;  [place] [facility] [place] [facility] [place]  Service: Pain Management;  Laterality: N/A;  Diabetic    EPIDURAL STEROID INJECTION INTO LUMBAR SPINE N/A 1/25/2022    Procedure: Lumbar Epidural Steroid Injection L4-5;  Surgeon: Sisi Lua Jr., MD;  Location: [facility] PAIN [facility];  Service: Pain Management;  Laterality: N/A;  diabetic     EPIDURAL STEROID INJECTION INTO LUMBAR SPINE N/A 11/15/2023    Procedure: INJECTION, STEROID, SPINE, LUMBAR, EPIDURAL;  Surgeon: Mari Mondragon DO;   Location: [facility] PA[facility] MANAGEMENT;  Service: Pain Management;  Laterality: N/A;    HERNIA REPAIR          Current Outpatient Medications   Medication Instructions    atorvastatin (LIPITOR) 10 mg, Oral, Daily    blood-glucose meter (ACCU-CHEK ARUNA PLUS METER) Misc 1 Units, Misc.(Non-Drug; Combo Route), Daily    finasteride (PROSCAR) 5 mg, Oral, Daily    gabapentin (NEURONTIN) 800 mg, Oral, 2 times daily    lancets (ONETOUCH ULTRASOFT LANCETS) Misc 1 lancet , Misc.(Non-Drug; Combo Route), Daily    levothyroxine (SYNTHROID) 50 mcg, Oral, Before breakfast    losartan (COZAAR) 25 mg, Oral    meloxicam (MOBIC) 15 mg, Oral, Daily PRN    memantine (NAMENDA) 5 mg, Oral, 2 times daily    metFORMIN (GLUCOPHAGE) 1,000 mg, Oral, 2 times daily with meals, Appointment needed for additional refills.    omeprazole (PRILOSEC) 20 mg, Oral, Daily    TRUE METRIX GLUCOSE TEST STRIP Strp 1 STRIP BY MISC.(NON-DRUG COMBO ROUTE) ROUTE ONCE DAILY.        Review of patient's allergies indicates:  No Known Allergies    Social History     Socioeconomic History    Marital status:    Occupational History    Occupation: retired   Tobacco Use    Smoking status: Never    Smokeless tobacco: Never   Substance and Sexual Activity    Alcohol use: No    Drug use: No     Social Determinants of Health     Financial Resource Strain: Low Risk  (10/16/2023)    Overall Financial Resource Strain (CARDIA)     Difficulty of Paying Living Expenses: Not hard at all   Food Insecurity: No Food Insecurity (10/16/2023)    Hunger Vital Sign     Worried About Running Out of Food in the Last Year: Never true     Ran Out of Food in the Last Year: Never true   Transportation Needs: No Transportation Needs (10/16/2023)    PRAPARE - Transportation     Lack of Transportation (Medical): No     Lack of Transportation (Non-Medical): No   Physical Activity: Insufficiently Active (10/16/2023)    Exercise Vital Sign     Days of Exercise per Week: 2 days     Minutes of Exercise  per Session: 60 min   Stress: No Stress Concern Present (10/16/2023)    [employer] - Occupational Stress Questionnaire     Feeling of Stress : Not at all   Housing Stability: Low Risk  (10/16/2023)    Housing Stability Vital Sign     Unable to Pay for Housing in the Last Year: No     Number of Places Lived in the Last Year: 1     Unstable Housing in the Last Year: No       Family History   Problem Relation Name Age of Onset    No Known Problems Mother      No Known Problems Father      Glaucoma Sister      Diabetes Sister      Cancer Brother x2     Diabetes Brother x2     No Known Problems Daughter x2     No Known Problems Son x1     No Known Problems Maternal Aunt      No Known Problems Maternal Uncle      No Known Problems Paternal Aunt      No Known Problems Paternal Uncle      No Known Problems Maternal Grandmother      No Known Problems Maternal Grandfather      No Known Problems Paternal Grandmother      No Known Problems Paternal Grandfather      Amblyopia Neg Hx      Blindness Neg Hx      Cataracts Neg Hx      Hypertension Neg Hx      Macular degeneration Neg Hx      Retinal detachment Neg Hx      Strabismus Neg Hx      Stroke Neg Hx      Thyroid disease Neg Hx      Prostate cancer Neg Hx      Kidney disease Neg Hx           Review of systems negative except as noted above    Objective:   Physical Exam:     left knee  Skin atraumatic   No effusion  Mild tenderness to palpation medial joint line, no tenderness to palpation lateral joint line  ROM 0-140  Stable anterior/posterior   Stable varus/valgus  No groin pain with rotation of hip  Grossly NVI distally    Straight leg raise positive  Lumbar paraspinal tenderness    Imaging independently interpreted:  X-rays of the left knee show no significant OA    Assessment:        Kang Herbert is a 88 y.o. male with   Encounter Diagnosis   Name Primary?    DDD (degenerative disc disease), lumbar Yes       Plan :    -discussed with  patient his pathology is most likely coming from his lumbar spine and related to lumbar radiculopathy  -physical therapy for paraspinal stretching and strengthening    -we will refer him to pain management for further evaluation and treatment          No orders of the defined types were placed in this encounter.       Hu Adair MD   05/09/2024

## 2024-05-10 ENCOUNTER — CLINICAL SUPPORT (OUTPATIENT)
Dept: REHABILITATION | Facility: HOSPITAL | Age: 89
End: 2024-05-10
Attending: ORTHOPAEDIC SURGERY
Payer: MEDICARE

## 2024-05-10 DIAGNOSIS — R29.898 DECREASED STRENGTH OF LOWER EXTREMITY: ICD-10-CM

## 2024-05-10 DIAGNOSIS — M53.86 DECREASED ROM OF LUMBAR SPINE: ICD-10-CM

## 2024-05-10 DIAGNOSIS — M51.36 DDD (DEGENERATIVE DISC DISEASE), LUMBAR: Primary | ICD-10-CM

## 2024-05-10 DIAGNOSIS — M54.10 RADICULAR PAIN OF LEFT LOWER EXTREMITY: ICD-10-CM

## 2024-05-10 PROCEDURE — 97110 THERAPEUTIC EXERCISES: CPT | Mod: PN

## 2024-05-10 PROCEDURE — 97162 PT EVAL MOD COMPLEX 30 MIN: CPT | Mod: PN

## 2024-05-10 NOTE — PLAN OF CARE
[facility]  Physical Therapy Initial Evaluation    Name: Kang Herbert  [facility] Number: [id number]    Therapy Diagnosis:   Encounter Diagnoses   Name Primary?    DDD (degenerative disc disease), lumbar Yes    Radicular pain of left lower extremity     Decreased ROM of lumbar spine     Decreased strength of lower extremity      Physician: Shahram Lamas MD    Physician Orders: PT Eval and Treat   Medical Diagnosis from Referral:   M51.36 (ICD-10-CM) - DDD (degenerative disc disease), lumbar   M54.10 (ICD-10-CM) - Radicular pain of left lower extremity   Evaluation Date: 5/10/2024  Authorization Period Expiration: 5/9/25  Plan of Care Expiration: 7/5/24  Visit # / Visits authorized: 1/ 1  FOTO: 1/10    Time In: 1:20  Time Out: 2:11  Total Billable Time: 51 minutes (mod Complexity Evaluation, Therapeutic Exercise - 1)    Precautions: Standard; type 2 diabetes and hypertension; history of seizures  Patient's wife afraid to be alone - requests to be with patient during visits.    Subjective   Date of onset: 2 months on chronic (5-6 years)  History of current condition - [place] reports: pain in left leg radiating down to foot. He went to the doctor for knee and was determined it was coming from back. history of steroid injection L4-5 11/15/23 which improved sx temporarily. He has had 2 rounds of PT for same condition with no significant improvement. No specific activities increase pain. Walks 10-15 minutes daily with wife. Uses cane for ambulation. Patient reports burning pain in legs which interferes with sleeping. He has to stand up and walk to improve symptoms.     Use of , patient with some difficulty understanding questions, answering with different answer than question asked requiring  to have to repeat.       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    Medications:   Washington has a current medication list which includes the following prescription(s): atorvastatin, blood-glucose meter, finasteride, gabapentin, lancets, levothyroxine, losartan, meloxicam, memantine, metformin, omeprazole, and true metrix glucose test strip.    Allergies:   Review of patient's allergies indicates:  No Known Allergies     Imaging, bone scan films: "MRI Lumbar Spine 10/2021: Multilevel degenerative changes of the lumbar spine detailed above.  Mild spinal canal stenosis noted at L1-L4.  Moderate to severe neural foraminal narrowing noted at L1-L3 and L4-L5.    Prior Therapy: PT in 2021 and 2023 for same condition  Social History:  lives with wife, and caregives for her; children assist sometimes   Occupation: retired  Prior Level of Function: independent with ADLs  Current Level of Function: no bending, lifting, occasional cooking, uses cane     Pain:   Current 8/10, worst 9/10, best 3/10   Location: bilateral back, left leg  Description: Burning, stabbing   Aggravating Factors: Sitting, Standing, Bending, Walking, Night Time, and Getting out of bed/chair  Easing Factors: massage and heating pad    Pts goals: "therapy doesn't help"     Objective     Posture:  Right thoracic curve, left lumbar curve, elevated right iliac crest. Flattening of lumbar lordosis    Palpation: mild tenderness L4-5   Sensation: normal light touch    Range of Motion/Strength: " "    Lumbar AROM Pain/Dysfunction with Movement:   Flexion 40 Left radiating pain   Extension 15 Left radiating pain   Right side bending 15    Left side bending 15 Left radiating pain   Right rotation 80%    Left rotation 80% Left radiating pain     Hip and knee range of motion within normal limits       L/E MMT Right   Left  Pain/Dysfunction with Movement   Hip Flexion 4+/5 4/5    Hip Extension 3/5 3/5    Hip Abduction 4/5 3+/5    Hip Adduction 5/5 5/5    Hip ER 5/5 4+/5    Knee Flexion 4+/5 4+/5    Knee Extension 4+/5 4+/5    Ankle DF 5/5 4/5    Ankle PF 4/5 4/5      Joint Mobility:   - Lumbar: pain with P/A L4-5    Special Tests:  Lumbar Tests:  SLR Test (L4-S3) = + left      Gait Analysis:With AD.  Device Used -  Cane  Deviations: patient assists wife, decreased step length and speed     30 second sit-to-stand test (without U/E support): 10 ( w/ UE support)  30" sit to stand Cutoff Scores:11        CMS Impairment/Limitation/Restriction for FOTO lumbar Survey    Therapist reviewed FOTO scores for Kang Herbert on 5/10/2024.   FOTO documents entered into [employer] - see Media section.    Limitation Score: 72%  Category: Mobility         TREATMENT   Treatment Time In: 2:00  Treatment Time Out: 2:11  Total Treatment time separate from Evaluation: 11 minutes    Washington received therapeutic exercises to develop strength, endurance, ROM, flexibility, posture, and core stabilization for 11 minutes including:  Lower trunk rotation  Bridge  Sit to stand  SKTC  Seated nerve glide ankle bias    Home Exercises Provided and Patient Education Provided     Education provided:   Anatomy and Pathology.  Symptom management and plan of care progressions.  Home Exercise Program.    Written Home Exercises Provided: yes.  Exercises were reviewed and Washington was able to demonstrate them prior to the end of the session.  Washington demonstrated good  understanding of the education provided.     See EMR under Patient Instructions " for exercises provided 5/10/2024.      Assessment   Washington is a 88 y.o. male referred to outpatient Physical Therapy with a medical diagnosis of DDD (degenerative disc disease), lumbar and Radicular pain of left lower extremity. Pt presents with decreased lumbar range of motion with reproduction of left lower extremity pain, decreased lower extremity strength, impaired balance and gait, decreased lumbar joint mobility, and decreased functional mobility. These impairments impact their ability to sit, stand, walk, perform household chores and sleep. 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 Patient requires , but even with use of  with difficulty comprehending some questions and following instructions. Patient with history of 2 prior rounds of PT without improvement in symptoms, therefore will trial 4 weeks of therapy and refer to spine doctor if no improvement.       Pt prognosis is Guarded.   Pt will benefit from skilled outpatient Physical Therapy to address the deficits stated above and in the chart below, provide pt/family education, and to maximize pt's level of independence.     Plan of care discussed with patient: Yes  Pt's spiritual, cultural and educational needs considered and patient is agreeable to the plan of care and goals as stated below:     Anticipated Barriers for therapy: chronicity, prior failed PT     Medical Necessity is demonstrated by the following  History  Co-morbidities and personal factors that may impact the plan of care [] LOW: no personal factors / co-morbidities  [] MODERATE: 1-2 personal factors / co-morbidities  [x] HIGH: 3+ personal factors / co-morbidities    Moderate / High Support Documentation:   Co-morbidities affecting plan of care:     Cataract       Diabetes mellitus      Diabetes mellitus, type 2      Hyperlipidemia      Hypertension      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    Open angle with borderline findings and  low glaucoma risk in both eyes 9/9/2014    Seizures      Spondylosis without myelopathy        Personal Factors:   age, difficulty understanding/following directions     Examination  Body Structures and Functions, activity limitations and participation restrictions that may impact the plan of care [] LOW: addressing 1-2 elements  [x] MODERATE: 3+ elements  [] HIGH: 4+ elements (please support below)    Moderate / High Support Documentation: range of motion, strength, balance, gait, transitions, transfers, mobility     Clinical Presentation [] LOW: stable  [x] MODERATE: Evolving  [] HIGH: Unstable     Decision Making/ Complexity Score: moderate       Goals   Short Term Goals (4 Weeks):  1. Pt will be compliant with HEP to supplement PT in decreasing pain with functional mobility.  2. Pt will perform sit to stand with good control to demonstrate improved core strength.  3. Pt will report 25% improvement in thoracolumbar ROM to promote functional mobility.  4. Pt will improve impaired LE MMTs to >/=4/5 to improve strength for functional tasks.  Long Term Goals (8 Weeks):   1. Pt will improve FOTO score to </= 54% limited to decrease perceived limitation with maintaining/changing body position.   2. Pt will perform bridge with good control to demonstrate improved hip strength.  3. Pt will improve impaired LE MMTs to >/=4+/5 to improve strength for functional tasks.  4. Pt will report no pain during walking 15 minutes or greater to promote recreational fitness.       Plan   Plan of care Certification: 5/10/2024 to 6/7/24.    Outpatient Physical Therapy 2 times weekly for 4 weeks to include the following interventions: Cervical/Lumbar Traction, Electrical Stimulation, Gait Training, Manual Therapy, Moist Heat/ Ice, Neuromuscular Re-ed, Patient Education, Therapeutic Activities, and Therapeutic Exercise, ASTYM, Kinesiotaping PRN, Functional Dry Needling    SCOTTY Cueva, PT, DPT, Cert.DN

## 2024-05-20 ENCOUNTER — CLINICAL SUPPORT (OUTPATIENT)
Dept: REHABILITATION | Facility: HOSPITAL | Age: 89
End: 2024-05-20
Payer: MEDICARE

## 2024-05-20 DIAGNOSIS — M53.86 DECREASED ROM OF LUMBAR SPINE: Primary | ICD-10-CM

## 2024-05-20 DIAGNOSIS — M51.36 DDD (DEGENERATIVE DISC DISEASE), LUMBAR: ICD-10-CM

## 2024-05-20 DIAGNOSIS — M54.10 RADICULAR PAIN OF LEFT LOWER EXTREMITY: ICD-10-CM

## 2024-05-20 PROCEDURE — 97110 THERAPEUTIC EXERCISES: CPT | Mod: PN

## 2024-05-20 PROCEDURE — 97530 THERAPEUTIC ACTIVITIES: CPT | Mod: PN

## 2024-05-20 NOTE — PROGRESS NOTES
"[facility]   Physical Therapy Treatment Note      Name: Kang Herbert  Clinic Number: [id number]    Therapy Diagnosis:   Encounter Diagnoses   Name Primary?    Decreased ROM of lumbar spine Yes    DDD (degenerative disc disease), lumbar     Radicular pain of left lower extremity      Physician: Shahram Lamas MD    Visit Date: 5/20/2024  Physician Orders: PT Eval and Treat   Medical Diagnosis from Referral:   M51.36 (ICD-10-CM) - DDD (degenerative disc disease), lumbar   M54.10 (ICD-10-CM) - Radicular pain of left lower extremity   Evaluation Date: 5/10/2024  Authorization Period Expiration: 5/9/25  Plan of Care Expiration: 7/5/24  Visit # / Visits authorized: 1/ 20  FOTO: 2/10  PTA Visit #: 0/5      Time In: 11:04  Time Out: 12:00  Total Billable Time: 56 minutes (Therapeutic Exercise - 2, TA2)     Precautions: Standard; type 2 diabetes and hypertension; history of seizures  Patient's wife afraid to be alone - requests to be with patient during visits.   used    Subjective     Patient reports: pain in the front of the left leg as well as down the left leg. Did the exercises 2x.   He was compliant with home exercise program.  Response to previous treatment: eval, sore  Functional change: ongoing    Pain: 6/10  Location:  bilateral back, left leg      Objective      Objective Measures updated at progress report unless specified.     Treatment     Washington received the treatments listed below:      therapeutic exercises to develop strength, endurance, ROM, flexibility, posture, and core stabilization for 30 minutes including:  Lower trunk rotation 20x   SKTC 5x10"   Seated nerve glide ankle bias 30 B  Sidelying hip abduction 2x10 bilateral   Long arc quad red theraband (toes pointed) 2x10  Seated ball roll 2'  Sink stretch 15x5"  Shuttle 1.5 cords 2x10   Bike 5' L1    therapeutic activities to improve functional performance for 26  minutes, including:  Heel raises " 2x10  Bridge 2x10  Sit to stand 2x10 red theraband knees  SAPD green theraband 2x10  Pallof press green theraband 15x bilateral       manual therapy techniques: Joint mobilizations were applied to the: left lower extremity for 0 minutes, including:     neuromuscular re-education activities to improve: Balance, Coordination, Kinesthetic, Sense, Proprioception, and Posture for 0 minutes. The following activities were included:      Patient Education and Home Exercises       Education provided:   - continue home exercise program     Written Home Exercises Provided: Patient instructed to cont prior HEP. Exercises were reviewed and [place] was able to demonstrate them prior to the end of the session.  Washington demonstrated good  understanding of the education provided. See Electronic Medical Record under Patient Instructions for exercises provided during therapy sessions    Assessment     Washington is a 88 y.o. male referred to outpatient Physical Therapy with a medical diagnosis of DDD (degenerative disc disease), lumbar and Radicular pain of left lower extremity. Patient presents for first follow up visit with moderate pain. Treatment focused on lumbar mobility and lower extremity strengthening activities. No complaints of pain reported throughout session. Modified activities to limit neural tension. Fatigue reported post treatment. Continue to progress as tolerated.    Washington Is progressing well towards his goals.   Patient prognosis is Guarded.     Patient will continue to benefit from skilled outpatient physical therapy to address the deficits listed in the problem list box on initial evaluation, provide pt/family education and to maximize pt's level of independence in the home and community environment.     Patient's spiritual, cultural and educational needs considered and pt agreeable to plan of care and goals.     Anticipated barriers to physical therapy: chronicity, prior failed PT     Goals:        Short Term Goals (4 Weeks):  1. Pt will be compliant with HEP to supplement PT in decreasing pain with functional mobility.  2. Pt will perform sit to stand with good control to demonstrate improved core strength.  3. Pt will report 25% improvement in thoracolumbar ROM to promote functional mobility.  4. Pt will improve impaired LE MMTs to >/=4/5 to improve strength for functional tasks.  Long Term Goals (8 Weeks):   1. Pt will improve FOTO score to </= 54% limited to decrease perceived limitation with maintaining/changing body position.   2. Pt will perform bridge with good control to demonstrate improved hip strength.  3. Pt will improve impaired LE MMTs to >/=4+/5 to improve strength for functional tasks.  4. Pt will report no pain during walking 15 minutes or greater to promote recreational fitness.        Plan     Plan of care Certification: 5/10/2024 to 6/7/24.  Outpatient Physical Therapy 2 times weekly for 4 weeks    SCOTTY Cueva, PT

## 2024-05-24 ENCOUNTER — CLINICAL SUPPORT (OUTPATIENT)
Dept: REHABILITATION | Facility: HOSPITAL | Age: 89
End: 2024-05-24
Attending: ORTHOPAEDIC SURGERY
Payer: MEDICARE

## 2024-05-24 DIAGNOSIS — E11.9 TYPE 2 DIABETES MELLITUS WITHOUT COMPLICATION, WITHOUT LONG-TERM CURRENT USE OF INSULIN: ICD-10-CM

## 2024-05-24 DIAGNOSIS — M53.86 DECREASED ROM OF LUMBAR SPINE: Primary | ICD-10-CM

## 2024-05-24 PROCEDURE — 97530 THERAPEUTIC ACTIVITIES: CPT | Mod: PN

## 2024-05-24 PROCEDURE — 97140 MANUAL THERAPY 1/> REGIONS: CPT | Mod: PN

## 2024-05-24 PROCEDURE — 97110 THERAPEUTIC EXERCISES: CPT | Mod: PN

## 2024-05-24 RX ORDER — METFORMIN HYDROCHLORIDE 1000 MG/1
1000 TABLET ORAL 2 TIMES DAILY WITH MEALS
Qty: 180 TABLET | Refills: 0 | Status: SHIPPED | OUTPATIENT
Start: 2024-05-24

## 2024-05-24 RX ORDER — METFORMIN HYDROCHLORIDE 1000 MG/1
1000 TABLET ORAL 2 TIMES DAILY WITH MEALS
Qty: 180 TABLET | Refills: 0 | OUTPATIENT
Start: 2024-05-24

## 2024-05-24 NOTE — TELEPHONE ENCOUNTER
Provider Staff:  Action required for this patient    Requires labs      Please see care gap opportunities below in Care Due Message.    Thanks!  [facility]     Appointments      Date Provider   Last Visit   2/22/2024 Zeke Marrufo MD   Next Visit   5/24/2024 Zeke Marrufo MD     Refill Decision Note   Kang Herbert  is requesting a refill authorization.  Brief Assessment and Rationale for Refill:  Approve     Medication Therapy Plan:         Comments:     Note composed:1:34 PM 05/24/2024            CHECK ONBASE FOR SCAN

## 2024-05-24 NOTE — PROGRESS NOTES
"[facility]   Physical Therapy Treatment Note      Name: Kang Herbert  Clinic Number: [id number]    Therapy Diagnosis:   Encounter Diagnosis   Name Primary?    Decreased ROM of lumbar spine Yes     Physician: Shahram Lamas MD    Visit Date: 5/24/2024  Physician Orders: PT Eval and Treat   Medical Diagnosis from Referral:   M51.36 (ICD-10-CM) - DDD (degenerative disc disease), lumbar   M54.10 (ICD-10-CM) - Radicular pain of left lower extremity   Evaluation Date: 5/10/2024  Authorization Period Expiration: 5/9/25  Plan of Care Expiration: 7/5/24  Visit # / Visits authorized: 2/ 20  FOTO: 3/10  PTA Visit #: 0/5      Time In: 10:03  Time Out: 11:00  Total Billable Time: 57 minutes (Therapeutic Exercise - 2, TA1, MT 1)     Precautions: Standard; type 2 diabetes and hypertension; history of seizures  Patient's wife afraid to be alone - requests to be with patient during visits.   used    Subjective     Patient reports: pain in the front of the left leg as well as down the left leg. Did the exercises 2x.   He was compliant with home exercise program.  Response to previous treatment: eval, sore  Functional change: ongoing    Pain: 6/10  Location:  bilateral back, left leg      Objective      Objective Measures updated at progress report unless specified.     Treatment     [place] received the treatments listed below:      therapeutic exercises to develop strength, endurance, ROM, flexibility, posture, and core stabilization for 29 minutes including:  Lower trunk rotation 20x   SKTC 5x10"   Seated nerve glide ankle bias 30x B  Sidelying hip abduction 2x10 bilateral   Hooklying abduction 2' blue theraband   Long arc quad red theraband (toes pointed) 2x10  Seated ball roll 2'  Sink stretch 15x5"  Shuttle 1.5 cords 2x10   Nustep sprints L3 7'     therapeutic activities to improve functional performance for 20  minutes, including:  Heel raises 2x10  Bridge 2x10  Sit to stand " 2x10 red theraband knees  SAPD green theraband 2x10  Pallof press green theraband 15x bilateral NP    manual therapy techniques: Joint mobilizations were applied to the: left lower extremity for 8 minutes, including:   Left LAD     neuromuscular re-education activities to improve: Balance, Coordination, Kinesthetic, Sense, Proprioception, and Posture for 0 minutes. The following activities were included:      Patient Education and Home Exercises       Education provided:   - continue home exercise program     Written Home Exercises Provided: Patient instructed to cont prior HEP. Exercises were reviewed and [place] was able to demonstrate them prior to the end of the session.  [place] demonstrated good  understanding of the education provided. See Electronic Medical Record under Patient Instructions for exercises provided during therapy sessions    Assessment     [place] is a 88 y.o. male referred to outpatient Physical Therapy with a medical diagnosis of DDD (degenerative disc disease), lumbar and Radicular pain of left lower extremity. Patient presents without appointment but able to accommodate. Mod to high pain at start of session. Incorporated LAD with good response. Continued lumbar mobility and lower extremity strengthening. Patient reported reduction to 5/10 pain post tx and walking better. Continue as tolerated.     [place] Is progressing well towards his goals.   Patient prognosis is Guarded.     Patient will continue to benefit from skilled outpatient physical therapy to address the deficits listed in the problem list box on initial evaluation, provide pt/family education and to maximize pt's level of independence in the home and community environment.     Patient's spiritual, cultural and educational needs considered and pt agreeable to plan of care and goals.     Anticipated barriers to physical therapy: chronicity, prior failed PT     Goals:       Short Term Goals (4 Weeks):  1. Pt will be  compliant with HEP to supplement PT in decreasing pain with functional mobility.  2. Pt will perform sit to stand with good control to demonstrate improved core strength.  3. Pt will report 25% improvement in thoracolumbar ROM to promote functional mobility.  4. Pt will improve impaired LE MMTs to >/=4/5 to improve strength for functional tasks.  Long Term Goals (8 Weeks):   1. Pt will improve FOTO score to </= 54% limited to decrease perceived limitation with maintaining/changing body position.   2. Pt will perform bridge with good control to demonstrate improved hip strength.  3. Pt will improve impaired LE MMTs to >/=4+/5 to improve strength for functional tasks.  4. Pt will report no pain during walking 15 minutes or greater to promote recreational fitness.        Plan     Plan of care Certification: 5/10/2024 to 6/7/24.  Outpatient Physical Therapy 2 times weekly for 4 weeks    SCOTTY Cueva PT

## 2024-05-24 NOTE — TELEPHONE ENCOUNTER
Refill Decision Note   Washington Keon  is requesting a refill authorization.  Brief Assessment and Rationale for Refill:  Quick Discontinue     Medication Therapy Plan:        Comments:     Note composed:1:35 PM 05/24/2024

## 2024-05-24 NOTE — TELEPHONE ENCOUNTER
Care Due:                  Date            Visit Type   Department     Provider  --------------------------------------------------------------------------------                                EP -                              PRIMARY      [facility]  Last Visit: 02-      CARE ([facility])   DIOMEDES Marrufo                              EP -                              PRIMARY      [facility]  Next Visit: 08-      CARE ([facility])   DIOMEDES Marrufo                                                            Last  Test          Frequency    Reason                     Performed    Due Date  --------------------------------------------------------------------------------    HBA1C.......  6 months...  metFORMIN................  02- 08-    [facility] Embedded Care Due Messages. Reference number: [id number].   5/24/2024 10:31:06 AM EDGART

## 2024-05-24 NOTE — TELEPHONE ENCOUNTER
No care due was identified.  [facility] Embedded Care Due Messages. Reference number: [id number].   5/24/2024 11:08:21 AM CDT

## 2024-05-28 ENCOUNTER — CLINICAL SUPPORT (OUTPATIENT)
Dept: REHABILITATION | Facility: HOSPITAL | Age: 89
End: 2024-05-28
Payer: MEDICARE

## 2024-05-28 DIAGNOSIS — M54.10 RADICULAR PAIN OF LEFT LOWER EXTREMITY: ICD-10-CM

## 2024-05-28 DIAGNOSIS — R29.898 DECREASED STRENGTH OF LOWER EXTREMITY: ICD-10-CM

## 2024-05-28 DIAGNOSIS — M53.86 DECREASED ROM OF LUMBAR SPINE: Primary | ICD-10-CM

## 2024-05-28 DIAGNOSIS — M51.36 DDD (DEGENERATIVE DISC DISEASE), LUMBAR: ICD-10-CM

## 2024-05-28 PROCEDURE — 97530 THERAPEUTIC ACTIVITIES: CPT | Mod: HCNC,PN

## 2024-05-28 PROCEDURE — 97110 THERAPEUTIC EXERCISES: CPT | Mod: HCNC,PN

## 2024-05-28 PROCEDURE — 97140 MANUAL THERAPY 1/> REGIONS: CPT | Mod: HCNC,PN

## 2024-05-28 NOTE — PROGRESS NOTES
"[facility]   Physical Therapy Treatment Note      Name: Kang Herbert  Clinic Number: [id number]    Therapy Diagnosis:   Encounter Diagnoses   Name Primary?    Decreased ROM of lumbar spine Yes    DDD (degenerative disc disease), lumbar     Radicular pain of left lower extremity     Decreased strength of lower extremity      Physician: Shahram Lamas MD    Visit Date: 5/28/2024  Physician Orders: PT Eval and Treat   Medical Diagnosis from Referral:   M51.36 (ICD-10-CM) - DDD (degenerative disc disease), lumbar   M54.10 (ICD-10-CM) - Radicular pain of left lower extremity   Evaluation Date: 5/10/2024  Authorization Period Expiration: 5/9/25  Plan of Care Expiration: 7/5/24  Visit # / Visits authorized: 3/ 20  FOTO: 4/10  PTA Visit #: 0/5      Time In: 2:05  Time Out: 3:00  Total Billable Time: 55 minutes (Therapeutic Exercise - 2, TA1, MT 1)     Precautions: Standard; type 2 diabetes and hypertension; history of seizures  Patient's wife afraid to be alone - requests to be with patient during visits.   used    Subjective     Patient reports:he lost the exercise sheet and has difficulty remembering them so did not do them since last visit  He was not compliant with home exercise program.  Response to previous treatment:felt better after last session  Functional change: ongoing    Pain: 5/10  Location:  bilateral back, left leg      Objective      Objective Measures updated at progress report unless specified.     Treatment     [place] received the treatments listed below:      therapeutic exercises to develop strength, endurance, ROM, flexibility, posture, and core stabilization for 30 minutes including:  Lower trunk rotation 20x   SKTC 5x10"   Seated nerve glide ankle bias 30x B  Sidelying hip abduction 2x10 bilateral   Hooklying abduction 2' blue theraband   Long arc quad yellow theraband (toes pointed) 2x10  Seated ball roll 2'  Sink stretch 15x5"  Shuttle 1.5 " cords 2x10   Straight leg raise 2x10  Nustep sprints L3 7' NP     therapeutic activities to improve functional performance for 17  minutes, including:  Heel raises 2x10  Bridge 2x10  Sit to stand 2x10 yellow theraband knees  SAPD green theraband 2x10  Rows green theraband 2x10   Pallof press green theraband 15x bilateral     manual therapy techniques: Joint mobilizations were applied to the: left lower extremity for 8 minutes, including:   Left LAD     neuromuscular re-education activities to improve: Balance, Coordination, Kinesthetic, Sense, Proprioception, and Posture for 0 minutes. The following activities were included:      Patient Education and Home Exercises       Education provided:   - continue home exercise program     Written Home Exercises Provided: Patient instructed to cont prior HEP. Exercises were reviewed and [place] was able to demonstrate them prior to the end of the session.  [place] demonstrated good  understanding of the education provided. See Electronic Medical Record under Patient Instructions for exercises provided during therapy sessions    Assessment     [place] is a 88 y.o. male referred to outpatient Physical Therapy with a medical diagnosis of DDD (degenerative disc disease), lumbar and Radicular pain of left lower extremity. Patient presents with moderate pain and left lower extremity symptoms. Good response to LAD. Updated home exercise program and print out. No increased pain post treatment. Progress as tolerated.      [place] Is progressing well towards his goals.   Patient prognosis is Guarded.     Patient will continue to benefit from skilled outpatient physical therapy to address the deficits listed in the problem list box on initial evaluation, provide pt/family education and to maximize pt's level of independence in the home and community environment.     Patient's spiritual, cultural and educational needs considered and pt agreeable to plan of care and goals.      Anticipated barriers to physical therapy: chronicity, prior failed PT     Goals:       Short Term Goals (4 Weeks):  1. Pt will be compliant with HEP to supplement PT in decreasing pain with functional mobility.  2. Pt will perform sit to stand with good control to demonstrate improved core strength.  3. Pt will report 25% improvement in thoracolumbar ROM to promote functional mobility.  4. Pt will improve impaired LE MMTs to >/=4/5 to improve strength for functional tasks.  Long Term Goals (8 Weeks):   1. Pt will improve FOTO score to </= 54% limited to decrease perceived limitation with maintaining/changing body position.   2. Pt will perform bridge with good control to demonstrate improved hip strength.  3. Pt will improve impaired LE MMTs to >/=4+/5 to improve strength for functional tasks.  4. Pt will report no pain during walking 15 minutes or greater to promote recreational fitness.        Plan     Plan of care Certification: 5/10/2024 to 6/7/24.  Outpatient Physical Therapy 2 times weekly for 4 weeks    SCOTTY Cueva, PT

## 2024-05-30 ENCOUNTER — CLINICAL SUPPORT (OUTPATIENT)
Dept: REHABILITATION | Facility: HOSPITAL | Age: 89
End: 2024-05-30
Payer: MEDICARE

## 2024-05-30 DIAGNOSIS — M53.86 DECREASED ROM OF LUMBAR SPINE: Primary | ICD-10-CM

## 2024-05-30 PROCEDURE — 97110 THERAPEUTIC EXERCISES: CPT | Mod: HCNC,PN

## 2024-05-30 PROCEDURE — 97530 THERAPEUTIC ACTIVITIES: CPT | Mod: HCNC,PN

## 2024-05-30 NOTE — PROGRESS NOTES
"[facility]   Physical Therapy Treatment Note      Name: Kang Herbert  Clinic Number: [id number]    Therapy Diagnosis:   Encounter Diagnosis   Name Primary?    Decreased ROM of lumbar spine Yes     Physician: Shahram Lamas MD    Visit Date: 5/30/2024  Physician Orders: PT Eval and Treat   Medical Diagnosis from Referral:   M51.36 (ICD-10-CM) - DDD (degenerative disc disease), lumbar   M54.10 (ICD-10-CM) - Radicular pain of left lower extremity   Evaluation Date: 5/10/2024  Authorization Period Expiration: 5/9/25  Plan of Care Expiration: 7/5/24  Visit # / Visits authorized: 4/ 20  FOTO: 5/10  PTA Visit #: 0/5      Time In: 11:00  Time Out: 11:57  Total Billable Time: 30 minutes (Therapeutic Exercise - 1, TA1)     Precautions: Standard; type 2 diabetes and hypertension; history of seizures  Patient's wife afraid to be alone - requests to be with patient during visits.   used    Subjective     Patient reports:he has pain in left knee and thigh today  He was not compliant with home exercise program.  Response to previous treatment:felt better after last session  Functional change: ongoing    Pain: 5/10  Location:  bilateral back, left leg      Objective      Objective Measures updated at progress report unless specified.     Treatment     [place] received the treatments listed below:      therapeutic exercises to develop strength, endurance, ROM, flexibility, posture, and core stabilization for 15 minutes including:  Lower trunk rotation 20x   SKTC 5x10"   Seated nerve glide ankle bias 30x bilateral  Supine sciatic nerve glides- required cuing  Sidelying hip abduction 2x10 bilateral   Hooklying abduction 2' blue theraband   Long arc quad yellow theraband (toes pointed) 2x10  Seated ball roll 2'  Sink stretch 15x5" NP  Shuttle 1.5 cords 2x10 - leg press   Straight leg raise 3x10  Nustep sprints L3 7'     therapeutic activities to improve functional performance for 10  " minutes, including:  Heel raises 2x10  Bridge 2x10  Sit to stand 2x10 yellow theraband knees  SAPD green theraband 2x10  Rows green theraband 2x10   Pallof press green theraband 15x bilateral     manual therapy techniques: Joint mobilizations were applied to the: left lower extremity for 5 minutes, including:   Left LAD     neuromuscular re-education activities to improve: Balance, Coordination, Kinesthetic, Sense, Proprioception, and Posture for 0 minutes. The following activities were included:      Patient Education and Home Exercises       Education provided:   - continue home exercise program     Written Home Exercises Provided: Patient instructed to cont prior HEP. Exercises were reviewed and [place] was able to demonstrate them prior to the end of the session.  [place] demonstrated good  understanding of the education provided. See Electronic Medical Record under Patient Instructions for exercises provided during therapy sessions    Assessment     [place] is a 88 y.o. male referred to outpatient Physical Therapy with a medical diagnosis of DDD (degenerative disc disease), lumbar and Radicular pain of left lower extremity. Patient presents with greater complaint of knee pain vs back pain. Continued LAD for distraction of hip and knee. Working to progress quad strength as well. Continue to progress as tolerated.     [place] Is progressing well towards his goals.   Patient prognosis is Guarded.     Patient will continue to benefit from skilled outpatient physical therapy to address the deficits listed in the problem list box on initial evaluation, provide pt/family education and to maximize pt's level of independence in the home and community environment.     Patient's spiritual, cultural and educational needs considered and pt agreeable to plan of care and goals.     Anticipated barriers to physical therapy: chronicity, prior failed PT     Goals:       Short Term Goals (4 Weeks):  1. Pt will be  compliant with HEP to supplement PT in decreasing pain with functional mobility.  2. Pt will perform sit to stand with good control to demonstrate improved core strength.  3. Pt will report 25% improvement in thoracolumbar ROM to promote functional mobility.  4. Pt will improve impaired LE MMTs to >/=4/5 to improve strength for functional tasks.  Long Term Goals (8 Weeks):   1. Pt will improve FOTO score to </= 54% limited to decrease perceived limitation with maintaining/changing body position.   2. Pt will perform bridge with good control to demonstrate improved hip strength.  3. Pt will improve impaired LE MMTs to >/=4+/5 to improve strength for functional tasks.  4. Pt will report no pain during walking 15 minutes or greater to promote recreational fitness.        Plan     Plan of care Certification: 5/10/2024 to 6/7/24.  Outpatient Physical Therapy 2 times weekly for 4 weeks    SCOTTY Cueva PT

## 2024-06-04 ENCOUNTER — OFFICE VISIT (OUTPATIENT)
Dept: PAIN MEDICINE | Facility: CLINIC | Age: 89
End: 2024-06-04
Payer: MEDICARE

## 2024-06-04 ENCOUNTER — TELEPHONE (OUTPATIENT)
Dept: PAIN MEDICINE | Facility: CLINIC | Age: 89
End: 2024-06-04

## 2024-06-04 ENCOUNTER — CLINICAL SUPPORT (OUTPATIENT)
Dept: REHABILITATION | Facility: HOSPITAL | Age: 89
End: 2024-06-04
Payer: MEDICARE

## 2024-06-04 VITALS
BODY MASS INDEX: 20.69 KG/M2 | WEIGHT: 112.44 LBS | SYSTOLIC BLOOD PRESSURE: 138 MMHG | HEART RATE: 90 BPM | DIASTOLIC BLOOD PRESSURE: 73 MMHG | HEIGHT: 62 IN

## 2024-06-04 DIAGNOSIS — M51.36 DDD (DEGENERATIVE DISC DISEASE), LUMBAR: ICD-10-CM

## 2024-06-04 DIAGNOSIS — M54.10 RADICULAR PAIN OF LEFT LOWER EXTREMITY: ICD-10-CM

## 2024-06-04 DIAGNOSIS — M53.86 DECREASED ROM OF LUMBAR SPINE: Primary | ICD-10-CM

## 2024-06-04 DIAGNOSIS — M54.16 LUMBAR RADICULOPATHY: Primary | ICD-10-CM

## 2024-06-04 DIAGNOSIS — M54.9 DORSALGIA, UNSPECIFIED: ICD-10-CM

## 2024-06-04 PROCEDURE — 3288F FALL RISK ASSESSMENT DOCD: CPT | Mod: CPTII,S$GLB,, | Performed by: STUDENT IN AN ORGANIZED HEALTH CARE EDUCATION/TRAINING PROGRAM

## 2024-06-04 PROCEDURE — 99999 PR PBB SHADOW E&M-EST. PATIENT-LVL IV: CPT | Mod: PBBFAC,,, | Performed by: STUDENT IN AN ORGANIZED HEALTH CARE EDUCATION/TRAINING PROGRAM

## 2024-06-04 PROCEDURE — 99214 OFFICE O/P EST MOD 30 MIN: CPT | Mod: S$GLB,,, | Performed by: STUDENT IN AN ORGANIZED HEALTH CARE EDUCATION/TRAINING PROGRAM

## 2024-06-04 PROCEDURE — 97140 MANUAL THERAPY 1/> REGIONS: CPT | Mod: KX,HCNC,PN

## 2024-06-04 PROCEDURE — 1125F AMNT PAIN NOTED PAIN PRSNT: CPT | Mod: CPTII,S$GLB,, | Performed by: STUDENT IN AN ORGANIZED HEALTH CARE EDUCATION/TRAINING PROGRAM

## 2024-06-04 PROCEDURE — 97112 NEUROMUSCULAR REEDUCATION: CPT | Mod: KX,HCNC,PN

## 2024-06-04 PROCEDURE — 1160F RVW MEDS BY RX/DR IN RCRD: CPT | Mod: CPTII,S$GLB,, | Performed by: STUDENT IN AN ORGANIZED HEALTH CARE EDUCATION/TRAINING PROGRAM

## 2024-06-04 PROCEDURE — 97110 THERAPEUTIC EXERCISES: CPT | Mod: KX,HCNC,PN

## 2024-06-04 PROCEDURE — 1101F PT FALLS ASSESS-DOCD LE1/YR: CPT | Mod: CPTII,S$GLB,, | Performed by: STUDENT IN AN ORGANIZED HEALTH CARE EDUCATION/TRAINING PROGRAM

## 2024-06-04 PROCEDURE — 97530 THERAPEUTIC ACTIVITIES: CPT | Mod: KX,HCNC,PN

## 2024-06-04 PROCEDURE — 1159F MED LIST DOCD IN RCRD: CPT | Mod: CPTII,S$GLB,, | Performed by: STUDENT IN AN ORGANIZED HEALTH CARE EDUCATION/TRAINING PROGRAM

## 2024-06-04 NOTE — PROGRESS NOTES
"[facility]   Physical Therapy Treatment Note      Name: Kang Herbert  Clinic Number: [id number]    Therapy Diagnosis:   Encounter Diagnosis   Name Primary?    Decreased ROM of lumbar spine Yes       Physician: Sahhram Lamas MD    Visit Date: 6/4/2024  Physician Orders: PT Eval and Treat   Medical Diagnosis from Referral:   M51.36 (ICD-10-CM) - DDD (degenerative disc disease), lumbar   M54.10 (ICD-10-CM) - Radicular pain of left lower extremity   Evaluation Date: 5/10/2024  Authorization Period Expiration: 5/9/25  Plan of Care Expiration: 7/5/24  Visit # / Visits authorized: 5/20  FOTO: 6/10  PTA Visit #: 0/5      Time In: 11:07  Time Out: 12:00  Total Billable Time: 53 minutes (Therapeutic Exercise - 1, TA1, 1 MT, 1 NM)     Precautions: Standard; type 2 diabetes and hypertension; history of seizures  Patient's wife afraid to be alone - requests to be with patient during visits.   used    Subjective     Patient reports:feeling so-so today. Complaints of right knee pain   He was not compliant with home exercise program.  Response to previous treatment:felt better after last session  Functional change: ongoing    Pain: 3/10  Location:  bilateral back, left leg      Objective      Objective Measures updated at progress report unless specified.     Treatment     [place] received the treatments listed below:      therapeutic exercises to develop strength, endurance, ROM, flexibility, posture, and core stabilization for 15 minutes including:  Lower trunk rotation 20x NP  SKTC 5x10"   Sidelying hip abduction 15x bilateral 2#  Hooklying abduction 2' blue theraband NP  Ball squeeze 20x  Long arc quad yellow theraband (toes pointed) 2x10 2# instead of band today  Seated ball roll 2'NP  Sink stretch 15x5" NP  Shuttle 1.5 cords 2x10 - leg press   Nustep sprints L3 7' NP    therapeutic activities to improve functional performance for 15  minutes, including:  Heel raises " 2x10  Bridge 2x10  Sit to stand 2x10 yellow theraband knees  SAPD green theraband 2x10  Rows green theraband 2x10 NP  Pallof press green theraband 15x bilateral     manual therapy techniques: Joint mobilizations were applied to the: left lower extremity for 8 minutes, including:   Left LAD     neuromuscular re-education activities to improve: Balance, Coordination, Kinesthetic, Sense, Proprioception, and Posture for 15 minutes. The following activities were included:  Quad sets 20x left   Straight leg raise 3x10->>required min A and sets of 5 total 20  Short arc quads 2x10 2#   Supine sciatic nerve glides- required cuing  Seated nerve glide ankle bias 30x bilateral NP   Step ups 2x10 L1      Patient Education and Home Exercises       Education provided:   - continue home exercise program     Written Home Exercises Provided: Patient instructed to cont prior HEP. Exercises were reviewed and [place] was able to demonstrate them prior to the end of the session.  [place] demonstrated good  understanding of the education provided. See Electronic Medical Record under Patient Instructions for exercises provided during therapy sessions    Assessment     [place] is a 88 y.o. male referred to outpatient Physical Therapy with a medical diagnosis of DDD (degenerative disc disease), lumbar and Radicular pain of left lower extremity. Patient presents with greater complaint of knee pain vs back pain. Continued LAD for distraction of hip and knee. Continued with increased focus on quad strengthening with additional functional strengthening standing activities. No increased pain post treatment. Progress as tolerated.     [place] Is progressing well towards his goals.   Patient prognosis is Guarded.     Patient will continue to benefit from skilled outpatient physical therapy to address the deficits listed in the problem list box on initial evaluation, provide pt/family education and to maximize pt's level of independence  in the home and community environment.     Patient's spiritual, cultural and educational needs considered and pt agreeable to plan of care and goals.     Anticipated barriers to physical therapy: chronicity, prior failed PT     Goals:       Short Term Goals (4 Weeks):  1. Pt will be compliant with HEP to supplement PT in decreasing pain with functional mobility.  2. Pt will perform sit to stand with good control to demonstrate improved core strength.  3. Pt will report 25% improvement in thoracolumbar ROM to promote functional mobility.  4. Pt will improve impaired LE MMTs to >/=4/5 to improve strength for functional tasks.  Long Term Goals (8 Weeks):   1. Pt will improve FOTO score to </= 54% limited to decrease perceived limitation with maintaining/changing body position.   2. Pt will perform bridge with good control to demonstrate improved hip strength.  3. Pt will improve impaired LE MMTs to >/=4+/5 to improve strength for functional tasks.  4. Pt will report no pain during walking 15 minutes or greater to promote recreational fitness.        Plan     Plan of care Certification: 5/10/2024 to 6/7/24.  Outpatient Physical Therapy 2 times weekly for 4 weeks    SCOTTY Cueva PT

## 2024-06-04 NOTE — TELEPHONE ENCOUNTER
----- Message from Miquel Rivera MD sent at 2024  3:40 PM CDT -----  Regarding: Order for BEN COTA    Patient Name: BEN COTA([id number])  Sex: Male  : 1935      PCP: KATLIN DOBSON    Center: [facility]ILL[facility]     Types of orders made on 2024: Imaging, Outpatient Referral, Procedure                                       Request    Order Date:2024  Ordering User:MIQUEL RIVERA [[id number]  ]  Encounter Provider:Miquel Rivera MD [[id number]]  Authorizing Provider: Miquel Rivera MD [[id number]]  Department:[facility][[id number]]    Common Order Information  Procedure -> Transforaminal Injection (Specify level and laterality) Cmt: Left             L3/4 and L4/5    Order Specific Information  Order: Procedure Order to Pain Management [Custom: [id number]]  Order #:          [id number]: 1 FUTURE    P  riority: Routine  Class: Clinic Performed    Future Order Information      Expires on:2025            Expected by:2024                   Associated Diagnoses      M51.36 DDD (degenerative disc disease), lumbar      M54.16 Lumbar radiculopathy      Is patient on anti-coagulants? -> No         Facility Name: -> [place]           Priority: Routine  Class: Clinic Performed    Future Order In  formation      Expires on:2025            Expected by:2024                   Associated Diagnoses      M51.36 DDD (degenerative disc disease), lumbar      M54.16 Lumbar radiculopathy      Procedure -> Transforaminal Injection (Specify level and laterality) Cmt:                 Left L3/4 and L4/5        Is patient on anti-coagulants? -> No         Facility Name: -> [place]

## 2024-06-04 NOTE — PROGRESS NOTES
Chronic patient Established Note (Follow up visit)      SUBJECTIVE:    INTERVAL HISTORY 6/4/2024:  Kang Herbert presents to the clinic for a follow-up appointment for chronic pain. Current pain intensity is 5/10.  Since the last visit, Kang Herbert states:  lower back and left leg pain.  He is s/p L4/5 LESI with Dr. Mondragon with no relief in his pain.  He reports the pain has remained the same for several years.  He reports he has started physical therapy for the past month which has helped significantly with his pain. He reports he continues to have pain which goes down the left leg. He does feel the pain radiates down the entire leg. He has been taking aleve PM which has been helping with his pain and sleep.    PRIOR HISTORY:   Interval updates:   8/30/2023 - Mr. Herbert returns to clinic today for severe back pain. He continues to have have pain in the back, which radiates down the left leg, across his left knee, and down to his foot. His pain is worse when he stands and walks, and was so unbearable that he had to present to the ED Wednesday. He was given 1 pill of norco in the ED and was sent home with a short course of robaxin and a lidocaine patch. He had meloxicam at home which he had already.  He has had injections in the lower back, but he doesn't remember them well.  His son, who speaks on his behalf, states that he has not complained of back/leg pain until this month.  He admits to home exercises (walking) and physical therapy (home therapy years ago).    02/08/2022 Kalin Herbert returns to clinic s/p Lumbar Epidural Steroid Injection at L4-5 on 01/25/22 with 0% relief.  He reports a pain intensity 7/10 today with a weekly range of 7-/10.  The pain is described as Aching.  Pain is worsened by: nothing in particular and improved by: nothing.  Mr. Herbert reports continued pain in his left lower extremity, pain is described as tingling pain.  He has attempted x2 lumbar DANIEL has that did not provide  him with any relief.  Denies any profound weakness, denies any bowel bladder dysfunction, denies any recent incident or trauma denies any saddle anesthesia.    2022 Jazmin returns to clinic s/p Lumbar Epidural Steroid Injection at L4-5 on 22 with 70% relief.  He reports a pain intensity 5/10 today with a weekly range of 5-5/10.    Additionally he did report continued pain in the left lower extremity however his overall relief from the injection was very good.    Subjective:   Kang Herbert is a 88 y.o. male who has a past medical history of Cataract, Diabetes mellitus, Diabetes mellitus, type 2, Hyperlipidemia, Hypertension, Hypertropia of left eye, Open angle with borderline findings and low glaucoma risk in both eyes, Seizures, and Spondylosis without myelopathy. He complains of pain as described below.    Location: low back   Onset: years  Radiation: bilateral legs, left is worse  Timing: intermittent  Current Pain Score: 5/10  Weekly Pain Range: 4-10/10  Quality: Aching, Tingling and Numb  Worsened by: walking for more than several minutes  Improved by: rest    Previous Therapies:  PT/OT: Yes, previously  HEP: not currently  TENS:       Interventions:   -2021 Lumbar Epidural Steroid Injection at L4-5  - 2022 Lumbar  Epidural Steroid Injection at L4-5 - no relief  11/15/2023 - L4/5 LESI no relief    Hx of LESI in  with excellent relief for pain shooting down leg at that time  Surgery: None for spine  Opioids: None  Adjuvants: None    Current Pain Medications:  Gabapentin 800 b.i.d.  Mobic 15 daily OR Aleve PM  Robaxin 500mg three times a day    [email or web address]  MRI Lumbar Spine Without Contrast  FINDINGS:  Alignment: Mild lumbar levoscoliosis.  Minimal grade 1 retrolisthesis of L1 on L2 and L2 on L3.  Vertebrae: Degenerative endplate changes are seen throughout the lumbar spine.  No fracture or marrow infiltrative process.  Discs: Multilevel disc height loss and desiccation,  most severe at T12-L1 through to L3-L4. no evidence for discitis.  Cord: Normal.  Conus terminates at L1.  Degenerative findings:  T12-L1: Circumferential disc bulge and moderate facet arthropathy result in mild right neural foraminal narrowing.  L1-L2: Circumferential disc bulge and mild facet arthropathy result in mild spinal canal stenosis and moderate bilateral neural foraminal narrowing.  L2-L3: Circumferential disc bulge and mild facet arthropathy result in mild spinal canal stenosis and moderate bilateral neural foraminal narrowing.  L3-L4: Circumferential disc bulge and mild facet arthropathy result in mild effacement of the thecal sac and mild right neural foraminal narrowing.  L4-L5: Circumferential disc bulge and moderate facet arthropathy result in severe left neural foraminal narrowing.  L5-S1: No spinal canal stenosis or neural foraminal narrowing.  Paraspinal muscles & soft tissues: Partially imaged left renal cyst.  Impression:  Multilevel degenerative changes of the lumbar spine detailed above.  Mild spinal canal stenosis noted at L1-L4.  Moderate to severe neural foraminal narrowing noted at L1-L3 and L4-L5.    Pain Disability Index Review:      6/4/2024     2:58 PM 1/5/2022    11:09 AM   Last 3 PDI Scores   Pain Disability Index (PDI) 35 35     Allergies:   Review of patient's allergies indicates:  No Known Allergies    Current Medications:   Current Outpatient Medications   Medication Sig Dispense Refill    atorvastatin (LIPITOR) 10 MG tablet Take 1 tablet (10 mg total) by mouth once daily. 90 tablet 3    blood-glucose meter (ACCU-CHEK ARUNA PLUS METER) Misc 1 Units by Misc.(Non-Drug; Combo Route) route once daily. 1 each 0    finasteride (PROSCAR) 5 mg tablet Take 1 tablet (5 mg total) by mouth once daily. 90 tablet 3    gabapentin (NEURONTIN) 800 MG tablet TAKE 1 TABLET BY MOUTH 2 TIMES DAILY. 180 tablet 3    lancets (ONETOUCH ULTRASOFT LANCETS) Misc 1 lancet by Misc.(Non-Drug; Combo Route)  route once daily. 100 each 3    levothyroxine (SYNTHROID) 50 MCG tablet Take 1 tablet (50 mcg total) by mouth before breakfast. 90 tablet 3    losartan (COZAAR) 25 MG tablet TAKE 1 TABLET BY MOUTH EVERY DAY 90 tablet 3    meloxicam (MOBIC) 15 MG tablet Take 1 tablet (15 mg total) by mouth daily as needed for Pain. 30 tablet 1    memantine (NAMENDA) 5 MG Tab Take 1 tablet (5 mg total) by mouth 2 (two) times daily. 180 tablet 3    metFORMIN (GLUCOPHAGE) 1000 MG tablet Take 1 tablet (1,000 mg total) by mouth 2 (two) times daily with meals. Appointment needed for additional refills. 180 tablet 0    omeprazole (PRILOSEC) 20 MG capsule Take 1 capsule (20 mg total) by mouth once daily. 90 capsule 3    TRUE METRIX GLUCOSE TEST STRIP Strp [facility]NON-DRUG COMBO ROUTE) ROUTE ONCE DAILY. 100 strip 3     No current facility-administered medications for this visit.       REVIEW OF SYSTEMS:    GENERAL:  No new weight loss, malaise or fevers.  HEENT:  Negative for new frequent or significant headaches.  NECK:  Negative for new lumps, goiter, and significant neck swelling.  RESPIRATORY:  Negative for new cough, wheezing or shortness of breath.  CARDIOVASCULAR:  Negative for new chest pain, leg swelling or palpitations.  GI:  Negative for new abdominal discomfort, blood in stools or black stools or change in bowel habits.  MUSCULOSKELETAL:  See HPI.  SKIN:  Negative for new lesions, rash, and itching.  PSYCH:  Negative for new sleep disturbance, mood disorder  HEMATOLOGY/LYMPHOLOGY:  Negative for new prolonged bleeding, bruising easily or swollen nodes.  NEURO:   No new headaches, syncope, paralysis, seizures or tremors.  All other reviewed and negative other than HPI.    Past Medical History:  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    Open angle with borderline findings and low glaucoma  risk in both eyes 9/9/2014    Seizures     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/2013       Past Surgical History:  Past Surgical History:   Procedure Laterality Date    CATARACT EXTRACTION  11-/    od/os    CHOLECYSTECTOMY      EPIDURAL STEROID INJECTION INTO LUMBAR SPINE N/A 11/30/2021    Procedure: Lumbar Epidural Steroid Injection L4-5 (No Sedation);  Surgeon: Sisi Lua Jr., MD;  Location: [facility] PAIN [facility]GT;  Service: Pain Management;  Laterality: N/A;  Diabetic    EPIDURAL STEROID INJECTION INTO LUMBAR SPINE N/A 1/25/2022    Procedure: Lumbar Epidural Steroid Injection L4-5;  Surgeon: Sisi Lua Jr., MD;  Location: [facility] PAIN [facility]GT;  Service: Pain Management;  Laterality: N/A;  diabetic     EPIDURAL STEROID INJECTION INTO LUMBAR SPINE N/A 11/15/2023    Procedure: INJECTION, STEROID, SPINE, LUMBAR, EPIDURAL;  Surgeon: Mari Mondragon DO;  [facility];  Service: Pain Management;  Laterality: N/A;    HERNIA REPAIR         Family History:  Family History   Problem Relation Name Age of Onset    No Known Problems Mother      No Known Problems Father      Glaucoma Sister      Diabetes Sister      Cancer Brother x2     Diabetes Brother x2     No Known Problems Daughter x2     No Known Problems Son x1     No Known Problems Maternal Aunt      No Known Problems Maternal Uncle      No Known Problems Paternal Aunt      No Known Problems Paternal Uncle      No Known Problems Maternal Grandmother      No Known Problems Maternal Grandfather      No Known Problems Paternal Grandmother      No Known Problems Paternal Grandfather      Amblyopia Neg Hx      Blindness Neg Hx      Cataracts Neg Hx      Hypertension Neg Hx      Macular degeneration Neg Hx      Retinal detachment Neg Hx      Strabismus Neg Hx      Stroke Neg Hx      Thyroid disease Neg Hx      Prostate cancer Neg Hx      Kidney disease Neg Hx         Social History:  Social History     Socioeconomic History    Marital status:   "  Occupational History    Occupation: retired   Tobacco Use    Smoking status: Never    Smokeless tobacco: Never   Substance and Sexual Activity    Alcohol use: No    Drug use: No     Social Determinants of Health     Financial Resource Strain: Low Risk  (10/16/2023)    Overall Financial Resource Strain (CARDIA)     Difficulty of Paying Living Expenses: Not hard at all   Food Insecurity: No Food Insecurity (10/16/2023)    Hunger Vital Sign     Worried About Running Out of Food in the Last Year: Never true     Ran Out of Food in the Last Year: Never true   Transportation Needs: No Transportation Needs (10/16/2023)    PRAPARE - Transportation     Lack of Transportation (Medical): No     Lack of Transportation (Non-Medical): No   Physical Activity: Insufficiently Active (10/16/2023)    Exercise Vital Sign     Days of Exercise per Week: 2 days     Minutes of Exercise per Session: 60 min   Stress: No Stress Concern Present (10/16/2023)    [employer] [place] [employer] - Occupational Stress Questionnaire     Feeling of Stress : Not at all   Housing Stability: Low Risk  (10/16/2023)    Housing Stability Vital Sign     Unable to Pay for Housing in the Last Year: No     Number of Places Lived in the Last Year: 1     Unstable Housing in the Last Year: No       OBJECTIVE:    /73 (BP Location: Left arm, Patient Position: Sitting)   Pulse 90   Ht 5' 2" (1.575 m)   Wt 51 kg (112 lb 7 oz)   BMI 20.56 kg/m²     PHYSICAL EXAMINATION:  General appearance: Well appearing, in no acute distress, alert and appropriately communicative.  Psych:  Mood and affect appropriate.  Skin: Skin color, texture, turgor normal, no rashes or lesions, in both upper and lower body for exposed skin.  Head/face:  Atraumatic, normocephalic.  Cor: regular rate  Pulm: non-labored breathing  GI: Abdomen non-distended and non-tender.  Back: Straight leg raising in the sitting and supine positions is negative to radicular pain.  pain to " palpation over the spine and/or paraspinal muscles. Pain with lumbar extension and facet loading.  Extremities: No deformities, significant lymphedema, or skin discoloration. No significant open wounds. No major amputations.  Musculoskeletal:  hip, sacroiliac and knee provocative maneuvers are negative. Bilateral upper and lower extremity strength is normal and symmetric.  No atrophy or tone abnormalities are noted.  Neuro: Bilateral upper and lower extremity coordination and muscle stretch reflexes abnormalities noted: none.  Benitez and/or Clonus: negative; loss of sensation to light touch: none  Gait: antalgic    CMP  Sodium   Date Value Ref Range Status   02/19/2024 139 136 - 145 mmol/L Final     Potassium   Date Value Ref Range Status   02/19/2024 4.3 3.5 - 5.1 mmol/L Final     Chloride   Date Value Ref Range Status   02/19/2024 103 95 - 110 mmol/L Final     CO2   Date Value Ref Range Status   02/19/2024 27 23 - 29 mmol/L Final     Glucose   Date Value Ref Range Status   02/19/2024 144 (H) 70 - 110 mg/dL Final     BUN   Date Value Ref Range Status   02/19/2024 16 8 - 23 mg/dL Final     Creatinine   Date Value Ref Range Status   02/19/2024 0.7 0.5 - 1.4 mg/dL Final     Calcium   Date Value Ref Range Status   02/19/2024 9.4 8.7 - 10.5 mg/dL Final     Total Protein   Date Value Ref Range Status   02/19/2024 6.9 6.0 - 8.4 g/dL Final     Albumin   Date Value Ref Range Status   02/19/2024 4.1 3.5 - 5.2 g/dL Final     Total Bilirubin   Date Value Ref Range Status   02/19/2024 0.3 0.1 - 1.0 mg/dL Final     Comment:     For infants and newborns, interpretation of results should be based  on gestational age, weight and in agreement with clinical  observations.    Premature Infant recommended reference ranges:  Up to 24 hours.............<8.0 mg/dL  Up to 48 hours............<12.0 mg/dL  3-5 days..................<15.0 mg/dL  6-29 days.................<15.0 mg/dL       Alkaline Phosphatase   Date Value Ref Range Status    02/19/2024 89 55 - 135 U/L Final     AST   Date Value Ref Range Status   02/19/2024 22 10 - 40 U/L Final     ALT   Date Value Ref Range Status   02/19/2024 20 10 - 44 U/L Final     Anion Gap   Date Value Ref Range Status   02/19/2024 9 8 - 16 mmol/L Final     eGFR   Date Value Ref Range Status   02/19/2024 >60.0 >60 mL/min/1.73 m^2 Final     ASSESSMENT: 89 y.o. year old male with chronic pain, consistent with:     1. Lumbar radiculopathy  Procedure Order to Pain Management      2. DDD (degenerative disc disease), lumbar  Ambulatory referral/consult to Pain Clinic    Procedure Order to Pain Management      3. Radicular pain of left lower extremity  Ambulatory referral/consult to Pain Clinic      4. Dorsalgia, unspecified  MRI Lumbar Spine Without Contrast        IMPRESSION: Kang Herbert presents today for lower back and left lower extremity pain. History and physical exam are consistent with lumbar radiculopathy.  Imaging is consistent with lumbar degenerative disc disease with foraminal stenosis at multiple levels.   He has failed to have improvement with interlaminar epidural steroid injection. We will get updated imaging of his lower back at this time (last MRI in 2021). Once this is completed, we can consider alternative interventions to help with his pain.    PLAN:   - I have stressed the importance of physical activity and a home exercise plan to help with pain and improve health.  - Patient can continue with medications for now since they are providing benefits, using them appropriately, and without side effects.  - schedule for MRI lumbar spine without contrast  - schedule for left L3/4 and L4/5 transforaminal epidural steroid injection  - RTC after epidural steroid injection  - Counseled patient regarding the importance of activity modification and physical therapy.    The above plan and management options were discussed at length with patient. Patient is in agreement with the above and verbalized  understanding.    Miquel Clinton  06/04/2024

## 2024-06-04 NOTE — H&P (VIEW-ONLY)
Chronic patient Established Note (Follow up visit)      SUBJECTIVE:    INTERVAL HISTORY 6/4/2024:  Kang Herbert presents to the clinic for a follow-up appointment for chronic pain. Current pain intensity is 5/10.  Since the last visit, Kang Herbert states:  lower back and left leg pain.  He is s/p L4/5 LESI with Dr. Mondragon with no relief in his pain.  He reports the pain has remained the same for several years.  He reports he has started physical therapy for the past month which has helped significantly with his pain. He reports he continues to have pain which goes down the left leg. He does feel the pain radiates down the entire leg. He has been taking aleve PM which has been helping with his pain and sleep.    PRIOR HISTORY:   Interval updates:   8/30/2023 - Mr. Herbert returns to clinic today for severe back pain. He continues to have have pain in the back, which radiates down the left leg, across his left knee, and down to his foot. His pain is worse when he stands and walks, and was so unbearable that he had to present to the ED Wednesday. He was given 1 pill of norco in the ED and was sent home with a short course of robaxin and a lidocaine patch. He had meloxicam at home which he had already.  He has had injections in the lower back, but he doesn't remember them well.  His son, who speaks on his behalf, states that he has not complained of back/leg pain until this month.  He admits to home exercises (walking) and physical therapy (home therapy years ago).    02/08/2022 Kalin Herbert returns to clinic s/p Lumbar Epidural Steroid Injection at L4-5 on 01/25/22 with 0% relief.  He reports a pain intensity 7/10 today with a weekly range of 7-/10.  The pain is described as Aching.  Pain is worsened by: nothing in particular and improved by: nothing.  Mr. Herbert reports continued pain in his left lower extremity, pain is described as tingling pain.  He has attempted x2 lumbar DANIEL has that did not provide  him with any relief.  Denies any profound weakness, denies any bowel bladder dysfunction, denies any recent incident or trauma denies any saddle anesthesia.    2022 Jazimn returns to clinic s/p Lumbar Epidural Steroid Injection at L4-5 on 22 with 70% relief.  He reports a pain intensity 5/10 today with a weekly range of 5-5/10.    Additionally he did report continued pain in the left lower extremity however his overall relief from the injection was very good.    Subjective:   Kang Herbert is a 88 y.o. male who has a past medical history of Cataract, Diabetes mellitus, Diabetes mellitus, type 2, Hyperlipidemia, Hypertension, Hypertropia of left eye, Open angle with borderline findings and low glaucoma risk in both eyes, Seizures, and Spondylosis without myelopathy. He complains of pain as described below.    Location: low back   Onset: years  Radiation: bilateral legs, left is worse  Timing: intermittent  Current Pain Score: 5/10  Weekly Pain Range: 4-10/10  Quality: Aching, Tingling and Numb  Worsened by: walking for more than several minutes  Improved by: rest    Previous Therapies:  PT/OT: Yes, previously  HEP: not currently  TENS:       Interventions:   -2021 Lumbar Epidural Steroid Injection at L4-5  - 2022 Lumbar  Epidural Steroid Injection at L4-5 - no relief  11/15/2023 - L4/5 LESI no relief    Hx of LESI in  with excellent relief for pain shooting down leg at that time  Surgery: None for spine  Opioids: None  Adjuvants: None    Current Pain Medications:  Gabapentin 800 b.i.d.  Mobic 15 daily OR Aleve PM  Robaxin 500mg three times a day    [email or web address]  MRI Lumbar Spine Without Contrast  FINDINGS:  Alignment: Mild lumbar levoscoliosis.  Minimal grade 1 retrolisthesis of L1 on L2 and L2 on L3.  Vertebrae: Degenerative endplate changes are seen throughout the lumbar spine.  No fracture or marrow infiltrative process.  Discs: Multilevel disc height loss and desiccation,  most severe at T12-L1 through to L3-L4. no evidence for discitis.  Cord: Normal.  Conus terminates at L1.  Degenerative findings:  T12-L1: Circumferential disc bulge and moderate facet arthropathy result in mild right neural foraminal narrowing.  L1-L2: Circumferential disc bulge and mild facet arthropathy result in mild spinal canal stenosis and moderate bilateral neural foraminal narrowing.  L2-L3: Circumferential disc bulge and mild facet arthropathy result in mild spinal canal stenosis and moderate bilateral neural foraminal narrowing.  L3-L4: Circumferential disc bulge and mild facet arthropathy result in mild effacement of the thecal sac and mild right neural foraminal narrowing.  L4-L5: Circumferential disc bulge and moderate facet arthropathy result in severe left neural foraminal narrowing.  L5-S1: No spinal canal stenosis or neural foraminal narrowing.  Paraspinal muscles & soft tissues: Partially imaged left renal cyst.  Impression:  Multilevel degenerative changes of the lumbar spine detailed above.  Mild spinal canal stenosis noted at L1-L4.  Moderate to severe neural foraminal narrowing noted at L1-L3 and L4-L5.    Pain Disability Index Review:      6/4/2024     2:58 PM 1/5/2022    11:09 AM   Last 3 PDI Scores   Pain Disability Index (PDI) 35 35     Allergies:   Review of patient's allergies indicates:  No Known Allergies    Current Medications:   Current Outpatient Medications   Medication Sig Dispense Refill    atorvastatin (LIPITOR) 10 MG tablet Take 1 tablet (10 mg total) by mouth once daily. 90 tablet 3    blood-glucose meter (ACCU-CHEK ARUNA PLUS METER) Misc 1 Units by Misc.(Non-Drug; Combo Route) route once daily. 1 each 0    finasteride (PROSCAR) 5 mg tablet Take 1 tablet (5 mg total) by mouth once daily. 90 tablet 3    gabapentin (NEURONTIN) 800 MG tablet TAKE 1 TABLET BY MOUTH 2 TIMES DAILY. 180 tablet 3    lancets (ONETOUCH ULTRASOFT LANCETS) Misc 1 lancet by Misc.(Non-Drug; Combo Route)  route once daily. 100 each 3    levothyroxine (SYNTHROID) 50 MCG tablet Take 1 tablet (50 mcg total) by mouth before breakfast. 90 tablet 3    losartan (COZAAR) 25 MG tablet TAKE 1 TABLET BY MOUTH EVERY DAY 90 tablet 3    meloxicam (MOBIC) 15 MG tablet Take 1 tablet (15 mg total) by mouth daily as needed for Pain. 30 tablet 1    memantine (NAMENDA) 5 MG Tab Take 1 tablet (5 mg total) by mouth 2 (two) times daily. 180 tablet 3    metFORMIN (GLUCOPHAGE) 1000 MG tablet Take 1 tablet (1,000 mg total) by mouth 2 (two) times daily with meals. Appointment needed for additional refills. 180 tablet 0    omeprazole (PRILOSEC) 20 MG capsule Take 1 capsule (20 mg total) by mouth once daily. 90 capsule 3    TRUE METRIX GLUCOSE TEST STRIP Strp 1 STRIP BY [facility]NON-DRUG COMBO ROUTE) ROUTE ONCE DAILY. 100 strip 3     No current facility-administered medications for this visit.       REVIEW OF SYSTEMS:    GENERAL:  No new weight loss, malaise or fevers.  HEENT:  Negative for new frequent or significant headaches.  NECK:  Negative for new lumps, goiter, and significant neck swelling.  RESPIRATORY:  Negative for new cough, wheezing or shortness of breath.  CARDIOVASCULAR:  Negative for new chest pain, leg swelling or palpitations.  GI:  Negative for new abdominal discomfort, blood in stools or black stools or change in bowel habits.  MUSCULOSKELETAL:  See HPI.  SKIN:  Negative for new lesions, rash, and itching.  PSYCH:  Negative for new sleep disturbance, mood disorder  HEMATOLOGY/LYMPHOLOGY:  Negative for new prolonged bleeding, bruising easily or swollen nodes.  NEURO:   No new headaches, syncope, paralysis, seizures or tremors.  All other reviewed and negative other than HPI.    Past Medical History:  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    Open angle with borderline findings and low glaucoma  risk in both eyes 9/9/2014    Seizures     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/2013       Past Surgical History:  Past Surgical History:   Procedure Laterality Date    CATARACT EXTRACTION  11-/    od/os    CHOLECYSTECTOMY      EPIDURAL STEROID INJECTION INTO LUMBAR SPINE N/A 11/30/2021    Procedure: Lumbar Epidural Steroid Injection L4-5 (No Sedation);  Surgeon: Sisi Lua Jr., MD;  Location: [facility];  Service: Pain Management;  Laterality: N/A;  Diabetic    EPIDURAL STEROID INJECTION INTO LUMBAR SPINE N/A 1/25/2022    Procedure: Lumbar Epidural Steroid Injection L4-5;  Surgeon: Sisi Lua Jr., MD;  Location: [facility];  Service: Pain Management;  Laterality: N/A;  diabetic     EPIDURAL STEROID INJECTION INTO LUMBAR SPINE N/A 11/15/2023    Procedure: INJECTION, STEROID, SPINE, LUMBAR, EPIDURAL;  Surgeon: Mari Mondragon DO;  Location: [facility]IN [facility];  Service: Pain Management;  Laterality: N/A;    HERNIA REPAIR         Family History:  Family History   Problem Relation Name Age of Onset    No Known Problems Mother      No Known Problems Father      Glaucoma Sister      Diabetes Sister      Cancer Brother x2     Diabetes Brother x2     No Known Problems Daughter x2     No Known Problems Son x1     No Known Problems Maternal Aunt      No Known Problems Maternal Uncle      No Known Problems Paternal Aunt      No Known Problems Paternal Uncle      No Known Problems Maternal Grandmother      No Known Problems Maternal Grandfather      No Known Problems Paternal Grandmother      No Known Problems Paternal Grandfather      Amblyopia Neg Hx      Blindness Neg Hx      Cataracts Neg Hx      Hypertension Neg Hx      Macular degeneration Neg Hx      Retinal detachment Neg Hx      Strabismus Neg Hx      Stroke Neg Hx      Thyroid disease Neg Hx      Prostate cancer Neg Hx      Kidney disease Neg Hx         Social History:  Social History     Socioeconomic History    Marital status:   "  Occupational History    Occupation: retired   Tobacco Use    Smoking status: Never    Smokeless tobacco: Never   Substance and Sexual Activity    Alcohol use: No    Drug use: No     Social Determinants of Health     Financial Resource Strain: Low Risk  (10/16/2023)    Overall Financial Resource Strain (CARDIA)     Difficulty of Paying Living Expenses: Not hard at all   Food Insecurity: No Food Insecurity (10/16/2023)    Hunger Vital Sign     Worried About Running Out of Food in the Last Year: Never true     Ran Out of Food in the Last Year: Never true   Transportation Needs: No Transportation Needs (10/16/2023)    PRAPARE - Transportation     Lack of Transportation (Medical): No     Lack of Transportation (Non-Medical): No   Physical Activity: Insufficiently Active (10/16/2023)    Exercise Vital Sign     Days of Exercise per Week: 2 days     Minutes of Exercise per Session: 60 min   Stress: No Stress Concern Present (10/16/2023)    [employer] - Occupational Stress Questionnaire     Feeling of Stress : Not at all   Housing Stability: Low Risk  (10/16/2023)    Housing Stability Vital Sign     Unable to Pay for Housing in the Last Year: No     Number of Places Lived in the Last Year: 1     Unstable Housing in the Last Year: No       OBJECTIVE:    /73 (BP Location: Left arm, Patient Position: Sitting)   Pulse 90   Ht 5' 2" (1.575 m)   Wt 51 kg (112 lb 7 oz)   BMI 20.56 kg/m²     PHYSICAL EXAMINATION:  General appearance: Well appearing, in no acute distress, alert and appropriately communicative.  Psych:  Mood and affect appropriate.  Skin: Skin color, texture, turgor normal, no rashes or lesions, in both upper and lower body for exposed skin.  Head/face:  Atraumatic, normocephalic.  Cor: regular rate  Pulm: non-labored breathing  GI: Abdomen non-distended and non-tender.  Back: Straight leg raising in the sitting and supine positions is negative to radicular pain.  pain to " palpation over the spine and/or paraspinal muscles. Pain with lumbar extension and facet loading.  Extremities: No deformities, significant lymphedema, or skin discoloration. No significant open wounds. No major amputations.  Musculoskeletal:  hip, sacroiliac and knee provocative maneuvers are negative. Bilateral upper and lower extremity strength is normal and symmetric.  No atrophy or tone abnormalities are noted.  Neuro: Bilateral upper and lower extremity coordination and muscle stretch reflexes abnormalities noted: none.  Benitez and/or Clonus: negative; loss of sensation to light touch: none  Gait: antalgic    CMP  Sodium   Date Value Ref Range Status   02/19/2024 139 136 - 145 mmol/L Final     Potassium   Date Value Ref Range Status   02/19/2024 4.3 3.5 - 5.1 mmol/L Final     Chloride   Date Value Ref Range Status   02/19/2024 103 95 - 110 mmol/L Final     CO2   Date Value Ref Range Status   02/19/2024 27 23 - 29 mmol/L Final     Glucose   Date Value Ref Range Status   02/19/2024 144 (H) 70 - 110 mg/dL Final     BUN   Date Value Ref Range Status   02/19/2024 16 8 - 23 mg/dL Final     Creatinine   Date Value Ref Range Status   02/19/2024 0.7 0.5 - 1.4 mg/dL Final     Calcium   Date Value Ref Range Status   02/19/2024 9.4 8.7 - 10.5 mg/dL Final     Total Protein   Date Value Ref Range Status   02/19/2024 6.9 6.0 - 8.4 g/dL Final     Albumin   Date Value Ref Range Status   02/19/2024 4.1 3.5 - 5.2 g/dL Final     Total Bilirubin   Date Value Ref Range Status   02/19/2024 0.3 0.1 - 1.0 mg/dL Final     Comment:     For infants and newborns, interpretation of results should be based  on gestational age, weight and in agreement with clinical  observations.    Premature Infant recommended reference ranges:  Up to 24 hours.............<8.0 mg/dL  Up to 48 hours............<12.0 mg/dL  3-5 days..................<15.0 mg/dL  6-29 days.................<15.0 mg/dL       Alkaline Phosphatase   Date Value Ref Range Status    02/19/2024 89 55 - 135 U/L Final     AST   Date Value Ref Range Status   02/19/2024 22 10 - 40 U/L Final     ALT   Date Value Ref Range Status   02/19/2024 20 10 - 44 U/L Final     Anion Gap   Date Value Ref Range Status   02/19/2024 9 8 - 16 mmol/L Final     eGFR   Date Value Ref Range Status   02/19/2024 >60.0 >60 mL/min/1.73 m^2 Final     ASSESSMENT: 89 y.o. year old male with chronic pain, consistent with:     1. Lumbar radiculopathy  Procedure Order to Pain Management      2. DDD (degenerative disc disease), lumbar  Ambulatory referral/consult to Pain Clinic    Procedure Order to Pain Management      3. Radicular pain of left lower extremity  Ambulatory referral/consult to Pain Clinic      4. Dorsalgia, unspecified  MRI Lumbar Spine Without Contrast        IMPRESSION: Kang Herbert presents today for lower back and left lower extremity pain. History and physical exam are consistent with lumbar radiculopathy.  Imaging is consistent with lumbar degenerative disc disease with foraminal stenosis at multiple levels.   He has failed to have improvement with interlaminar epidural steroid injection. We will get updated imaging of his lower back at this time (last MRI in 2021). Once this is completed, we can consider alternative interventions to help with his pain.    PLAN:   - I have stressed the importance of physical activity and a home exercise plan to help with pain and improve health.  - Patient can continue with medications for now since they are providing benefits, using them appropriately, and without side effects.  - schedule for MRI lumbar spine without contrast  - schedule for left L3/4 and L4/5 transforaminal epidural steroid injection  - RTC after epidural steroid injection  - Counseled patient regarding the importance of activity modification and physical therapy.    The above plan and management options were discussed at length with patient. Patient is in agreement with the above and verbalized  understanding.    Miquel Clinton  06/04/2024

## 2024-06-06 ENCOUNTER — CLINICAL SUPPORT (OUTPATIENT)
Dept: REHABILITATION | Facility: HOSPITAL | Age: 89
End: 2024-06-06
Payer: MEDICARE

## 2024-06-06 DIAGNOSIS — M53.86 DECREASED ROM OF LUMBAR SPINE: Primary | ICD-10-CM

## 2024-06-06 PROCEDURE — 97140 MANUAL THERAPY 1/> REGIONS: CPT | Mod: PN

## 2024-06-06 PROCEDURE — 97530 THERAPEUTIC ACTIVITIES: CPT | Mod: PN

## 2024-06-06 PROCEDURE — 97110 THERAPEUTIC EXERCISES: CPT | Mod: PN

## 2024-06-06 PROCEDURE — 97112 NEUROMUSCULAR REEDUCATION: CPT | Mod: PN

## 2024-06-06 NOTE — PROGRESS NOTES
[facility]   Physical Therapy Treatment Note / plan of care      Name: Kang Herbert  Clinic Number: [id number]    Therapy Diagnosis:   Encounter Diagnosis   Name Primary?    Decreased ROM of lumbar spine Yes       Physician: Shahram Lamas MD    Visit Date: 6/6/2024  Physician Orders: PT Eval and Treat   Medical Diagnosis from Referral:   M51.36 (ICD-10-CM) - DDD (degenerative disc disease), lumbar   M54.10 (ICD-10-CM) - Radicular pain of left lower extremity   Evaluation Date: 5/10/2024  Authorization Period Expiration: 5/9/25  Plan of Care Expiration: 7/5/24  Visit # / Visits authorized: 6/20  FOTO: 7/10  PTA Visit #: 0/5      Time In: 3:03  Time Out: 4:00  Total Billable Time: 57 minutes (Therapeutic Exercise - 1, TA1, 1 MT, 1 NM)     Precautions: Standard; type 2 diabetes and hypertension; history of seizures  Patient's wife afraid to be alone - requests to be with patient during visits.   used    Subjective     Patient reports: slight back pain and moderate left leg pain. Feels some relief with therapy.  60% improvement in back pain. Planning to have another injection for L3-4 and 4-5.    He was not compliant with home exercise program.  Response to previous treatment:felt better after last session  Functional change: ongoing    Pain: 3/10  Location:  bilateral back, left leg      Objective    Posture:  Right thoracic curve, left lumbar curve, elevated right iliac crest. Flattening of lumbar lordosis    Palpation: mild tenderness L4-5   Sensation: normal light touch     Range of Motion/Strength:      Lumbar AROM Pain/Dysfunction with Movement:   Flexion 45 Left radiating pain   Extension 15 Back pain   Right side bending 15     Left side bending 15 Left radiating pain   Right rotation 80%     Left rotation 80%       Hip and knee range of motion within normal limits         L/E MMT Right   Left  Pain/Dysfunction with Movement   Hip Flexion 5/5 4+/5     Hip  "Extension 4-/5 3/5     Hip Abduction 4/5 4-/5     Hip Adduction 5/5 5/5     Hip ER 5/5 4+/5     Knee Flexion 5/5 5/5     Knee Extension 4+/5 4+/5  mod difficulty but independent with straight leg raise.    Ankle DF 5/5 4/5     Ankle PF 4/5 4/5        Joint Mobility:   - Lumbar: pain with P/A L4-5     Special Tests:  Lumbar Tests:  SLR Test (L4-S3) = - left       Gait Analysis:With AD.  Device Used -  Cane  Deviations: patient assists wife, decreased step length and speed      30 second sit-to-stand test (without U/E support): 13 ( w/ UE support)  30" sit to stand Cutoff Scores:11          CMS Impairment/Limitation/Restriction for FOTO lumbar Survey     Therapist reviewed FOTO scores for Kang Herbert on 6/6/2024.   FOTO documents entered into [employer] - see Media section.     Limitation Score: 46%  Category: Mobility         Treatment     Washington received the treatments listed below:      therapeutic exercises to develop strength, endurance, ROM, flexibility, posture, and core stabilization for 18 minutes including:  Objective measures  Lower trunk rotation 20x   SKTC 5x10" NP  Sidelying hip abduction 15x bilateral 2#  Hooklying abduction 2' blue theraband NP  Ball squeeze 20x  Long arc quad yellow theraband (toes pointed) 2x10 2# instead of band today  Seated ball roll 2'NP  Shuttle 1.5 cords 2x10 - leg press NP  Nustep sprints L3 7' NP    therapeutic activities to improve functional performance for 15  minutes, including:  Heel raises 2x10  Bridge 2x10  Standing marches 2# 2x20  Sit to stand 2x10 yellow theraband knees  SAPD green theraband 2x10  Rows green theraband 2x10 NP  Pallof press green theraband 15x bilateral NP    manual therapy techniques: Joint mobilizations were applied to the: left lower extremity for 8 minutes, including:   Left LAD   STM quad  Contract relax quad stretch    neuromuscular re-education activities to improve: Balance, Coordination, Kinesthetic, Sense, Proprioception, and Posture " for 15 minutes. The following activities were included:  Quad sets 20x left   Straight leg raise 3x10-  Short arc quads 3x10 2#   Supine sciatic nerve glides- required cuing  Seated nerve glide ankle bias 30x bilateral NP  Step ups 2x10 L1  Lateral step ups 10x bilateral       Patient Education and Home Exercises       Education provided:   - continue home exercise program     Written Home Exercises Provided: Patient instructed to cont prior HEP. Exercises were reviewed and [place] was able to demonstrate them prior to the end of the session.  [place] demonstrated good  understanding of the education provided. See Electronic Medical Record under Patient Instructions for exercises provided during therapy sessions    Assessment     [place] is a 88 y.o. male referred to outpatient Physical Therapy with a medical diagnosis of DDD (degenerative disc disease), lumbar and Radicular pain of left lower extremity. Patient presents primary complaint of left thigh pain greater than back. Back pain with subjective 60% improvement with therapy to date. He plans to have another injection soon at L3-5. Patient with some improvements in lower extremity strength and good improvements in functional mobility assessed with sit to stand and FOTO scores. No significant improvement in lumbar range of motion. Patient will continue to benefit from skilled PT to improve lower extremity strength and functional mobility. Will plan to continue 1-2x/week 4 weeks.      [place] Is progressing well towards his goals.   Patient prognosis is Guarded.     Patient will continue to benefit from skilled outpatient physical therapy to address the deficits listed in the problem list box on initial evaluation, provide pt/family education and to maximize pt's level of independence in the home and community environment.     Patient's spiritual, cultural and educational needs considered and pt agreeable to plan of care and goals.     Anticipated  barriers to physical therapy: chronicity, prior failed PT     Goals:       Short Term Goals (4 Weeks):  1. Pt will be compliant with HEP to supplement PT in decreasing pain with functional mobility. Progressing, not met  2. Pt will perform sit to stand with good control to demonstrate improved core strength. met  3. Pt will report 25% improvement in thoracolumbar ROM to promote functional mobility. Progressing, not met  4. Pt will improve impaired LE MMTs to >/=4/5 to improve strength for functional tasks. Progressing, not met  Long Term Goals (8 Weeks):   1. Pt will improve FOTO score to </= 54% limited to decrease perceived limitation with maintaining/changing body position. met  2. Pt will perform bridge with good control to demonstrate improved hip strength. met  3. Pt will improve impaired LE MMTs to >/=4+/5 to improve strength for functional tasks. Progressing, not met  4. Pt will report no pain during walking 15 minutes or greater to promote recreational fitness. Progressing, not met        Plan     Plan of care Certification: 6/6/24-7/5/24  Outpatient Physical Therapy 1-2 times weekly for 4 weeks    SCOTTY Cueva, PT

## 2024-06-06 NOTE — PLAN OF CARE
[facility]   Physical Therapy plan of care      Name: Kang Herbert  [facility] Number: [id number]    Therapy Diagnosis:   Encounter Diagnosis   Name Primary?    Decreased ROM of lumbar spine Yes       Physician: Shahram Lamas MD    Visit Date: 6/6/2024  Physician Orders: PT Eval and Treat   Medical Diagnosis from Referral:   M51.36 (ICD-10-CM) - DDD (degenerative disc disease), lumbar   M54.10 (ICD-10-CM) - Radicular pain of left lower extremity   Evaluation Date: 5/10/2024  Authorization Period Expiration: 5/9/25  Plan of Care Expiration: 7/5/24  Visit # / Visits authorized: 6/20  FOTO: 7/10  PTA Visit #: 0/5      Time In: 3:03  Time Out: 4:00  Total Billable Time: 57 minutes (Therapeutic Exercise - 1, TA1, 1 MT, 1 NM)     Precautions: Standard; type 2 diabetes and hypertension; history of seizures  Patient's wife afraid to be alone - requests to be with patient during visits.   used    Subjective     Patient reports: slight back pain and moderate left leg pain. Feels some relief with therapy.  60% improvement in back pain. Planning to have another injection for L3-4 and 4-5.    He was not compliant with home exercise program.  Response to previous treatment:felt better after last session  Functional change: ongoing    Pain: 3/10  Location:  bilateral back, left leg      Objective    Posture:  Right thoracic curve, left lumbar curve, elevated right iliac crest. Flattening of lumbar lordosis    Palpation: mild tenderness L4-5, left quads   Sensation: normal light touch     Range of Motion/Strength:      Lumbar AROM Pain/Dysfunction with Movement:   Flexion 45 Left radiating pain   Extension 15 Back pain   Right side bending 15     Left side bending 15 Left radiating pain   Right rotation 80%     Left rotation 80%       Hip and knee range of motion within normal limits      L/E MMT Right   Left  Pain/Dysfunction with Movement   Hip Flexion 5/5 4+/5     Hip Extension 4-/5  "3/5     Hip Abduction 4/5 4-/5     Hip Adduction 5/5 5/5     Hip ER 5/5 4+/5     Knee Flexion 5/5 5/5     Knee Extension 4+/5 4+/5  mod difficulty but independent with straight leg raise.    Ankle DF 5/5 4/5     Ankle PF 4/5 4/5        Joint Mobility:   - Lumbar: pain with P/A L4-5     Special Tests:  Lumbar Tests:  SLR Test (L4-S3) = - left       Gait Analysis:With AD.  Device Used -  Cane  Deviations: patient assists wife, decreased step length and speed      30 second sit-to-stand test (without U/E support): 13 ( w/ UE support)  30" sit to stand Cutoff Scores:11          CMS Impairment/Limitation/Restriction for FOTO lumbar Survey     Therapist reviewed FOTO scores for Kang Herbert on 6/6/2024.   FOTO documents entered into [employer] (?????) - see Media section.     Limitation Score: 46%  Category: Mobility         Treatment     [place] received the treatments in daily note    Patient Education and Home Exercises       Education provided:   - continue home exercise program     Written Home Exercises Provided: Patient instructed to cont prior HEP. Exercises were reviewed and [place] was able to demonstrate them prior to the end of the session.  [place] demonstrated good  understanding of the education provided. See Electronic Medical Record under Patient Instructions for exercises provided during therapy sessions    Assessment     [place] is a 88 y.o. male referred to outpatient Physical Therapy with a medical diagnosis of DDD (degenerative disc disease), lumbar and Radicular pain of left lower extremity. Patient presents primary complaint of left thigh pain greater than back. Back pain with subjective 60% improvement with therapy to date. He plans to have another injection soon at L3-5. Patient with some improvements in lower extremity strength and good improvements in functional mobility assessed with sit to stand and FOTO scores, achieving goals. Decreased left knee mobility consistent with knee " osteoarthritis.  No significant improvement in lumbar range of motion. Patient will continue to benefit from skilled PT to improve lower extremity strength and functional mobility. Will plan to continue 1-2x/week 4 weeks.      [place] Is progressing well towards his goals.   Patient prognosis is Guarded.     Patient will continue to benefit from skilled outpatient physical therapy to address the deficits listed in the problem list box on initial evaluation, provide pt/family education and to maximize pt's level of independence in the home and community environment.     Patient's spiritual, cultural and educational needs considered and pt agreeable to plan of care and goals.     Anticipated barriers to physical therapy: chronicity, prior failed PT     Goals:       Short Term Goals (4 Weeks):  1. Pt will be compliant with HEP to supplement PT in decreasing pain with functional mobility. Progressing, not met  2. Pt will perform sit to stand with good control to demonstrate improved core strength. met  3. Pt will report 25% improvement in thoracolumbar ROM to promote functional mobility. Progressing, not met  4. Pt will improve impaired LE MMTs to >/=4/5 to improve strength for functional tasks. Progressing, not met  Long Term Goals (8 Weeks):   1. Pt will improve FOTO score to </= 54% limited to decrease perceived limitation with maintaining/changing body position. met  2. Pt will perform bridge with good control to demonstrate improved hip strength. met  3. Pt will improve impaired LE MMTs to >/=4+/5 to improve strength for functional tasks. Progressing, not met  4. Pt will report no pain during walking 15 minutes or greater to promote recreational fitness. Progressing, not met        Plan     Plan of care Certification: 6/6/24-7/5/24  Outpatient Physical Therapy 1-2 times weekly for 4 weeks    SCOTTY Cueva, PT

## 2024-06-18 ENCOUNTER — CLINICAL SUPPORT (OUTPATIENT)
Dept: REHABILITATION | Facility: HOSPITAL | Age: 89
End: 2024-06-18
Payer: MEDICARE

## 2024-06-18 DIAGNOSIS — M53.86 DECREASED ROM OF LUMBAR SPINE: Primary | ICD-10-CM

## 2024-06-18 PROCEDURE — 97140 MANUAL THERAPY 1/> REGIONS: CPT | Mod: KX,HCNC,PN

## 2024-06-18 PROCEDURE — 97530 THERAPEUTIC ACTIVITIES: CPT | Mod: KX,HCNC,PN

## 2024-06-18 PROCEDURE — 97112 NEUROMUSCULAR REEDUCATION: CPT | Mod: KX,HCNC,PN

## 2024-06-18 NOTE — PROGRESS NOTES
[facility]   Physical Therapy Treatment Note     Name: Kang Herbert  [facility] Number: [id number]    Therapy Diagnosis:   Encounter Diagnosis   Name Primary?    Decreased ROM of lumbar spine Yes       Physician: Shahram Lamas MD    Visit Date: 6/18/2024  Physician Orders: PT Eval and Treat   Medical Diagnosis from Referral:   M51.36 (ICD-10-CM) - DDD (degenerative disc disease), lumbar   M54.10 (ICD-10-CM) - Radicular pain of left lower extremity   Evaluation Date: 5/10/2024  Authorization Period Expiration: 5/9/25  Plan of Care Expiration: 7/5/24  Visit # / Visits authorized: 7/20  FOTO: 7/10  PTA Visit #: 0/5      Time In: 3:30  Time Out: 4:30  Total Billable Time: 60 minutes (Therapeutic Exercise - 2TA, 1 MT, 1NM)     Precautions: Standard; type 2 diabetes and hypertension; history of seizures  Patient's wife afraid to be alone - requests to be with patient during visits.   used    Subjective     Patient reports: slight pain in left leg (sciatic nerve pain complaint).   He was not compliant with home exercise program.  Response to previous treatment:felt better after last session  Functional change: ongoing    Pain: 3/10  Location:  bilateral back, left leg      Objective    Posture:  Right thoracic curve, left lumbar curve, elevated right iliac crest. Flattening of lumbar lordosis    Palpation: mild tenderness L4-5   Sensation: normal light touch     Range of Motion/Strength:      Lumbar AROM Pain/Dysfunction with Movement:   Flexion 45 Left radiating pain   Extension 15 Back pain   Right side bending 15     Left side bending 15 Left radiating pain   Right rotation 80%     Left rotation 80%       Hip and knee range of motion within normal limits         L/E MMT Right   Left  Pain/Dysfunction with Movement   Hip Flexion 5/5 4+/5     Hip Extension 4-/5 3/5     Hip Abduction 4/5 4-/5     Hip Adduction 5/5 5/5     Hip ER 5/5 4+/5     Knee Flexion 5/5 5/5     Knee  "Extension 4+/5 4+/5  mod difficulty but independent with straight leg raise.    Ankle DF 5/5 4/5     Ankle PF 4/5 4/5        Joint Mobility:   - Lumbar: pain with P/A L4-5     Special Tests:  Lumbar Tests:  SLR Test (L4-S3) = - left       Gait Analysis:With AD.  Device Used -  Cane  Deviations: patient assists wife, decreased step length and speed      30 second sit-to-stand test (without U/E support): 13 ( w/ UE support)  30" sit to stand Cutoff Scores:11          CMS Impairment/Limitation/Restriction for FOTO lumbar Survey     Therapist reviewed FOTO scores for Kang Herbert on 6/6/2024.   FOTO documents entered into [employer] - see Media section.     Limitation Score: 46%  Category: Mobility         Treatment     [place] received the treatments listed below:      therapeutic exercises to develop strength, endurance, ROM, flexibility, posture, and core stabilization for 15 minutes including:  Lower trunk rotation 20x   Sidelying hip abduction 15x2 bilateral 2#  Long arc quad yellow theraband (toes pointed) 2x10 2# instead of band today NP  +Standing lumbar flexion stretch (sliding hands forward on elevated mat) x 10, 10'' hold  Shuttle 1.5 cords 2x10 - leg press NP      therapeutic activities to improve functional performance for 30  minutes, including:  Heel raises 3x10  Bridge 2x15  Standing marches 2# 2x20  Sit to stand 2x15 yellow theraband knees  SAPD green theraband 2x10 NP  Rows green theraband 2x10 NP  Pallof press green theraband 15x bilateral NP    manual therapy techniques: Joint mobilizations were applied to the: left lower extremity for 8 minutes, including:  Left LAD   STM quad  Contract relax quad stretch    neuromuscular re-education activities to improve: Balance, Coordination, Kinesthetic, Sense, Proprioception, and Posture for 7 minutes. The following activities were included:  Quad sets 20x left NP  Straight leg raise 3x10-2# RLE, 0# LLE  Short arc quads 3x10 2# NP  Supine sciatic nerve " glides x 30 reps  Seated nerve glide ankle bias 30x bilateral NP  Step ups 2x10 6'' step  Lateral step ups 10x bilateral  NP      Patient Education and Home Exercises       Education provided:   - continue home exercise program     Written Home Exercises Provided: Patient instructed to cont prior HEP. Exercises were reviewed and [place] was able to demonstrate them prior to the end of the session.  [place] demonstrated good  understanding of the education provided. See Electronic Medical Record under Patient Instructions for exercises provided during therapy sessions    Assessment     [place] is a 88 y.o. male referred to outpatient Physical Therapy with a medical diagnosis of DDD (degenerative disc disease), lumbar and Radicular pain of left lower extremity. Patient presents primary complaint of left thigh pain greater than back. Back pain with subjective 60% improvement with therapy to date. He plans to have another injection soon at L3-5.   Pt reports slight pain in posterior left lower extremity. No reports of pain throughout session. Patient will continue to benefit from skilled PT to improve lower extremity strength and functional mobility. Will plan to continue 1-2x/week 4 weeks.      [place] Is progressing well towards his goals.   Patient prognosis is Guarded.     Patient will continue to benefit from skilled outpatient physical therapy to address the deficits listed in the problem list box on initial evaluation, provide pt/family education and to maximize pt's level of independence in the home and community environment.     Patient's spiritual, cultural and educational needs considered and pt agreeable to plan of care and goals.     Anticipated barriers to physical therapy: chronicity, prior failed PT     Goals:       Short Term Goals (4 Weeks):  1. Pt will be compliant with HEP to supplement PT in decreasing pain with functional mobility. Progressing, not met  2. Pt will perform sit to stand  with good control to demonstrate improved core strength. met  3. Pt will report 25% improvement in thoracolumbar ROM to promote functional mobility. Progressing, not met  4. Pt will improve impaired LE MMTs to >/=4/5 to improve strength for functional tasks. Progressing, not met  Long Term Goals (8 Weeks):   1. Pt will improve FOTO score to </= 54% limited to decrease perceived limitation with maintaining/changing body position. met  2. Pt will perform bridge with good control to demonstrate improved hip strength. met  3. Pt will improve impaired LE MMTs to >/=4+/5 to improve strength for functional tasks. Progressing, not met  4. Pt will report no pain during walking 15 minutes or greater to promote recreational fitness. Progressing, not met        Plan     Plan of care Certification: 6/6/24-7/5/24  Outpatient Physical Therapy 1-2 times weekly for 4 weeks    Kadie Zarco, PT

## 2024-06-21 ENCOUNTER — TELEPHONE (OUTPATIENT)
Dept: PAIN MEDICINE | Facility: CLINIC | Age: 89
End: 2024-06-21
Payer: MEDICARE

## 2024-06-24 ENCOUNTER — HOSPITAL ENCOUNTER (OUTPATIENT)
Dept: RADIOLOGY | Facility: HOSPITAL | Age: 89
Discharge: HOME | End: 2024-06-24
Attending: STUDENT IN AN ORGANIZED HEALTH CARE EDUCATION/TRAINING PROGRAM
Payer: MEDICARE

## 2024-06-24 DIAGNOSIS — M54.9 DORSALGIA, UNSPECIFIED: ICD-10-CM

## 2024-06-24 PROCEDURE — 72148 MRI LUMBAR SPINE W/O DYE: CPT | Mod: TC

## 2024-06-24 PROCEDURE — 72148 MRI LUMBAR SPINE W/O DYE: CPT | Mod: 26,,, | Performed by: RADIOLOGY

## 2024-06-27 ENCOUNTER — CLINICAL SUPPORT (OUTPATIENT)
Dept: REHABILITATION | Facility: HOSPITAL | Age: 89
End: 2024-06-27
Payer: MEDICARE

## 2024-06-27 DIAGNOSIS — M53.86 DECREASED ROM OF LUMBAR SPINE: Primary | ICD-10-CM

## 2024-06-27 PROCEDURE — 97530 THERAPEUTIC ACTIVITIES: CPT | Mod: HCNC,PN

## 2024-06-27 PROCEDURE — 97112 NEUROMUSCULAR REEDUCATION: CPT | Mod: HCNC,PN

## 2024-06-27 PROCEDURE — 97110 THERAPEUTIC EXERCISES: CPT | Mod: HCNC,PN

## 2024-06-27 NOTE — PRE-PROCEDURE INSTRUCTIONS
Patient was informed of pre-procedure instructions and new arrival time of 8:00 am. Pt verbalized understanding and is to be accompanied by daughter, Aislinn.

## 2024-06-27 NOTE — PROGRESS NOTES
[facility]   Physical Therapy Treatment Note     Name: Kang Herbert  Clinic Number: [id number]    Therapy Diagnosis:   Encounter Diagnosis   Name Primary?    Decreased ROM of lumbar spine Yes       Physician: Shahram Lamas MD    Visit Date: 6/27/2024  Physician Orders: PT Eval and Treat   Medical Diagnosis from Referral:   M51.36 (ICD-10-CM) - DDD (degenerative disc disease), lumbar   M54.10 (ICD-10-CM) - Radicular pain of left lower extremity   Evaluation Date: 5/10/2024  Authorization Period Expiration: 5/9/25  Plan of Care Expiration: 7/5/24  Visit # / Visits authorized: 8/20  FOTO: 9/10  PTA Visit #: 0/5     Time In: 3:00  Time Out: 3:55  Total Billable Time: 55 minutes (Therapeutic Exercise -1, 2TA, 1NM)     Precautions: Standard; type 2 diabetes and hypertension; history of seizures  Patient's wife afraid to be alone - requests to be with patient during visits.   used    Subjective     Patient reports: some times it feels like his leg itches and sometimes is pain. It lasts about 2 hours in the front of the leg. Injection scheduled tomorrow  He was not compliant with home exercise program.  Response to previous treatment:felt better after last session  Functional change: ongoing    Pain: 3/10  Location:  bilateral back, left leg      Objective        ambulation with single point cane- 3 LOB stepping strategy to left   Treatment     [place] received the treatments listed below:      therapeutic exercises to develop strength, endurance, ROM, flexibility, posture, and core stabilization for 10 minutes including:  Lower trunk rotation 20x   Sidelying hip abduction 15x2 bilateral 2#  Long arc quad red theraband (toes pointed) 2x10 2# instead of band today   Standing lumbar flexion stretch (sliding hands forward on elevated mat) 20x  Shuttle 1.5 cords 2x10 - leg press NP    Next: bikes    therapeutic activities to improve functional performance for 25  minutes,  including:  Heel raises 3x10  Bridge 2x15  Standing marches 2# 2x20  Sit to stand 2x15 red theraband knees  SAPD green theraband 2x10 NP  Rows green theraband 2x10 NP  Pallof press green theraband 15x bilateral NP  3 clinic laps single point cane     manual therapy techniques: Joint mobilizations were applied to the: left lower extremity for 0 minutes, including:  Left LAD   STM quad  Contract relax quad stretch    neuromuscular re-education activities to improve: Balance, Coordination, Kinesthetic, Sense, Proprioception, and Posture for 20 minutes. The following activities were included:  Quad sets 20x left NP  Straight leg raise 3x10-2# RLE, 0# LLE  Short arc quads 3x10 2# NP  Supine sciatic nerve glides x 30 reps  Seated nerve glide ankle bias 30x bilateral NP  Step ups 2x10 6'' step  Lateral step ups 10x bilateral  6'    Patient Education and Home Exercises       Education provided:   - continue home exercise program     Written Home Exercises Provided: Patient instructed to cont prior HEP. Exercises were reviewed and [place] was able to demonstrate them prior to the end of the session.  [place] demonstrated good  understanding of the education provided. See Electronic Medical Record under Patient Instructions for exercises provided during therapy sessions    Assessment     [place] is a 88 y.o. male referred to outpatient Physical Therapy with a medical diagnosis of DDD (degenerative disc disease), lumbar and Radicular pain of left lower extremity. Patient presents primary complaint of left thigh pain greater than back. Back pain with subjective 60% improvement with therapy to date. He plans to have another injection soon at L3-5.   Pt reports anterior left leg pain today. Challenged with straight leg raise and requires min cuing for lag. Patient demonstrates impaired balance with left side sway during ambulation. No complaints of pain throughout session. Required occasional rest breaks. Patient with 2  remaining visits then will likely dc to home exercise program.     [place] Is progressing well towards his goals.   Patient prognosis is Guarded.     Patient will continue to benefit from skilled outpatient physical therapy to address the deficits listed in the problem list box on initial evaluation, provide pt/family education and to maximize pt's level of independence in the home and community environment.     Patient's spiritual, cultural and educational needs considered and pt agreeable to plan of care and goals.     Anticipated barriers to physical therapy: chronicity, prior failed PT     Goals:       Short Term Goals (4 Weeks):  1. Pt will be compliant with HEP to supplement PT in decreasing pain with functional mobility. Progressing, not met  2. Pt will perform sit to stand with good control to demonstrate improved core strength. met  3. Pt will report 25% improvement in thoracolumbar ROM to promote functional mobility. Progressing, not met  4. Pt will improve impaired LE MMTs to >/=4/5 to improve strength for functional tasks. Progressing, not met  Long Term Goals (8 Weeks):   1. Pt will improve FOTO score to </= 54% limited to decrease perceived limitation with maintaining/changing body position. met  2. Pt will perform bridge with good control to demonstrate improved hip strength. met  3. Pt will improve impaired LE MMTs to >/=4+/5 to improve strength for functional tasks. Progressing, not met  4. Pt will report no pain during walking 15 minutes or greater to promote recreational fitness. Progressing, not met        Plan     Plan of care Certification: 6/6/24-7/5/24  Outpatient Physical Therapy 1-2 times weekly for 4 weeks    SCOTTY Cueva, PT

## 2024-06-28 ENCOUNTER — HOSPITAL ENCOUNTER (OUTPATIENT)
Facility: HOSPITAL | Age: 89
Discharge: HOME | End: 2024-06-28
Attending: STUDENT IN AN ORGANIZED HEALTH CARE EDUCATION/TRAINING PROGRAM | Admitting: STUDENT IN AN ORGANIZED HEALTH CARE EDUCATION/TRAINING PROGRAM
Payer: MEDICARE

## 2024-06-28 VITALS
RESPIRATION RATE: 17 BRPM | TEMPERATURE: 98 F | SYSTOLIC BLOOD PRESSURE: 147 MMHG | DIASTOLIC BLOOD PRESSURE: 80 MMHG | OXYGEN SATURATION: 97 % | HEART RATE: 90 BPM

## 2024-06-28 DIAGNOSIS — M41.9 ACQUIRED SCOLIOSIS: ICD-10-CM

## 2024-06-28 DIAGNOSIS — G89.29 CHRONIC PAIN: ICD-10-CM

## 2024-06-28 DIAGNOSIS — M51.36 DDD (DEGENERATIVE DISC DISEASE), LUMBAR: Primary | ICD-10-CM

## 2024-06-28 LAB — POCT GLUCOSE: 162 MG/DL (ref 70–110)

## 2024-06-28 PROCEDURE — 25000003 PHARM REV CODE 250: Performed by: STUDENT IN AN ORGANIZED HEALTH CARE EDUCATION/TRAINING PROGRAM

## 2024-06-28 PROCEDURE — 63600175 PHARM REV CODE 636 W HCPCS: Performed by: STUDENT IN AN ORGANIZED HEALTH CARE EDUCATION/TRAINING PROGRAM

## 2024-06-28 PROCEDURE — 99152 MOD SED SAME PHYS/QHP 5/>YRS: CPT | Performed by: STUDENT IN AN ORGANIZED HEALTH CARE EDUCATION/TRAINING PROGRAM

## 2024-06-28 PROCEDURE — 64483 NJX AA&/STRD TFRM EPI L/S 1: CPT | Mod: LT | Performed by: STUDENT IN AN ORGANIZED HEALTH CARE EDUCATION/TRAINING PROGRAM

## 2024-06-28 PROCEDURE — 64484 NJX AA&/STRD TFRM EPI L/S EA: CPT | Mod: HCNC,LT,, | Performed by: STUDENT IN AN ORGANIZED HEALTH CARE EDUCATION/TRAINING PROGRAM

## 2024-06-28 PROCEDURE — 64484 NJX AA&/STRD TFRM EPI L/S EA: CPT | Mod: LT | Performed by: STUDENT IN AN ORGANIZED HEALTH CARE EDUCATION/TRAINING PROGRAM

## 2024-06-28 PROCEDURE — 64483 NJX AA&/STRD TFRM EPI L/S 1: CPT | Mod: HCNC,LT,, | Performed by: STUDENT IN AN ORGANIZED HEALTH CARE EDUCATION/TRAINING PROGRAM

## 2024-06-28 PROCEDURE — 82962 GLUCOSE BLOOD TEST: CPT | Performed by: STUDENT IN AN ORGANIZED HEALTH CARE EDUCATION/TRAINING PROGRAM

## 2024-06-28 PROCEDURE — 25500020 PHARM REV CODE 255: Performed by: STUDENT IN AN ORGANIZED HEALTH CARE EDUCATION/TRAINING PROGRAM

## 2024-06-28 RX ORDER — DEXAMETHASONE SODIUM PHOSPHATE 10 MG/ML
INJECTION INTRAMUSCULAR; INTRAVENOUS
Status: DISCONTINUED | OUTPATIENT
Start: 2024-06-28 | End: 2024-06-28 | Stop reason: HOSPADM

## 2024-06-28 RX ORDER — LIDOCAINE HYDROCHLORIDE 10 MG/ML
INJECTION, SOLUTION EPIDURAL; INFILTRATION; INTRACAUDAL; PERINEURAL
Status: DISCONTINUED | OUTPATIENT
Start: 2024-06-28 | End: 2024-06-28 | Stop reason: HOSPADM

## 2024-06-28 RX ORDER — FENTANYL CITRATE 50 UG/ML
INJECTION, SOLUTION INTRAMUSCULAR; INTRAVENOUS
Status: DISCONTINUED | OUTPATIENT
Start: 2024-06-28 | End: 2024-06-28 | Stop reason: HOSPADM

## 2024-06-28 RX ORDER — MIDAZOLAM HYDROCHLORIDE 1 MG/ML
INJECTION INTRAMUSCULAR; INTRAVENOUS
Status: DISCONTINUED | OUTPATIENT
Start: 2024-06-28 | End: 2024-06-28 | Stop reason: HOSPADM

## 2024-06-28 RX ORDER — LIDOCAINE HYDROCHLORIDE 10 MG/ML
1 INJECTION, SOLUTION EPIDURAL; INFILTRATION; INTRACAUDAL; PERINEURAL ONCE
Status: DISCONTINUED | OUTPATIENT
Start: 2024-06-28 | End: 2024-06-28 | Stop reason: HOSPADM

## 2024-06-28 RX ORDER — SODIUM CHLORIDE 9 MG/ML
500 INJECTION, SOLUTION INTRAVENOUS CONTINUOUS
Status: DISCONTINUED | OUTPATIENT
Start: 2024-06-28 | End: 2024-06-28 | Stop reason: HOSPADM

## 2024-06-28 RX ORDER — LIDOCAINE HYDROCHLORIDE 20 MG/ML
INJECTION, SOLUTION EPIDURAL; INFILTRATION; INTRACAUDAL; PERINEURAL
Status: DISCONTINUED | OUTPATIENT
Start: 2024-06-28 | End: 2024-06-28 | Stop reason: HOSPADM

## 2024-06-28 NOTE — OP NOTE
Lumbar Transforaminal Epidural Steroid Injection under Fluoroscopic Guidance    The procedure, risks, benefits, and options were discussed with the patient. There are no contraindications to the procedure. The patent expressed understanding and agreed to the procedure. Informed written consent was obtained prior to the start of the procedure and can be found in the patient's chart.    PATIENT NAME: Mr. Kang Herbert   MRN: [id number]     DATE OF PROCEDURE: 06/28/2024    PROCEDURE:  Left  L3/4 and L4/5 Lumbar Transforaminal Epidural Steroid Injection under Fluoroscopic Guidance    PRE-OP DIAGNOSIS: Lumbar radiculopathy [M54.16] Lumbar radiculopathy [M54.16]    POST-OP DIAGNOSIS: Same    PHYSICIAN: Miquel Clinton MD    ASSISTANTS: None     MEDICATIONS INJECTED: Preservative-free Decadron 10mg with 5cc of Lidocaine 1% MPF     LOCAL ANESTHETIC INJECTED: Xylocaine 2%     SEDATION: Versed 0.5mg and Fentanyl 50mcg                                                                                                                                                                                     Conscious sedation ordered by M.D. Patient re-evaluation prior to administration of conscious sedation. No changes noted in patient's status from initial evaluation. The patient's vital signs were monitored by RN and patient remained hemodynamically stable throughout the procedure.    Event Time In   Sedation Start 0939   Sedation End 0957       ESTIMATED BLOOD LOSS: None    COMPLICATIONS: None    TECHNIQUE: Time-out was performed to identify the patient and procedure to be performed. With the patient laying in a prone position, the surgical area was prepped and draped in the usual sterile fashion using ChloraPrep and a fenestrated drape.The levels were determined under fluoroscopy guidance. Skin anesthesia was achieved by injecting Lidocaine 2% over the injection sites. The transforaminal spaces were then approached with a 22 gauge, 5  inch spinal quinke needle that was introduced under fluoroscopic guidance in the AP and Lateral views. Once the needle tip was in the area of the transforaminal space, and there was no blood, CSF or paraesthesias, contrast dye Omnipaque (300mg/mL) was injected to confirm placement and there was no vascular runoff. Fluoroscopic imaging in the AP and lateral views revealed a clear outline of the spinal nerve with proximal spread of agent through the neural foramen into the epidural space. 3 mL of the medication mixture listed above was injected slowly at each site. Displacement of the radio opaque contrast after injection of the medication confirmed that the medication went into the area of the transforaminal spaces. The needles were removed and bleeding was nil. A sterile dressing was applied. No specimens collected. The patient tolerated the procedure well.     The patient was monitored after the procedure in the recovery area. They were given post-procedure and discharge instructions to follow at home. The patient was discharged in a stable condition.    Miquel Clinton MD

## 2024-06-28 NOTE — DISCHARGE INSTRUCTIONS
Home Care Instructions Pain Management:     1.  DIET:     You may resume your normal diet today.     2.  BATHING:     You may shower with luke warm water.     3.  DRESSING:     You may remove your bandage today.     4.  ACTIVITY LEVEL:       You may resume your normal activities 24 hours after your procedure.     5.  MEDICATIONS:     You may resume your normal medications today.     6.  SPECIAL INSTRUCTIONS:     No heat to the injection site for 24 hours including bath or shower, heating pad, moist heat or hot tubs.     Use an ice pack to the injection site for any pain or discomfort.  Apply ice packs for 20 minute intervals as needed.     If you have received any sedatives by mouth today, you can not drive for 12 hours.     If you have received sedation through an IV, you can not drive for 24 hours.     PLEASE CALL YOUR DOCTOR FOR THE FOLLOWIN.  Redness or swelling around the injection site.  2.  Fever of 101 degrees.  3.  Drainage (pus) from the injection site.  4.  For any continuous bleeding (some dried blood over the incision is normal.)     FOR EMERGENCIES:     If any unusual problems or difficulties occur during clinic hours, call [phone] or dial [phone].     Follow up with with your physician in 2-3 weeks.

## 2024-06-28 NOTE — DISCHARGE SUMMARY
Discharge Note  Short Stay      SUMMARY     Admit Date: 6/28/2024    Attending Physician: Miquel Clinotn MD PhD    Discharge Physician: Miquel Clinton      Discharge Date: 6/28/2024 9:57 AM    Procedure(s) (LRB):  LT  L3-4 and L4-L5 TFESI (Left)    Final Diagnosis: Lumbar radiculopathy [M54.16]    Disposition: Home or self care    Patient Instructions:   Current Discharge Medication List        CONTINUE these medications which have NOT CHANGED    Details   atorvastatin (LIPITOR) 10 MG tablet Take 1 tablet (10 mg total) by mouth once daily.  Qty: 90 tablet, Refills: 3    Comments: Please note: diagnosis coding information is often on page 2 or 3 of e-scribed orders.  Associated Diagnoses: Hyperlipidemia, unspecified hyperlipidemia type      finasteride (PROSCAR) 5 mg tablet Take 1 tablet (5 mg total) by mouth once daily.  Qty: 90 tablet, Refills: 3      gabapentin (NEURONTIN) 800 MG tablet TAKE 1 TABLET BY MOUTH 2 TIMES DAILY.  Qty: 180 tablet, Refills: 3    Comments: DX Code Needed  .  Associated Diagnoses: Other osteoarthritis involving multiple joints; Spinal stenosis of lumbar region with neurogenic claudication; Lumbar radiculopathy      levothyroxine (SYNTHROID) 50 MCG tablet Take 1 tablet (50 mcg total) by mouth before breakfast.  Qty: 90 tablet, Refills: 3    Associated Diagnoses: Hypothyroidism, unspecified type      losartan (COZAAR) 25 MG tablet TAKE 1 TABLET BY MOUTH EVERY DAY  Qty: 90 tablet, Refills: 3    Comments: .  Associated Diagnoses: Essential hypertension      meloxicam (MOBIC) 15 MG tablet Take 1 tablet (15 mg total) by mouth daily as needed for Pain.  Qty: 30 tablet, Refills: 1    Comments: DX Code Needed  .  Associated Diagnoses: Other osteoarthritis involving multiple joints; Spinal stenosis of lumbar region with neurogenic claudication; Lumbar radiculopathy      memantine (NAMENDA) 5 MG Tab Take 1 tablet (5 mg total) by mouth 2 (two) times daily.  Qty: 180 tablet, Refills: 3    Associated  Diagnoses: Senile dementia      metFORMIN (GLUCOPHAGE) 1000 MG tablet Take 1 tablet (1,000 mg total) by mouth 2 (two) times daily with meals. Appointment needed for additional refills.  Qty: 180 tablet, Refills: 0    Associated Diagnoses: Type 2 diabetes mellitus without complication, without long-term current use of insulin      omeprazole (PRILOSEC) 20 MG capsule Take 1 capsule (20 mg total) by mouth once daily.  Qty: 90 capsule, Refills: 3    Comments: Please note: diagnosis coding information is often on page 2 or 3 of e-scribed orders..      blood-glucose meter (ACCU-CHEK ARUNA PLUS METER) Misc 1 Units by Misc.(Non-Drug; Combo Route) route once daily.  Qty: 1 each, Refills: 0    Associated Diagnoses: Type 2 diabetes mellitus with hyperglycemia, without long-term current use of insulin      lancets (ONETOUCH ULTRASOFT LANCETS) Misc 1 lancet by Misc.(Non-Drug; Combo Route) route once daily.  Qty: 100 each, Refills: 3    Associated Diagnoses: Type 2 diabetes mellitus with hyperglycemia, without long-term current use of insulin      TRUE METRIX GLUCOSE TEST STRIP Strp 1 STRIP BY MISC.(NON-DRUG COMBO ROUTE) ROUTE ONCE DAILY.  Qty: 100 strip, Refills: 3    Associated Diagnoses: Type 2 diabetes mellitus with hyperglycemia, without long-term current use of insulin                 Discharge Diagnosis: Lumbar radiculopathy [M54.16]  Condition on Discharge: Stable with no complications to procedure   Diet on Discharge: Same as before.  Activity: as per instruction sheet.  Discharge to: Home with a responsible adult.  Follow up: 2-4 weeks       Please call my office or pager at [phone] if experienced any weakness or loss of sensation, fever > 101.5, pain uncontrolled with oral medications, persistent nausea/vomiting/or diarrhea, redness or drainage from the incisions, or any other worrisome concerns. If physician on call was not reached or could not communicate with our office for any reason please go to the nearest  emergency department      Miquel Clinton MD PhD

## 2024-06-28 NOTE — INTERVAL H&P NOTE
The patient was examined and no significant changes were noted from the updated H&P or last clinic note.    The risks and benefits of this procedure, including alternative therapies, were discussed with the patient.  The discussion of risks included infection, bleeding, need for additional procedures or surgery, nerve damage, paralysis, adverse medication reaction(s), stroke, and if appropriate for the procedure, death.  Questions regarding the procedure, risks, expected outcome, and possible side effects were solicited and answered to [place]'s satisfaction.  Kang Herbert wishes to proceed with the injection or procedure as confirmed by written consent.

## 2024-06-30 ENCOUNTER — HOSPITAL ENCOUNTER (EMERGENCY)
Facility: HOSPITAL | Age: 89
Discharge: HOME | End: 2024-06-30
Attending: EMERGENCY MEDICINE
Payer: MEDICARE

## 2024-06-30 VITALS
DIASTOLIC BLOOD PRESSURE: 79 MMHG | RESPIRATION RATE: 18 BRPM | BODY MASS INDEX: 21.58 KG/M2 | OXYGEN SATURATION: 94 % | WEIGHT: 118 LBS | TEMPERATURE: 98 F | HEART RATE: 103 BPM | SYSTOLIC BLOOD PRESSURE: 150 MMHG

## 2024-06-30 DIAGNOSIS — K59.00 CONSTIPATION: ICD-10-CM

## 2024-06-30 LAB
BILIRUB UR QL STRIP: NEGATIVE
CLARITY UR: CLEAR
COLOR UR: YELLOW
GLUCOSE UR QL STRIP: ABNORMAL
HGB UR QL STRIP: NEGATIVE
KETONES UR QL STRIP: NEGATIVE
LEUKOCYTE ESTERASE UR QL STRIP: NEGATIVE
NITRITE UR QL STRIP: NEGATIVE
PH UR STRIP: 8 [PH] (ref 5–8)
PROT UR QL STRIP: NEGATIVE
SP GR UR STRIP: 1.01 (ref 1–1.03)
URN SPEC COLLECT METH UR: ABNORMAL
UROBILINOGEN UR STRIP-ACNC: NEGATIVE EU/DL

## 2024-06-30 PROCEDURE — 81003 URINALYSIS AUTO W/O SCOPE: CPT | Mod: HCNC | Performed by: EMERGENCY MEDICINE

## 2024-06-30 PROCEDURE — 99283 EMERGENCY DEPT VISIT LOW MDM: CPT | Mod: 25,HCNC

## 2024-06-30 PROCEDURE — 25000003 PHARM REV CODE 250: Mod: HCNC | Performed by: EMERGENCY MEDICINE

## 2024-06-30 RX ORDER — LACTULOSE 10 G/15ML
20 SOLUTION ORAL ONCE
Status: COMPLETED | OUTPATIENT
Start: 2024-06-30 | End: 2024-06-30

## 2024-06-30 RX ADMIN — LACTULOSE 20 G: 20 SOLUTION ORAL at 11:06

## 2024-06-30 NOTE — ED PROVIDER NOTES
Encounter Date: 6/30/2024       History     Chief Complaint   Patient presents with    Constipation     Presents awake, alert with c/o constipation x 3 days.     Patient is an 89-year-old male complain of constipation.  His last bowel movement was 3 days ago.  Patient is concerned that the epidural injection he received for sciatica 2 days ago may have attributed to this.  He has no nausea or vomiting.  He takes no opioid pain medication.  No recent dietary changes.      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    Seizures     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/2013     Past Surgical History:   Procedure Laterality Date    CATARACT EXTRACTION  11-/    od/os    CHOLECYSTECTOMY      EPIDURAL STEROID INJECTION INTO LUMBAR SPINE N/A 11/30/2021    Procedure: Lumbar Epidural Steroid Injection L4-5 (No Sedation);  Surgeon: Sisi Lua Jr., MD;  Location: [facility] [place];  Service: Pain Management;  Laterality: N/A;  Diabetic    EPIDURAL STEROID INJECTION INTO LUMBAR SPINE N/A 1/25/2022    Procedure: Lumbar Epidural Steroid Injection L4-5;  Surgeon: Sisi Lua Jr., MD;  Location: [facility];  Service: Pain Management;  Laterality: N/A;  diabetic     EPIDURAL STEROID INJECTION INTO LUMBAR SPINE N/A 11/15/2023    Procedure: INJECTION, STEROID, SPINE, LUMBAR, EPIDURAL;  Surgeon: Mari Mondragon DO;  Location: [facility];  Service: Pain Management;  Laterality: N/A;    HERNIA REPAIR      INJECTION, SPINE, LUMBOSACRAL, TRANSFORAMINAL APPROACH Left 6/28/2024    Procedure: LT  L3-4 and L4-L5 TFESI;  Surgeon: Miquel Clinton MD;  Location: [facility];  Service: Pain Management;  Laterality: Left;  20 mins      Family History   Problem Relation Name Age of Onset    No Known Problems Mother      No Known Problems Father      Glaucoma Sister      Diabetes Sister      Cancer Brother x2     Diabetes Brother x2     No Known Problems Daughter x2     No Known Problems Son x1     No Known Problems Maternal Aunt      No Known Problems Maternal Uncle      No Known Problems Paternal Aunt      No Known Problems Paternal Uncle      No Known Problems Maternal Grandmother      No Known Problems Maternal Grandfather      No Known Problems Paternal Grandmother      No Known Problems Paternal Grandfather      Amblyopia Neg Hx      Blindness Neg Hx      Cataracts Neg Hx      Hypertension Neg Hx      Macular degeneration Neg Hx      Retinal detachment Neg Hx      Strabismus Neg Hx      Stroke Neg Hx      Thyroid disease Neg Hx      Prostate cancer Neg Hx      Kidney disease Neg Hx       Social History     Tobacco Use    Smoking status: Never    Smokeless tobacco: Never   Substance Use Topics    Alcohol use: No    Drug use: No     Review of Systems   Constitutional:  Negative for fever.   Gastrointestinal:  Positive for abdominal pain and constipation. Negative for vomiting.   All other systems reviewed and are negative.      Physical Exam     Initial Vitals [06/30/24 1058]   BP Pulse Resp Temp SpO2   (!) 150/79 103 18 98 °F (36.7 °C) (!) 94 %      MAP       --         Physical Exam    Nursing note and vitals reviewed.  Constitutional: No distress.   Cardiovascular:  Normal rate, regular rhythm and normal heart sounds.           Pulmonary/Chest: No respiratory distress.   Abdominal: Abdomen is soft. Bowel sounds are normal.   Very mild diffuse tenderness without guarding or rebound.    RECTAL :  Soft stool in the rectal vault, no impaction.   Musculoskeletal:         General: Normal range of motion.     Neurological: He is alert.   Skin: Skin is warm and dry.         ED Course   Procedures  Labs Reviewed   URINALYSIS - Abnormal; Notable  for the following components:       Result Value    Glucose, UA 2+ (*)     All other components within normal limits          Imaging Results              X-Ray Abdomen Portable (Final result)  Result time 06/30/24 12:11:40      Final result by Dougie Mason MD (06/30/24 12:11:40)                   Impression:      Probable constipation      Electronically signed by: Dougie Mason MD  Date:    06/30/2024  Time:    12:11               Narrative:    EXAMINATION:  XR ABDOMEN PORTABLE    CLINICAL HISTORY:  Constipation, unspecified    TECHNIQUE:  AP View(s) of the abdomen was performed.    COMPARISON:  02/07/2015    FINDINGS:  Prominent colonic bowel gas and stool in the rectum.  No dilated loops of small bowel.  Lung bases are clear.  Multilevel lumbar spondylosis noted.                                       Medications   lactulose 20 gram/30 mL solution Soln 20 g (20 g Oral Given 6/30/24 1158)     Medical Decision Making  Emergent evaluation of an 89-year-old male with constipation.  X-ray shows no evidence of obstruction.  There is soft stool in the rectal vault noted on exam without impaction.  He was given a dose of lactulose here in the emergency department.  I have suggested he drank a bottle of magnesium citrate when he gets home.  I have also suggested using MiraLax if needed.  He should follow up with his primary physician if no bowel movement in the next 48 hours.    Amount and/or Complexity of Data Reviewed  Labs: ordered.  Radiology: ordered.     Details: Abdominal x-ray shows excessive fecal burden, no obstruction.    Risk  Prescription drug management.                                      Clinical Impression:  Final diagnoses:  [K59.00] Constipation          ED Disposition Condition    Discharge Stable          ED Prescriptions    None       Follow-up Information       Follow up With Specialties Details Why Contact Info    Zeke Marrufo MD Family Medicine  As needed [address]  [place] [address]  [phone]               Nasir Dyson MD  06/30/24 6610

## 2024-06-30 NOTE — DISCHARGE INSTRUCTIONS
Drank a bottle of magnesium citrate today.  If you do not have a bowel movement by this evening you may also take a dose of MiraLax.

## 2024-07-08 ENCOUNTER — CLINICAL SUPPORT (OUTPATIENT)
Dept: OPHTHALMOLOGY | Facility: CLINIC | Age: 89
End: 2024-07-08
Payer: MEDICARE

## 2024-07-08 ENCOUNTER — OFFICE VISIT (OUTPATIENT)
Dept: OPHTHALMOLOGY | Facility: CLINIC | Age: 89
End: 2024-07-08
Payer: MEDICARE

## 2024-07-08 DIAGNOSIS — H04.123 INSUFFICIENCY OF TEAR FILM OF BOTH EYES: ICD-10-CM

## 2024-07-08 DIAGNOSIS — H53.2 BINOCULAR VISION DISORDER WITH DIPLOPIA: ICD-10-CM

## 2024-07-08 DIAGNOSIS — H40.003 GLAUCOMA SUSPECT OF BOTH EYES: ICD-10-CM

## 2024-07-08 DIAGNOSIS — H40.023 OPEN ANGLE WITH BORDERLINE FINDINGS AND HIGH GLAUCOMA RISK IN BOTH EYES: Primary | ICD-10-CM

## 2024-07-08 PROCEDURE — 92083 EXTENDED VISUAL FIELD XM: CPT | Mod: HCNC,S$GLB,, | Performed by: OPHTHALMOLOGY

## 2024-07-08 PROCEDURE — 92020 GONIOSCOPY: CPT | Mod: HCNC,S$GLB,, | Performed by: OPHTHALMOLOGY

## 2024-07-08 PROCEDURE — 76514 ECHO EXAM OF EYE THICKNESS: CPT | Mod: HCNC,S$GLB,, | Performed by: OPHTHALMOLOGY

## 2024-07-08 PROCEDURE — 1101F PT FALLS ASSESS-DOCD LE1/YR: CPT | Mod: HCNC,CPTII,S$GLB, | Performed by: OPHTHALMOLOGY

## 2024-07-08 PROCEDURE — 92133 CPTRZD OPH DX IMG PST SGM ON: CPT | Mod: HCNC,S$GLB,, | Performed by: OPHTHALMOLOGY

## 2024-07-08 PROCEDURE — 92014 COMPRE OPH EXAM EST PT 1/>: CPT | Mod: HCNC,S$GLB,, | Performed by: OPHTHALMOLOGY

## 2024-07-08 PROCEDURE — 1160F RVW MEDS BY RX/DR IN RCRD: CPT | Mod: HCNC,CPTII,S$GLB, | Performed by: OPHTHALMOLOGY

## 2024-07-08 PROCEDURE — 1159F MED LIST DOCD IN RCRD: CPT | Mod: HCNC,CPTII,S$GLB, | Performed by: OPHTHALMOLOGY

## 2024-07-08 PROCEDURE — 1126F AMNT PAIN NOTED NONE PRSNT: CPT | Mod: HCNC,CPTII,S$GLB, | Performed by: OPHTHALMOLOGY

## 2024-07-08 PROCEDURE — 3288F FALL RISK ASSESSMENT DOCD: CPT | Mod: HCNC,CPTII,S$GLB, | Performed by: OPHTHALMOLOGY

## 2024-07-08 PROCEDURE — 99999 PR PBB SHADOW E&M-EST. PATIENT-LVL III: CPT | Mod: PBBFAC,HCNC,, | Performed by: OPHTHALMOLOGY

## 2024-07-08 NOTE — PROGRESS NOTES
HVF/OCT rel/fix fair OD good OS coop good OU/chart checked for latex allergy/-1.75 + 1.75 x 165 OD -1.25 + 1.25 x 1810 OS -BJ

## 2024-07-08 NOTE — PROGRESS NOTES
HPI     Glaucoma Suspect     Additional comments: Glaucoma Re-eval/Dr. Loya           Comments    Pt states his va seems to be doing well with his glasses on- without his   glasses, he has vertical diplopia.    Open angle with borderline findings and high glaucoma risk in both eyes  Binocular vision disorder with diplopia  Capsular bag distension syndrome  Myopia of both eyes with astigmatism and presbyopia    No gtts            Last edited by Oz Saleh on 7/8/2024  2:19 PM.            Assessment /Plan     For exam results, see Encounter Report.    Open angle with borderline findings and high glaucoma risk in both eyes    Glaucoma suspect of both eyes    Insufficiency of tear film of both eyes    Binocular vision disorder with diplopia            Patient with Son  Wife with dementia    Doing well at home    POAG supect  Sister with Glaucoma    ONH  PPA  Tilt / Thinning OS / OD    Consider HVF Faster     CCT  586 // 599    Target mid teens --> achieved    Both eyes  Observation    PC IOL OU  Open p YAG Cap OU  Quiet  Observe    Left Hypertropia [place]  No Diplopia with specs OS 3.0 Down      Dry Eye Syndrome: discussed use of warm compresses, preserved & non-preserved artificial tears, gel and PM ointment options.  Also discussed options utilizing medications.      Plan  RTC 6 months with IOP & OCT RNFL  RTC sonner prn with good understanding

## 2024-07-16 ENCOUNTER — OFFICE VISIT (OUTPATIENT)
Dept: PAIN MEDICINE | Facility: CLINIC | Age: 89
End: 2024-07-16
Payer: MEDICARE

## 2024-07-16 VITALS
DIASTOLIC BLOOD PRESSURE: 70 MMHG | HEIGHT: 62 IN | BODY MASS INDEX: 21.7 KG/M2 | HEART RATE: 103 BPM | WEIGHT: 117.94 LBS | SYSTOLIC BLOOD PRESSURE: 121 MMHG

## 2024-07-16 DIAGNOSIS — M54.16 LUMBAR RADICULOPATHY: Primary | ICD-10-CM

## 2024-07-16 DIAGNOSIS — M51.36 DDD (DEGENERATIVE DISC DISEASE), LUMBAR: ICD-10-CM

## 2024-07-16 DIAGNOSIS — M53.3 SACROILIAC JOINT PAIN: ICD-10-CM

## 2024-07-16 PROCEDURE — 1160F RVW MEDS BY RX/DR IN RCRD: CPT | Mod: CPTII,S$GLB,, | Performed by: STUDENT IN AN ORGANIZED HEALTH CARE EDUCATION/TRAINING PROGRAM

## 2024-07-16 PROCEDURE — 1159F MED LIST DOCD IN RCRD: CPT | Mod: CPTII,S$GLB,, | Performed by: STUDENT IN AN ORGANIZED HEALTH CARE EDUCATION/TRAINING PROGRAM

## 2024-07-16 PROCEDURE — 99213 OFFICE O/P EST LOW 20 MIN: CPT | Mod: S$GLB,,, | Performed by: STUDENT IN AN ORGANIZED HEALTH CARE EDUCATION/TRAINING PROGRAM

## 2024-07-16 PROCEDURE — 1125F AMNT PAIN NOTED PAIN PRSNT: CPT | Mod: CPTII,S$GLB,, | Performed by: STUDENT IN AN ORGANIZED HEALTH CARE EDUCATION/TRAINING PROGRAM

## 2024-07-16 PROCEDURE — 99999 PR PBB SHADOW E&M-EST. PATIENT-LVL III: CPT | Mod: PBBFAC,,, | Performed by: STUDENT IN AN ORGANIZED HEALTH CARE EDUCATION/TRAINING PROGRAM

## 2024-07-16 PROCEDURE — 3288F FALL RISK ASSESSMENT DOCD: CPT | Mod: CPTII,S$GLB,, | Performed by: STUDENT IN AN ORGANIZED HEALTH CARE EDUCATION/TRAINING PROGRAM

## 2024-07-16 PROCEDURE — 1101F PT FALLS ASSESS-DOCD LE1/YR: CPT | Mod: CPTII,S$GLB,, | Performed by: STUDENT IN AN ORGANIZED HEALTH CARE EDUCATION/TRAINING PROGRAM

## 2024-07-16 NOTE — PROGRESS NOTES
Chronic patient Established Note (Follow up visit)      SUBJECTIVE:  INTERVAL HISTORY 7/16/2024:  Mr. Herbert returns for lower back and left leg pain. Current Pain level is 5-6/10.  He reports about 70% relief of the lower back pain until this past Saturday, after which he was dancing and aggravated his pain.  He reports that since the injection the pain comes and goes.  He is not taking any medication for the pain.  He reports that he has been able to sleep without the Aleve.    INTERVAL HISTORY 6/4/2024:  Kang Herbert presents to the clinic for a follow-up appointment for chronic pain. Current pain intensity is 5/10.  Since the last visit, Kang Herbert states:  lower back and left leg pain.  He is s/p L4/5 LESI with Dr. Mondragon with no relief in his pain.  He reports the pain has remained the same for several years.  He reports he has started physical therapy for the past month which has helped significantly with his pain. He reports he continues to have pain which goes down the left leg. He does feel the pain radiates down the entire leg. He has been taking aleve PM which has been helping with his pain and sleep.    PRIOR HISTORY:   Interval updates:   8/30/2023 - Mr. Herbert returns to clinic today for severe back pain. He continues to have have pain in the back, which radiates down the left leg, across his left knee, and down to his foot. His pain is worse when he stands and walks, and was so unbearable that he had to present to the ED Wednesday. He was given 1 pill of norco in the ED and was sent home with a short course of robaxin and a lidocaine patch. He had meloxicam at home which he had already.  He has had injections in the lower back, but he doesn't remember them well.  His son, who speaks on his behalf, states that he has not complained of back/leg pain until this month.  He admits to home exercises (walking) and physical therapy (home therapy years ago).    02/08/2022 -  Keon returns to  clinic s/p Lumbar Epidural Steroid Injection at L4-5 on 01/25/22 with 0% relief.  He reports a pain intensity 7/10 today with a weekly range of 7-/10.  The pain is described as Aching.  Pain is worsened by: nothing in particular and improved by: nothing.  Mr. Herbert reports continued pain in his left lower extremity, pain is described as tingling pain.  He has attempted x2 lumbar DANIEL has that did not provide him with any relief.  Denies any profound weakness, denies any bowel bladder dysfunction, denies any recent incident or trauma denies any saddle anesthesia.    01/05/2022 -Keon returns to clinic s/p Lumbar Epidural Steroid Injection at L4-5 on 1/5/22 with 70% relief.  He reports a pain intensity 5/10 today with a weekly range of 5-5/10.    Additionally he did report continued pain in the left lower extremity however his overall relief from the injection was very good.    Subjective:   Kang Herbert is a 88 y.o. male who has a past medical history of Cataract, Diabetes mellitus, Diabetes mellitus, type 2, Hyperlipidemia, Hypertension, Hypertropia of left eye, Open angle with borderline findings and low glaucoma risk in both eyes, Seizures, and Spondylosis without myelopathy. He complains of pain as described below.    Location: low back   Onset: years  Radiation: bilateral legs, left is worse  Timing: intermittent  Current Pain Score: 5/10  Weekly Pain Range: 4-10/10  Quality: Aching, Tingling and Numb  Worsened by: walking for more than several minutes  Improved by: rest    Previous Therapies:  PT/OT: Yes, previously  HEP: not currently  TENS:       Interventions:   -11/30/2021 Lumbar Epidural Steroid Injection at L4-5  - 1/25/2022 Lumbar  Epidural Steroid Injection at L4-5 - no relief  11/15/2023 - L4/5 LESI no relief  6/28/24 - Left L3/4 and L4/5 TFESI - 70% relief    Hx of LESI in 2012 with excellent relief for pain shooting down leg at that time  Surgery: None for spine  Opioids: None  Adjuvants:  None    Current Pain Medications:  Gabapentin 800 b.i.d.  Mobic 15 daily OR Aleve PM  Robaxin 500mg three times a day    Imagin2021  MRI Lumbar Spine Without Contrast  FINDINGS:  Alignment: Mild lumbar levoscoliosis.  Minimal grade 1 retrolisthesis of L1 on L2 and L2 on L3.  Vertebrae: Degenerative endplate changes are seen throughout the lumbar spine.  No fracture or marrow infiltrative process.  Discs: Multilevel disc height loss and desiccation, most severe at T12-L1 through to L3-L4. no evidence for discitis.  Cord: Normal.  Conus terminates at L1.  Degenerative findings:  T12-L1: Circumferential disc bulge and moderate facet arthropathy result in mild right neural foraminal narrowing.  L1-L2: Circumferential disc bulge and mild facet arthropathy result in mild spinal canal stenosis and moderate bilateral neural foraminal narrowing.  L2-L3: Circumferential disc bulge and mild facet arthropathy result in mild spinal canal stenosis and moderate bilateral neural foraminal narrowing.  L3-L4: Circumferential disc bulge and mild facet arthropathy result in mild effacement of the thecal sac and mild right neural foraminal narrowing.  L4-L5: Circumferential disc bulge and moderate facet arthropathy result in severe left neural foraminal narrowing.  L5-S1: No spinal canal stenosis or neural foraminal narrowing.  Paraspinal muscles & soft tissues: Partially imaged left renal cyst.  Impression:  Multilevel degenerative changes of the lumbar spine detailed above.  Mild spinal canal stenosis noted at L1-L4.  Moderate to severe neural foraminal narrowing noted at L1-L3 and L4-L5.    Pain Disability Index Review:      2024     2:47 PM 2024     2:58 PM 2022    11:09 AM   Last 3 PDI Scores   Pain Disability Index (PDI) 35 35 35     Allergies:   Review of patient's allergies indicates:  No Known Allergies    Current Medications:   Current Outpatient Medications   Medication Sig Dispense Refill     atorvastatin (LIPITOR) 10 MG tablet Take 1 tablet (10 mg total) by mouth once daily. 90 tablet 3    blood-glucose meter (ACCU-CHEK ARUNA PLUS METER) Misc 1 Units by Misc.(Non-Drug; Combo Route) route once daily. 1 each 0    finasteride (PROSCAR) 5 mg tablet Take 1 tablet (5 mg total) by mouth once daily. 90 tablet 3    gabapentin (NEURONTIN) 800 MG tablet TAKE 1 TABLET BY MOUTH 2 TIMES DAILY. 180 tablet 3    lancets (ONETOUCH ULTRASOFT LANCETS) Misc 1 lancet by Misc.(Non-Drug; Combo Route) route once daily. 100 each 3    levothyroxine (SYNTHROID) 50 MCG tablet Take 1 tablet (50 mcg total) by mouth before breakfast. 90 tablet 3    losartan (COZAAR) 25 MG tablet TAKE 1 TABLET BY MOUTH EVERY DAY 90 tablet 3    meloxicam (MOBIC) 15 MG tablet Take 1 tablet (15 mg total) by mouth daily as needed for Pain. 30 tablet 1    memantine (NAMENDA) 5 MG Tab Take 1 tablet (5 mg total) by mouth 2 (two) times daily. 180 tablet 3    metFORMIN (GLUCOPHAGE) 1000 MG tablet Take 1 tablet (1,000 mg total) by mouth 2 (two) times daily with meals. Appointment needed for additional refills. 180 tablet 0    omeprazole (PRILOSEC) 20 MG capsule Take 1 capsule (20 mg total) by mouth once daily. 90 capsule 3    TRUE METRIX GLUCOSE TEST STRIP Strp 1 STRIP BY [facility]NON-DRUG COMBO ROUTE) ROUTE ONCE DAILY. 100 strip 3     No current facility-administered medications for this visit.       REVIEW OF SYSTEMS:    GENERAL:  No new weight loss, malaise or fevers.  HEENT:  Negative for new frequent or significant headaches.  NECK:  Negative for new lumps, goiter, and significant neck swelling.  RESPIRATORY:  Negative for new cough, wheezing or shortness of breath.  CARDIOVASCULAR:  Negative for new chest pain, leg swelling or palpitations.  GI:  Negative for new abdominal discomfort, blood in stools or black stools or change in bowel habits.  MUSCULOSKELETAL:  See HPI.  SKIN:  Negative for new lesions, rash, and itching.  PSYCH:  Negative for new sleep  disturbance, mood disorder  HEMATOLOGY/LYMPHOLOGY:  Negative for new prolonged bleeding, bruising easily or swollen nodes.  NEURO:   No new headaches, syncope, paralysis, seizures or tremors.  All other reviewed and negative other than HPI.    Past Medical History:  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    Seizures     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/2013       Past Surgical History:  Past Surgical History:   Procedure Laterality Date    CATARACT EXTRACTION  11-/    od/os    CHOLECYSTECTOMY      EPIDURAL STEROID INJECTION INTO LUMBAR SPINE N/A 11/30/2021    Procedure: Lumbar Epidural Steroid Injection L4-5 (No Sedation);  Surgeon: Sisi Lua Jr., MD;  Location: [facility];  Service: Pain Management;  Laterality: N/A;  Diabetic    EPIDURAL STEROID INJECTION INTO LUMBAR SPINE N/A 1/25/2022    Procedure: Lumbar Epidural Steroid Injection L4-5;  Surgeon: Sisi Lua Jr., MD;  Location: [facility];  Service: Pain Management;  Laterality: N/A;  diabetic     EPIDURAL STEROID INJECTION INTO LUMBAR SPINE N/A 11/15/2023    Procedure: INJECTION, STEROID, SPINE, LUMBAR, EPIDURAL;  Surgeon: Mari Mondragon DO;  Location: [facility] PAIN [facility];  Service: Pain Management;  Laterality: N/A;    HERNIA REPAIR      INJECTION, SPINE, LUMBOSACRAL, TRANSFORAMINAL APPROACH Left 6/28/2024    Procedure: LT  L3-4 and L4-L5 TFESI;  Surgeon: Miquel Clinton MD;  Location: [facility] PAIN [facility]NA[facility]EMENT;  Service: Pain Management;  Laterality: Left;  20 mins       Family History:  Family History   Problem Relation Name Age of Onset    No Known Problems Mother      No Known Problems Father      Glaucoma Sister      Diabetes Sister      Cancer Brother x2     Diabetes Brother x2     No Known Problems Daughter  x2     No Known Problems Son x1     No Known Problems Maternal Aunt      No Known Problems Maternal Uncle      No Known Problems Paternal Aunt      No Known Problems Paternal Uncle      No Known Problems Maternal Grandmother      No Known Problems Maternal Grandfather      No Known Problems Paternal Grandmother      No Known Problems Paternal Grandfather      Amblyopia Neg Hx      Blindness Neg Hx      Cataracts Neg Hx      Hypertension Neg Hx      Macular degeneration Neg Hx      Retinal detachment Neg Hx      Strabismus Neg Hx      Stroke Neg Hx      Thyroid disease Neg Hx      Prostate cancer Neg Hx      Kidney disease Neg Hx         Social History:  Social History     Socioeconomic History    Marital status:    Occupational History    Occupation: retired   Tobacco Use    Smoking status: Never    Smokeless tobacco: Never   Substance and Sexual Activity    Alcohol use: No    Drug use: No     Social Determinants of Health     Financial Resource Strain: Low Risk  (10/16/2023)    Overall Financial Resource Strain (CARDIA)     Difficulty of Paying Living Expenses: Not hard at all   Food Insecurity: No Food Insecurity (10/16/2023)    Hunger Vital Sign     Worried About Running Out of Food in the Last Year: Never true     Ran Out of Food in the Last Year: Never true   Transportation Needs: No Transportation Needs (10/16/2023)    PRAPARE - Transportation     Lack of Transportation (Medical): No     Lack of Transportation (Non-Medical): No   Physical Activity: Insufficiently Active (10/16/2023)    Exercise Vital Sign     Days of Exercise per Week: 2 days     Minutes of Exercise per Session: 60 min   Stress: No Stress Concern Present (10/16/2023)    Citizen of [place] [employer] - Occupational Stress Questionnaire     Feeling of Stress : Not at all   Housing Stability: Low Risk  (10/16/2023)    Housing Stability Vital Sign     Unable to Pay for Housing in the Last Year: No     Number of Places Lived in  "the Last Year: 1     Unstable Housing in the Last Year: No       OBJECTIVE:    /70 (BP Location: Right arm, Patient Position: Sitting)   Pulse 103   Ht 5' 2" (1.575 m)   Wt 53.5 kg (117 lb 15.1 oz)   BMI 21.57 kg/m²     PHYSICAL EXAMINATION:  General appearance: Well appearing, in no acute distress, alert and appropriately communicative.  Psych:  Mood and affect appropriate.  Skin: Skin color, texture, turgor normal, no rashes or lesions, in both upper and lower body for exposed skin.  Head/face:  Atraumatic, normocephalic.  Cor: regular rate  Pulm: non-labored breathing  GI: Abdomen non-distended and non-tender.  Back: Straight leg raising in the sitting and supine positions is negative to radicular pain.  pain to palpation over the spine and/or paraspinal muscles. Pain with lumbar extension and facet loading.  Extremities: No deformities, significant lymphedema, or skin discoloration. No significant open wounds. No major amputations.  Musculoskeletal:  hip, sacroiliac and knee provocative maneuvers are negative with the exception of left CHRISTINE, left Abimbola +, left SI tenderness. Bilateral upper and lower extremity strength is normal and symmetric.  No atrophy or tone abnormalities are noted.  Neuro: Bilateral upper and lower extremity coordination and muscle stretch reflexes abnormalities noted: none.  Benitez and/or Clonus: negative; loss of sensation to light touch: none  Gait: antalgic    CMP  Sodium   Date Value Ref Range Status   02/19/2024 139 136 - 145 mmol/L Final     Potassium   Date Value Ref Range Status   02/19/2024 4.3 3.5 - 5.1 mmol/L Final     Chloride   Date Value Ref Range Status   02/19/2024 103 95 - 110 mmol/L Final     CO2   Date Value Ref Range Status   02/19/2024 27 23 - 29 mmol/L Final     Glucose   Date Value Ref Range Status   02/19/2024 144 (H) 70 - 110 mg/dL Final     BUN   Date Value Ref Range Status   02/19/2024 16 8 - 23 mg/dL Final     Creatinine   Date Value Ref Range " Status   02/19/2024 0.7 0.5 - 1.4 mg/dL Final     Calcium   Date Value Ref Range Status   02/19/2024 9.4 8.7 - 10.5 mg/dL Final     Total Protein   Date Value Ref Range Status   02/19/2024 6.9 6.0 - 8.4 g/dL Final     Albumin   Date Value Ref Range Status   02/19/2024 4.1 3.5 - 5.2 g/dL Final     Total Bilirubin   Date Value Ref Range Status   02/19/2024 0.3 0.1 - 1.0 mg/dL Final     Comment:     For infants and newborns, interpretation of results should be based  on gestational age, weight and in agreement with clinical  observations.    Premature Infant recommended reference ranges:  Up to 24 hours.............<8.0 mg/dL  Up to 48 hours............<12.0 mg/dL  3-5 days..................<15.0 mg/dL  6-29 days.................<15.0 mg/dL       Alkaline Phosphatase   Date Value Ref Range Status   02/19/2024 89 55 - 135 U/L Final     AST   Date Value Ref Range Status   02/19/2024 22 10 - 40 U/L Final     ALT   Date Value Ref Range Status   02/19/2024 20 10 - 44 U/L Final     Anion Gap   Date Value Ref Range Status   02/19/2024 9 8 - 16 mmol/L Final     eGFR   Date Value Ref Range Status   02/19/2024 >60.0 >60 mL/min/1.73 m^2 Final     ASSESSMENT: 89 y.o. year old male with chronic pain, consistent with:     1. Lumbar radiculopathy        2. DDD (degenerative disc disease), lumbar        3. Sacroiliac joint pain            IMPRESSION: Kang Herbert presents today for lower back and left lower extremity pain. History and physical exam are consistent with lumbar radiculopathy.  Imaging is consistent with lumbar degenerative disc disease with foraminal stenosis at multiple levels.   He has had some improvement in his pain (especially the radiating left lower extremity pain), but he continues to have intermittent left lower back pain.  We can continue conservative management for now.    PLAN:   - I have stressed the importance of physical activity and a home exercise plan to help with pain and improve health.  -  Patient can continue with medications for now since they are providing benefits, using them appropriately, and without side effects.  - continue physical therapy, as he has found benefit from this in the past  - we can consider left SI joint injection based on his symptoms, however he can continue conservative management for now prior to proceeding  - RTC in 2 - 3 months to discuss SI joint injection vs. Alternative interventions, if applicable at that time  - Counseled patient regarding the importance of activity modification and physical therapy.    The above plan and management options were discussed at length with patient. Patient is in agreement with the above and verbalized understanding.    Miquel Clinton  07/16/2024

## 2024-07-17 ENCOUNTER — TELEPHONE (OUTPATIENT)
Dept: SPINE | Facility: CLINIC | Age: 89
End: 2024-07-17
Payer: MEDICARE

## 2024-07-17 NOTE — TELEPHONE ENCOUNTER
----- Message from Christen Cheney sent at 7/17/2024  8:24 AM CDT -----  Type:  Needs Medical Advice    Who Called:  Pt daughter Moises       Would the patient rather a call back or a response via [employer]?  call  Best Call Back Number:   [phone]  Additional Information:  Daughter wants to verify the status of pt new orders for physical therapy. Daughter would like a call back.

## 2024-07-24 ENCOUNTER — TELEPHONE (OUTPATIENT)
Dept: SPINE | Facility: CLINIC | Age: 89
End: 2024-07-24
Payer: MEDICARE

## 2024-07-24 DIAGNOSIS — M46.1 SACROILIITIS: Primary | ICD-10-CM

## 2024-07-24 NOTE — TELEPHONE ENCOUNTER
----- Message from Paulette Garces sent at 7/24/2024  1:40 PM CDT -----  Type:   Call    Who Called:Sisney/Daughter  Does the patient know what this is regarding?:call  Would the patient rather a call back or a response via [facility]? call  Best Call Back Number:[phone]  Additional Information:

## 2024-07-24 NOTE — TELEPHONE ENCOUNTER
----- Message from Aries Pulido sent at 7/24/2024  3:11 PM CDT -----  Name of Who is Calling:BEN COTA [[id number]]                   What is the request in detail:PT just missed your call about his shot please assist                    Can the clinic reply by [facility]: No                   What Number to Call Back if not in [facility]:[phone]   Erivedge Counseling- I discussed with the patient the risks of Erivedge including but not limited to nausea, vomiting, diarrhea, constipation, weight loss, changes in the sense of taste, decreased appetite, muscle spasms, and hair loss.  The patient verbalized understanding of the proper use and possible adverse effects of Erivedge.  All of the patient's questions and concerns were addressed.

## 2024-07-25 ENCOUNTER — OFFICE VISIT (OUTPATIENT)
Dept: URGENT CARE | Facility: CLINIC | Age: 89
End: 2024-07-25
Payer: MEDICARE

## 2024-07-25 ENCOUNTER — TELEPHONE (OUTPATIENT)
Dept: PAIN MEDICINE | Facility: CLINIC | Age: 89
End: 2024-07-25
Payer: MEDICARE

## 2024-07-25 VITALS
WEIGHT: 117 LBS | DIASTOLIC BLOOD PRESSURE: 82 MMHG | OXYGEN SATURATION: 95 % | HEART RATE: 91 BPM | SYSTOLIC BLOOD PRESSURE: 123 MMHG | BODY MASS INDEX: 21.53 KG/M2 | TEMPERATURE: 98 F | HEIGHT: 62 IN | RESPIRATION RATE: 20 BRPM
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
## 2024-07-25 DIAGNOSIS — H65.193 ACUTE EFFUSION OF BOTH MIDDLE EARS: ICD-10-CM

## 2024-07-25 DIAGNOSIS — U07.1 COVID-19 VIRUS DETECTED: ICD-10-CM

## 2024-07-25 DIAGNOSIS — U07.1 COVID-19: ICD-10-CM

## 2024-07-25 DIAGNOSIS — S42.202A CLOSED FRACTURE OF PROXIMAL END OF LEFT HUMERUS, UNSPECIFIED FRACTURE MORPHOLOGY, INITIAL ENCOUNTER: Primary | ICD-10-CM

## 2024-07-25 DIAGNOSIS — M53.3 SACROILIAC JOINT PAIN: Primary | ICD-10-CM

## 2024-07-25 LAB
CTP QC/QA: YES
SARS-COV-2 AG RESP QL IA.RAPID: POSITIVE

## 2024-07-25 PROCEDURE — 73030 X-RAY EXAM OF SHOULDER: CPT | Mod: FY,LT,S$GLB, | Performed by: RADIOLOGY

## 2024-07-25 PROCEDURE — 73130 X-RAY EXAM OF HAND: CPT | Mod: FY,LT,S$GLB, | Performed by: RADIOLOGY

## 2024-07-25 RX ORDER — TRAMADOL HYDROCHLORIDE 50 MG/1
50 TABLET ORAL EVERY 6 HOURS PRN
Qty: 20 TABLET | Refills: 0 | Status: SHIPPED | OUTPATIENT
Start: 2024-07-25 | End: 2024-07-30

## 2024-07-25 RX ORDER — BENZONATATE 200 MG/1
200 CAPSULE ORAL 3 TIMES DAILY PRN
Qty: 30 CAPSULE | Refills: 0 | Status: SHIPPED | OUTPATIENT
Start: 2024-07-25 | End: 2024-08-04

## 2024-07-25 NOTE — TELEPHONE ENCOUNTER
----- Message from Miquel Rivera MD sent at 2024  3:43 PM CDT -----  Regarding: Order for BEN COTA    Patient Name: BEN COAT([id number])  Sex: Male  : 1935      PCP: KATLIN DOBSON    Center: [facility] CENTRAL BILLING OFFICE     Types of orders made on 2024: Procedure Request    Order Date:2024  Ordering User:MIQUEL RIVERA [[id number]]  Encounter Provider:Miquel Rivera MD [[id number]]  Authoriz  ing Provider: Miquel Rivera MD [[id number]  Department:[facility][[id number]]    Common Order Information  Procedure -> Sacroiliac Injection (Specify laterality) Cmt: Left SI Joint    Order Specific Information  Order: Procedure Order to Pain Management [Custom: [id number]]  Order #:          [id number]: 1 FUTURE    Priority: Routine  Class: Clinic Performed    Future Order Information      Expi  res on:2025            Expected by:2024                   Associated Diagnoses      M46.1 Sacroiliitis      Physician -> MIGUEL         Is patient on anti-coagulants? -> No         Facility Name: -> [place]           Priority: Routine  Class: Clinic Performed    Future Order Information      Expires on:2025            Expected by:2024                   Associated Diagnoses      M46.1   Sacroiliitis      Procedure -> Sacroiliac Injection (Specify laterality) Cmt: Left SI Joint        Physician -> MIGUEL         Is patient on anti-coagulants? -> No         Facility Name: -> [place]

## 2024-07-25 NOTE — PROGRESS NOTES
"Subjective:      Patient ID: Kang Herbert is a 89 y.o. male.    Vitals:  height is 5' 2" (1.575 m) and weight is 53.1 kg (117 lb). His temperature is 97.9 °F (36.6 °C). His blood pressure is 123/82 and his pulse is 91. His respiration is 20 and oxygen saturation is 95%.     Chief Complaint: Fall    89 year old male presents today with a fall that occurred last night at home while he was walking. His daughter is speaking for patient today and she states the patient was walking with the wrong slippers on and fell on his left shoulder, wrist, and hand. Abnormal ROM, unable to life arm at all. Pain radiates down shoulder to hand. Normal ROM to hand and wrist. Hematoma on hand. Pain scale overall 09/10. Describes the shoulder pain as constant.  Patient declines  using daughter instead      Also coming in for a cough. Close exposure to COVID from wife. Treatments at home includes Codeine cough medication with no relief. Requesting to be swabbed for COVID.  Patient has had the cough for over a week.    Fall  Incident onset: 07/24/2024. Point of impact: left arm. Pain location: left arm. The pain is at a severity of 9/10. Pertinent negatives include no abdominal pain, bowel incontinence, fever, headaches, hearing loss, hematuria, loss of consciousness, nausea, numbness, tingling, visual change or vomiting. He has tried nothing for the symptoms.       Constitution: Negative for appetite change, chills, fatigue and fever.   HENT:  Negative for ear pain, ear discharge, dental problem, drooling, mouth sores, tongue pain, tongue lesion, congestion, postnasal drip, sinus pain, sinus pressure, sore throat, trouble swallowing and voice change.    Neck: Negative for neck pain and neck stiffness.   Cardiovascular:  Negative for chest pain.   Eyes:  Negative for eye trauma.   Respiratory:  Positive for cough and sputum production. Negative for bloody sputum and shortness of breath.    Gastrointestinal:  Negative for " abdominal pain, nausea, vomiting, constipation, diarrhea and bowel incontinence.   Genitourinary:  Negative for hematuria.   Musculoskeletal:  Positive for pain, trauma, joint pain, joint swelling, abnormal ROM of joint and muscle ache. Negative for back pain and muscle cramps.   Skin:  Positive for bruising. Negative for rash and erythema.   Neurological:  Negative for dizziness, passing out, loss of balance, headaches, disorientation, altered mental status, loss of consciousness, numbness, tingling and tremors.   Psychiatric/Behavioral:  Negative for altered mental status and disorientation.       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    Seizures     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/2013       Past Surgical History:   Procedure Laterality Date    CATARACT EXTRACTION  11-/    od/os    CHOLECYSTECTOMY      EPIDURAL STEROID INJECTION INTO LUMBAR SPINE N/A 11/30/2021    Procedure: Lumbar Epidural Steroid Injection L4-5 (No Sedation);  Surgeon: Sisi Lua Jr., MD;  [place] [facility] [place]  Service: Pain Management;  Laterality: N/A;  Diabetic    EPIDURAL STEROID INJECTION INTO LUMBAR SPINE N/A 1/25/2022    Procedure: Lumbar Epidural Steroid Injection L4-5;  Surgeon: Sisi Lua Jr., MD;  Location: [facility];  Service: Pain Management;  Laterality: N/A;  diabetic     EPIDURAL STEROID INJECTION INTO LUMBAR SPINE N/A 11/15/2023    Procedure: INJECTION, STEROID, SPINE, LUMBAR, EPIDURAL;  Surgeon: Mari Mondragon DO;  Location: [facility];  Service: Pain Management;  Laterality: N/A;    HERNIA REPAIR      INJECTION, SPINE, LUMBOSACRAL, TRANSFORAMINAL APPROACH Left 6/28/2024    Procedure: LT  L3-4 and L4-L5 TFESI;  Surgeon: Miquel Clinton MD;  Location: [facility] PAIN  MANAGEMENT;  Service: Pain Management;  Laterality: Left;  20 mins       Family History   Problem Relation Name Age of Onset    No Known Problems Mother      No Known Problems Father      Glaucoma Sister      Diabetes Sister      Cancer Brother x2     Diabetes Brother x2     No Known Problems Daughter x2     No Known Problems Son x1     No Known Problems Maternal Aunt      No Known Problems Maternal Uncle      No Known Problems Paternal Aunt      No Known Problems Paternal Uncle      No Known Problems Maternal Grandmother      No Known Problems Maternal Grandfather      No Known Problems Paternal Grandmother      No Known Problems Paternal Grandfather      Amblyopia Neg Hx      Blindness Neg Hx      Cataracts Neg Hx      Hypertension Neg Hx      Macular degeneration Neg Hx      Retinal detachment Neg Hx      Strabismus Neg Hx      Stroke Neg Hx      Thyroid disease Neg Hx      Prostate cancer Neg Hx      Kidney disease Neg Hx         Social History     Socioeconomic History    Marital status:    Occupational History    Occupation: retired   Tobacco Use    Smoking status: Never    Smokeless tobacco: Never   Substance and Sexual Activity    Alcohol use: No    Drug use: No     Social Determinants of Health     Financial Resource Strain: Low Risk  (10/16/2023)    Overall Financial Resource Strain (CARDIA)     Difficulty of Paying Living Expenses: Not hard at all   Food Insecurity: No Food Insecurity (10/16/2023)    Hunger Vital Sign     Worried About Running Out of Food in the Last Year: Never true     Ran Out of Food in the Last Year: Never true   Transportation Needs: No Transportation Needs (10/16/2023)    PRAPARE - Transportation     Lack of Transportation (Medical): No     Lack of Transportation (Non-Medical): No   Physical Activity: Insufficiently Active (10/16/2023)    Exercise Vital Sign     Days of Exercise per Week: 2 days     Minutes of Exercise per Session: 60 min   Stress: No Stress Concern Present  (10/16/2023)    [employer] [place] [employer] - Occupational Stress Questionnaire     Feeling of Stress : Not at all   Housing Stability: Low Risk  (10/16/2023)    Housing Stability Vital Sign     Unable to Pay for Housing in the Last Year: No     Number of Places Lived in the Last Year: 1     Unstable Housing in the Last Year: No       Current Outpatient Medications   Medication Sig Dispense Refill    atorvastatin (LIPITOR) 10 MG tablet Take 1 tablet (10 mg total) by mouth once daily. 90 tablet 3    blood-glucose meter (ACCU-CHEK ARUNA PLUS METER) Misc 1 Units by Misc.(Non-Drug; Combo Route) route once daily. 1 each 0    finasteride (PROSCAR) 5 mg tablet Take 1 tablet (5 mg total) by mouth once daily. 90 tablet 3    gabapentin (NEURONTIN) 800 MG tablet TAKE 1 TABLET BY MOUTH 2 TIMES DAILY. 180 tablet 3    lancets (ONETOUCH ULTRASOFT LANCETS) Misc 1 lancet by Misc.(Non-Drug; Combo Route) route once daily. 100 each 3    levothyroxine (SYNTHROID) 50 MCG tablet Take 1 tablet (50 mcg total) by mouth before breakfast. 90 tablet 3    losartan (COZAAR) 25 MG tablet TAKE 1 TABLET BY MOUTH EVERY DAY 90 tablet 3    meloxicam (MOBIC) 15 MG tablet Take 1 tablet (15 mg total) by mouth daily as needed for Pain. 30 tablet 1    memantine (NAMENDA) 5 MG Tab Take 1 tablet (5 mg total) by mouth 2 (two) times daily. 180 tablet 3    metFORMIN (GLUCOPHAGE) 1000 MG tablet Take 1 tablet (1,000 mg total) by mouth 2 (two) times daily with meals. Appointment needed for additional refills. 180 tablet 0    omeprazole (PRILOSEC) 20 MG capsule Take 1 capsule (20 mg total) by mouth once daily. 90 capsule 3    TRUE METRIX GLUCOSE TEST STRIP Strp 1 STRIP BY MISC.(NON-DRUG COMBO ROUTE) ROUTE ONCE DAILY. 100 strip 3     No current facility-administered medications for this visit.       Review of patient's allergies indicates:  No Known Allergies    Objective:     Physical Exam   Constitutional: He is oriented to person, place, and time. He  appears well-developed. He is cooperative.  Non-toxic appearance. He does not appear ill. No distress.   HENT:   Head: Normocephalic and atraumatic.   Ears:   Right Ear: Hearing, external ear and ear canal normal. Tympanic membrane is not injected, not erythematous, not retracted and not bulging. A middle ear effusion is present. no impacted cerumen  Left Ear: Hearing, external ear and ear canal normal. Tympanic membrane is not injected, not erythematous, not retracted and not bulging. A middle ear effusion is present. no impacted cerumen  Nose: Nose normal. No mucosal edema, rhinorrhea, nasal deformity or congestion. No epistaxis. Right sinus exhibits no maxillary sinus tenderness and no frontal sinus tenderness. Left sinus exhibits no maxillary sinus tenderness and no frontal sinus tenderness.   Mouth/Throat: Uvula is midline, oropharynx is clear and moist and mucous membranes are normal. No trismus in the jaw. Normal dentition. No uvula swelling. No oropharyngeal exudate, posterior oropharyngeal edema or posterior oropharyngeal erythema.   Eyes: Conjunctivae and lids are normal. Right eye exhibits no discharge. Left eye exhibits no discharge. No scleral icterus.   Neck: Trachea normal and phonation normal. Neck supple. No edema present. No erythema present. No neck rigidity present.   Cardiovascular: Normal rate, regular rhythm, normal heart sounds and normal pulses.   No murmur heard.Exam reveals no gallop and no friction rub.   Pulmonary/Chest: Effort normal and breath sounds normal. No stridor. No respiratory distress. He has no decreased breath sounds. He has no wheezes. He has no rhonchi. He has no rales.   Abdominal: Normal appearance.   Musculoskeletal:         General: Swelling, tenderness and signs of injury present. No deformity.      Left shoulder: He exhibits decreased range of motion, tenderness, bony tenderness, swelling, effusion and abnormal pulse. He exhibits no crepitus and no deformity.       Left elbow: Normal.      Left wrist: Normal.      Cervical back: He exhibits no tenderness.      Thoracic back: Normal.      Lumbar back: Normal.      Left upper arm: He exhibits tenderness, bony tenderness and swelling. He exhibits no deformity and no laceration.      Left forearm: Normal.        Arms:       Left hand: He exhibits swelling. He exhibits normal range of motion, no tenderness, no bony tenderness, normal capillary refill and no deformity. Normal sensation noted. Decreased sensation is not present in the ulnar distribution, is not present in the medial redistribution and is not present in the radial distribution. Normal strength noted. Left ring finger: Exhibits ecchymosis and swelling. Left little finger: Exhibits ecchymosis and swelling. Motor /Testing: The patient has normal left wrist strength.        Hands:    Lymphadenopathy:     He has no cervical adenopathy.   Neurological: He is alert and oriented to person, place, and time. No sensory deficit. He exhibits normal muscle tone.   Skin: Skin is warm, dry, intact, not diaphoretic, not pale and no rash. bruising No erythema   Psychiatric: His speech is normal and behavior is normal. Mood, judgment and thought content normal.   Nursing note and vitals reviewed.    Results for orders placed or performed in visit on 07/25/24   SARS Coronavirus 2 Antigen, POCT Manual Read   Result Value Ref Range    SARS Coronavirus 2 Antigen Positive (A) Negative     Acceptable Yes    XR HAND COMPLETE 3 VIEW LEFT    Result Date: 7/25/2024  EXAMINATION: XR HAND COMPLETE 3 VIEW LEFT CLINICAL HISTORY: . Unspecified fall, initial encounter TECHNIQUE: PA, lateral, and oblique views of the left hand were performed. COMPARISON: None FINDINGS: The bones appear osteopenic.  There is no evidence for acute fracture or bone destruction.  There is no evidence for dislocation.  There are degenerative changes primarily involving the interphalangeal joints.  There is  atherosclerotic calcification identified within the arteries at the level of the wrist.  No soft tissue foreign bodies are appreciated.     No evidence for acute fracture, bone destruction, or dislocation. Osteopenia. Degenerative changes. Electronically signed by: Gian Singh MD Date:    07/25/2024 Time:    15:22    XR SHOULDER COMPLETE 2 OR MORE VIEWS LEFT    Result Date: 7/25/2024  EXAMINATION: XR SHOULDER COMPLETE 2 OR MORE VIEWS LEFT CLINICAL HISTORY: Unspecified fall, initial encounter TECHNIQUE: Two or three views of the left shoulder were performed. COMPARISON: Left shoulder series 06/02/2020 FINDINGS: Acute appearing fracture involving the left humeral head and neck junction and also extending into the proximal metaphysis with minimal displacement and slight impaction.  No dislocation or destructive osseous process.  No adjacent rib fracture identified.  Generalized osteopenia.  Mild degenerative change about the shoulder similar to prior.  Left lung apex is clear.  Calcification and tortuosity of the imaged aorta.  No subcutaneous emphysema or radiopaque foreign body.     Proximal left humeral acute fracture, as above. Electronically signed by: Yobani Saba MD Date:    07/25/2024 Time:    15:15      Assessment:     1. Closed fracture of proximal end of left humerus, unspecified fracture morphology, initial encounter    2. COVID-19    3. Fall, initial encounter    4. Acute effusion of both middle ears        Plan:       Closed fracture of proximal end of left humerus, unspecified fracture morphology, initial encounter  -     SLING FOR HOME USE  -     Ambulatory referral/consult to Orthopedics  -     traMADoL (ULTRAM) 50 mg tablet; Take 1 tablet (50 mg total) by mouth every 6 (six) hours as needed for Pain.  Dispense: 20 tablet; Refill: 0    COVID-19  -     SARS Coronavirus 2 Antigen, POCT Manual Read  -     benzonatate (TESSALON) 200 MG capsule; Take 1 capsule (200 mg total) by mouth 3 (three) times daily  as needed for Cough.  Dispense: 30 capsule; Refill: 0    Fall, initial encounter  -     XR SHOULDER COMPLETE 2 OR MORE VIEWS LEFT; Future; Expected date: 07/25/2024  -     XR HAND COMPLETE 3 VIEW LEFT; Future; Expected date: 07/25/2024    Acute effusion of both middle ears                Results reviewed, pt notified  I have reviewed the patient chart and pertinent past imaging/labs.  6 - covid risk score  Patient feels much more comfortable with his sling on no concerns we will follow up with urgent care as needed  Patient Instructions   We will call if official x-ray results are different than what we discussed.  There is a fracture to the left humerus.  I have placed a referral to Orthopedics. A referral was placed for you someone should contact you however if you do not hear from them in a week please call [phone] to schedule appointment.  Rotate Tylenol and Advil every 4 hours with food as we discussed.  I have sent tramadol to the pharmacy to take as needed if over-the-counter medications such as Advil and Tylenol not helping with the pain.  Tramadol is an opioid and can become addictive even with limited use.  It can also cause opioid induced constipation make sure to increase fiber and water intake.  Use only as needed.  You also tested positive for COVID today.  You are out of the window to take medication for COVID.  Use Tessalon as needed for cough.  Zyrtec as directed for fluid behind the ears.  If your symptoms persist or worsen please go to the ER or follow up with primary care provider.

## 2024-07-25 NOTE — PATIENT INSTRUCTIONS
We will call if official x-ray results are different than what we discussed.  There is a fracture to the left humerus.  I have placed a referral to Orthopedics. A referral was placed for you someone should contact you however if you do not hear from them in a week please call [phone] to schedule appointment.  Rotate Tylenol and Advil every 4 hours with food as we discussed.  I have sent tramadol to the pharmacy to take as needed if over-the-counter medications such as Advil and Tylenol not helping with the pain.  Tramadol is an opioid and can become addictive even with limited use.  It can also cause opioid induced constipation make sure to increase fiber and water intake.  Use only as needed.  You also tested positive for COVID today.  You are out of the window to take medication for COVID.  Use Tessalon as needed for cough.  Zyrtec as directed for fluid behind the ears.  If your symptoms persist or worsen please go to the ER or follow up with primary care provider.

## 2024-07-29 DIAGNOSIS — M25.512 LEFT SHOULDER PAIN, UNSPECIFIED CHRONICITY: Primary | ICD-10-CM

## 2024-08-01 ENCOUNTER — OFFICE VISIT (OUTPATIENT)
Dept: ORTHOPEDICS | Facility: CLINIC | Age: 89
End: 2024-08-01
Payer: MEDICARE

## 2024-08-01 ENCOUNTER — HOSPITAL ENCOUNTER (OUTPATIENT)
Dept: RADIOLOGY | Facility: HOSPITAL | Age: 89
Discharge: HOME | End: 2024-08-01
Attending: ORTHOPAEDIC SURGERY
Payer: MEDICARE

## 2024-08-01 VITALS — HEIGHT: 62 IN | WEIGHT: 108.94 LBS | BODY MASS INDEX: 20.05 KG/M2

## 2024-08-01 DIAGNOSIS — M25.512 ACUTE PAIN OF LEFT SHOULDER: ICD-10-CM

## 2024-08-01 DIAGNOSIS — M25.512 LEFT SHOULDER PAIN, UNSPECIFIED CHRONICITY: ICD-10-CM

## 2024-08-01 DIAGNOSIS — S42.295D OTHER CLOSED NONDISPLACED FRACTURE OF PROXIMAL END OF LEFT HUMERUS WITH ROUTINE HEALING, SUBSEQUENT ENCOUNTER: Primary | ICD-10-CM

## 2024-08-01 DIAGNOSIS — M25.512 LEFT SHOULDER PAIN, UNSPECIFIED CHRONICITY: Primary | ICD-10-CM

## 2024-08-01 PROCEDURE — 1159F MED LIST DOCD IN RCRD: CPT | Mod: HCNC,CPTII,S$GLB, | Performed by: ORTHOPAEDIC SURGERY

## 2024-08-01 PROCEDURE — 99213 OFFICE O/P EST LOW 20 MIN: CPT | Mod: HCNC,57,S$GLB, | Performed by: ORTHOPAEDIC SURGERY

## 2024-08-01 PROCEDURE — 73030 X-RAY EXAM OF SHOULDER: CPT | Mod: 26,HCNC,LT, | Performed by: RADIOLOGY

## 2024-08-01 PROCEDURE — 3288F FALL RISK ASSESSMENT DOCD: CPT | Mod: HCNC,CPTII,S$GLB, | Performed by: ORTHOPAEDIC SURGERY

## 2024-08-01 PROCEDURE — 1101F PT FALLS ASSESS-DOCD LE1/YR: CPT | Mod: HCNC,CPTII,S$GLB, | Performed by: ORTHOPAEDIC SURGERY

## 2024-08-01 PROCEDURE — 73030 X-RAY EXAM OF SHOULDER: CPT | Mod: TC,HCNC,PN,LT

## 2024-08-01 PROCEDURE — 23600 CLTX PROX HUMRL FX W/O MNPJ: CPT | Mod: HCNC,LT,S$GLB, | Performed by: ORTHOPAEDIC SURGERY

## 2024-08-01 PROCEDURE — 1125F AMNT PAIN NOTED PAIN PRSNT: CPT | Mod: HCNC,CPTII,S$GLB, | Performed by: ORTHOPAEDIC SURGERY

## 2024-08-01 PROCEDURE — 99999 PR PBB SHADOW E&M-EST. PATIENT-LVL III: CPT | Mod: PBBFAC,HCNC,, | Performed by: ORTHOPAEDIC SURGERY

## 2024-08-01 NOTE — PROGRESS NOTES
[facility], Orthopedics and Sports Medicine  [facility]    New Patient Shoulder Office Visit  08/01/2024     Subjective:      Kang Herbert is a 89 y.o. right handed male referred by Shellie Coughlin (from [facility]) for evaluation and treatment of a left shoulder problem. This is evaluated as a personal injury when patient fell while walking onto his Left shoulder, wrist and hand on 07/24/2024.  Left shoulder XR showed proximal humeral acute fracture on 07/25/2024.  He was discharged with sling and Rx for Tramadol but has not taken any Tramadol.  He has been using Tylenol PRN (about twice a day) with some relief.  He reports the pain is overall improving since the injury and he has improved ROM.  He denies numbness, pins/needles or loss of sensation of the left shoulder.  He reports the swelling has decreased.      The patient notes the following symptoms: pain, swelling, and bruising.  Symptoms are located lateral left shoulder.    The symptoms began 1 week ago and are improving.    Related history: negative for prior surgery, trauma, arthritis or disorders.      Outside reports reviewed: historical medical records, office notes, and radiology reports.     [facility] #[id number] utilized for this encounter.      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    Seizures     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/2013       Patient Active Problem List   Diagnosis    DDD (degenerative disc disease), lumbar    Degeneration of cervical intervertebral disc    Acquired scoliosis    Aortic atherosclerosis    Type 2 diabetes mellitus with diabetic autonomic neuropathy, without long-term current use of insulin    Diabetic peripheral neuropathy associated with type 2  diabetes mellitus    Essential hypertension    Mixed dyslipidemia    Acquired hypothyroidism    Glaucoma suspect of both eyes    Status post cataract extraction and insertion of intraocular lens    Insufficiency of tear film of both eyes    Hypertropia of left eye    Binocular vision disorder with diplopia    Rash and nonspecific skin eruption    Partial symptomatic epilepsy with complex partial seizures, not intractable, without status epilepticus    Vascular claudication    Spinal stenosis of lumbar region with neurogenic claudication    Vitreous degeneration of both eyes    Capsular bag distension syndrome    Pseudophakia of both eyes    Decreased strength of lower extremity    Senile dementia    Radicular pain of left lower extremity    Decreased ROM of lumbar spine    Open angle with borderline findings and high glaucoma risk in both eyes       Past Surgical History:   Procedure Laterality Date    CATARACT EXTRACTION  11-/    od/os    CHOLECYSTECTOMY      EPIDURAL STEROID INJECTION INTO LUMBAR SPINE N/A 11/30/2021    Procedure: Lumbar Epidural Steroid Injection L4-5 (No Sedation);  Surgeon: Sisi Lua Jr., MD;  Location: [facility] PAIN [facility]GT;  Service: Pain Management;  Laterality: N/A;  Diabetic    EPIDURAL STEROID INJECTION INTO LUMBAR SPINE N/A 1/25/2022    Procedure: Lumbar Epidural Steroid Injection L4-5;  Surgeon: Sisi Lua Jr., MD;  Location: [facility] PAIN [facility];  Service: Pain Management;  Laterality: N/A;  diabetic     EPIDURAL STEROID INJECTION INTO LUMBAR SPINE N/A 11/15/2023    Procedure: INJECTION, STEROID, SPINE, LUMBAR, EPIDURAL;  Surgeon: Mari Mondragon DO;  Location: [facility];  Service: Pain Management;  Laterality: N/A;    HERNIA REPAIR      INJECTION, SPINE, LUMBOSACRAL, TRANSFORAMINAL APPROACH Left 6/28/2024    Procedure: LT  L3-4 and L4-L5 TFESI;  Surgeon: Miquel Clinton MD;  Location: [facility] PAIN [facility]NA[facility]EMENT;  Service: Pain Management;  Laterality: Left;  20 mins         Current Outpatient Medications   Medication Instructions    atorvastatin (LIPITOR) 10 mg, Oral, Daily    benzonatate (TESSALON) 200 mg, Oral, 3 times daily PRN    blood-glucose meter (ACCU-CHEK ARUNA PLUS METER) Misc 1 Units, Misc.(Non-Drug; Combo Route), Daily    finasteride (PROSCAR) 5 mg, Oral, Daily    gabapentin (NEURONTIN) 800 mg, Oral, 2 times daily    lancets (ONETOUCH ULTRASOFT LANCETS) Misc 1 lancet , Misc.(Non-Drug; Combo Route), Daily    levothyroxine (SYNTHROID) 50 mcg, Oral, Before breakfast    losartan (COZAAR) 25 mg, Oral    meloxicam (MOBIC) 15 mg, Oral, Daily PRN    memantine (NAMENDA) 5 mg, Oral, 2 times daily    metFORMIN (GLUCOPHAGE) 1,000 mg, Oral, 2 times daily with meals, Appointment needed for additional refills.    omeprazole (PRILOSEC) 20 mg, Oral, Daily    TRUE METRIX GLUCOSE TEST STRIP Strp 1 STRIP BY MISC.(NON-DRUG COMBO ROUTE) ROUTE ONCE DAILY.        Review of patient's allergies indicates:  No Known Allergies    Social History     Socioeconomic History    Marital status:    Occupational History    Occupation: retired   Tobacco Use    Smoking status: Never    Smokeless tobacco: Never   Substance and Sexual Activity    Alcohol use: No    Drug use: No     Social Determinants of Health     Financial Resource Strain: Low Risk  (10/16/2023)    Overall Financial Resource Strain (CARDIA)     Difficulty of Paying Living Expenses: Not hard at all   Food Insecurity: No Food Insecurity (10/16/2023)    Hunger Vital Sign     Worried About Running Out of Food in the Last Year: Never true     Ran Out of Food in the Last Year: Never true   Transportation Needs: No Transportation Needs (10/16/2023)    PRAPARE - Transportation     Lack of Transportation (Medical): No     Lack of Transportation (Non-Medical): No   Physical Activity: Insufficiently Active (10/16/2023)    Exercise Vital Sign     Days of Exercise per Week: 2 days     Minutes of Exercise per Session: 60 min   Stress: No Stress  Concern Present (10/16/2023)    [place] citizen [place] [employer] - Occupational Stress Questionnaire     Feeling of Stress : Not at all   Housing Stability: Low Risk  (10/16/2023)    Housing Stability Vital Sign     Unable to Pay for Housing in the Last Year: No     Number of Places Lived in the Last Year: 1     Unstable Housing in the Last Year: No       Family History   Problem Relation Name Age of Onset    No Known Problems Mother      No Known Problems Father      Glaucoma Sister      Diabetes Sister      Cancer Brother x2     Diabetes Brother x2     No Known Problems Daughter x2     No Known Problems Son x1     No Known Problems Maternal Aunt      No Known Problems Maternal Uncle      No Known Problems Paternal Aunt      No Known Problems Paternal Uncle      No Known Problems Maternal Grandmother      No Known Problems Maternal Grandfather      No Known Problems Paternal Grandmother      No Known Problems Paternal Grandfather      Amblyopia Neg Hx      Blindness Neg Hx      Cataracts Neg Hx      Hypertension Neg Hx      Macular degeneration Neg Hx      Retinal detachment Neg Hx      Strabismus Neg Hx      Stroke Neg Hx      Thyroid disease Neg Hx      Prostate cancer Neg Hx      Kidney disease Neg Hx           Review of Systems   Constitutional: Negative for chills and fever.   HENT:  Negative for hearing loss.    Cardiovascular:  Negative for chest pain.   Respiratory:  Negative for shortness of breath.    Gastrointestinal:  Negative for abdominal pain.   Neurological:  Negative for light-headedness.        Objective:      General    Nursing note and vitals reviewed.  Constitutional: He is oriented to person, place, and time. He appears well-developed and well-nourished.   HENT:   Head: Normocephalic and atraumatic.   Eyes: EOM are normal.   Cardiovascular:  Normal rate and regular rhythm.            Pulmonary/Chest: Effort normal.   Abdominal: Soft.   Neurological: He is alert and oriented to person,  place, and time.   Psychiatric: He has a normal mood and affect.         Right Shoulder Exam     Range of Motion   Extension:  abnormal   Forward Flexion:  abnormal     Left Shoulder Exam     Inspection/Observation   Bruising: present    Tenderness   The patient is tender to palpation of the acromioclavicular joint and greater tuberosity.    Range of Motion   Passive abduction:  90 abnormal   Extension:  30 abnormal   Forward Flexion:  90 abnormal     Other   Sensation: normal       Muscle Strength   Left Upper Extremity  Shoulder Abduction: 4/5   Shoulder Internal Rotation: 3/5   Shoulder External Rotation: 3/5     Vascular Exam       Left Pulses      Radial:                    2+        Imaging:  Radiographs of the left shoulder taken 08/01/2024 were personally reviewed from the [facility].  Comminuted left proximal humerus fracture noted.  Alignment stable.  The glenohumeral alignment is within normal limits.  Osteopenia noted.  No marrow replacement process.  Healing proximal humerus fracture.    Left shoulder XR 07/25/2024 - Acute appearing fracture involving the left humeral head and neck junction and also extending into the proximal metaphysis with minimal displacement and slight impaction.  No dislocation or destructive osseous process.  No adjacent rib fracture identified.  Generalized osteopenia.  Mild degenerative change about the shoulder similar to prior.  No subcutaneous emphysema or radiopaque foreign body.      Procedures  Procedure Note for Closed Treatment of fracture  The patient was consented for closed treatment of left proximal humerus fracture.  The correct side and site was identified after patient gave verbal consent for proceeding.  A reduction maneuver was not required.  The patient was placed in no device for treatment of the injury.  The patient tolerated the procedure without any complications.          Assessment:       Kang Herbert is a 89 y.o. male seen in the office  today. The primary encounter diagnosis was Other closed nondisplaced fracture of proximal end of left humerus with routine healing, subsequent encounter. A diagnosis of Acute pain of left shoulder was also pertinent to this visit.  Symptoms are improving.  Non-operative treatment is recommended at this time. Patient counseled on limiting lifting weights to 2 lbs and to continue conservative management. The natural history and expected course discussed with patient. Various treatment options were discussed, including their risks and benefits. All of the patient's questions were answered.     Plan:      Tylenol 650mg TID, PRN pain.  Follow up in 6 weeks.   Can discontinue sling  No lifting more than 2 lbs.  Return to clinic in 6 weeks, will consider physical therapy of needed         Valerie Mayer MD, MPH  [facility] PGY-3  08/01/2024        Irineo Daniels IV, MD   of Clinical Orthopedics  Department of Orthopedic Surgery  [facility]  Office: [phone]  Website: [email or web address]

## 2024-08-09 ENCOUNTER — TELEPHONE (OUTPATIENT)
Dept: PAIN MEDICINE | Facility: CLINIC | Age: 89
End: 2024-08-09
Payer: MEDICARE

## 2024-08-16 ENCOUNTER — TELEPHONE (OUTPATIENT)
Dept: PAIN MEDICINE | Facility: CLINIC | Age: 89
End: 2024-08-16
Payer: MEDICARE

## 2024-08-20 ENCOUNTER — LAB VISIT (OUTPATIENT)
Dept: LAB | Facility: HOSPITAL | Age: 89
End: 2024-08-20
Attending: FAMILY MEDICINE
Payer: MEDICARE

## 2024-08-20 DIAGNOSIS — I70.0 AORTIC ATHEROSCLEROSIS: ICD-10-CM

## 2024-08-20 DIAGNOSIS — I10 ESSENTIAL HYPERTENSION: ICD-10-CM

## 2024-08-20 DIAGNOSIS — E11.43 TYPE 2 DIABETES MELLITUS WITH DIABETIC AUTONOMIC NEUROPATHY, WITHOUT LONG-TERM CURRENT USE OF INSULIN: ICD-10-CM

## 2024-08-20 DIAGNOSIS — E03.9 HYPOTHYROIDISM, UNSPECIFIED TYPE: ICD-10-CM

## 2024-08-20 LAB
ALBUMIN SERPL BCP-MCNC: 4.3 G/DL (ref 3.5–5.2)
ALP SERPL-CCNC: 142 U/L (ref 55–135)
ALT SERPL W/O P-5'-P-CCNC: 16 U/L (ref 10–44)
ANION GAP SERPL CALC-SCNC: 14 MMOL/L (ref 8–16)
AST SERPL-CCNC: 25 U/L (ref 10–40)
BASOPHILS # BLD AUTO: 0.04 K/UL (ref 0–0.2)
BASOPHILS NFR BLD: 0.5 % (ref 0–1.9)
BILIRUB SERPL-MCNC: 0.5 MG/DL (ref 0.1–1)
BUN SERPL-MCNC: 15 MG/DL (ref 8–23)
CALCIUM SERPL-MCNC: 10 MG/DL (ref 8.7–10.5)
CHLORIDE SERPL-SCNC: 98 MMOL/L (ref 95–110)
CHOLEST SERPL-MCNC: 123 MG/DL (ref 120–199)
CHOLEST/HDLC SERPL: 3.4 {RATIO} (ref 2–5)
CO2 SERPL-SCNC: 25 MMOL/L (ref 23–29)
CREAT SERPL-MCNC: 0.7 MG/DL (ref 0.5–1.4)
DIFFERENTIAL METHOD BLD: ABNORMAL
EOSINOPHIL # BLD AUTO: 0.4 K/UL (ref 0–0.5)
EOSINOPHIL NFR BLD: 4.8 % (ref 0–8)
ERYTHROCYTE [DISTWIDTH] IN BLOOD BY AUTOMATED COUNT: 14.2 % (ref 11.5–14.5)
EST. GFR  (NO RACE VARIABLE): >60 ML/MIN/1.73 M^2
ESTIMATED AVG GLUCOSE: 140 MG/DL (ref 68–131)
GLUCOSE SERPL-MCNC: 118 MG/DL (ref 70–110)
HBA1C MFR BLD: 6.5 % (ref 4–5.6)
HCT VFR BLD AUTO: 39.7 % (ref 40–54)
HDLC SERPL-MCNC: 36 MG/DL (ref 40–75)
HDLC SERPL: 29.3 % (ref 20–50)
HGB BLD-MCNC: 13 G/DL (ref 14–18)
IMM GRANULOCYTES # BLD AUTO: 0.02 K/UL (ref 0–0.04)
IMM GRANULOCYTES NFR BLD AUTO: 0.3 % (ref 0–0.5)
LDLC SERPL CALC-MCNC: 33 MG/DL (ref 63–159)
LYMPHOCYTES # BLD AUTO: 2.7 K/UL (ref 1–4.8)
LYMPHOCYTES NFR BLD: 35.6 % (ref 18–48)
MCH RBC QN AUTO: 30.9 PG (ref 27–31)
MCHC RBC AUTO-ENTMCNC: 32.7 G/DL (ref 32–36)
MCV RBC AUTO: 94 FL (ref 82–98)
MONOCYTES # BLD AUTO: 0.7 K/UL (ref 0.3–1)
MONOCYTES NFR BLD: 9 % (ref 4–15)
NEUTROPHILS # BLD AUTO: 3.7 K/UL (ref 1.8–7.7)
NEUTROPHILS NFR BLD: 49.8 % (ref 38–73)
NONHDLC SERPL-MCNC: 87 MG/DL
NRBC BLD-RTO: 0 /100 WBC
PLATELET # BLD AUTO: 167 K/UL (ref 150–450)
PMV BLD AUTO: 11.7 FL (ref 9.2–12.9)
POTASSIUM SERPL-SCNC: 3.9 MMOL/L (ref 3.5–5.1)
PROT SERPL-MCNC: 7.3 G/DL (ref 6–8.4)
RBC # BLD AUTO: 4.21 M/UL (ref 4.6–6.2)
SODIUM SERPL-SCNC: 137 MMOL/L (ref 136–145)
T4 FREE SERPL-MCNC: 1.01 NG/DL (ref 0.71–1.51)
TRIGL SERPL-MCNC: 270 MG/DL (ref 30–150)
TSH SERPL DL<=0.005 MIU/L-ACNC: 6.34 UIU/ML (ref 0.4–4)
WBC # BLD AUTO: 7.48 K/UL (ref 3.9–12.7)

## 2024-08-20 PROCEDURE — 84443 ASSAY THYROID STIM HORMONE: CPT | Mod: HCNC | Performed by: FAMILY MEDICINE

## 2024-08-20 PROCEDURE — 80061 LIPID PANEL: CPT | Mod: HCNC | Performed by: FAMILY MEDICINE

## 2024-08-20 PROCEDURE — 84439 ASSAY OF FREE THYROXINE: CPT | Mod: HCNC | Performed by: FAMILY MEDICINE

## 2024-08-20 PROCEDURE — 85025 COMPLETE CBC W/AUTO DIFF WBC: CPT | Mod: HCNC | Performed by: FAMILY MEDICINE

## 2024-08-20 PROCEDURE — 83036 HEMOGLOBIN GLYCOSYLATED A1C: CPT | Mod: HCNC | Performed by: FAMILY MEDICINE

## 2024-08-20 PROCEDURE — 80053 COMPREHEN METABOLIC PANEL: CPT | Mod: HCNC | Performed by: FAMILY MEDICINE

## 2024-08-21 NOTE — PRE-PROCEDURE INSTRUCTIONS
Patient reviewed on 8/21/2024.  Okay to proceed at [place]. The following pre-procedure instructions and arrival time have been reviewed with patient's daughter via phone and sent to patient portal for review.  Patient's daughter verbalized an understanding.  Pt to be accompanied by [place] day of procedure as responsible .      Dear [place] ,     You are scheduled for a pain procedure on 8/23/24 with Dr. Clinton, please report to [facility]   [address] [facility].     Please park in the lot in front of the building and enter at the main entrance. Proceed to the second floor for registration.        Anesthesia fasting instructions:   No sedation.  You do not need to fast before this procedure.  You can eat and drink like normal.  You can drive yourself (with stipulations).  There are some rare cases where you may need to call an uber if you are unable to drive after the procedure due to weakness/dizziness.      *Refrain from drinking any alcohol  *Please insure that you have pre-planned your ride home IF YOU ARE to have sedation of any kind You CANNOT drive yourself home.    *You may not leave alone in an uber, taxi, etc. IF YOU HAVE received sedating medications by mouth or by vein  *You must be discharged to a responsible adult.   *Please remain under adult supervision for 24 hours if you had any form of sedation.      If possible, please have your visitor &/or ride home stay during your visit.   The surgeon should speak with your visitors after your procedure.  All visitors must be 18 years of age or older. Please limit your visitors to max of 2 people.  Please plan on being here for roughly 2-3 hours.   If you are on blood thinners, you need to follow the anticoagulation instructions that had been discussed previously.     IF you were told to stop your blood thinners, this is how long you should generally hold some of the more common ones.  Remember that stopping blood thinners  is only necessary for certain procedures. If you are unsure of your instructions, please call us.      You should take any medications that you routinely take for blood pressure, heart medications, thyroid, cholesterol, etc with small sips of water.   If you are a diabetic, do not take your medication if you will be fasting, but bring it with you.   Please plan on being here for roughly 2 hours.   HOLD vitamins and supplements the morning of your procedure.  HOLD any Anticoagulants (ex. Aspirin, goody or BC powder, Coumadin, Eliquis, Heparin, Plavix, Lovenox, Xarelto, etc.) for a total of 5 days  HOLD any non-insulin injections until after procedure (Ozempic, Mounjaro, Trulicity, Victoza, Byetta, Wegovy, Adlyxin, etc) (you should have been instructed to hold for a 7 day period)     Shower the night before and the morning of your procedure with antibacterial soap (ex. Dial)   Please do not use antibacterial soap to wash your face. Use your regular face soap.  Do not apply any products to your skin nor your hair after you shower the morning of your procedure.         Products include: lotions, oils, ointments, creams, gels, powders, lotion, deodorant, make-up, perfume/cologne, after shave and sunscreen.        No contacts. Bring glasses if necessary.  If you have dentures, please bring a case.  Please leave all jewelry and valuables at home.  Wear loose comfortable clothes (preferably an button up shirt), All patients will need to be placed in a gown for procedure.     ---If you start to feel sick (fever, chills, coughing, sore throat, etc) or start on or have been taking any antibiotics, please contact your doctor's office at [phone] your procedure would have to be rescheduled.            Please reply to this message as receipt of delivery.  Arrival time: 9:20 am     Thanks, Catina, LPN [facility]  Pre-Admit Testing

## 2024-08-22 ENCOUNTER — DOCUMENTATION ONLY (OUTPATIENT)
Dept: REHABILITATION | Facility: HOSPITAL | Age: 89
End: 2024-08-22
Payer: MEDICARE

## 2024-08-22 NOTE — PROGRESS NOTES
Physical therapist and physical therapy assistant(s) met face to face to discuss patient's treatment plan and progress towards established goals. Pt will be seen by a physical therapist minimally every 6th visit or every 30 days.     Bhavana Dawn PTA

## 2024-08-23 ENCOUNTER — HOSPITAL ENCOUNTER (OUTPATIENT)
Facility: HOSPITAL | Age: 89
Discharge: HOME | End: 2024-08-23
Attending: STUDENT IN AN ORGANIZED HEALTH CARE EDUCATION/TRAINING PROGRAM | Admitting: STUDENT IN AN ORGANIZED HEALTH CARE EDUCATION/TRAINING PROGRAM
Payer: MEDICARE

## 2024-08-23 VITALS
RESPIRATION RATE: 17 BRPM | SYSTOLIC BLOOD PRESSURE: 139 MMHG | TEMPERATURE: 98 F | DIASTOLIC BLOOD PRESSURE: 73 MMHG | OXYGEN SATURATION: 95 % | HEART RATE: 87 BPM

## 2024-08-23 DIAGNOSIS — G89.29 CHRONIC PAIN: ICD-10-CM

## 2024-08-23 DIAGNOSIS — M46.1 SACROILIITIS: Primary | ICD-10-CM

## 2024-08-23 LAB — POCT GLUCOSE: 155 MG/DL (ref 70–110)

## 2024-08-23 PROCEDURE — 25500020 PHARM REV CODE 255: Performed by: STUDENT IN AN ORGANIZED HEALTH CARE EDUCATION/TRAINING PROGRAM

## 2024-08-23 PROCEDURE — 82962 GLUCOSE BLOOD TEST: CPT | Performed by: STUDENT IN AN ORGANIZED HEALTH CARE EDUCATION/TRAINING PROGRAM

## 2024-08-23 PROCEDURE — 25000003 PHARM REV CODE 250: Performed by: STUDENT IN AN ORGANIZED HEALTH CARE EDUCATION/TRAINING PROGRAM

## 2024-08-23 PROCEDURE — 27096 INJECT SACROILIAC JOINT: CPT | Mod: LT | Performed by: STUDENT IN AN ORGANIZED HEALTH CARE EDUCATION/TRAINING PROGRAM

## 2024-08-23 PROCEDURE — 27096 INJECT SACROILIAC JOINT: CPT | Mod: HCNC,LT,, | Performed by: STUDENT IN AN ORGANIZED HEALTH CARE EDUCATION/TRAINING PROGRAM

## 2024-08-23 PROCEDURE — 63600175 PHARM REV CODE 636 W HCPCS: Performed by: STUDENT IN AN ORGANIZED HEALTH CARE EDUCATION/TRAINING PROGRAM

## 2024-08-23 RX ORDER — BUPIVACAINE HYDROCHLORIDE 2.5 MG/ML
INJECTION, SOLUTION EPIDURAL; INFILTRATION; INTRACAUDAL
Status: DISCONTINUED | OUTPATIENT
Start: 2024-08-23 | End: 2024-08-23 | Stop reason: HOSPADM

## 2024-08-23 RX ORDER — LIDOCAINE HYDROCHLORIDE 20 MG/ML
INJECTION, SOLUTION EPIDURAL; INFILTRATION; INTRACAUDAL; PERINEURAL
Status: DISCONTINUED | OUTPATIENT
Start: 2024-08-23 | End: 2024-08-23 | Stop reason: HOSPADM

## 2024-08-23 RX ORDER — TRIAMCINOLONE ACETONIDE 40 MG/ML
INJECTION, SUSPENSION INTRA-ARTICULAR; INTRAMUSCULAR
Status: DISCONTINUED | OUTPATIENT
Start: 2024-08-23 | End: 2024-08-23 | Stop reason: HOSPADM

## 2024-08-23 RX ORDER — ALPRAZOLAM 0.5 MG/1
0.5 TABLET, ORALLY DISINTEGRATING ORAL ONCE AS NEEDED
Status: COMPLETED | OUTPATIENT
Start: 2024-08-23 | End: 2024-08-23

## 2024-08-23 RX ADMIN — ALPRAZOLAM 0.5 MG: 0.5 TABLET, ORALLY DISINTEGRATING ORAL at 10:08

## 2024-08-23 NOTE — DISCHARGE INSTRUCTIONS
Home Care Instructions Pain Management:     1.  DIET:     You may resume your normal diet today.     2.  BATHING:     You may shower with luke warm water.     3.  DRESSING:     You may remove your bandage today.     4.  ACTIVITY LEVEL:       You may resume your normal activities 24 hours after your procedure.     5.  MEDICATIONS:     You may resume your normal medications today.     6.  SPECIAL INSTRUCTIONS:     No heat to the injection site for 24 hours including bath or shower, heating pad, moist heat or hot tubs.     Use an ice pack to the injection site for any pain or discomfort.  Apply ice packs for 20 minute intervals as needed.     If you have received any sedatives by mouth today, you can not drive for 12 hours.     If you have received sedation through an IV, you can not drive for 24 hours.     PLEASE CALL YOUR DOCTOR FOR THE FOLLOWIN.  Redness or swelling around the injection site.  2.  Fever of 101 degrees.  3.  Drainage (pus) from the injection site.  4.  For any continuous bleeding (some dried blood over the incision is normal.)     FOR EMERGENCIES:     If any unusual problems or difficulties occur during clinic hours, call [phone] or dial [phone].     Follow up with with your physician in 2-3 weeks.

## 2024-08-23 NOTE — H&P
HPI  Patient presenting for Procedure(s) (LRB):  Left SI Joint (Left)     Patient on Anti-coagulation No    No health changes since previous encounter    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    Seizures     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/2013     Past Surgical History:   Procedure Laterality Date    CATARACT EXTRACTION  11-/    od/os    CHOLECYSTECTOMY      EPIDURAL STEROID INJECTION INTO LUMBAR SPINE N/A 11/30/2021    Procedure: Lumbar Epidural Steroid Injection L4-5 (No Sedation);  Surgeon: Sisi Lua Jr., MD;  Location: [facility]IN [facility]GT;  Service: Pain Management;  Laterality: N/A;  Diabetic    EPIDURAL STEROID INJECTION INTO LUMBAR SPINE N/A 1/25/2022    Procedure: Lumbar Epidural Steroid Injection L4-5;  Surgeon: Sisi Lua Jr., MD;  Location: [facility];  Service: Pain Management;  Laterality: N/A;  diabetic     EPIDURAL STEROID INJECTION INTO LUMBAR SPINE N/A 11/15/2023    Procedure: INJECTION, STEROID, SPINE, LUMBAR, EPIDURAL;  Surgeon: Mari Mondragon DO;  Location: [facility] PAIN MANAGEMENT;  Service: Pain Management;  Laterality: N/A;    HERNIA REPAIR      INJECTION, SPINE, LUMBOSACRAL, TRANSFORAMINAL APPROACH Left 6/28/2024    Procedure: LT  L3-4 and L4-L5 TFESI;  Surgeon: Miquel Clinton MD;  Location: [facility] PAIN MANAGEMENT;  Service: Pain Management;  Laterality: Left;  20 mins     Review of patient's allergies indicates:  No Known Allergies   Current Facility-Administered Medications   Medication    alprazolam ODT dissolvable tablet 0.5 mg       PMHx, PSHx, Allergies, Medications reviewed in epic    ROS negative except pain complaints in HPI    OBJECTIVE:    There were no vitals taken for this visit.    PHYSICAL EXAMINATION:    GENERAL: Well appearing, in no  acute distress, alert and oriented x3.  PSYCH:  Mood and affect appropriate.  SKIN: Skin color, texture, turgor normal, no rashes or lesions which will impact the procedure.  CV: RRR with palpation of the radial artery.  PULM: No evidence of respiratory difficulty, symmetric chest rise. Clear to auscultation.  NEURO: Cranial nerves grossly intact.    Plan:    Proceed with procedure as planned Procedure(s) (LRB):  Left SI Joint (Left)    Bayron Luong  08/23/2024

## 2024-08-23 NOTE — PLAN OF CARE
Pt in preop bay 29, VSS, meds given.  Pt denies any open wounds on body or the use of any weight loss injections. Pt ready to roll.    Procedural consents verified with pt.

## 2024-08-26 ENCOUNTER — LAB VISIT (OUTPATIENT)
Dept: LAB | Facility: HOSPITAL | Age: 89
End: 2024-08-26
Attending: FAMILY MEDICINE
Payer: MEDICARE

## 2024-08-26 ENCOUNTER — OFFICE VISIT (OUTPATIENT)
Dept: FAMILY MEDICINE | Facility: CLINIC | Age: 89
End: 2024-08-26
Payer: MEDICARE

## 2024-08-26 VITALS
DIASTOLIC BLOOD PRESSURE: 70 MMHG | HEART RATE: 80 BPM | SYSTOLIC BLOOD PRESSURE: 116 MMHG | WEIGHT: 108.13 LBS | OXYGEN SATURATION: 99 % | BODY MASS INDEX: 19.9 KG/M2 | HEIGHT: 62 IN

## 2024-08-26 DIAGNOSIS — D53.9 NUTRITIONAL ANEMIA: ICD-10-CM

## 2024-08-26 DIAGNOSIS — K21.9 GASTROESOPHAGEAL REFLUX DISEASE WITHOUT ESOPHAGITIS: ICD-10-CM

## 2024-08-26 DIAGNOSIS — F03.90 SENILE DEMENTIA: ICD-10-CM

## 2024-08-26 DIAGNOSIS — I10 ESSENTIAL HYPERTENSION: ICD-10-CM

## 2024-08-26 DIAGNOSIS — E03.9 HYPOTHYROIDISM, UNSPECIFIED TYPE: ICD-10-CM

## 2024-08-26 DIAGNOSIS — E78.5 HYPERLIPIDEMIA, UNSPECIFIED HYPERLIPIDEMIA TYPE: ICD-10-CM

## 2024-08-26 DIAGNOSIS — M54.16 LUMBAR RADICULOPATHY: ICD-10-CM

## 2024-08-26 DIAGNOSIS — M48.062 SPINAL STENOSIS OF LUMBAR REGION WITH NEUROGENIC CLAUDICATION: ICD-10-CM

## 2024-08-26 DIAGNOSIS — N40.0 BENIGN PROSTATIC HYPERPLASIA WITHOUT LOWER URINARY TRACT SYMPTOMS: ICD-10-CM

## 2024-08-26 DIAGNOSIS — M15.8 OTHER OSTEOARTHRITIS INVOLVING MULTIPLE JOINTS: ICD-10-CM

## 2024-08-26 DIAGNOSIS — R74.8 ELEVATED ALKALINE PHOSPHATASE LEVEL: ICD-10-CM

## 2024-08-26 DIAGNOSIS — E11.43 TYPE 2 DIABETES MELLITUS WITH DIABETIC AUTONOMIC NEUROPATHY, WITHOUT LONG-TERM CURRENT USE OF INSULIN: ICD-10-CM

## 2024-08-26 DIAGNOSIS — R74.8 ELEVATED ALKALINE PHOSPHATASE LEVEL: Primary | ICD-10-CM

## 2024-08-26 DIAGNOSIS — M51.36 DDD (DEGENERATIVE DISC DISEASE), LUMBAR: Primary | ICD-10-CM

## 2024-08-26 LAB
ALBUMIN SERPL BCP-MCNC: 4.5 G/DL (ref 3.5–5.2)
ALP SERPL-CCNC: 128 U/L (ref 55–135)
ALT SERPL W/O P-5'-P-CCNC: 16 U/L (ref 10–44)
AST SERPL-CCNC: 20 U/L (ref 10–40)
BILIRUB DIRECT SERPL-MCNC: 0.2 MG/DL (ref 0.1–0.3)
BILIRUB SERPL-MCNC: 0.3 MG/DL (ref 0.1–1)
PROT SERPL-MCNC: 7.6 G/DL (ref 6–8.4)

## 2024-08-26 PROCEDURE — 99999 PR PBB SHADOW E&M-EST. PATIENT-LVL III: CPT | Mod: PBBFAC,HCNC,, | Performed by: FAMILY MEDICINE

## 2024-08-26 PROCEDURE — 1101F PT FALLS ASSESS-DOCD LE1/YR: CPT | Mod: HCNC,CPTII,S$GLB, | Performed by: FAMILY MEDICINE

## 2024-08-26 PROCEDURE — 99215 OFFICE O/P EST HI 40 MIN: CPT | Mod: HCNC,S$GLB,, | Performed by: FAMILY MEDICINE

## 2024-08-26 PROCEDURE — 82977 ASSAY OF GGT: CPT | Mod: HCNC | Performed by: FAMILY MEDICINE

## 2024-08-26 PROCEDURE — 80076 HEPATIC FUNCTION PANEL: CPT | Mod: HCNC | Performed by: FAMILY MEDICINE

## 2024-08-26 PROCEDURE — 36415 COLL VENOUS BLD VENIPUNCTURE: CPT | Mod: HCNC,PO | Performed by: FAMILY MEDICINE

## 2024-08-26 PROCEDURE — 1160F RVW MEDS BY RX/DR IN RCRD: CPT | Mod: HCNC,CPTII,S$GLB, | Performed by: FAMILY MEDICINE

## 2024-08-26 PROCEDURE — 3288F FALL RISK ASSESSMENT DOCD: CPT | Mod: HCNC,CPTII,S$GLB, | Performed by: FAMILY MEDICINE

## 2024-08-26 PROCEDURE — 1159F MED LIST DOCD IN RCRD: CPT | Mod: HCNC,CPTII,S$GLB, | Performed by: FAMILY MEDICINE

## 2024-08-26 RX ORDER — GABAPENTIN 800 MG/1
800 TABLET ORAL 2 TIMES DAILY
Qty: 180 TABLET | Refills: 3 | Status: SHIPPED | OUTPATIENT
Start: 2024-08-26

## 2024-08-26 RX ORDER — MEMANTINE HYDROCHLORIDE 5 MG/1
5 TABLET ORAL 2 TIMES DAILY
Qty: 180 TABLET | Refills: 3 | Status: SHIPPED | OUTPATIENT
Start: 2024-08-26 | End: 2025-08-26

## 2024-08-26 RX ORDER — FINASTERIDE 5 MG/1
5 TABLET, FILM COATED ORAL DAILY
Qty: 90 TABLET | Refills: 3 | Status: SHIPPED | OUTPATIENT
Start: 2024-08-26

## 2024-08-26 RX ORDER — OMEPRAZOLE 20 MG/1
20 CAPSULE, DELAYED RELEASE ORAL DAILY
Qty: 90 CAPSULE | Refills: 3 | Status: SHIPPED | OUTPATIENT
Start: 2024-08-26

## 2024-08-26 RX ORDER — LEVOTHYROXINE SODIUM 50 UG/1
50 TABLET ORAL
Qty: 90 TABLET | Refills: 3 | Status: SHIPPED | OUTPATIENT
Start: 2024-08-26

## 2024-08-26 RX ORDER — METFORMIN HYDROCHLORIDE 500 MG/1
500 TABLET, EXTENDED RELEASE ORAL 2 TIMES DAILY WITH MEALS
Qty: 180 TABLET | Refills: 3 | Status: SHIPPED | OUTPATIENT
Start: 2024-08-26 | End: 2025-08-26

## 2024-08-26 RX ORDER — LOSARTAN POTASSIUM 25 MG/1
25 TABLET ORAL DAILY
Qty: 90 TABLET | Refills: 3 | Status: SHIPPED | OUTPATIENT
Start: 2024-08-26

## 2024-08-26 RX ORDER — ATORVASTATIN CALCIUM 10 MG/1
10 TABLET, FILM COATED ORAL DAILY
Qty: 90 TABLET | Refills: 3 | Status: SHIPPED | OUTPATIENT
Start: 2024-08-26

## 2024-08-26 NOTE — PROGRESS NOTES
Slightly elevated liver enzyme.  Normal kidney function.  Repeat testing.  Please notify the patient.

## 2024-08-26 NOTE — PROGRESS NOTES
Subjective     Patient ID: Kang Herbert is a 89 y.o. male.    Chief Complaint: No chief complaint on file.    89 years old who came to the clinic with chronic lower back pain associated with radiculopathy.  Patient currently on gabapentin to help with the symptoms and interventional pain management.  Blood pressure today was stable.  Last A1c was stable.  Patient with mild anemia but stable in comparison with previous reports.  Patient is requesting a walker to help with his gait.  He reports a recent fall.  Thyroid test was borderline.  Patient still able to do his activities of daily living even with dementia.  No flare ups of reflux.  Slightly elevated alkaline phosphatase.  Patient taking Proscar for BPH.  He is currently asymptomatic.    Hypertension  This is a chronic problem. The current episode started more than 1 year ago. The problem is unchanged. Pertinent negatives include no chest pain, orthopnea, palpitations or peripheral edema. There are no associated agents to hypertension. Risk factors for coronary artery disease include male gender and sedentary lifestyle. Past treatments include angiotensin blockers. The current treatment provides significant improvement. Compliance problems include exercise.  There is no history of angina. There is no history of a hypertension causing med or a thyroid problem.   Back Pain  This is a chronic problem. The current episode started more than 1 year ago. The problem occurs intermittently. The problem is unchanged. The pain is present in the lumbar spine and sacro-iliac. The pain is at a severity of 6/10. The pain is moderate. The pain is The same all the time. The symptoms are aggravated by position. Stiffness is present All day. Pertinent negatives include no bladder incontinence, bowel incontinence or chest pain. Risk factors include lack of exercise. He has tried analgesics for the symptoms. The treatment provided moderate relief.   Diabetes  He presents for  his follow-up diabetic visit. He has type 2 diabetes mellitus. There are no hypoglycemic associated symptoms. Pertinent negatives for diabetes include no chest pain, no polydipsia, no polyphagia and no polyuria. There are no hypoglycemic complications. Symptoms are stable. There are no diabetic complications. Risk factors for coronary artery disease include hypertension, diabetes mellitus, male sex and sedentary lifestyle. Current diabetic treatment includes oral agent (monotherapy). He is compliant with treatment all of the time. He is following a generally healthy diet. He has not had a previous visit with a dietitian. He never participates in exercise. An ACE inhibitor/angiotensin II receptor blocker is being taken.     Review of Systems   Constitutional: Negative.    HENT: Negative.     Eyes: Negative.    Respiratory: Negative.     Cardiovascular: Negative.  Negative for chest pain, palpitations, orthopnea, leg swelling and claudication.   Gastrointestinal: Negative.  Negative for bowel incontinence.   Endocrine: Negative for polydipsia, polyphagia and polyuria.   Genitourinary: Negative.  Negative for bladder incontinence.   Musculoskeletal:  Positive for arthralgias and back pain.   Integumentary:  Negative.   Neurological: Negative.    Psychiatric/Behavioral: Negative.            Objective     Physical Exam  Vitals and nursing note reviewed.   Constitutional:       General: He is not in acute distress.     Appearance: He is well-developed. He is not diaphoretic.   HENT:      Head: Normocephalic and atraumatic.      Right Ear: External ear normal.      Left Ear: External ear normal.      Nose: Nose normal.      Mouth/Throat:      Pharynx: No oropharyngeal exudate.   Eyes:      General: No scleral icterus.        Right eye: No discharge.         Left eye: No discharge.      Conjunctiva/sclera: Conjunctivae normal.      Pupils: Pupils are equal, round, and reactive to light.   Neck:      Thyroid: No thyromegaly.       Vascular: No JVD.      Trachea: No tracheal deviation.   Cardiovascular:      Rate and Rhythm: Normal rate and regular rhythm.      Heart sounds: Normal heart sounds. No murmur heard.     No friction rub. No gallop.   Pulmonary:      Effort: Pulmonary effort is normal. No respiratory distress.      Breath sounds: Normal breath sounds. No stridor. No wheezing or rales.   Chest:      Chest wall: No tenderness.   Abdominal:      General: Bowel sounds are normal. There is no distension.      Palpations: Abdomen is soft. There is no mass.      Tenderness: There is no abdominal tenderness. There is no guarding or rebound.   Musculoskeletal:         General: Normal range of motion.      Cervical back: Normal range of motion and neck supple.      Lumbar back: Tenderness present.   Lymphadenopathy:      Cervical: No cervical adenopathy.   Skin:     General: Skin is warm and dry.      Coloration: Skin is not pale.      Findings: No erythema or rash.   Neurological:      Mental Status: He is alert and oriented to person, place, and time.      Cranial Nerves: No cranial nerve deficit.      Motor: No abnormal muscle tone.      Coordination: Coordination abnormal.      Gait: Gait abnormal.      Deep Tendon Reflexes: Reflexes are normal and symmetric. Reflexes normal.   Psychiatric:         Behavior: Behavior normal.         Thought Content: Thought content normal.         Judgment: Judgment normal.            Assessment and Plan     1. DDD (degenerative disc disease), lumbar  -     Cancel: WALKER FOR HOME USE  -     WALKER FOR HOME USE    2. Essential hypertension  -     Lipid Panel; Future; Expected date: 08/26/2024  -     Comprehensive Metabolic Panel; Future; Expected date: 08/26/2024  -     losartan (COZAAR) 25 MG tablet; Take 1 tablet (25 mg total) by mouth once daily.  Dispense: 90 tablet; Refill: 3    3. Type 2 diabetes mellitus with diabetic autonomic neuropathy, without long-term current use of insulin  -      Microalbumin/Creatinine Ratio, Urine; Future; Expected date: 08/26/2024  -     Lipid Panel; Future; Expected date: 08/26/2024  -     Hemoglobin A1C; Future; Expected date: 08/26/2024  -     Comprehensive Metabolic Panel; Future; Expected date: 08/26/2024  -     metFORMIN (GLUCOPHAGE-XR) 500 MG ER 24hr tablet; Take 1 tablet (500 mg total) by mouth 2 (two) times daily with meals.  Dispense: 180 tablet; Refill: 3    4. Nutritional anemia  -     CBC Auto Differential; Future; Expected date: 08/26/2024    5. Other osteoarthritis involving multiple joints  -     gabapentin (NEURONTIN) 800 MG tablet; Take 1 tablet (800 mg total) by mouth 2 (two) times daily.  Dispense: 180 tablet; Refill: 3  -     WALKER FOR HOME USE    6. Spinal stenosis of lumbar region with neurogenic claudication  -     gabapentin (NEURONTIN) 800 MG tablet; Take 1 tablet (800 mg total) by mouth 2 (two) times daily.  Dispense: 180 tablet; Refill: 3  -     WALKER FOR HOME USE    7. Lumbar radiculopathy  -     gabapentin (NEURONTIN) 800 MG tablet; Take 1 tablet (800 mg total) by mouth 2 (two) times daily.  Dispense: 180 tablet; Refill: 3  -     WALKER FOR HOME USE    8. Hypothyroidism, unspecified type  -     levothyroxine (SYNTHROID) 50 MCG tablet; Take 1 tablet (50 mcg total) by mouth before breakfast.  Dispense: 90 tablet; Refill: 3    9. Senile dementia  -     memantine (NAMENDA) 5 MG Tab; Take 1 tablet (5 mg total) by mouth 2 (two) times daily.  Dispense: 180 tablet; Refill: 3    10. Benign prostatic hyperplasia without lower urinary tract symptoms  -     finasteride (PROSCAR) 5 mg tablet; Take 1 tablet (5 mg total) by mouth once daily.  Dispense: 90 tablet; Refill: 3    11. Gastroesophageal reflux disease without esophagitis  -     omeprazole (PRILOSEC) 20 MG capsule; Take 1 capsule (20 mg total) by mouth once daily.  Dispense: 90 capsule; Refill: 3    12. Hyperlipidemia, unspecified hyperlipidemia type  -     atorvastatin (LIPITOR) 10 MG tablet;  Take 1 tablet (10 mg total) by mouth once daily.  Dispense: 90 tablet; Refill: 3    Continue monitoring blood pressure at home, low sodium diet.   Continue monitoring blood sugar at home,ADA diet.     I spent a total of 40 minutes on the day of the visit.This includes face to face time and non-face to face time preparing to see the patient (eg, review of tests), obtaining and/or reviewing separately obtained history, documenting clinical information in the electronic or other health record, independently interpreting results and communicating results to the patient/family/caregiver, or care coordinator.        The mobility limitation cannot be sufficiently resolved by the use of a cane. Patient's functional mobility deficit can be sufficiently resolved with the use of a walker. Patient's mobility limitation significantly impairs their ability to participate in one of more activities of daily living. The use of a walker will significantly improve the patient's ability to participate in [facility] and the patient will use it on regular basis in the home .   Follow-up in 6 months..

## 2024-08-27 LAB — GGT SERPL-CCNC: 32 U/L (ref 8–55)

## 2024-08-27 NOTE — DISCHARGE SUMMARY
Discharge Note  Short Stay      SUMMARY     Admit Date: 8/23/2024    Attending Physician: Miquel Clinton MD PhD    Discharge Physician: Miquel Clinton      Discharge Date: 8/27/2024 1:55 PM    Procedure(s) (LRB):  Left SI Joint (Left)    Final Diagnosis: Sacroiliac joint pain [M53.3]    Disposition: Home or self care    Patient Instructions:   Discharge Medication List as of 8/23/2024  7:42 AM        START taking these medications    Details   blood-glucose meter (ACCU-CHEK ARUNA PLUS METER) Misc 1 Units by Misc.(Non-Drug; Combo Route) route once daily., Starting Wed 8/30/2023, Until Thu 8/29/2024, Normal      lancets (ONETOUCH ULTRASOFT LANCETS) Misc 1 lancet by Misc.(Non-Drug; Combo Route) route once daily., Starting Mon 11/8/2021, Normal      meloxicam (MOBIC) 15 MG tablet Take 1 tablet (15 mg total) by mouth daily as needed for Pain., Starting Thu 5/2/2024, Normal      TRUE METRIX GLUCOSE TEST STRIP Strp 1 STRIP BY [facility]NON-DRUG COMBO ROUTE) ROUTE ONCE DAILY., Normal      atorvastatin (LIPITOR) 10 MG tablet Take 1 tablet (10 mg total) by mouth once daily., Starting Sat 10/28/2023, Normal      finasteride (PROSCAR) 5 mg tablet Take 1 tablet (5 mg total) by mouth once daily., Starting Mon 5/6/2024, Normal      gabapentin (NEURONTIN) 800 MG tablet TAKE 1 TABLET BY MOUTH 2 TIMES DAILY., Starting Wed 8/23/2023, Normal      levothyroxine (SYNTHROID) 50 MCG tablet Take 1 tablet (50 mcg total) by mouth before breakfast., Starting Mon 9/11/2023, Normal      losartan (COZAAR) 25 MG tablet TAKE 1 TABLET BY MOUTH EVERY DAY, Starting Wed 10/11/2023, Normal      memantine (NAMENDA) 5 MG Tab Take 1 tablet (5 mg total) by mouth 2 (two) times daily., Starting Mon 10/16/2023, Until Tue 10/15/2024, Normal      metFORMIN (GLUCOPHAGE) 1000 MG tablet Take 1 tablet (1,000 mg total) by mouth 2 (two) times daily with meals. Appointment needed for additional refills., Starting Fri 5/24/2024, Normal      omeprazole (PRILOSEC) 20 MG capsule  Take 1 capsule (20 mg total) by mouth once daily., Starting Sat 10/28/2023, Normal                 Discharge Diagnosis: Sacroiliac joint pain [M53.3]  Condition on Discharge: Stable with no complications to procedure   Diet on Discharge: Same as before.  Activity: as per instruction sheet.  Discharge to: Home with a responsible adult.  Follow up: 2-4 weeks       Please call my office or pager at [phone] if experienced any weakness or loss of sensation, fever > 101.5, pain uncontrolled with oral medications, persistent nausea/vomiting/or diarrhea, redness or drainage from the incisions, or any other worrisome concerns. If physician on call was not reached or could not communicate with our office for any reason please go to the nearest emergency department      Miquel Clinton MD PhD

## 2024-08-27 NOTE — OP NOTE
Sacroiliac Joint Injection under Fluoroscopic Guidance    The procedure, risks, benefits, and options were discussed with the patient. There are no contraindications to the procedure. The patent expressed understanding and agreed to the procedure. Informed written consent was obtained prior to the start of the procedure and can be found in the patient's chart.    PATIENT NAME: Mr. Kang Herbert   MRN: [id number]     DATE OF PROCEDURE: 08/27/2024    PROCEDURE: Left Sacroiliac Joint Injection under Fluoroscopic Guidance    PRE-OP DIAGNOSIS: Sacroiliac joint pain [M53.3]    POST-OP DIAGNOSIS: Same    PHYSICIAN: Miquel Clinton MD    ASSISTANTS: Bayron Luong MD     MEDICATIONS INJECTED: Preservative-free Kenalog 40mg with 3cc of Bupivacine 0.25%     LOCAL ANESTHETIC INJECTED: Xylocaine 2%     SEDATION: None    ESTIMATED BLOOD LOSS: None    COMPLICATIONS: None    TECHNIQUE: Time-out was performed to identify the patient and procedure to be performed. With the patient laying in a prone position, the surgical area was prepped and draped in the usual sterile fashion using ChloraPrep and a fenestrated drape. The sacroiliac joint was determined under fluoroscopy guidance. Skin anesthesia was achieved by injecting Lidocaine 2% over the injection site. The sacroiliac joint was  then approached with a 22 gauge, 3.5 inch spinal quinke needle that was introduced under fluoroscopic guidance in the AP and Lateral views. Once the needle tip was in the area of the joint, and there was no blood, contrast dye Omnipaque (300mg/mL) was injected to confirm placement and there was no vascular runoff. Fluoroscopic imaging in the AP and lateral views revealed a clear outline of the joint space. 4 mL of the medication mixture listed above was injected slowly intraarticular and allyssa-articular. Displacement of the radio opaque contrast after injection of the medication confirmed that the medication went into the area of the joint. The needles  were removed and bleeding was nil.  A sterile dressing was applied. No specimens collected. The patient tolerated the procedure well.     The patient was monitored after the procedure in the recovery area. They were given post-procedure and discharge instructions to follow at home. The patient was discharged in a stable condition.    Miquel Clinton MD

## 2024-09-03 ENCOUNTER — TELEPHONE (OUTPATIENT)
Dept: FAMILY MEDICINE | Facility: CLINIC | Age: 89
End: 2024-09-03
Payer: MEDICARE

## 2024-09-03 NOTE — TELEPHONE ENCOUNTER
----- Message from Nohemi Ku sent at 9/3/2024  3:17 PM CDT -----  Needs advice from nurse:      Who Called:daughter-Aislinn Alcaraz  Regarding:would like to know pt's lab results  Would the patient rather a call back or VIA [facility]?  Best Call Back number: [phone]  Additional Info:

## 2024-09-16 ENCOUNTER — TELEPHONE (OUTPATIENT)
Dept: ORTHOPEDICS | Facility: CLINIC | Age: 89
End: 2024-09-16
Payer: MEDICARE

## 2024-09-16 NOTE — TELEPHONE ENCOUNTER
----- Message from Shala Singleton sent at 9/16/2024  9:48 AM CDT -----  Type:  Reschedule Apt     Who Called: Pt's family member  Does the patient know what this is regarding?: needs to reschedule apt on 09/19  Would the patient rather a call back or a response via [facility]? Call   Best Call Back Number: [phone]   Additional Information:

## 2024-10-02 ENCOUNTER — OFFICE VISIT (OUTPATIENT)
Dept: ORTHOPEDICS | Facility: CLINIC | Age: 89
End: 2024-10-02
Attending: ORTHOPAEDIC SURGERY
Payer: MEDICARE

## 2024-10-02 ENCOUNTER — HOSPITAL ENCOUNTER (OUTPATIENT)
Dept: RADIOLOGY | Facility: HOSPITAL | Age: 89
Discharge: HOME | End: 2024-10-02
Attending: ORTHOPAEDIC SURGERY
Payer: MEDICARE

## 2024-10-02 VITALS — WEIGHT: 108 LBS | BODY MASS INDEX: 19.88 KG/M2 | HEIGHT: 62 IN

## 2024-10-02 DIAGNOSIS — M25.512 LEFT SHOULDER PAIN, UNSPECIFIED CHRONICITY: ICD-10-CM

## 2024-10-02 DIAGNOSIS — S42.295D OTHER CLOSED NONDISPLACED FRACTURE OF PROXIMAL END OF LEFT HUMERUS WITH ROUTINE HEALING, SUBSEQUENT ENCOUNTER: Primary | ICD-10-CM

## 2024-10-02 PROCEDURE — 73030 X-RAY EXAM OF SHOULDER: CPT | Mod: TC,HCNC,PN,LT

## 2024-10-02 PROCEDURE — 73030 X-RAY EXAM OF SHOULDER: CPT | Mod: 26,HCNC,LT, | Performed by: RADIOLOGY

## 2024-10-02 PROCEDURE — 99999 PR PBB SHADOW E&M-EST. PATIENT-LVL III: CPT | Mod: PBBFAC,HCNC,, | Performed by: ORTHOPAEDIC SURGERY

## 2024-10-02 PROCEDURE — 1101F PT FALLS ASSESS-DOCD LE1/YR: CPT | Mod: HCNC,CPTII,S$GLB, | Performed by: ORTHOPAEDIC SURGERY

## 2024-10-02 PROCEDURE — 3288F FALL RISK ASSESSMENT DOCD: CPT | Mod: HCNC,CPTII,S$GLB, | Performed by: ORTHOPAEDIC SURGERY

## 2024-10-02 PROCEDURE — 1125F AMNT PAIN NOTED PAIN PRSNT: CPT | Mod: HCNC,CPTII,S$GLB, | Performed by: ORTHOPAEDIC SURGERY

## 2024-10-02 PROCEDURE — 1159F MED LIST DOCD IN RCRD: CPT | Mod: HCNC,CPTII,S$GLB, | Performed by: ORTHOPAEDIC SURGERY

## 2024-10-02 PROCEDURE — 99024 POSTOP FOLLOW-UP VISIT: CPT | Mod: HCNC,S$GLB,, | Performed by: ORTHOPAEDIC SURGERY

## 2024-10-02 NOTE — PROGRESS NOTES
[facility], Orthopedics and Sports Medicine  [facility]    Established Patient Office Visit  10/02/2024     Diagnosis:  Left proximal humerus fracture    Procedure:   (8/1/2024) closed treatment left proximal humerus fracture     Date of Injury:   7/25/2024    Subjective:      Kang Herbert is a 89 y.o. male who presents for follow up treatment of the above mentioned diagnosis.  Overall the patient's symptoms are resolved.  The patient is not currently in physical therapy but his pain gradually improved since last visit.  No issues.  He is able to lift the arm overhead without difficulty.     Outside reports reviewed: historical medical records, office notes, and radiology reports.    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    Seizures     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/2013       Patient Active Problem List   Diagnosis    DDD (degenerative disc disease), lumbar    Degeneration of cervical intervertebral disc    Acquired scoliosis    Aortic atherosclerosis    Type 2 diabetes mellitus with diabetic autonomic neuropathy, without long-term current use of insulin    Diabetic peripheral neuropathy associated with type 2 diabetes mellitus    Essential hypertension    Mixed dyslipidemia    Acquired hypothyroidism    Glaucoma suspect of both eyes    Status post cataract extraction and insertion of intraocular lens    Insufficiency of tear film of both eyes    Hypertropia of left eye    Binocular vision disorder with diplopia    Rash and nonspecific skin eruption    Partial symptomatic epilepsy with complex partial seizures, not intractable, without status epilepticus    Vascular claudication    Spinal stenosis of lumbar region with neurogenic claudication    Vitreous  degeneration of both eyes    Capsular bag distension syndrome    Pseudophakia of both eyes    Decreased strength of lower extremity    Senile dementia    Radicular pain of left lower extremity    Decreased ROM of lumbar spine    Open angle with borderline findings and high glaucoma risk in both eyes       Past Surgical History:   Procedure Laterality Date    CATARACT EXTRACTION  11-/    od/os    CHOLECYSTECTOMY      EPIDURAL STEROID INJECTION INTO LUMBAR SPINE N/A 11/30/2021    Procedure: Lumbar Epidural Steroid Injection L4-5 (No Sedation);  Surgeon: Sisi Lua Jr., MD;  Location: [facility] PAIN MGT;  Service: Pain Management;  Laterality: N/A;  Diabetic    EPIDURAL STEROID INJECTION INTO LUMBAR SPINE N/A 1/25/2022    Procedure: Lumbar Epidural Steroid Injection L4-5;  Surgeon: Sisi Lua Jr., MD;  Location: [facility] PAIN [facility];  Service: Pain Management;  Laterality: N/A;  diabetic     EPIDURAL STEROID INJECTION INTO LUMBAR SPINE N/A 11/15/2023    Procedure: INJECTION, STEROID, SPINE, LUMBAR, EPIDURAL;  Surgeon: Mari Mondragon DO;  Location: [facility] PAIN MANAGEMENT;  Service: Pain Management;  Laterality: N/A;    HERNIA REPAIR      INJECTION, SACROILIAC JOINT Left 8/23/2024    Procedure: Left SI Joint;  Surgeon: Miquel Clinton MD;  Location: [facility] PAIN MANAGEMENT;  Service: Pain Management;  Laterality: Left;  no sed no ac    INJECTION, SPINE, LUMBOSACRAL, TRANSFORAMINAL APPROACH Left 6/28/2024    Procedure: LT  L3-4 and L4-L5 TFESI;  Surgeon: Miquel Clinton MD;  Location: [facility] PAIN MANAGEMENT;  Service: Pain Management;  Laterality: Left;  20 mins        Current Outpatient Medications   Medication Instructions    atorvastatin (LIPITOR) 10 mg, Oral, Daily    blood-glucose meter (ACCU-CHEK ARUNA PLUS METER) Misc 1 Units, Misc.(Non-Drug; Combo Route), Daily    finasteride (PROSCAR) 5 mg, Oral, Daily    gabapentin (NEURONTIN) 800 mg, Oral, 2 times daily    lancets (ONETOUCH ULTRASOFT LANCETS) Misc 1 lancet ,  Misc.(Non-Drug; Combo Route), Daily    levothyroxine (SYNTHROID) 50 mcg, Oral, Before breakfast    losartan (COZAAR) 25 mg, Oral, Daily    meloxicam (MOBIC) 15 mg, Oral, Daily PRN    memantine (NAMENDA) 5 mg, Oral, 2 times daily    metFORMIN (GLUCOPHAGE-XR) 500 mg, Oral, 2 times daily with meals    omeprazole (PRILOSEC) 20 mg, Oral, Daily    TRUE METRIX GLUCOSE TEST STRIP Strp 1 STRIP BY MISC.(NON-DRUG COMBO ROUTE) ROUTE ONCE DAILY.        Review of patient's allergies indicates:  No Known Allergies    Social History     Socioeconomic History    Marital status:    Occupational History    Occupation: retired   Tobacco Use    Smoking status: Never    Smokeless tobacco: Never   Substance and Sexual Activity    Alcohol use: No    Drug use: No     Social Drivers of Health     Financial Resource Strain: Low Risk  (10/16/2023)    Overall Financial Resource Strain (CARDIA)     Difficulty of Paying Living Expenses: Not hard at all   Food Insecurity: No Food Insecurity (10/16/2023)    Hunger Vital Sign     Worried About Running Out of Food in the Last Year: Never true     Ran Out of Food in the Last Year: Never true   Transportation Needs: No Transportation Needs (10/16/2023)    PRAPARE - Transportation     Lack of Transportation (Medical): No     Lack of Transportation (Non-Medical): No   Physical Activity: Insufficiently Active (10/16/2023)    Exercise Vital Sign     Days of Exercise per Week: 2 days     Minutes of Exercise per Session: 60 min   Stress: No Stress Concern Present (10/16/2023)    [employer] [place] [employer] - Occupational Stress Questionnaire     Feeling of Stress : Not at all   Housing Stability: Low Risk  (10/16/2023)    Housing Stability Vital Sign     Unable to Pay for Housing in the Last Year: No     Number of Places Lived in the Last Year: 1     Unstable Housing in the Last Year: No       Family History   Problem Relation Name Age of Onset    No Known Problems Mother      No Known  Problems Father      Glaucoma Sister      Diabetes Sister      Cancer Brother x2     Diabetes Brother x2     No Known Problems Daughter x2     No Known Problems Son x1     No Known Problems Maternal Aunt      No Known Problems Maternal Uncle      No Known Problems Paternal Aunt      No Known Problems Paternal Uncle      No Known Problems Maternal Grandmother      No Known Problems Maternal Grandfather      No Known Problems Paternal Grandmother      No Known Problems Paternal Grandfather      Amblyopia Neg Hx      Blindness Neg Hx      Cataracts Neg Hx      Hypertension Neg Hx      Macular degeneration Neg Hx      Retinal detachment Neg Hx      Strabismus Neg Hx      Stroke Neg Hx      Thyroid disease Neg Hx      Prostate cancer Neg Hx      Kidney disease Neg Hx           Review of Systems   Constitutional: Negative for chills and fever.   HENT:  Negative for hearing loss.    Eyes:  Negative for blurred vision.   Cardiovascular:  Negative for chest pain.   Respiratory:  Negative for shortness of breath.    Gastrointestinal:  Negative for abdominal pain.   Neurological:  Negative for light-headedness.          Objective:      General    Nursing note and vitals reviewed.  Constitutional: He is oriented to person, place, and time. He appears well-developed and well-nourished. No distress.   HENT:   Head: Normocephalic and atraumatic.   Eyes: Pupils are equal, round, and reactive to light.   Cardiovascular:  Normal rate and regular rhythm.            Pulmonary/Chest: Effort normal and breath sounds normal. No respiratory distress.   Neurological: He is alert and oriented to person, place, and time.   Psychiatric: He has a normal mood and affect. His behavior is normal.         Right Shoulder Exam     Inspection/Observation   Swelling: absent  Bruising: absent  Atrophy: absent    Tenderness   The patient is experiencing no tenderness.    Range of Motion   Active abduction:  170 normal   Forward Flexion:  170 normal    External Rotation 0 degrees:  50 normal   Internal rotation 0 degrees:  T8 normal     Tests & Signs   Drop arm: negative  Antoine test: negative  Impingement: negative  Belly Press: negative  Active Compression Test ([place]'s Sign): negative  Speed's Test: negative    Other   Sensation: normal    Comments:  Guadalupe test- negative    Left Shoulder Exam     Inspection/Observation   Swelling: absent  Bruising: absent  Atrophy: absent    Tenderness   The patient is experiencing no tenderness.     Range of Motion   Active abduction:  170 normal   Forward Flexion:  170 normal   External Rotation 0 degrees:  50 normal   Internal rotation 0 degrees:  T8 normal     Tests & Signs   Drop arm: negative  Antoine test: negative  Impingement: negative  Belly Press: negative  Active Compression test ([place]'s Sign): negative  Speed's Test: negative    Other   Sensation: normal     Comments:  Guadalupe test- negative      Muscle Strength   Right Upper Extremity   Shoulder Abduction: 5/5   Shoulder Internal Rotation: 5/5   Shoulder External Rotation: 5/5   Biceps: 5/5   Left Upper Extremity  Shoulder Abduction: 5/5   Shoulder Internal Rotation: 5/5   Shoulder External Rotation: 5/5   Biceps: 5/5     Reflexes     Left Side  Biceps:  2+  Triceps:  2+  Brachioradialis:  2+  Left Benitez's Sign:  Absent    Right Side   Biceps:  2+  Triceps:  2+  Brachioradialis:  2+  Right Benitez's Sign:  absent    Vascular Exam     Right Pulses      Radial:                    2+      Left Pulses      Radial:                    2+        Imaging:  Radiographs of the left shoulder taken 10/02/2024 were personally reviewed from the [facility].  Multiple views of the shoulder are available today for review, including an AP, scapular Y, axillary view.  The glenohumeral joint demonstrates moderate degenerative changes.  The acromioclavicular joint demonstrates moderate degenerative changes.  The glenohumeral joint is concentrically reduced.  There is no  acute fracture or dislocation. The prior noted proximal humerus fracture healing with no new displacement and appropriate alignment.       Procedures        Assessment:       Kang Herbert is a 89 y.o. male seen in the office today. The encounter diagnosis was Other closed nondisplaced fracture of proximal end of left humerus with routine healing, subsequent encounter.  Non-operative treatment is recommended at this time. Will continue with nonsurgical treatment.  Can WBAT LUE and return as needed if symptoms return. The natural history and expected course discussed with patient. Various treatment options were discussed, including their risks and benefits. All of the patient's questions were answered.     Plan:      Tylenol 650mg TID, PRN pain.  Follow up in 3 months or as  needed if symptoms worsen.  WBAT LUE         Irineo Daniels IV, MD   of Clinical Orthopedics  Department of Orthopedic Surgery  [facility]  Office: [phone]  Website: [email or web address]    ---------------------------------------

## 2024-10-07 DIAGNOSIS — M54.16 LUMBAR RADICULOPATHY: ICD-10-CM

## 2024-10-07 DIAGNOSIS — E11.65 TYPE 2 DIABETES MELLITUS WITH HYPERGLYCEMIA, WITHOUT LONG-TERM CURRENT USE OF INSULIN: ICD-10-CM

## 2024-10-07 DIAGNOSIS — M48.062 SPINAL STENOSIS OF LUMBAR REGION WITH NEUROGENIC CLAUDICATION: ICD-10-CM

## 2024-10-07 DIAGNOSIS — M15.8 OTHER OSTEOARTHRITIS INVOLVING MULTIPLE JOINTS: ICD-10-CM

## 2024-10-07 NOTE — TELEPHONE ENCOUNTER
No care due was identified.  [facility] Embedded Care Due Messages. Reference number: [id number].   10/07/2024 1:30:11 PM CDT

## 2024-10-08 NOTE — TELEPHONE ENCOUNTER
Refill Routing Note   Medication(s) are not appropriate for processing by [facility] for the following reason(s):        Outside of protocol  Due for refill >6 months ago    [facility] action(s):  Defer  Route               Appointments  past 12m or future 3m with PCP    Date Provider   Last Visit   8/26/2024 Zeke Marrufo MD   Next Visit   3/5/2025 Zeke Marrufo MD   ED visits in past 90 days: 0        Note composed:3:10 AM 10/08/2024

## 2024-10-09 DIAGNOSIS — E11.65 TYPE 2 DIABETES MELLITUS WITH HYPERGLYCEMIA, WITHOUT LONG-TERM CURRENT USE OF INSULIN: ICD-10-CM

## 2024-10-09 NOTE — TELEPHONE ENCOUNTER
No care due was identified.  [facility] Embedded Care Due Messages. Reference number: [id number].   10/09/2024 1:22:58 PM CDT

## 2024-10-10 RX ORDER — GABAPENTIN 800 MG/1
800 TABLET ORAL 2 TIMES DAILY
Qty: 180 TABLET | Refills: 3 | Status: SHIPPED | OUTPATIENT
Start: 2024-10-10

## 2024-10-10 NOTE — TELEPHONE ENCOUNTER
Refill Decision Note   Kang Herbert  is requesting a refill authorization.  Brief Assessment and Rationale for Refill:  Approve     Medication Therapy Plan:        Comments:     Note composed:1:11 AM 10/10/2024

## 2024-11-26 ENCOUNTER — CLINICAL SUPPORT (OUTPATIENT)
Dept: REHABILITATION | Facility: HOSPITAL | Age: 89
End: 2024-11-26
Payer: MEDICARE

## 2024-11-26 DIAGNOSIS — M53.86 DECREASED ROM OF LUMBAR SPINE: Primary | ICD-10-CM

## 2024-11-26 DIAGNOSIS — R29.898 DECREASED STRENGTH OF LOWER EXTREMITY: ICD-10-CM

## 2024-11-26 DIAGNOSIS — M51.369 DEGENERATION OF INTERVERTEBRAL DISC OF LUMBAR REGION, UNSPECIFIED WHETHER PAIN PRESENT: ICD-10-CM

## 2024-11-26 PROCEDURE — 97162 PT EVAL MOD COMPLEX 30 MIN: CPT | Mod: HCNC,PN

## 2024-11-26 PROCEDURE — 97110 THERAPEUTIC EXERCISES: CPT | Mod: HCNC,PN

## 2024-11-26 NOTE — PLAN OF CARE
[facility]  Physical Therapy Initial Evaluation    Name: Kang Herbert  [facility] Number: [id number]    Therapy Diagnosis:   Encounter Diagnoses   Name Primary?    Decreased ROM of lumbar spine Yes    Decreased strength of lower extremity     Degeneration of intervertebral disc of lumbar region, unspecified whether pain present      Physician: Miquel Clinton MD    Physician Orders: PT Eval and Treat   Medical Diagnosis from Referral:   M54.16 (ICD-10-CM) - Lumbar radiculopathy   M51.369 (ICD-10-CM) - DDD (degenerative disc disease), lumbar   M53.3 (ICD-10-CM) - Sacroiliac joint pain     Evaluation Date: 11/26/2024  Authorization Period Expiration: 12/31/24  Plan of Care Expiration: 1/10/25  Visit # / Visits authorized: 1/ 20  FOTO: no proxy available, unable to understand survey with use of      Time In: 11:10  Time Out: 12:10  Total Billable Time: 60 minutes (mod Complexity Evaluation, Therapeutic Exercise - 1)    Precautions: Standard; type 2 diabetes and hypertension; history of seizures, dementia, fall risk, recent left humeral fracture  Patient's wife afraid to be alone - requests to be with patient at all times during visits.    Subjective   Date of onset: 6+ years   History of current condition - [place] reports: he has pain across his back and hips and right leg greater than left. Pain ongoing years. He reports he has sciatic pain which has not resolved. He has had 4 injections in his back without significant relief. Able to do most daily activities but pain becomes strong after a few hours. Walks at night in home for about 10 minutes when he can't sleep at night. He reports he has continued some exercises since last episode.     He also recently broke his left shoulder in his home 2x due to falls when he got caught in pajama pants. Overall he shoulder is doing better.   He also reports dizziness due to vertigo at night that prevents him from sleeping. He was  "prescribed a medication but it was only helping in the morning, not night.  This may also have contributed to falls.     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    Medications:   [place] has a current medication list which includes the following prescription(s): atorvastatin, blood sugar diagnostic, blood sugar diagnostic, blood-glucose meter, finasteride, gabapentin, lancets, levothyroxine, losartan, meloxicam, memantine, metformin, and omeprazole.    Allergies:   Review of patient's allergies indicates:  No Known Allergies     Imaging, MRI studies June 2024: "FINDINGS:  Alignment: Levo and kyphotic curvature of the thoracolumbar junction.  Minimal grade 1 anterolisthesis of T10 on T11.  Minimal grade 1 retrolisthesis of L1 on L2 and L2 on L3.     Vertebrae: Vertebral body heights are well maintained without evidence of fracture.  No marrow signal abnormality to suggest an infiltrative process.     Discs: Degenerative disc space narrowing and desiccation throughout the lower thoracic and lumbar spine, most significant at L1-L2 and L2-L3.  Multilevel chronic endplate changes and edema of the lower thoracic and lumbar spine.     Cord: Lower cord is normal " "morphology and signal. Conus terminates at L1-L2.     Degenerative findings:     T10-T11: Anterolisthesis with uncovering of the disc.  Broad-based disc bulge with a superimposed left foraminal and extraforaminal component.  Mild spinal canal stenosis.  Moderate left neural foraminal narrowing.     T11-T12: Left eccentric disc bulge.  Left facet joint arthropathy.  No spinal canal stenosis.  Moderate left neural foraminal narrowing.     T12-L1: Mild broad-based disc bulge.  Moderate right facet joint arthropathy.  Right paracentral spinal canal stenosis. Severe right neural foraminal narrowing.     L1-L2: Minimal retrolisthesis.  Mild broad-based disc bulge.  Mild spinal canal stenosis. Moderate bilateral neural foraminal narrowing.     L2-L3: Minimal retrolisthesis.  Left foraminal and extraforaminal disc bulge.  Moderate spinal canal stenosis. Mild bilateral neural foraminal narrowing.     L3-L4: Mild broad-based disc bulge.  Mild bilateral facet joint arthropathy.  Moderate spinal canal stenosis. Moderate right neural foraminal narrowing.     L4-L5: Moderate broad-based disc bulge asymmetric left.  Facet joint arthropathy, more significant on the left.  Narrowing of left lateral recess..  Severe left neural foraminal narrowing.     L5-S1:  No spinal canal stenosis. No neural foraminal narrowing.     Limited views of the upper sacrum and upper sacroiliac joints are without significant abnormality.     Paraspinal muscles & soft tissues: Bilateral renal T2 hyperintensities, corresponding to cysts when compared to prior CT urogram.     Impression:     Slight progression of degenerative changes of the lumbar spine when compared to prior MRI.  Significant spinal canal stenosis and neural foraminal narrowing levels include L2-L3 through L4-L5 as noted above."    Prior Therapy: PT for low back- inconsistent reports as to whether PT helps or not, repeats several times feels better after but then states therapy doesn't " help  Social History:  lives with their spouse (dependent)  Occupation: not working  Prior Level of Function: chronic back pain, independent with ADL  Current Level of Function: pain with ADLs, limited mobility, night pains limiting sleep    Pain:   Current 7/10, worst 9/10, best 4/10   Location: bilateral back  and buttocks   Description: Sharp  Aggravating Factors: Sitting, Standing, Bending, Walking, Night Time, Lifting, and Getting out of bed/chair  Easing Factors: pain medication and injection    Pts goals: decreased pain    Objective     Posture: Right thoracic curve with increased kyphosis, left lumbar curve, elevated right iliac crest. Flattening of lumbar lordosis   Palpation: generalized tenderness of bilateral lumbar paraspinals   Sensation: normal light touch    Range of Motion/Strength:     Lumbar AROM Pain/Dysfunction with Movement:   Flexion 30* Pain in bilateral posterior LEs   Extension 10* Central back pain   Right side bending 25%* Bilateral back & lower extremity pain   Left side bending 25% * Bilateral back & lower extremity pain    Right rotation 60%*    Left rotation 75%          L/E MMT Right Left Pain/Dysfunction with Movement   Hip Flexion 4+/5 4+/5    Hip Extension 4+/5 4+/5    Hip Abduction 3/5 4/5    Hip Adduction 5/5 5/5    Hip IR 5/5 5/5    Hip ER 5/5 5/5    Knee Flexion 4+/5 4+/5    Knee Extension 4+/5 4+/5    Ankle DF 5/5 4+/5    Ankle PF 4/5 4/5        Joint Mobility:   - Lumbar: hypomobility throughout lumbar spine, mild relief with p/a  - Hip: normal mobility    Flexibility: decreased flexibility hamstrings     Special Tests:  Lumbar Tests:  Slump test = + bilateral   Quadrant test = + bilateral   SLR Test (L4-S3) = + bilateral   Lumbar distraction = + relief bilateral        Cluster for spinal stenosis:   Néstor s/s, leg > LBP, pain during walking/standing, relief upon sitting, > 49 y/o = Met 4/4    Gait Analysis:With AD.  Device Used -  Cane  Deviations: decreased step length and  "gait speed      30 second sit-to-stand test (without U/E support): 14 ( w/ UE support)  30" sit to stand Cutoff Scores:11      Balance: min sway NBOS, mod-max sway EC   Unable to maintain tandem 3sec     TREATMENT   Treatment Time In: 12:00  Treatment Time Out: 12:10  Total Treatment time separate from Evaluation: 10 minutes    [place] received therapeutic exercises to develop strength, endurance, ROM, flexibility, posture, and core stabilization for 10 minutes including:  Lower trunk rotation  SKTC   Bridges  Sidelying hip abduction     Home Exercises Provided and Patient Education Provided     Education provided:   Anatomy and Pathology.  Symptom management and plan of care progressions.  Home Exercise Program.    Written Home Exercises Provided: yes.  Exercises were reviewed and [place] was able to demonstrate them prior to the end of the session.  [place] demonstrated good  understanding of the education provided.     See EMR under Patient Instructions for exercises provided 11/26/2024.      Assessment   [place] is a 89 y.o. male referred to outpatient Physical Therapy with a medical diagnosis of Lumbar radiculopathy, DDD (degenerative disc disease), lumbar, Sacroiliac joint pain. Pt presents with chronic low back pain with radiating pain. Some difficulty with subjective interpretation using  as patient is somewhat poor historian. Patient with decreased lumbar range of motion, decreased lower extremity strength, impaired balance and gait, decreased lumbar joint mobility, and decreased functional mobility. These impairments impact their ability to stand, walk, sleep, increased fall risk.       Pt prognosis is Fair-guarded   Pt will benefit from skilled outpatient Physical Therapy to address the deficits stated above and in the chart below, provide pt/family education, and to maximize pt's level of independence.     Plan of care discussed with patient: Yes  Pt's spiritual, cultural and " educational needs considered and patient is agreeable to the plan of care and goals as stated below:     Anticipated Barriers for therapy: chronicity, prior failed PT, dependent wife    Medical Necessity is demonstrated by the following  History  Co-morbidities and personal factors that may impact the plan of care [] LOW: no personal factors / co-morbidities  [] MODERATE: 1-2 personal factors / co-morbidities  [x] HIGH: 3+ personal factors / co-morbidities    Moderate / High Support Documentation:   Co-morbidities affecting plan of care:   Cataract        Diabetes mellitus      Diabetes mellitus, type 2      Hyperlipidemia      Hypertension      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    Seizures      Spondylosis without myelopathy        Personal Factors:   age, difficulty understanding/following directions      Examination  Body Structures and Functions, activity limitations and participation restrictions that may impact the plan of care [] LOW: addressing 1-2 elements  [x] MODERATE: 3+ elements  [] HIGH: 4+ elements (please support below)    Moderate / High Support Documentation: range of motion, strength, balance, gait, transitions, transfers, mobility      Clinical Presentation [] LOW: stable  [x] MODERATE: Evolving  [] HIGH: Unstable     Decision Making/ Complexity Score: moderate       Goals   Short Term Goals (4 Weeks):  1. Pt will be compliant with HEP to supplement PT in decreasing pain with functional mobility.  2. Pt will perform sit to stand  with good control to demonstrate improved core strength.  3. Pt will report 25% improvement in thoracolumbar ROM to promote functional mobility.  4. Pt will improve hip abduction MMTs to >/=4/5 to improve strength for functional tasks.  Long Term Goals (8 Weeks):   1. Pt will be able to be able to perform standing activities for 75% of session with 3/10 pain or less.   2. Pt will improve  impaired LE MMTs to >/=4+/5 to improve strength for functional tasks.  3. Pt will report no pain during 30 sec sit to stand test to promote functional mobility.       Plan   Plan of care Certification: 11/26/2024 to 1/10/25.    Outpatient Physical Therapy 2 times weekly for 6 weeks to include the following interventions: Cervical/Lumbar Traction, Electrical Stimulation, Gait Training, Manual Therapy, Moist Heat/ Ice, Neuromuscular Re-ed, Patient Education, Therapeutic Activities, and Therapeutic Exercise, ASTYM, Kinesiotaping PRN, Functional Dry Needling    Aria Cueva, PT, DPT, OCS, Cert.DN

## 2024-12-02 ENCOUNTER — DOCUMENTATION ONLY (OUTPATIENT)
Dept: REHABILITATION | Facility: HOSPITAL | Age: 89
End: 2024-12-02
Payer: MEDICARE

## 2024-12-02 NOTE — PROGRESS NOTES
Face to face meeting completed with Aria Cueva PT regarding current status and progress of   Kang Herbert .  Bhavana Dawn, PTA

## 2024-12-11 ENCOUNTER — CLINICAL SUPPORT (OUTPATIENT)
Dept: REHABILITATION | Facility: HOSPITAL | Age: 89
End: 2024-12-11
Payer: MEDICARE

## 2024-12-11 DIAGNOSIS — M53.86 DECREASED ROM OF LUMBAR SPINE: Primary | ICD-10-CM

## 2024-12-11 DIAGNOSIS — R29.898 DECREASED STRENGTH OF LOWER EXTREMITY: ICD-10-CM

## 2024-12-11 DIAGNOSIS — M51.369 DEGENERATION OF INTERVERTEBRAL DISC OF LUMBAR REGION, UNSPECIFIED WHETHER PAIN PRESENT: ICD-10-CM

## 2024-12-11 NOTE — PROGRESS NOTES
"  [facility] OUTPATIENT THERAPY AND WELLNESS   Physical Therapy Treatment Note      Name: Kang Herbert  [facility] Number: [id number]    Therapy Diagnosis:   Encounter Diagnoses   Name Primary?    Decreased ROM of lumbar spine Yes    Decreased strength of lower extremity     Degeneration of intervertebral disc of lumbar region, unspecified whether pain present      Physician: Miquel Clinton MD    Visit Date: 12/11/2024    Evaluation Date: 11/26/2024  Authorization Period Expiration: 12/31/24  Plan of Care Expiration: 1/10/25  Visit # / Visits authorized: 1/ 20  FOTO: no proxy available, unable to understand survey with use of       Time In: 2:00  Time Out: 2:55  Total Billable Time: 55 minutes (Therapeutic Exercise - 2, NM2)     Precautions: Standard; type 2 diabetes and hypertension; history of seizures, dementia, fall risk, recent left humeral fracture  Patient's wife afraid to be alone - requests to be with patient at all times during visits.    PTA Visit #: 1/5       Subjective     Patient reports: feeling fine, regular. The therapy is helping him feel better. .  He was compliant with home exercise program.  Response to previous treatment: Eval only  Functional change: Ongoing    Pain: 2/10  Location: bilateral back and buttocks     Objective      Objective Measures updated at progress report unless specified.     Treatment     [place] received the treatments listed below:      therapeutic exercises to develop strength, endurance, ROM, flexibility, posture, and core stabilization for 25 minutes including:  Lower trunk rotation 2'  SKTC 10x10"   Heel raises 2x10   Standing hip abductions 2x10  Seated lumbar flexion 2'  Bike 5' L2     neuromuscular re-education activities to improve: Balance, Coordination, Kinesthetic, Sense, Proprioception, and Posture for 30 minutes. The following activities were included:  Bridges 2x10  Sidelying hip abduction 2x10  Straight leg raise 3x10    Long arc quad red " theraband 2x10  Sit to stand 2x10       Patient Education and Home Exercises       Education provided:   Anatomy and Pathology.  Symptom management and plan of care progressions.  Home Exercise Program.    Written Home Exercises Provided: Pt instructed to continue prior HEP. Exercises were reviewed and [place] was able to demonstrate them prior to the end of the session.  [place] demonstrated good  understanding of the education provided. See Electronic Medical Record under Patient Instructions for exercises provided during therapy sessions    Assessment     [place] is a 89 y.o. male referred to outpatient Physical Therapy with a medical diagnosis of Lumbar radiculopathy, DDD (degenerative disc disease), lumbar, Sacroiliac joint pain. Pt presents with chronic low back pain with radiating pain. Patient presents for first visit with low grade pain, improving since evaluation. Treatment focused on lumbopelvic control and generalized strengthening. Patient with good response to treatment session with reduction in pain. Continue to progress as tolerated.     [place] Is progressing well towards his goals.   Patient prognosis is Fair to Guarded.     Patient will continue to benefit from skilled outpatient physical therapy to address the deficits listed in the problem list box on initial evaluation, provide pt/family education and to maximize pt's level of independence in the home and community environment.     Patient's spiritual, cultural and educational needs considered and pt agreeable to plan of care and goals.     Anticipated barriers to physical therapy: chronicity, prior failed PT, dependent wife     Goals:   Short Term Goals (4 Weeks):  1. Pt will be compliant with HEP to supplement PT in decreasing pain with functional mobility.  2. Pt will perform sit to stand  with good control to demonstrate improved core strength.  3. Pt will report 25% improvement in thoracolumbar ROM to promote functional  mobility.  4. Pt will improve hip abduction MMTs to >/=4/5 to improve strength for functional tasks.  Long Term Goals (8 Weeks):   1. Pt will be able to be able to perform standing activities for 75% of session with 3/10 pain or less.   2. Pt will improve impaired LE MMTs to >/=4+/5 to improve strength for functional tasks.  3. Pt will report no pain during 30 sec sit to stand test to promote functional mobility.     Plan     Plan of care Certification: 11/26/2024 to 1/10/25.     Aria Cueva, PT, DPT

## 2024-12-12 ENCOUNTER — CLINICAL SUPPORT (OUTPATIENT)
Dept: REHABILITATION | Facility: HOSPITAL | Age: 89
End: 2024-12-12
Payer: MEDICARE

## 2024-12-12 DIAGNOSIS — R29.898 DECREASED STRENGTH OF LOWER EXTREMITY: ICD-10-CM

## 2024-12-12 DIAGNOSIS — M53.86 DECREASED ROM OF LUMBAR SPINE: Primary | ICD-10-CM

## 2024-12-12 DIAGNOSIS — M51.369 DEGENERATION OF INTERVERTEBRAL DISC OF LUMBAR REGION, UNSPECIFIED WHETHER PAIN PRESENT: ICD-10-CM

## 2024-12-12 PROCEDURE — 97140 MANUAL THERAPY 1/> REGIONS: CPT | Mod: HCNC,PN

## 2024-12-12 PROCEDURE — 97110 THERAPEUTIC EXERCISES: CPT | Mod: HCNC,PN

## 2024-12-12 PROCEDURE — 97112 NEUROMUSCULAR REEDUCATION: CPT | Mod: HCNC,PN

## 2024-12-12 NOTE — PROGRESS NOTES
" [facility]   Physical Therapy Treatment Note      Name: Kang Herbert  [facility] Number: [id number]    Therapy Diagnosis:   Encounter Diagnoses   Name Primary?    Decreased ROM of lumbar spine Yes    Decreased strength of lower extremity     Degeneration of intervertebral disc of lumbar region, unspecified whether pain present        Physician: Miuqel Clinton MD    Visit Date: 12/12/2024    Evaluation Date: 11/26/2024  Authorization Period Expiration: 12/31/24  Plan of Care Expiration: 1/10/25  Visit # / Visits authorized: 2/ 20  FOTO: no proxy available, unable to understand survey with use of       Time In: 2:00  Time Out: 2:55  Total Billable Time: 55 minutes (Therapeutic Exercise - 1, NM2, 1 MT)     Precautions: Standard; type 2 diabetes and hypertension; history of seizures, dementia, fall risk, recent left humeral fracture  Patient's wife afraid to be alone - requests to be with patient at all times during visits.    PTA Visit #: 1/5   [facility]  used    Subjective     Patient reports: he is feeling a little better. Some complaints of knee pain today.   He was compliant with home exercise program.  Response to previous treatment: improving back pain  Functional change: Ongoing    Pain: 2/10  Location: bilateral back and buttocks     Objective      Objective Measures updated at progress report unless specified.     Treatment     [place] received the treatments listed below:      therapeutic exercises to develop strength, endurance, ROM, flexibility, posture, and core stabilization for 17 minutes including:  Lower trunk rotation 2'  SKTC 10x10"   Heel raises 2x10   Standing hip abductions 2x10  Standing hip extension 2x10   Seated lumbar flexion 2'  Bike 5' L1    neuromuscular re-education activities to improve: Balance, Coordination, Kinesthetic, Sense, Proprioception, and Posture for 30 minutes. The following activities were included:  Bridges 2x10  Sidelying " hip abduction 2x10  Straight leg raise 3x10    Long arc quad red theraband 2x10  Sit to stand 2x10   Hamstring curls red theraband 2x10     [place] received the following manual therapy techniques: Joint mobilizations and Soft tissue Mobilization were applied to the: bilateral knees for 8 minutes, including:  Patellar mobilizations  STM quads     Patient Education and Home Exercises       Education provided:   Anatomy and Pathology.  Symptom management and plan of care progressions.  Home Exercise Program.    Written Home Exercises Provided: Pt instructed to continue prior HEP. Exercises were reviewed and [place] was able to demonstrate them prior to the end of the session.  [place] demonstrated good  understanding of the education provided. See Electronic Medical Record under Patient Instructions for exercises provided during therapy sessions    Assessment     [place] is a 89 y.o. male referred to outpatient Physical Therapy with a medical diagnosis of Lumbar radiculopathy, DDD (degenerative disc disease), lumbar, Sacroiliac joint pain. Pt presents with chronic low back pain with radiating pain. Patient presents with mild knee soreness since yesterdays visit. Patient tolerated session well overall. Included patellar mobilizations to improve knee mobility and tolerance for exercises. Patient cued to limit range due to mild pain with hip extension. Patient with good response to treatment session with reduction in pain. Continue to progress as tolerated.     [place] Is progressing well towards his goals.   Patient prognosis is Fair to Guarded.     Patient will continue to benefit from skilled outpatient physical therapy to address the deficits listed in the problem list box on initial evaluation, provide pt/family education and to maximize pt's level of independence in the home and community environment.     Patient's spiritual, cultural and educational needs considered and pt agreeable to plan of care  and goals.     Anticipated barriers to physical therapy: chronicity, prior failed PT, dependent wife     Goals:   Short Term Goals (4 Weeks):  1. Pt will be compliant with HEP to supplement PT in decreasing pain with functional mobility.  2. Pt will perform sit to stand  with good control to demonstrate improved core strength.  3. Pt will report 25% improvement in thoracolumbar ROM to promote functional mobility.  4. Pt will improve hip abduction MMTs to >/=4/5 to improve strength for functional tasks.  Long Term Goals (8 Weeks):   1. Pt will be able to be able to perform standing activities for 75% of session with 3/10 pain or less.   2. Pt will improve impaired LE MMTs to >/=4+/5 to improve strength for functional tasks.  3. Pt will report no pain during 30 sec sit to stand test to promote functional mobility.     Plan     Plan of care Certification: 11/26/2024 to 1/10/25.     Aria Cueva, PT, DPT

## 2024-12-17 ENCOUNTER — CLINICAL SUPPORT (OUTPATIENT)
Dept: REHABILITATION | Facility: HOSPITAL | Age: 89
End: 2024-12-17
Payer: MEDICARE

## 2024-12-17 DIAGNOSIS — R29.898 DECREASED STRENGTH OF LOWER EXTREMITY: ICD-10-CM

## 2024-12-17 DIAGNOSIS — M51.369 DEGENERATION OF INTERVERTEBRAL DISC OF LUMBAR REGION, UNSPECIFIED WHETHER PAIN PRESENT: ICD-10-CM

## 2024-12-17 DIAGNOSIS — M53.86 DECREASED ROM OF LUMBAR SPINE: Primary | ICD-10-CM

## 2024-12-17 PROCEDURE — 97112 NEUROMUSCULAR REEDUCATION: CPT | Mod: HCNC,PN

## 2024-12-17 PROCEDURE — 97530 THERAPEUTIC ACTIVITIES: CPT | Mod: HCNC,PN

## 2024-12-17 NOTE — PROGRESS NOTES
"  [facility]   Physical Therapy Treatment Note      Name: Kang Herbert  [facility] Number: [id number]    Therapy Diagnosis:   Encounter Diagnoses   Name Primary?    Decreased ROM of lumbar spine Yes    Decreased strength of lower extremity     Degeneration of intervertebral disc of lumbar region, unspecified whether pain present          Physician: Miquel Clinton MD    Visit Date: 12/17/2024    Evaluation Date: 11/26/2024  Authorization Period Expiration: 12/31/24  Plan of Care Expiration: 1/10/25  Visit # / Visits authorized: 2/ 20  FOTO: no proxy available, unable to understand survey with use of       Time In: 2:30  Time Out:3:31  Total Billable Time: 38 minutes (Therapeutic Exercise - 2NM2, 1 TA)   Precautions: Standard; type 2 diabetes and hypertension; history of seizures, dementia, fall risk, recent left humeral fracture  Patient's wife afraid to be alone - requests to be with patient at all times during visits.    PTA Visit #: 0/5   AMN  used    Subjective     Patient reports: he is feeling a little better today with only a little pain in low back, right shoulder, knees.  He was compliant with home exercise program.  Response to previous treatment: improving back pain  Functional change: Ongoing    Pain: 2/10  Location: bilateral back and buttocks     Objective      Objective Measures updated at progress report unless specified.     Treatment   Bold= progressed today  (+)= added today    [place] received the treatments listed below:      therapeutic exercises to develop strength, endurance, ROM, flexibility, posture, and core stabilization for 15minutes including:  Lower trunk rotation 2'  Seated lumbar flexion 2'  Next:  Heel raises 2x10   Standing hip abductions 2x10  Standing hip extension 2x10   SKTC 10x10"       neuromuscular re-education activities to improve: Balance, Coordination, Kinesthetic, Sense, Proprioception, and Posture for 32 minutes. The " following activities were included:  Bridges 3x10  Sidelying hip abduction 3x10  Straight leg raise 3x10    Long arc quad red theraband 2x10  Hamstring curls red theraband 3x10   Sit to stand 2x10       Therapeutic activity to promote functional mobility: 8 mins  Bike 8' L2      [place] received the following manual therapy techniques: Joint mobilizations and Soft tissue Mobilization were applied to the: bilateral knees for 0minutes, including:  Patellar mobilizations  STM quads     Patient Education and Home Exercises       Education provided:   Anatomy and Pathology.  Symptom management and plan of care progressions.  Home Exercise Program.    Written Home Exercises Provided: Pt instructed to continue prior HEP. Exercises were reviewed and [place] was able to demonstrate them prior to the end of the session.  [place] demonstrated good  understanding of the education provided. See Electronic Medical Record under Patient Instructions for exercises provided during therapy sessions    Assessment     [place] is a 89 y.o. male referred to outpatient Physical Therapy with a medical diagnosis of Lumbar radiculopathy, DDD (degenerative disc disease), lumbar, Sacroiliac joint pain. Pt presents with chronic low back pain with radiating pain.   Interval: low pain reported today and good tolerance of today's session. Rode bike for longer duration at higher level of resistance, performed exercises with increased repetitions, denoting improved strength and endurance.     [place] Is progressing well towards his goals.   Patient prognosis is Fair to Guarded.     Patient will continue to benefit from skilled outpatient physical therapy to address the deficits listed in the problem list box on initial evaluation, provide pt/family education and to maximize pt's level of independence in the home and community environment.     Patient's spiritual, cultural and educational needs considered and pt agreeable to plan of  care and goals.     Anticipated barriers to physical therapy: chronicity, prior failed PT, dependent wife     Goals:   Short Term Goals (4 Weeks):  1. Pt will be compliant with HEP to supplement PT in decreasing pain with functional mobility.  2. Pt will perform sit to stand  with good control to demonstrate improved core strength.  3. Pt will report 25% improvement in thoracolumbar ROM to promote functional mobility.  4. Pt will improve hip abduction MMTs to >/=4/5 to improve strength for functional tasks.  Long Term Goals (8 Weeks):   1. Pt will be able to be able to perform standing activities for 75% of session with 3/10 pain or less.   2. Pt will improve impaired LE MMTs to >/=4+/5 to improve strength for functional tasks.  3. Pt will report no pain during 30 sec sit to stand test to promote functional mobility.     Plan     Plan of care Certification: 11/26/2024 to 1/10/25.     Kadie Zarco, PT , DPT  12/17/2024

## 2024-12-19 ENCOUNTER — CLINICAL SUPPORT (OUTPATIENT)
Dept: REHABILITATION | Facility: HOSPITAL | Age: 89
End: 2024-12-19
Payer: MEDICARE

## 2024-12-19 DIAGNOSIS — R29.898 DECREASED STRENGTH OF LOWER EXTREMITY: ICD-10-CM

## 2024-12-19 DIAGNOSIS — M51.369 DEGENERATION OF INTERVERTEBRAL DISC OF LUMBAR REGION, UNSPECIFIED WHETHER PAIN PRESENT: ICD-10-CM

## 2024-12-19 DIAGNOSIS — M53.86 DECREASED ROM OF LUMBAR SPINE: Primary | ICD-10-CM

## 2024-12-19 PROCEDURE — 97110 THERAPEUTIC EXERCISES: CPT | Mod: HCNC,PN,CQ

## 2024-12-19 PROCEDURE — 97530 THERAPEUTIC ACTIVITIES: CPT | Mod: HCNC,PN,CQ

## 2024-12-19 PROCEDURE — 97112 NEUROMUSCULAR REEDUCATION: CPT | Mod: HCNC,PN,CQ

## 2024-12-19 NOTE — PROGRESS NOTES
"  [facility]   Physical Therapy Treatment Note      Name: Kang Herbert  [facility] Number: [id number]    Therapy Diagnosis:   Encounter Diagnoses   Name Primary?    Decreased ROM of lumbar spine Yes    Decreased strength of lower extremity     Degeneration of intervertebral disc of lumbar region, unspecified whether pain present        Physician: Miquel Clinton MD    Visit Date: 12/19/2024    Evaluation Date: 11/26/2024  Authorization Period Expiration: 12/31/24  Plan of Care Expiration: 1/10/25  Visit # / Visits authorized: 2/ 20  FOTO: no proxy available, unable to understand survey with use of       Time In: 2:00  Time Out: 3:00  Total Billable Time: 60 minutes (Therapeutic Exercise - 1,NM2, 1 TA, )   Precautions: Standard; type 2 diabetes and hypertension; history of seizures, dementia, fall risk, recent left humeral fracture  Patient's wife afraid to be alone - requests to be with patient at all times during visits.    PTA Visit #: 1/5   AMN  used, not today    Subjective     Patient reports: he is feeling a little better today  He was compliant with home exercise program.  Response to previous treatment: improving back pain  Functional change: Ongoing    Pain: 2/10  Location: bilateral back and buttocks     Objective      Objective Measures updated at progress report unless specified.     Treatment   Bold= progressed today  (+)= added today    [place] received the treatments listed below:      therapeutic exercises to develop strength, endurance, ROM, flexibility, posture, and core stabilization for 10 minutes including:  Lower trunk rotation 2'  Seated lumbar flexion 2'    Next:  Standing hip abductions 2x10  Standing hip extension 2x10   SKTC 10x10"       neuromuscular re-education activities to improve: Balance, Coordination, Kinesthetic, Sense, Proprioception, and Posture for 30 minutes. The following activities were included:  Bridges 3x10  Sidelying " hip abduction 3x10  Straight leg raise 3x10    Long arc quad red theraband 2x10  Hamstring curls red theraband 3x10       Therapeutic activity to promote functional mobility:15 mins  Bike 8' L2 hills  Sit to stand 2x10   Heel raises 2x10       [place] received the following manual therapy techniques: Joint mobilizations and Soft tissue Mobilization were applied to the: bilateral knees for 0 minutes, including:  Patellar mobilizations  STM quads     Patient Education and Home Exercises       Education provided:   Anatomy and Pathology.  Symptom management and plan of care progressions.  Home Exercise Program.    Written Home Exercises Provided: Pt instructed to continue prior HEP. Exercises were reviewed and [place] was able to demonstrate them prior to the end of the session.  [place] demonstrated good  understanding of the education provided. See Electronic Medical Record under Patient Instructions for exercises provided during therapy sessions    Assessment     [place] is a 89 y.o. male referred to outpatient Physical Therapy with a medical diagnosis of Lumbar radiculopathy, DDD (degenerative disc disease), lumbar, Sacroiliac joint pain. Pt presents with chronic low back pain with radiating pain.   Interval: Primary complaint of lower back/hip pain today. Overall feels improvement since beginning PT. Good tolerance to session today with appropriate fatigue noted. Monitor response, progress as appropriate.     [place] Is progressing well towards his goals.   Patient prognosis is Fair to Guarded.     Patient will continue to benefit from skilled outpatient physical therapy to address the deficits listed in the problem list box on initial evaluation, provide pt/family education and to maximize pt's level of independence in the home and community environment.     Patient's spiritual, cultural and educational needs considered and pt agreeable to plan of care and goals.     Anticipated barriers to  physical therapy: chronicity, prior failed PT, dependent wife     Goals:   Short Term Goals (4 Weeks):  1. Pt will be compliant with HEP to supplement PT in decreasing pain with functional mobility.  2. Pt will perform sit to stand  with good control to demonstrate improved core strength.  3. Pt will report 25% improvement in thoracolumbar ROM to promote functional mobility.  4. Pt will improve hip abduction MMTs to >/=4/5 to improve strength for functional tasks.  Long Term Goals (8 Weeks):   1. Pt will be able to be able to perform standing activities for 75% of session with 3/10 pain or less.   2. Pt will improve impaired LE MMTs to >/=4+/5 to improve strength for functional tasks.  3. Pt will report no pain during 30 sec sit to stand test to promote functional mobility.     Plan     Plan of care Certification: 11/26/2024 to 1/10/25.     Bhavana Dawn, PTA  12/19/2024

## 2024-12-23 ENCOUNTER — CLINICAL SUPPORT (OUTPATIENT)
Dept: REHABILITATION | Facility: HOSPITAL | Age: 89
End: 2024-12-23
Payer: MEDICARE

## 2024-12-23 DIAGNOSIS — M51.369 DEGENERATION OF INTERVERTEBRAL DISC OF LUMBAR REGION, UNSPECIFIED WHETHER PAIN PRESENT: ICD-10-CM

## 2024-12-23 DIAGNOSIS — R29.898 DECREASED STRENGTH OF LOWER EXTREMITY: ICD-10-CM

## 2024-12-23 DIAGNOSIS — M53.86 DECREASED ROM OF LUMBAR SPINE: Primary | ICD-10-CM

## 2024-12-23 PROCEDURE — 97112 NEUROMUSCULAR REEDUCATION: CPT | Mod: HCNC,PN,CQ

## 2024-12-23 PROCEDURE — 97530 THERAPEUTIC ACTIVITIES: CPT | Mod: HCNC,PN,CQ

## 2024-12-23 PROCEDURE — 97110 THERAPEUTIC EXERCISES: CPT | Mod: HCNC,PN,CQ

## 2024-12-23 NOTE — PROGRESS NOTES
"  [facility]   Physical Therapy Treatment Note      Name: Kang Herbert  [facility] Number: [id number]    Therapy Diagnosis:   Encounter Diagnoses   Name Primary?    Decreased ROM of lumbar spine Yes    Decreased strength of lower extremity     Degeneration of intervertebral disc of lumbar region, unspecified whether pain present          Physician: Miquel Clinton MD    Visit Date: 12/23/2024    Evaluation Date: 11/26/2024  Authorization Period Expiration: 12/31/24  Plan of Care Expiration: 1/10/25  Visit # / Visits authorized: 5/ 20  FOTO: no proxy available, unable to understand survey with use of       Time In: 11:00  Time Out: 11:55  Total Billable Time: 55 minutes (Therapeutic Exercise - 1,NM2, 1 TA, )   Precautions: Standard; type 2 diabetes and hypertension; history of seizures, dementia, fall risk, recent left humeral fracture  Patient's wife afraid to be alone - requests to be with patient at all times during visits.    PTA Visit #: 1/5   AMN  used, not today    Subjective     Patient reports: Still having pain in posterior hip. Everything else seems to be ok  He was compliant with home exercise program.  Response to previous treatment: improving back pain  Functional change: Ongoing    Pain: 2/10  Location: bilateral back and buttocks     Objective      Objective Measures updated at progress report unless specified.     Treatment   Bold= progressed today  (+)= added today    [place] received the treatments listed below:      therapeutic exercises to develop strength, endurance, ROM, flexibility, posture, and core stabilization for 10 minutes including:  Lower trunk rotation 2'  Seated lumbar flexion 2'  Standing hip abductions 2x10  Standing hip extension 2x10     Next:  SK 10x10"       neuromuscular re-education activities to improve: Balance, Coordination, Kinesthetic, Sense, Proprioception, and Posture for 30 minutes. The following activities were " included:  Bridges 3x10  Sidelying hip abduction 3x10  +Clamshell 2 x 10   Straight leg raise 3x10    Long arc quad red theraband 2x10  Hamstring curls red theraband 3x10       Therapeutic activity to promote functional mobility:15 mins  Bike 8' L2 hills  Sit to stand 2x10   Heel raises 2x10       [place] received the following manual therapy techniques: Joint mobilizations and Soft tissue Mobilization were applied to the: bilateral knees for 0 minutes, including:  Patellar mobilizations  STM quads     Patient Education and Home Exercises       Education provided:   Anatomy and Pathology.  Symptom management and plan of care progressions.  Home Exercise Program.    Written Home Exercises Provided: Pt instructed to continue prior HEP. Exercises were reviewed and [place] was able to demonstrate them prior to the end of the session.  [place] demonstrated good  understanding of the education provided. See Electronic Medical Record under Patient Instructions for exercises provided during therapy sessions    Assessment     [place] is a 89 y.o. male referred to outpatient Physical Therapy with a medical diagnosis of Lumbar radiculopathy, DDD (degenerative disc disease), lumbar, Sacroiliac joint pain. Pt presents with chronic low back pain with radiating pain.   Interval: Primary complaint of lower back/hip pain today. Overall feels improvement since beginning PT. Good tolerance to session. Showed appropriate fatigue post session. Monitor response, progress as appropriate.     [place] Is progressing well towards his goals.   Patient prognosis is Fair to Guarded.     Patient will continue to benefit from skilled outpatient physical therapy to address the deficits listed in the problem list box on initial evaluation, provide pt/family education and to maximize pt's level of independence in the home and community environment.     Patient's spiritual, cultural and educational needs considered and pt agreeable to  plan of care and goals.     Anticipated barriers to physical therapy: chronicity, prior failed PT, dependent wife     Goals:   Short Term Goals (4 Weeks):  1. Pt will be compliant with HEP to supplement PT in decreasing pain with functional mobility.  2. Pt will perform sit to stand  with good control to demonstrate improved core strength.  3. Pt will report 25% improvement in thoracolumbar ROM to promote functional mobility.  4. Pt will improve hip abduction MMTs to >/=4/5 to improve strength for functional tasks.  Long Term Goals (8 Weeks):   1. Pt will be able to be able to perform standing activities for 75% of session with 3/10 pain or less.   2. Pt will improve impaired LE MMTs to >/=4+/5 to improve strength for functional tasks.  3. Pt will report no pain during 30 sec sit to stand test to promote functional mobility.     Plan     Plan of care Certification: 11/26/2024 to 1/10/25.     Bhavana Dawn, PTA  12/23/2024

## 2024-12-24 ENCOUNTER — DOCUMENTATION ONLY (OUTPATIENT)
Dept: REHABILITATION | Facility: HOSPITAL | Age: 89
End: 2024-12-24
Payer: MEDICARE

## 2024-12-31 ENCOUNTER — CLINICAL SUPPORT (OUTPATIENT)
Dept: REHABILITATION | Facility: HOSPITAL | Age: 89
End: 2024-12-31
Payer: MEDICARE

## 2024-12-31 DIAGNOSIS — R29.898 DECREASED STRENGTH OF LOWER EXTREMITY: ICD-10-CM

## 2024-12-31 DIAGNOSIS — M51.369 DEGENERATION OF INTERVERTEBRAL DISC OF LUMBAR REGION, UNSPECIFIED WHETHER PAIN PRESENT: ICD-10-CM

## 2024-12-31 DIAGNOSIS — M53.86 DECREASED ROM OF LUMBAR SPINE: Primary | ICD-10-CM

## 2024-12-31 PROCEDURE — 97112 NEUROMUSCULAR REEDUCATION: CPT | Mod: HCNC,PN

## 2024-12-31 PROCEDURE — 97530 THERAPEUTIC ACTIVITIES: CPT | Mod: HCNC,PN

## 2024-12-31 PROCEDURE — 97110 THERAPEUTIC EXERCISES: CPT | Mod: HCNC,PN

## 2024-12-31 NOTE — PROGRESS NOTES
"  [facility]   Physical Therapy Treatment Note      Name: Kang Herbert  [facility] Number: [id number]    Therapy Diagnosis:   Encounter Diagnoses   Name Primary?    Decreased ROM of lumbar spine Yes    Decreased strength of lower extremity     Degeneration of intervertebral disc of lumbar region, unspecified whether pain present        Physician: Miquel Clinton MD    Visit Date: 12/31/2024    Evaluation Date: 11/26/2024  Authorization Period Expiration: 12/31/24  Plan of Care Expiration: 1/10/25  Visit # / Visits authorized: 6/ 8  FOTO: no proxy available, unable to understand survey with use of       Time In: 3:00  Time Out: 3:55  Total Billable Time: 55 minutes (Therapeutic Exercise - 1,NM2, 1 TA, )   Precautions: Standard; type 2 diabetes and hypertension; history of seizures, dementia, fall risk, recent left humeral fracture  Patient's wife afraid to be alone - requests to be with patient at all times during visits.    PTA Visit #: 1/5   AMN  used, not today    Subjective     Patient reports: low complaints of pain today.  He was compliant with home exercise program.  Response to previous treatment: improving back pain  Functional change: Ongoing    Pain: 2/10  Location: bilateral back and buttocks     Objective      Objective Measures updated at progress report unless specified.     Treatment   Bold= progressed today  (+)= added today    [place] received the treatments listed below:      therapeutic exercises to develop strength, endurance, ROM, flexibility, posture, and core stabilization for 10 minutes including:  Lower trunk rotation 2'  Seated lumbar flexion 2'  Standing hip abductions 3x10  Standing hip extension 3x10     Next:  SK 10x10"       neuromuscular re-education activities to improve: Balance, Coordination, Kinesthetic, Sense, Proprioception, and Posture for 30 minutes. The following activities were included:  Bridges 3x10  Sidelying hip " abduction 3x10  Clamshell 2 x 10   Straight leg raise 3x10    Long arc quad green theraband 2x10  Hamstring curls red theraband 3x10   SAPD red theraband 2x10  SAP[employer] march 2x10 CGA     Therapeutic activity to promote functional mobility:15 mins  Bike 8' L2 hills  Sit to stand 3x10   Heel raises 3x10     [place] received the following manual therapy techniques: Joint mobilizations and Soft tissue Mobilization were applied to the: bilateral knees for 0 minutes, including:  Patellar mobilizations  STM quads     Patient Education and Home Exercises       Education provided:   Anatomy and Pathology.  Symptom management and plan of care progressions.  Home Exercise Program.    Written Home Exercises Provided: Pt instructed to continue prior HEP. Exercises were reviewed and [place] was able to demonstrate them prior to the end of the session.  [place] demonstrated good  understanding of the education provided. See Electronic Medical Record under Patient Instructions for exercises provided during therapy sessions    Assessment     [place] is a 89 y.o. male referred to outpatient Physical Therapy with a medical diagnosis of Lumbar radiculopathy, DDD (degenerative disc disease), lumbar, Sacroiliac joint pain. Pt presents with chronic low back pain with radiating pain.   Interval: mild complaint of lower back/hip pain today. Good tolerance for progression of exercises this visit. Appropriate fatigue post treatment. Monitor response, progress as appropriate.     [place] Is progressing well towards his goals.   Patient prognosis is Fair to Guarded.     Patient will continue to benefit from skilled outpatient physical therapy to address the deficits listed in the problem list box on initial evaluation, provide pt/family education and to maximize pt's level of independence in the home and community environment.     Patient's spiritual, cultural and educational needs considered and pt agreeable to plan of care and  goals.     Anticipated barriers to physical therapy: chronicity, prior failed PT, dependent wife     Goals:   Short Term Goals (4 Weeks):  1. Pt will be compliant with HEP to supplement PT in decreasing pain with functional mobility.  2. Pt will perform sit to stand  with good control to demonstrate improved core strength. met  3. Pt will report 25% improvement in thoracolumbar ROM to promote functional mobility.  4. Pt will improve hip abduction MMTs to >/=4/5 to improve strength for functional tasks.  Long Term Goals (8 Weeks):   1. Pt will be able to be able to perform standing activities for 75% of session with 3/10 pain or less.   2. Pt will improve impaired LE MMTs to >/=4+/5 to improve strength for functional tasks.  3. Pt will report no pain during 30 sec sit to stand test to promote functional mobility.     Plan     Plan of care Certification: 11/26/2024 to 1/10/25.     Aria Cueva, PT, DPT  12/31/2024

## 2025-01-02 ENCOUNTER — CLINICAL SUPPORT (OUTPATIENT)
Dept: REHABILITATION | Facility: HOSPITAL | Age: 89
End: 2025-01-02
Payer: MEDICARE

## 2025-01-02 DIAGNOSIS — M51.369 DEGENERATION OF INTERVERTEBRAL DISC OF LUMBAR REGION, UNSPECIFIED WHETHER PAIN PRESENT: ICD-10-CM

## 2025-01-02 DIAGNOSIS — R29.898 DECREASED STRENGTH OF LOWER EXTREMITY: ICD-10-CM

## 2025-01-02 DIAGNOSIS — M53.86 DECREASED ROM OF LUMBAR SPINE: Primary | ICD-10-CM

## 2025-01-02 PROCEDURE — 97112 NEUROMUSCULAR REEDUCATION: CPT | Mod: HCNC,PN,CQ

## 2025-01-02 PROCEDURE — 97530 THERAPEUTIC ACTIVITIES: CPT | Mod: HCNC,PN,CQ

## 2025-01-02 PROCEDURE — 97110 THERAPEUTIC EXERCISES: CPT | Mod: HCNC,PN,CQ

## 2025-01-02 NOTE — PROGRESS NOTES
"  [facility]   Physical Therapy Treatment Note      Name: Kang Herbert  [facility] Number: [id number]    Therapy Diagnosis:   Encounter Diagnoses   Name Primary?    Decreased ROM of lumbar spine Yes    Decreased strength of lower extremity     Degeneration of intervertebral disc of lumbar region, unspecified whether pain present          Physician: Miquel Clinton MD    Visit Date: 1/2/2025    Evaluation Date: 11/26/2024  Authorization Period Expiration: 12/31/24  Plan of Care Expiration: 1/10/25  Visit # / Visits authorized: 1/ 8  FOTO: no proxy available, unable to understand survey with use of       Time In: 1:00  Time Out: 1:55  Total Billable Time: 55 minutes (Therapeutic Exercise - 1,NM2, 1 TA, )   Precautions: Standard; type 2 diabetes and hypertension; history of seizures, dementia, fall risk, recent left humeral fracture  Patient's wife afraid to be alone - requests to be with patient at all times during visits.    PTA Visit #: 1/5   AMN  used, not today    Subjective     Patient reports: No new complaints. Lower back pain unchanged.  He was compliant with home exercise program.  Response to previous treatment: improving back pain  Functional change: Ongoing    Pain: 2/10  Location: bilateral back and buttocks     Objective      Objective Measures updated at progress report unless specified.     Treatment   Bold= progressed today  (+)= added today    [place] received the treatments listed below:      therapeutic exercises to develop strength, endurance, ROM, flexibility, posture, and core stabilization for 10 minutes including:  Lower trunk rotation 2'  Seated lumbar flexion 2'  Standing hip abductions 3x10  Standing hip extension 3x10     Next:  SK 10x10"       neuromuscular re-education activities to improve: Balance, Coordination, Kinesthetic, Sense, Proprioception, and Posture for 30 minutes. The following activities were included:  Bridges " 3x10  Sidelying hip abduction 3x10  Clamshell 2 x 10   Straight leg raise 3x10    Long arc quad green theraband 2x10  Hamstring curls red theraband 3x10   SAPD red theraband 2x10  SAPD march 2x10 CGA     Therapeutic activity to promote functional mobility:15 mins  Bike 8' L2 hills  Sit to stand 3x10   Heel raises 3x10     [place] received the following manual therapy techniques: Joint mobilizations and Soft tissue Mobilization were applied to the: bilateral knees for 0 minutes, including:  Patellar mobilizations  STM quads     Patient Education and Home Exercises       Education provided:   Anatomy and Pathology.  Symptom management and plan of care progressions.  Home Exercise Program.    Written Home Exercises Provided: Pt instructed to continue prior HEP. Exercises were reviewed and [place] was able to demonstrate them prior to the end of the session.  [place] demonstrated good  understanding of the education provided. See Electronic Medical Record under Patient Instructions for exercises provided during therapy sessions    Assessment     [place] is a 89 y.o. male referred to outpatient Physical Therapy with a medical diagnosis of Lumbar radiculopathy, DDD (degenerative disc disease), lumbar, Sacroiliac joint pain. Pt presents with chronic low back pain with radiating pain.   Interval: Ongoing mild complaints of lower back/hip pain today. Treatment continues to focus on lower back mobility and general strengthening. Good tolerance. Monitor response, progress as appropriate.     [place] Is progressing well towards his goals.   Patient prognosis is Fair to Guarded.     Patient will continue to benefit from skilled outpatient physical therapy to address the deficits listed in the problem list box on initial evaluation, provide pt/family education and to maximize pt's level of independence in the home and community environment.     Patient's spiritual, cultural and educational needs considered and pt  agreeable to plan of care and goals.     Anticipated barriers to physical therapy: chronicity, prior failed PT, dependent wife     Goals:   Short Term Goals (4 Weeks):  1. Pt will be compliant with HEP to supplement PT in decreasing pain with functional mobility.  2. Pt will perform sit to stand  with good control to demonstrate improved core strength. met  3. Pt will report 25% improvement in thoracolumbar ROM to promote functional mobility.  4. Pt will improve hip abduction MMTs to >/=4/5 to improve strength for functional tasks.  Long Term Goals (8 Weeks):   1. Pt will be able to be able to perform standing activities for 75% of session with 3/10 pain or less.   2. Pt will improve impaired LE MMTs to >/=4+/5 to improve strength for functional tasks.  3. Pt will report no pain during 30 sec sit to stand test to promote functional mobility.     Plan     Plan of care Certification: 11/26/2024 to 1/10/25.     Bhavana Dawn, PTA  1/3/2025

## 2025-01-07 ENCOUNTER — CLINICAL SUPPORT (OUTPATIENT)
Dept: REHABILITATION | Facility: HOSPITAL | Age: 89
End: 2025-01-07
Payer: MEDICARE

## 2025-01-07 DIAGNOSIS — M53.86 DECREASED ROM OF LUMBAR SPINE: Primary | ICD-10-CM

## 2025-01-07 DIAGNOSIS — M51.369 DEGENERATION OF INTERVERTEBRAL DISC OF LUMBAR REGION, UNSPECIFIED WHETHER PAIN PRESENT: ICD-10-CM

## 2025-01-07 DIAGNOSIS — R29.898 DECREASED STRENGTH OF LOWER EXTREMITY: ICD-10-CM

## 2025-01-07 PROCEDURE — 97530 THERAPEUTIC ACTIVITIES: CPT | Mod: HCNC,PN

## 2025-01-07 PROCEDURE — 97110 THERAPEUTIC EXERCISES: CPT | Mod: HCNC,PN

## 2025-01-07 PROCEDURE — 97112 NEUROMUSCULAR REEDUCATION: CPT | Mod: HCNC,PN

## 2025-01-07 NOTE — PROGRESS NOTES
"  [facility]   Physical Therapy Treatment Note      Name: Kang Herbert  [facility] Number: [id number]    Therapy Diagnosis:   Encounter Diagnoses   Name Primary?    Decreased ROM of lumbar spine Yes    Decreased strength of lower extremity     Degeneration of intervertebral disc of lumbar region, unspecified whether pain present        Physician: Miquel Clinton MD    Visit Date: 1/7/2025    Evaluation Date: 11/26/2024  Authorization Period Expiration: 1/10/25  Plan of Care Expiration: 1/10/25  Visit # / Visits authorized: 2/ 4  FOTO: no proxy available, unable to understand survey with use of       Time In: 2:00  Time Out: 2:55  Total Billable Time: 55 minutes (Therapeutic Exercise - 1,NM2, 1 TA, )   Precautions: Standard; type 2 diabetes and hypertension; history of seizures, dementia, fall risk, recent left humeral fracture  Patient's wife afraid to be alone - requests to be with patient at all times during visits.    PTA Visit #: 1/5   AMN  used, not today    Subjective     Patient reports:low grade back pain and bilateral shoulder pain today .  He was compliant with home exercise program.  Response to previous treatment: improving back pain  Functional change: Ongoing    Pain: 2/10  Location: bilateral back and buttocks     Objective      Objective Measures updated at progress report unless specified.     Treatment   Bold= progressed today  (+)= added today    [place] received the treatments listed below:      therapeutic exercises to develop strength, endurance, ROM, flexibility, posture, and core stabilization for 10 minutes including:  Lower trunk rotation 2'  Seated lumbar flexion 2'  Standing hip abductions 3x10 red theraband   Standing hip extension 3x10     Next:  SK 10x10"     neuromuscular re-education activities to improve: Balance, Coordination, Kinesthetic, Sense, Proprioception, and Posture for 30 minutes. The following activities were " included:  Bridges 3x10  Sidelying hip abduction 3x10  Clamshell 2 x 10   Straight leg raise 3x10    Long arc quad green theraband 2x10  Hamstring curls red theraband 3x10   SAPD red theraband 2x10  SAPD march 2x10 CGA     Therapeutic activity to promote functional mobility:15 mins  Bike 8' L2 hills NP   Sit to stand 3x10 4.4# ball  Heel raises 3x10   Step up lateral 4in 2x10     [place] received the following manual therapy techniques: Joint mobilizations and Soft tissue Mobilization were applied to the: bilateral knees for 0 minutes, including:  Patellar mobilizations  STM quads     Patient Education and Home Exercises       Education provided:   Anatomy and Pathology.  Symptom management and plan of care progressions.  Home Exercise Program.    Written Home Exercises Provided: Pt instructed to continue prior HEP. Exercises were reviewed and [place] was able to demonstrate them prior to the end of the session.  [place] demonstrated good  understanding of the education provided. See Electronic Medical Record under Patient Instructions for exercises provided during therapy sessions    Assessment     [place] is a 89 y.o. male referred to outpatient Physical Therapy with a medical diagnosis of Lumbar radiculopathy, DDD (degenerative disc disease), lumbar, Sacroiliac joint pain. Pt presents with chronic low back pain with radiating pain.   Interval: minimal complaints of low back or knee pain today. Some shoulder pain voiced. Continued to progress lower extremity strengthening. No complaints of pain with progressions today.   Monitor response, progress as appropriate. 1 remaining visit in authorization period.     [place] Is progressing well towards his goals.   Patient prognosis is Fair to Guarded.     Patient will continue to benefit from skilled outpatient physical therapy to address the deficits listed in the problem list box on initial evaluation, provide pt/family education and to maximize pt's  level of independence in the home and community environment.     Patient's spiritual, cultural and educational needs considered and pt agreeable to plan of care and goals.     Anticipated barriers to physical therapy: chronicity, prior failed PT, dependent wife     Goals:   Short Term Goals (4 Weeks):  1. Pt will be compliant with HEP to supplement PT in decreasing pain with functional mobility.  2. Pt will perform sit to stand  with good control to demonstrate improved core strength. met  3. Pt will report 25% improvement in thoracolumbar ROM to promote functional mobility.  4. Pt will improve hip abduction MMTs to >/=4/5 to improve strength for functional tasks.  Long Term Goals (8 Weeks):   1. Pt will be able to be able to perform standing activities for 75% of session with 3/10 pain or less.   2. Pt will improve impaired LE MMTs to >/=4+/5 to improve strength for functional tasks.  3. Pt will report no pain during 30 sec sit to stand test to promote functional mobility.     Plan     Plan of care Certification: 11/26/2024 to 1/10/25.     Aria Cueva, PT, DPT  1/7/2025

## 2025-01-09 ENCOUNTER — TELEPHONE (OUTPATIENT)
Dept: OPTOMETRY | Facility: CLINIC | Age: 89
End: 2025-01-09
Payer: MEDICARE

## 2025-01-09 ENCOUNTER — CLINICAL SUPPORT (OUTPATIENT)
Dept: REHABILITATION | Facility: HOSPITAL | Age: 89
End: 2025-01-09
Payer: MEDICARE

## 2025-01-09 DIAGNOSIS — M51.369 DEGENERATION OF INTERVERTEBRAL DISC OF LUMBAR REGION, UNSPECIFIED WHETHER PAIN PRESENT: ICD-10-CM

## 2025-01-09 DIAGNOSIS — M53.86 DECREASED ROM OF LUMBAR SPINE: Primary | ICD-10-CM

## 2025-01-09 DIAGNOSIS — R29.898 DECREASED STRENGTH OF LOWER EXTREMITY: ICD-10-CM

## 2025-01-09 PROCEDURE — 97530 THERAPEUTIC ACTIVITIES: CPT | Mod: HCNC,PN

## 2025-01-09 PROCEDURE — 97110 THERAPEUTIC EXERCISES: CPT | Mod: HCNC,PN

## 2025-01-09 PROCEDURE — 97112 NEUROMUSCULAR REEDUCATION: CPT | Mod: HCNC,PN

## 2025-01-09 NOTE — PROGRESS NOTES
[facility]   Physical Therapy Treatment Note / discharge summary     Name: Kang Herbert  Clinic Number: [id number]    Therapy Diagnosis:   Encounter Diagnoses   Name Primary?    Decreased ROM of lumbar spine Yes    Decreased strength of lower extremity     Degeneration of intervertebral disc of lumbar region, unspecified whether pain present        Physician: Miquel Clinton MD    Visit Date: 1/9/2025    Evaluation Date: 11/26/2024  Authorization Period Expiration: 1/10/25  Plan of Care Expiration: 1/10/25  Visit # / Visits authorized: 3/ 4  FOTO: no proxy available, unable to understand survey with use of       Time In: 2:00  Time Out: 2:55  Total Billable Time: 55 minutes (Therapeutic Exercise - 1,NM2, 1 TA, )   Precautions: Standard; type 2 diabetes and hypertension; history of seizures, dementia, fall risk, recent left humeral fracture  Patient's wife afraid to be alone - requests to be with patient at all times during visits.    PTA Visit #: 1/5   AMN  used, not today    Subjective     Patient reports: minimal complaints of back or knee pain. Complaints of right shoulder pain which he has not seen a doctor for. Able to do most things around the house and assist wife. Able to walk more  He was compliant with home exercise program.  Response to previous treatment: improving back pain  Functional change: Ongoing    Pain: 2/10  Location: bilateral back and buttocks     Objective      Posture: Right thoracic curve with increased kyphosis, left lumbar curve,  Flattening of lumbar lordosis   Palpation: low tenderness of bilateral lumbar paraspinals   Sensation: normal light touch     Range of Motion/Strength:      Lumbar AROM Pain/Dysfunction with Movement:   Flexion 50 Pain in bilateral posterior LEs   Extension 14 Central back pain   Right side bending 50* Bilateral back    Left side bending 50% * Bilateral back    Right rotation 75%     Left rotation 75%       "     L/E MMT Right Left Pain/Dysfunction with Movement   Hip Flexion 5/5 4+/5     Hip Extension 4+/5 4+/5     Hip Abduction 4/5 4/5     Hip Adduction 5/5 5/5     Hip IR 5/5 5/5     Hip ER 5/5 5/5     Knee Flexion 5/5 5/5     Knee Extension 4+/5 4+/5     Ankle DF 5/5 4+/5     Ankle PF 4/5 4/5           Joint Mobility:   - Lumbar: hypomobility throughout lumbar spine, pain at TL junction, mild relief with p/a  - Hip: normal mobility     Flexibility: decreased flexibility hamstrings      Special Tests:  Lumbar Tests:  Slump test = - bilateral   Quadrant test = + bilateral   SLR Test (L4-S3) = + bilateral   Lumbar distraction = + relief bilateral         Cluster for spinal stenosis:   Néstor s/s, leg > LBP, pain during walking/standing, relief upon sitting, > 49 y/o = Met 4/4     30 second sit-to-stand test (without U/E support): 13   30" sit to stand Cutoff Scores:11       Balance: min sway NBOS, mod sway EC    tandem 5 sec bilateral   Treatment   Bold= progressed today  (+)= added today    [place] received the treatments listed below:      therapeutic exercises to develop strength, endurance, ROM, flexibility, posture, and core stabilization for 10 minutes including:  Lower trunk rotation 2'  Seated lumbar flexion 2'  Standing hip abductions 3x10 red theraband   Standing hip extension 3x10     Next:  SKTC 10x10"     neuromuscular re-education activities to improve: Balance, Coordination, Kinesthetic, Sense, Proprioception, and Posture for 30 minutes. The following activities were included:  Bridges 3x10  Sidelying hip abduction 3x10  Clamshell 2 x 10   Straight leg raise 3x10    Long arc quad green theraband 2x10  Hamstring curls red theraband 3x10   SAPD red theraband 2x10  SAPD march 2x10 CGA     Therapeutic activity to promote functional mobility:15 mins  Bike 8' L2 hills NP   Sit to stand 3x10 4.4# ball  Heel raises 3x10   Step up lateral 4in 2x10   Forward step up 4in 2x10    [place] received the following " manual therapy techniques: Joint mobilizations and Soft tissue Mobilization were applied to the: bilateral knees for 0 minutes, including:  Patellar mobilizations  STM quads     Patient Education and Home Exercises       Education provided:   Anatomy and Pathology.  Symptom management and plan of care progressions.  Home Exercise Program.    Written Home Exercises Provided: Pt instructed to continue prior HEP. Exercises were reviewed and [place] was able to demonstrate them prior to the end of the session.  [place] demonstrated good  understanding of the education provided. See Electronic Medical Record under Patient Instructions for exercises provided during therapy sessions    Assessment     [place] is a 89 y.o. male referred to outpatient Physical Therapy with a medical diagnosis of Lumbar radiculopathy, DDD (degenerative disc disease), lumbar, Sacroiliac joint pain. Pt presents with chronic low back pain with radiating pain.   Interval: minimal complaints of low back or knee pain in recent visits. Some shoulder pain voiced. Patient demonstrates improvements in pain, lower extremity strength, lumbar range of motion, and functional mobility since beginning PT. Some improvement in balance and stability as well. Ongoing lumbar hypomobility with pain greatest at TL junction. Positive straight leg raise with lower symptom irritability and reports less frequent complaints of lower extremity pain. Patient has progressed well towards goals and has reached end of authorization period. He is appropriate for discharge at this time. Instructed to reach out to PCP for shoulder as needed.     [place] Is progressing well towards his goals.   Patient prognosis is Fair to Guarded.      Patient's spiritual, cultural and educational needs considered and pt agreeable to plan of care and goals.     Anticipated barriers to physical therapy: chronicity, prior failed PT, dependent wife     Goals:   Short Term Goals (4  Weeks):  1. Pt will be compliant with HEP to supplement PT in decreasing pain with functional mobility. met  2. Pt will perform sit to stand  with good control to demonstrate improved core strength. met  3. Pt will report 25% improvement in thoracolumbar ROM to promote functional mobility. met  4. Pt will improve hip abduction MMTs to >/=4/5 to improve strength for functional tasks. met  Long Term Goals (8 Weeks):   1. Pt will be able to be able to perform standing activities for 75% of session with 3/10 pain or less. met  2. Pt will improve impaired LE MMTs to >/=4+/5 to improve strength for functional tasks. Progressing, not met  3. Pt will report no pain during 30 sec sit to stand test to promote functional mobility. Progressing,not met    Plan     Discharge to home exercise program     Aria Cueva, PT, DPT  1/9/2025

## 2025-01-09 NOTE — PLAN OF CARE
[facility]   Physical Therapy Treatment Note / discharge summary     Name: Kang Herbert  Clinic Number: [id number]    Therapy Diagnosis:   Encounter Diagnoses   Name Primary?    Decreased ROM of lumbar spine Yes    Decreased strength of lower extremity     Degeneration of intervertebral disc of lumbar region, unspecified whether pain present      Physician: Miquel Clinton MD    Visit Date: 1/9/2025    Evaluation Date: 11/26/2024  Authorization Period Expiration: 1/10/25  Plan of Care Expiration: 1/10/25  Visit # / Visits authorized: 3/ 4  FOTO: no proxy available, unable to understand survey with use of       Time In: 2:00  Time Out: 2:55  Total Billable Time: 55 minutes (Therapeutic Exercise - 1,NM2, 1 TA, )   Precautions: Standard; type 2 diabetes and hypertension; history of seizures, dementia, fall risk, recent left humeral fracture  Patient's wife afraid to be alone - requests to be with patient at all times during visits.    PTA Visit #: 1/5   AMN  used, not today    Subjective     Patient reports: minimal complaints of back or knee pain. Complaints of right shoulder pain which he has not seen a doctor for. Able to do most things around the house and assist wife. Able to walk more  He was compliant with home exercise program.  Response to previous treatment: improving back pain  Functional change: Ongoing    Pain: 2/10  Location: bilateral back and buttocks     Objective      Posture: Right thoracic curve with increased kyphosis, left lumbar curve,  Flattening of lumbar lordosis   Palpation: low tenderness of bilateral lumbar paraspinals   Sensation: normal light touch     Range of Motion/Strength:      Lumbar AROM Pain/Dysfunction with Movement:   Flexion 50 Pain in bilateral posterior LEs   Extension 14 Central back pain   Right side bending 50* Bilateral back    Left side bending 50% * Bilateral back    Right rotation 75%     Left rotation 75%          "  L/E MMT Right Left Pain/Dysfunction with Movement   Hip Flexion 5/5 4+/5     Hip Extension 4+/5 4+/5     Hip Abduction 4/5 4/5     Hip Adduction 5/5 5/5     Hip IR 5/5 5/5     Hip ER 5/5 5/5     Knee Flexion 5/5 5/5     Knee Extension 4+/5 4+/5     Ankle DF 5/5 4+/5     Ankle PF 4/5 4/5           Joint Mobility:   - Lumbar: hypomobility throughout lumbar spine, pain at TL junction, mild relief with p/a  - Hip: normal mobility     Flexibility: decreased flexibility hamstrings      Special Tests:  Lumbar Tests:  Slump test = - bilateral   Quadrant test = + bilateral   SLR Test (L4-S3) = + bilateral   Lumbar distraction = + relief bilateral         Cluster for spinal stenosis:   Néstor s/s, leg > LBP, pain during walking/standing, relief upon sitting, > 49 y/o = Met 4/4     30 second sit-to-stand test (without U/E support): 13   30" sit to stand Cutoff Scores:11       Balance: min sway NBOS, mod sway EC    tandem 5 sec bilateral   Treatment       [place] received the treatments listed in daily note    Patient Education and Home Exercises       Education provided:   Anatomy and Pathology.  Symptom management and plan of care progressions.  Home Exercise Program.    Written Home Exercises Provided: Pt instructed to continue prior HEP. Exercises were reviewed and [place] was able to demonstrate them prior to the end of the session.  [place] demonstrated good  understanding of the education provided. See Electronic Medical Record under Patient Instructions for exercises provided during therapy sessions    Assessment     [place] is a 89 y.o. male referred to outpatient Physical Therapy with a medical diagnosis of Lumbar radiculopathy, DDD (degenerative disc disease), lumbar, Sacroiliac joint pain. Pt presents with chronic low back pain with radiating pain.   Interval: minimal complaints of low back or knee pain in recent visits. Some shoulder pain voiced. Patient demonstrates improvements in pain, lower extremity " strength, lumbar range of motion, and functional mobility since beginning PT. Some improvement in balance and stability as well. Ongoing lumbar hypomobility with pain greatest at TL junction. Positive straight leg raise with lower symptom irritability and reports less frequent complaints of lower extremity pain. Patient has progressed well towards goals and has reached end of authorization period. He is appropriate for discharge at this time. Instructed to reach out to PCP for shoulder as needed.     [place] Is progressing well towards his goals.   Patient prognosis is Fair to Guarded.      Patient's spiritual, cultural and educational needs considered and pt agreeable to plan of care and goals.     Anticipated barriers to physical therapy: chronicity, prior failed PT, dependent wife     Goals:   Short Term Goals (4 Weeks):  1. Pt will be compliant with HEP to supplement PT in decreasing pain with functional mobility. met  2. Pt will perform sit to stand  with good control to demonstrate improved core strength. met  3. Pt will report 25% improvement in thoracolumbar ROM to promote functional mobility. met  4. Pt will improve hip abduction MMTs to >/=4/5 to improve strength for functional tasks. met  Long Term Goals (8 Weeks):   1. Pt will be able to be able to perform standing activities for 75% of session with 3/10 pain or less. met  2. Pt will improve impaired LE MMTs to >/=4+/5 to improve strength for functional tasks. Progressing, not met  3. Pt will report no pain during 30 sec sit to stand test to promote functional mobility. Progressing,not met    Plan     Discharge to home exercise program     Aria Cueva, PT, DPT  1/9/2025

## 2025-01-13 ENCOUNTER — TELEPHONE (OUTPATIENT)
Dept: OPHTHALMOLOGY | Facility: CLINIC | Age: 89
End: 2025-01-13
Payer: MEDICARE

## 2025-01-13 DIAGNOSIS — Z00.00 ENCOUNTER FOR MEDICARE ANNUAL WELLNESS EXAM: ICD-10-CM

## 2025-01-13 NOTE — TELEPHONE ENCOUNTER
----- Message from Heather sent at 1/13/2025  9:49 AM CST -----  Type:  Later Appointment Request    Caller is requesting a later appointment.   Name of Caller: Pt's daughter   When is the first available appointment? Today at 1:30PM   Symptoms: 6 month OCT/IOP  Would the patient rather a call back or a response via [facility]? Call back   Best Call Back Number: [phone]  Additional Information: Please be advised, caller states that pt is not able to go to appt today, if pt can be scheduled the next soonest available preferably in the morning

## 2025-01-15 ENCOUNTER — CLINICAL SUPPORT (OUTPATIENT)
Dept: FAMILY MEDICINE | Facility: CLINIC | Age: 89
End: 2025-01-15
Payer: MEDICARE

## 2025-01-15 DIAGNOSIS — Z23 NEEDS FLU SHOT: Primary | ICD-10-CM

## 2025-01-15 PROCEDURE — 90653 IIV ADJUVANT VACCINE IM: CPT | Mod: HCNC,S$GLB,, | Performed by: FAMILY MEDICINE

## 2025-01-15 PROCEDURE — G0008 ADMIN INFLUENZA VIRUS VAC: HCPCS | Mod: HCNC,S$GLB,, | Performed by: FAMILY MEDICINE

## 2025-02-14 ENCOUNTER — TELEPHONE (OUTPATIENT)
Dept: FAMILY MEDICINE | Facility: CLINIC | Age: 89
End: 2025-02-14
Payer: MEDICARE

## 2025-02-14 NOTE — TELEPHONE ENCOUNTER
Lvm for pt's daugther informing her appt 3/5 will need to be reschedule due to provider being out of office.

## 2025-05-19 ENCOUNTER — LAB VISIT (OUTPATIENT)
Dept: LAB | Facility: HOSPITAL | Age: 89
End: 2025-05-19
Attending: FAMILY MEDICINE
Payer: MEDICARE

## 2025-05-19 DIAGNOSIS — D53.9 NUTRITIONAL ANEMIA: ICD-10-CM

## 2025-05-19 DIAGNOSIS — I10 ESSENTIAL HYPERTENSION: ICD-10-CM

## 2025-05-19 DIAGNOSIS — E11.43 TYPE 2 DIABETES MELLITUS WITH DIABETIC AUTONOMIC NEUROPATHY, WITHOUT LONG-TERM CURRENT USE OF INSULIN: ICD-10-CM

## 2025-05-19 LAB
ABSOLUTE EOSINOPHIL (OHS): 0.29 K/UL
ABSOLUTE MONOCYTE (OHS): 0.66 K/UL (ref 0.3–1)
ABSOLUTE NEUTROPHIL COUNT (OHS): 4.28 K/UL (ref 1.8–7.7)
ALBUMIN SERPL BCP-MCNC: 4.3 G/DL (ref 3.5–5.2)
ALP SERPL-CCNC: 110 UNIT/L (ref 40–150)
ALT SERPL W/O P-5'-P-CCNC: 25 UNIT/L (ref 10–44)
ANION GAP (OHS): 11 MMOL/L (ref 8–16)
AST SERPL-CCNC: 24 UNIT/L (ref 11–45)
BASOPHILS # BLD AUTO: 0.04 K/UL
BASOPHILS NFR BLD AUTO: 0.5 %
BILIRUB SERPL-MCNC: 0.7 MG/DL (ref 0.1–1)
BUN SERPL-MCNC: 19 MG/DL (ref 8–23)
CALCIUM SERPL-MCNC: 9.5 MG/DL (ref 8.7–10.5)
CHLORIDE SERPL-SCNC: 105 MMOL/L (ref 95–110)
CHOLEST SERPL-MCNC: 156 MG/DL (ref 120–199)
CHOLEST/HDLC SERPL: 3.9 {RATIO} (ref 2–5)
CO2 SERPL-SCNC: 24 MMOL/L (ref 23–29)
CREAT SERPL-MCNC: 0.8 MG/DL (ref 0.5–1.4)
EAG (OHS): 163 MG/DL (ref 68–131)
ERYTHROCYTE [DISTWIDTH] IN BLOOD BY AUTOMATED COUNT: 13.8 % (ref 11.5–14.5)
GFR SERPLBLD CREATININE-BSD FMLA CKD-EPI: >60 ML/MIN/1.73/M2
GLUCOSE SERPL-MCNC: 164 MG/DL (ref 70–110)
HBA1C MFR BLD: 7.3 % (ref 4–5.6)
HCT VFR BLD AUTO: 44.8 % (ref 40–54)
HDLC SERPL-MCNC: 40 MG/DL (ref 40–75)
HDLC SERPL: 25.6 % (ref 20–50)
HGB BLD-MCNC: 14.2 GM/DL (ref 14–18)
IMM GRANULOCYTES # BLD AUTO: 0.02 K/UL (ref 0–0.04)
IMM GRANULOCYTES NFR BLD AUTO: 0.3 % (ref 0–0.5)
LDLC SERPL CALC-MCNC: 57.6 MG/DL (ref 63–159)
LYMPHOCYTES # BLD AUTO: 2.51 K/UL (ref 1–4.8)
MCH RBC QN AUTO: 29.6 PG (ref 27–31)
MCHC RBC AUTO-ENTMCNC: 31.7 G/DL (ref 32–36)
MCV RBC AUTO: 93 FL (ref 82–98)
NONHDLC SERPL-MCNC: 116 MG/DL
NUCLEATED RBC (/100WBC) (OHS): 0 /100 WBC
PLATELET # BLD AUTO: 165 K/UL (ref 150–450)
PMV BLD AUTO: 11.5 FL (ref 9.2–12.9)
POTASSIUM SERPL-SCNC: 4.1 MMOL/L (ref 3.5–5.1)
PROT SERPL-MCNC: 7.6 GM/DL (ref 6–8.4)
RBC # BLD AUTO: 4.8 M/UL (ref 4.6–6.2)
RELATIVE EOSINOPHIL (OHS): 3.7 %
RELATIVE LYMPHOCYTE (OHS): 32.2 % (ref 18–48)
RELATIVE MONOCYTE (OHS): 8.5 % (ref 4–15)
RELATIVE NEUTROPHIL (OHS): 54.8 % (ref 38–73)
SODIUM SERPL-SCNC: 140 MMOL/L (ref 136–145)
TRIGL SERPL-MCNC: 292 MG/DL (ref 30–150)
WBC # BLD AUTO: 7.8 K/UL (ref 3.9–12.7)

## 2025-05-19 PROCEDURE — 36415 COLL VENOUS BLD VENIPUNCTURE: CPT | Mod: HCNC,PO

## 2025-05-19 PROCEDURE — 85025 COMPLETE CBC W/AUTO DIFF WBC: CPT | Mod: HCNC

## 2025-05-19 PROCEDURE — 82465 ASSAY BLD/SERUM CHOLESTEROL: CPT | Mod: HCNC

## 2025-05-19 PROCEDURE — 80053 COMPREHEN METABOLIC PANEL: CPT | Mod: HCNC

## 2025-05-19 PROCEDURE — 83036 HEMOGLOBIN GLYCOSYLATED A1C: CPT | Mod: HCNC

## 2025-05-23 ENCOUNTER — RESULTS FOLLOW-UP (OUTPATIENT)
Dept: FAMILY MEDICINE | Facility: CLINIC | Age: 89
End: 2025-05-23

## 2025-05-23 ENCOUNTER — OFFICE VISIT (OUTPATIENT)
Dept: FAMILY MEDICINE | Facility: CLINIC | Age: 89
End: 2025-05-23
Payer: MEDICARE

## 2025-05-23 VITALS
SYSTOLIC BLOOD PRESSURE: 114 MMHG | HEART RATE: 103 BPM | DIASTOLIC BLOOD PRESSURE: 68 MMHG | OXYGEN SATURATION: 98 % | BODY MASS INDEX: 19.93 KG/M2 | WEIGHT: 108.31 LBS | HEIGHT: 62 IN

## 2025-05-23 DIAGNOSIS — G40.209 PARTIAL SYMPTOMATIC EPILEPSY WITH COMPLEX PARTIAL SEIZURES, NOT INTRACTABLE, WITHOUT STATUS EPILEPTICUS: ICD-10-CM

## 2025-05-23 DIAGNOSIS — M25.511 CHRONIC PAIN OF BOTH SHOULDERS: ICD-10-CM

## 2025-05-23 DIAGNOSIS — E11.43 TYPE 2 DIABETES MELLITUS WITH DIABETIC AUTONOMIC NEUROPATHY, WITHOUT LONG-TERM CURRENT USE OF INSULIN: ICD-10-CM

## 2025-05-23 DIAGNOSIS — G89.29 CHRONIC BILATERAL LOW BACK PAIN WITH LEFT-SIDED SCIATICA: ICD-10-CM

## 2025-05-23 DIAGNOSIS — R13.10 DYSPHAGIA, UNSPECIFIED TYPE: Primary | ICD-10-CM

## 2025-05-23 DIAGNOSIS — F03.90 SENILE DEMENTIA: ICD-10-CM

## 2025-05-23 DIAGNOSIS — M25.512 CHRONIC PAIN OF BOTH SHOULDERS: ICD-10-CM

## 2025-05-23 DIAGNOSIS — M54.42 CHRONIC BILATERAL LOW BACK PAIN WITH LEFT-SIDED SCIATICA: ICD-10-CM

## 2025-05-23 DIAGNOSIS — G89.29 CHRONIC PAIN OF BOTH SHOULDERS: ICD-10-CM

## 2025-05-23 PROCEDURE — 99999 PR PBB SHADOW E&M-EST. PATIENT-LVL V: CPT | Mod: PBBFAC,HCNC,, | Performed by: FAMILY MEDICINE

## 2025-05-23 NOTE — PROGRESS NOTES
Subjective     Patient ID: Kang Herbert is a 89 y.o. male.    Chief Complaint: Back Pain, Thyroid Problem, Hypertension, and Hyperlipidemia    89 years old male came to the clinic for dementia follow-up.  Patient still able to do his activities of daily living.  Patient with good compliance with his thyroid medicine.  He is requesting physical therapy to help with bilateral shoulder and back pain.  Patient previously with sciatica over the left side.  Last A1c was stable.  Diabetes under control.  No flare ups of neuropathy.  No Recent seizures.    Back Pain  This is a chronic problem. The current episode started more than 1 year ago. The problem has been gradually worsening since onset. The pain is present in the lumbar spine. The pain is at a severity of 5/10. The pain is moderate. The pain is The same all the time. The symptoms are aggravated by bending and twisting. Stiffness is present All day. Pertinent negatives include no chest pain, numbness, tingling or weight loss. He has tried nothing for the symptoms. The treatment provided no relief.   Thyroid Problem  Presents for follow-up visit. Patient reports no hoarse voice, palpitations or weight loss. The symptoms have been stable. His past medical history is significant for hyperlipidemia.   Hypertension  This is a chronic problem. The current episode started more than 1 year ago. The problem has been gradually improving since onset. The problem is controlled. Pertinent negatives include no chest pain, orthopnea, palpitations or peripheral edema. There are no associated agents to hypertension. Risk factors for coronary artery disease include male gender and sedentary lifestyle. Past treatments include angiotensin blockers. The current treatment provides significant improvement. Compliance problems include exercise.  There is no history of angina. Identifiable causes of hypertension include a thyroid problem. There is no history of a hypertension causing  med.   Hyperlipidemia  This is a chronic problem. The problem is controlled. Recent lipid tests were reviewed and are high. Exacerbating diseases include hypothyroidism. Pertinent negatives include no chest pain or myalgias. Current antihyperlipidemic treatment includes statins. The current treatment provides significant improvement of lipids. Risk factors for coronary artery disease include male sex, a sedentary lifestyle and hypertension.     Review of Systems   Constitutional: Negative.  Negative for weight loss.   HENT: Negative.  Negative for hoarse voice.    Eyes: Negative.    Respiratory: Negative.     Cardiovascular: Negative.  Negative for chest pain, palpitations and orthopnea.   Gastrointestinal: Negative.    Genitourinary: Negative.    Musculoskeletal:  Positive for back pain. Negative for myalgias.   Integumentary:  Negative.   Neurological: Negative.  Negative for tingling and numbness.   Psychiatric/Behavioral: Negative.            Objective     Physical Exam  Vitals and nursing note reviewed.   Constitutional:       General: He is not in acute distress.     Appearance: He is well-developed. He is not diaphoretic.   HENT:      Head: Normocephalic and atraumatic.      Right Ear: External ear normal.      Left Ear: External ear normal.      Nose: Nose normal.      Mouth/Throat:      Pharynx: No oropharyngeal exudate.   Eyes:      General: No scleral icterus.        Right eye: No discharge.         Left eye: No discharge.      Conjunctiva/sclera: Conjunctivae normal.      Pupils: Pupils are equal, round, and reactive to light.   Neck:      Thyroid: No thyromegaly.      Vascular: No JVD.      Trachea: No tracheal deviation.   Cardiovascular:      Rate and Rhythm: Normal rate and regular rhythm.      Heart sounds: Normal heart sounds. No murmur heard.     No friction rub. No gallop.   Pulmonary:      Effort: Pulmonary effort is normal. No respiratory distress.      Breath sounds: Normal breath sounds. No  stridor. No wheezing or rales.   Chest:      Chest wall: No tenderness.   Abdominal:      General: Bowel sounds are normal. There is no distension.      Palpations: Abdomen is soft. There is no mass.      Tenderness: There is no abdominal tenderness. There is no guarding or rebound.   Musculoskeletal:         General: No tenderness. Normal range of motion.      Cervical back: Normal range of motion and neck supple.   Lymphadenopathy:      Cervical: No cervical adenopathy.   Skin:     General: Skin is warm and dry.      Coloration: Skin is not pale.      Findings: No erythema or rash.   Neurological:      Mental Status: He is alert and oriented to person, place, and time.      Cranial Nerves: No cranial nerve deficit.      Motor: No abnormal muscle tone.      Coordination: Coordination normal.      Deep Tendon Reflexes: Reflexes are normal and symmetric. Reflexes normal.   Psychiatric:         Behavior: Behavior normal.         Thought Content: Thought content normal.         Judgment: Judgment normal.            Assessment and Plan     1. Dysphagia, unspecified type  -     Fl Modified Barium Swallow Speech; Future; Expected date: 05/23/2025  -     SLP video swallow; Future; Expected date: 05/23/2025  -     SLP evaluation; Future    2. Type 2 diabetes mellitus with diabetic autonomic neuropathy, without long-term current use of insulin  -     Microalbumin/Creatinine Ratio, Urine; Future; Expected date: 05/23/2025  -     Lipid Panel; Future; Expected date: 05/23/2025  -     Hemoglobin A1C; Future; Expected date: 05/23/2025  -     Comprehensive Metabolic Panel; Future; Expected date: 05/23/2025  -     CBC Auto Differential; Future; Expected date: 05/23/2025    3. Senile dementia    4. Partial symptomatic epilepsy with complex partial seizures, not intractable, without status epilepticus    5. Chronic pain of both shoulders  -     Ambulatory Referral/Consult to Physical Therapy    6. Chronic bilateral low back pain with  left-sided sciatica  -     Ambulatory Referral/Consult to Physical Therapy      Continue monitoring blood pressure at home, low sodium diet.   Continue monitoring blood sugar at home,ADA diet.   I spent a total of 45 minutes on the day of the visit.This includes face to face time and non-face to face time preparing to see the patient (eg, review of tests), obtaining and/or reviewing separately obtained history, documenting clinical information in the electronic or other health record, independently interpreting results and communicating results to the patient/family/caregiver, or care coordinator.        Follow up in about 6 months (around 11/23/2025), or if symptoms worsen or fail to improve.

## 2025-05-26 ENCOUNTER — OFFICE VISIT (OUTPATIENT)
Dept: OPHTHALMOLOGY | Facility: CLINIC | Age: 89
End: 2025-05-26
Payer: MEDICARE

## 2025-05-26 DIAGNOSIS — H40.003 GLAUCOMA SUSPECT OF BOTH EYES: ICD-10-CM

## 2025-05-26 DIAGNOSIS — H53.2 BINOCULAR VISION DISORDER WITH DIPLOPIA: ICD-10-CM

## 2025-05-26 DIAGNOSIS — H04.123 INSUFFICIENCY OF TEAR FILM OF BOTH EYES: ICD-10-CM

## 2025-05-26 DIAGNOSIS — Z96.1 PSEUDOPHAKIA OF BOTH EYES: ICD-10-CM

## 2025-05-26 DIAGNOSIS — H40.023 OPEN ANGLE WITH BORDERLINE FINDINGS AND HIGH GLAUCOMA RISK IN BOTH EYES: Primary | ICD-10-CM

## 2025-05-26 PROCEDURE — G2211 COMPLEX E/M VISIT ADD ON: HCPCS | Mod: HCNC,S$GLB,, | Performed by: OPHTHALMOLOGY

## 2025-05-26 PROCEDURE — 3288F FALL RISK ASSESSMENT DOCD: CPT | Mod: CPTII,HCNC,S$GLB, | Performed by: OPHTHALMOLOGY

## 2025-05-26 PROCEDURE — 1159F MED LIST DOCD IN RCRD: CPT | Mod: CPTII,HCNC,S$GLB, | Performed by: OPHTHALMOLOGY

## 2025-05-26 PROCEDURE — 1101F PT FALLS ASSESS-DOCD LE1/YR: CPT | Mod: CPTII,HCNC,S$GLB, | Performed by: OPHTHALMOLOGY

## 2025-05-26 PROCEDURE — 99999 PR PBB SHADOW E&M-EST. PATIENT-LVL III: CPT | Mod: PBBFAC,HCNC,, | Performed by: OPHTHALMOLOGY

## 2025-05-26 PROCEDURE — 2023F DILAT RTA XM W/O RTNOPTHY: CPT | Mod: CPTII,HCNC,S$GLB, | Performed by: OPHTHALMOLOGY

## 2025-05-26 PROCEDURE — 92133 CPTRZD OPH DX IMG PST SGM ON: CPT | Mod: HCNC,S$GLB,, | Performed by: OPHTHALMOLOGY

## 2025-05-26 PROCEDURE — 99214 OFFICE O/P EST MOD 30 MIN: CPT | Mod: HCNC,S$GLB,, | Performed by: OPHTHALMOLOGY

## 2025-05-26 PROCEDURE — 1126F AMNT PAIN NOTED NONE PRSNT: CPT | Mod: CPTII,HCNC,S$GLB, | Performed by: OPHTHALMOLOGY

## 2025-05-26 PROCEDURE — 1160F RVW MEDS BY RX/DR IN RCRD: CPT | Mod: CPTII,HCNC,S$GLB, | Performed by: OPHTHALMOLOGY

## 2025-05-26 NOTE — PROGRESS NOTES
HPI    DLS: 7/8/24 w/ Dr. Gagnon    89 y.o. male presents for IOP Check w/ OCT. Patient denies changes to VA,   eye pain, headaches, flashes, and floaters. Patient states he is   interested in new specs, wears specs with prisms for double vision.    Open angle with borderline findings and high glaucoma risk in both eyes  Binocular vision disorder with diplopia  Capsular bag distension syndrome  Myopia of both eyes with astigmatism and presbyopia    Systane BID OU    Last edited by Ofelia Brandt on 5/26/2025  2:14 PM.            Assessment /Plan     For exam results, see Encounter Report.    Open angle with borderline findings and high glaucoma risk in both eyes  -     Posterior Segment OCT Optic Nerve- Both eyes  -     Color Fundus Photography - OU - Both Eyes; Future    Binocular vision disorder with diplopia    Glaucoma suspect of both eyes    Insufficiency of tear film of both eyes    Pseudophakia of both eyes            Patient with Daughter  Wife with dementia    Doing well at home    [employer] like[employer]    POAG supect  Sister with Glaucoma    ONH  PPA  Tilt / Thinning OS / OD    Consider HVF Faster --> holding    CCT  586 // 599    Target mid teens --> achieved    Both eyes  Observation & discussed    PC IOL OU  Open p YAG Cap OU  Quiet  Observe    Left Hypertropia Schuyler  No Diplopia with specs OS 3.0 Down    Dry Eye Syndrome: discussed use of warm compresses, non-preserved artificial tears, gel and PM ointment options.      Plan  RTC 6 months with IOP & DFE --> consider photos  RTC sonner prn with good understanding

## 2025-06-10 ENCOUNTER — TELEPHONE (OUTPATIENT)
Dept: SPEECH THERAPY | Facility: HOSPITAL | Age: 89
End: 2025-06-10
Payer: MEDICARE

## 2025-06-10 NOTE — TELEPHONE ENCOUNTER
Pt daughter called in to schedule mbss she request to have this done in [place]. Advised I would have scheduling contact her.

## 2025-06-11 ENCOUNTER — CLINICAL SUPPORT (OUTPATIENT)
Dept: REHABILITATION | Facility: HOSPITAL | Age: 89
End: 2025-06-11
Payer: MEDICARE

## 2025-06-11 DIAGNOSIS — M54.10 RADICULAR PAIN OF LEFT LOWER EXTREMITY: ICD-10-CM

## 2025-06-11 DIAGNOSIS — R29.898 DECREASED STRENGTH OF LOWER EXTREMITY: ICD-10-CM

## 2025-06-11 DIAGNOSIS — M53.86 DECREASED ROM OF LUMBAR SPINE: Primary | ICD-10-CM

## 2025-06-11 PROCEDURE — 97110 THERAPEUTIC EXERCISES: CPT | Mod: HCNC,PN

## 2025-06-11 PROCEDURE — 97161 PT EVAL LOW COMPLEX 20 MIN: CPT | Mod: HCNC,PN

## 2025-06-11 NOTE — PROGRESS NOTES
Outpatient Rehab    Physical Therapy Evaluation    Patient Name: Kang Herbert  MRN: [id number]  YOB: 1935  Encounter Date: 6/11/2025    Therapy Diagnosis:   Encounter Diagnoses   Name Primary?    Decreased ROM of lumbar spine Yes    Decreased strength of lower extremity     Radicular pain of left lower extremity      Physician: Zeke Marrufo*    Physician Orders: Eval and Treat  Medical Diagnosis: Chronic pain of both shoulders  Chronic bilateral low back pain with left-sided sciatica  Surgical Diagnosis: Not applicable for this Episode   Surgical Date: Not applicable for this Episode    Visit # / Visits Authorized:  1 / 1  Insurance Authorization Period: 5/23/2025 to 5/23/2026  Date of Evaluation: 6/11/2025  Plan of Care Certification: 6/11/2025 to 7/11/25     Time In: 1600   Time Out: 1655  Total Time (in minutes): 55   Total Billable Time (in minutes): 55    Intake Outcome Measure for FOTO Survey    Therapist reviewed FOTO scores for Kang Herbert on 6/11/2025.   FOTO report - see Media section or FOTO account episode details.     Intake Score: 52%    Precautions:     Standard; type 2 diabetes and hypertension; history of seizures, dementia, fall risk, left humeral fracture 8/1/24      Subjective   History of Present Illness  [place] is a 89 y.o. male who reports to physical therapy with a chief concern of chronic back and shoulder pain.     The patient reports a medical diagnosis of M25.511,G89.29,M25.512 (ICD-10-CM) - Chronic pain of both shoulders  G89.29,M54.42 (ICD-10-CM) - Chronic bilateral low back pain with left-sided sciatica.    Diagnostic tests related to this condition: None.        History of Present Condition/Illness: Patient returns to PT with chronic back and shoulder pain. He has been assisting his wife more which aggravates back and shoulders. He has been using cane to walk and occasionally walker. He denies falls. Sleeps 6 hours and reports interruption  by pain in back and shoulders. He sleeps with hand under pillow which aggravates it. He reports numbness and tingling into left foot. He has assistance from children to cook and clean. No recent imaging. Both shoulders hurt but right more than left. Overall leg and back pain worse than shoulder pain.     Activities of Daily Living      General Prior Level of Function Comments: min assistance with ADLs, short community mobility  General Current Level of Function Comments: pain with ADLs and increased assist  Patient Roles: Caregiver for adult  Patient Responsibilities: Driving, Personal ADL, Health management    Previously independent with activities of daily living? Yes     Currently independent with activities of daily living? Yes          Previously independent with instrumental activities of daily living? No  Activities previously needing assistance include: Grocery/shopping, Home establishment and management, and Meal prep.   Currently independent with instrumental activities of daily living? No  Activities currently needing assistance include: Care of others, Community mobility, Grocery/shopping, Home establishment and management, and Meal prep.            Pain     Patient reports a current pain level of 8/10. Pain at best is reported as 6/10. Pain at worst is reported as 9/10.   Location: back/ L LE and B shoulder (L>R) (2-3/10)  Clinical Progression (since onset): Stable  Pain Qualities: Aching, Burning, Sharp, Tightness  Pain-Relieving Factors: Medications - over-the-counter, Ice, Heat, Physical therapy, Exercise  Pain-Aggravating Factors: Bending, Lifting, Transfers, Standing, Walking         Review of Systems  Patient denies: Bladder Incontinence, Bowel Incontinence, and Saddle Numbness        Treatment History  Treatments  Previously Received Treatments: Yes  Previous Treatments: Physical therapy  Currently Receiving Treatments: No    Living Arrangements  Living Situation  Housing: Home  independently  Living Arrangements: Spouse/significant other  Support Systems: Family members, Children, Spouse/significant other    Home Setup  Type of Structure: House  Home Access: Stairs with rails  Entrance Stairs - Number of Steps: 3  Entrance Stairs - Rails: Both  Bathroom Equipment: Shower chair with back, Grab bars in shower          Past Medical History/Physical Systems Review:   Kang Herbert  has a past medical history of Cataract, Diabetes mellitus, Diabetes mellitus, type 2, Hyperlipidemia, Hypertension, Hypertropia of left eye, Open angle with borderline findings and low glaucoma risk in both eyes, Seizures, and Spondylosis without myelopathy.    Kang Herbert  has a past surgical history that includes Cholecystectomy; Hernia repair; Cataract extraction (11-/); Epidural steroid injection into lumbar spine (N/A, 11/30/2021); Epidural steroid injection into lumbar spine (N/A, 1/25/2022); Epidural steroid injection into lumbar spine (N/A, 11/15/2023); injection, spine, lumbosacral, transforaminal approach (Left, 6/28/2024); and injection, sacroiliac joint (Left, 8/23/2024).    [place] has a current medication list which includes the following prescription(s): atorvastatin, blood sugar diagnostic, blood sugar diagnostic, blood-glucose meter, finasteride, gabapentin, lancets, levothyroxine, losartan, meloxicam, memantine, metformin, and omeprazole.    Review of patient's allergies indicates:  No Known Allergies     Objective   Posture  Patient presents with a Forward head position. Increased thoracic kyphosis and Flat lumbar spine is observed.     Bilateral scapulae are: Protracted         Right knee position is: Flexed       Cervical Thoracic Sensation  Right Cervical/Thoracic Sensation  Intact: Light Touch       Left Cervical/Thoracic Sensation  Intact: Light Touch            Right Dermatomes  Right Cervical Dermatome Light Touch  Intact: C2, C3, C4, C5, C6, C7, and C8  Right Upper  Thoracic Dermatome Light Touch  Intact: T1  Right Lumbar Dermatome Light Touch  Intact: L2, L3, L4, and L5  Right Sacral Dermatome Light Touch  Intact: S1-2       Left Dermatomes  Left Cervical Dermatome Light Touch  Intact: C2, C3, C4, C5, C6, C7, and C8  Left Upper Thoracic Dermatome Light Touch  Intact: T1  Left Lumbar Dermatome Light Touch  Intact: L2, L3, L4, and L5  Left Sacral Dermatome Light Touch  Intact: S1-2           Spinal Mobility  Hypomobile: Lumbosacral  Lumbosacral Mobility Details: Significant hypomobility throughout, pain free        Lumbar Range of Motion   Active (deg) Passive (deg) Pain   Flexion 40       Extension 10       Right Lateral Flexion  (WNL)       Right Rotation  (80%)       Left Lateral Flexion  (75% pain into L LE)   Yes   Left Rotation  (80%)                Shoulder Range of Motion  Right Shoulder   Active (deg) Passive (deg) Pain   Flexion 150       Extension         Scaption         ABduction  (WNL)       ADduction         Horizontal ABduction         Horizontal ADduction         External Rotation (Shoulder ABducted 0 degrees)         External Rotation (Shoulder ABducted 45 degrees)         External Rotation (Shoulder ABducted 90 degrees)  (T5) 90     Internal Rotation (Shoulder ABducted 0 degrees)  (L3)   Yes   Internal Rotation (Shoulder ABducted 45 degrees)         Internal Rotation (Shoulder ABducted 90 degrees)   55 Yes     Left Shoulder   Active (deg) Passive (deg) Pain   Flexion 160       Extension         Scaption         ABduction  (WNL)       ADduction         Horizontal ABduction         Horizontal ADduction         External Rotation (Shoulder ABducted 0 degrees)         External Rotation (Shoulder ABducted 45 degrees)         External Rotation (Shoulder ABducted 90 degrees)  (T6)       Internal Rotation (Shoulder ABducted 0 degrees)  (L1)       Internal Rotation (Shoulder ABducted 45 degrees)         Internal Rotation (Shoulder ABducted 90 degrees)                   Hip Range of Motion   Right Hip   Active (deg) Passive (deg) Pain   Flexion  (WNL)       Extension         ABduction         ADduction         External Rotation 90/90  (40)       External Rotation Prone         Internal Rotation 90/90  (30)   Yes   Internal Rotation Prone             Left Hip   Active (deg) Passive (deg) Pain   Flexion  (WNL)       Extension         ABduction         ADduction         External Rotation 90/90  (40)       External Rotation Prone         Internal Rotation 90/90  (30)   Yes   Internal Rotation Prone                      Shoulder Strength - Planes of Motion   Right Strength Right Pain Left Strength Left  Pain   Flexion 5   5     Extension           ABduction 4 Yes 5     ADduction           Horizontal ABduction           Horizontal ADduction           Internal Rotation 0° 5   5     Internal Rotation 90°           External Rotation 0° 4+ Yes 4+     External Rotation 90°               Elbow Strength   Right Strength Right Pain Left Strength Left  Pain   Flexion (C6) 4+   5     Extension (C7) 5   5               Hip Strength - Planes of Motion   Right Strength Right Pain Left Strength Left  Pain   Flexion (L2) 5   4+     Extension 4   4 Yes   ABduction 5   4 Yes   ADduction 5   5     Internal Rotation 5   5     External Rotation 4+   4+         Knee Strength   Right Strength Right Pain Left Strength Left  Pain   Flexion (S2) 5   5     Prone Flexion           Extension (L3) 4+   4+            Ankle/Foot Strength - Planes of Motion   Right Strength Right Pain Left Strength Left  Pain   Dorsiflexion (L4) 5   4+     Plantar Flexion (S1) 5   5     Inversion           Eversion           Great Toe Flexion           Great Toe Extension (L5)           Lesser Toes Flexion           Lesser Toes Extension                       Cervical/Thoracic Special Tests  Thoracic Tests  Positive: Slump (L)         Lumbar/Pelvic Girdle Special Tests       Lumbar Tests - SLR and Tension  Positive: Left Passive  Straight Leg Raise                   Shoulder Special Tests  Rotator Cuff Tests  Negative: Right Drop Arm, Left Drop Arm, Right Empty Can, Left Empty Can, Right External Rotation Lag Sign, and Left External Rotation Lag Sign  Negative: Right Internal Rotation Lag Sign, Left Internal Rotation Lag Sign, Right Internal Rotation Resistance Strength, and Left Internal Rotation Resistance Strength  Impingement Tests  Positive: Right Antoine-Davin, Right Painful Arc, and Left Painful Arc  Negative: Right Cross Body ADduction, Right Infraspinatus Muscle, and Right Neer's           Shoulder Joint Mobility  Right Shoulder Mobility  Hypomobile: Posterior Capsule Mobility  Left Shoulder Mobility  Hypomobile: Posterior Capsule Mobility            Right Spinal Mobility  Hypomobile: Thoracic Mobility  Left Spinal Mobility  Hypomobile: Left Thoracic Spine Mobility              Fall Risk  Functional mobility test results suggest the patient is: At Risk for Falls  Four Stage Balance Test  Narrow Base of Support: 10 sec sec  Tandem Stand - Right Foot in Front: 8 sec  Tandem Stand - Left Foot in Front: 8 sec              Unsteady nbos EC     Timed Up & Go (TUG)  Time: 12.4 seconds  Observations: Short strides, Slow tentative pace, and Shuffling  An older adult who takes >=12 seconds to complete the TUG is at risk for falling.       Sit to Stand Testing      The patient completed 14 repetitions of a sit to stand transfer in 30 seconds. intermittent hand use         Gait Analysis  Walking Speed: Decreased            Knee Observations During Gait  Right: Knee Decreased Extension in Midstance         Treatment:  Therapeutic Exercise  TE 1: LTR 10x  TE 2: SKTC 10x  TE 3: sciatic nerve glide 10x  TE 4: small range prone press up 10x      Time Entry(in minutes):  PT Evaluation (Low) Time Entry: 45  Therapeutic Exercise Time Entry: 10    Assessment & Plan   Assessment  [place] presents with a condition of Low complexity.   Presentation of  Symptoms: Stable  Will Comorbidities Impact Care: Yes       Functional Limitations: Activity tolerance, Ambulating on uneven surfaces, Completing work/school activities, Completing self-care activities, Decreased ambulation distance/endurance, Functional mobility, Gait limitations, Pain with ADLs/IADLs, Painful locomotion/ambulation, Participating in leisure activities, Performing household chores, Range of motion, Squatting, Standing tolerance, Sitting tolerance  Impairments: Abnormal gait, Activity intolerance, Abnormal or restricted range of motion, Lack of appropriate home exercise program, Impaired physical strength, Pain with functional activity, Impaired balance  Personal Factors Affecting Prognosis: Pain    Patient Goal for Therapy (PT): reduce back pain  Prognosis: Good  Assessment Details: [place] is a 89 y.o. male referred to outpatient Physical Therapy with a medical diagnosis of M25.511,G89.29,M25.512 (ICD-10-CM) - Chronic pain of both shoulders G89.29,M54.42 (ICD-10-CM) - Chronic bilateral low back pain with left-sided sciatica. Patient is well known at this location for management of his chronic low back pain. Patient with history of left humeral fracture 8/24 which has recovered well and patient with greater complaints of right shoulder pain. Pt presents with chronic low back pain with left radiating pain aggravated with assisting dependent wife. He reports no falls since prior episode, ambulating with cane/ walker. Patient demonstrates decreased lumbar range of motion, decreased lower extremity strength, impaired balance and gait, decreased lumbar joint mobility, and decreased functional mobility. Overall, patients functional tests within age related norms, however mobility limited by pain mobility impairments. These impairments impact his ability to stand, walk, sleep, increased fall risk.        Plan  From a physical therapy perspective, the patient would benefit from: Skilled Rehab  Services    Planned therapy interventions include: Therapeutic exercise, Therapeutic activities, Neuromuscular re-education, Manual therapy, and Gait training.    Planned modalities to include: Cryotherapy (cold pack) and Thermotherapy (hot pack).        Visit Frequency: 2 times Per Week for 4 Weeks.       This plan was discussed with Patient and Therapy assistant.   Discussion participants: Agreed Upon Plan of Care             The patient's spiritual, cultural, and educational needs were considered, and the patient is agreeable to the plan of care and goals.     Education  Education was done with Patient. The patient's learning style includes Demonstration, Listening, and Pictures/video. The patient Demonstrates understanding and Verbalizes understanding.                 Goals:   Active       Ambulation/movement       Patient will walk 5 minutes without rest break       Start:  06/11/25    Expected End:  07/11/25               Functional outcome       Patient will show a significant change in FOTO patient-reported outcome tool to demonstrate subjective improvement       Start:  06/11/25    Expected End:  07/11/25            Patient will demonstrate independence in home program for support of progression       Start:  06/11/25    Expected End:  07/11/25               Pain       Patient will report pain of 5/10 demonstrating a reduction of overall pain       Start:  06/11/25    Expected End:  07/11/25               Range of Motion       Patient will achieve right shoulder flexion of 160 degrees       Start:  06/11/25    Expected End:  07/11/25               Strength       Patient will achieve right shoulder abduction strength of 4+/5       Start:  06/11/25    Expected End:  07/11/25            Patient will achieve bilateral hip abduction strength of 4+/5       Start:  06/11/25    Expected End:  07/11/25                Aria Cueva PT, DPT

## 2025-06-13 DIAGNOSIS — E11.43 TYPE 2 DIABETES MELLITUS WITH DIABETIC AUTONOMIC NEUROPATHY, WITHOUT LONG-TERM CURRENT USE OF INSULIN: ICD-10-CM

## 2025-06-13 DIAGNOSIS — I10 ESSENTIAL HYPERTENSION: ICD-10-CM

## 2025-06-13 RX ORDER — METFORMIN HYDROCHLORIDE 500 MG/1
500 TABLET, EXTENDED RELEASE ORAL 2 TIMES DAILY WITH MEALS
Qty: 180 TABLET | Refills: 3 | Status: SHIPPED | OUTPATIENT
Start: 2025-06-13 | End: 2026-06-13

## 2025-06-13 RX ORDER — LOSARTAN POTASSIUM 25 MG/1
25 TABLET ORAL DAILY
Qty: 90 TABLET | Refills: 3 | Status: SHIPPED | OUTPATIENT
Start: 2025-06-13

## 2025-06-13 NOTE — TELEPHONE ENCOUNTER
Care Due:                  Date            Visit Type   Department     Provider  --------------------------------------------------------------------------------                                EP -                              PRIMARY      [facility]  Last Visit: 05-      CARE ([facility])   DIOMEDES Marrufo                              EP -                              PRIMARY      [facility]  Next Visit: 11-      CARE ([facility])   DIOMEDES Marrufo                                                            Last  Test          Frequency    Reason                     Performed    Due Date  --------------------------------------------------------------------------------    TSH.........  12 months..  levothyroxine............  08- 08-    Health [facility] Embedded Care Due Messages. Reference number: [id number].   6/13/2025 11:21:12 AM CDT

## 2025-06-17 ENCOUNTER — CLINICAL SUPPORT (OUTPATIENT)
Dept: SPEECH THERAPY | Facility: HOSPITAL | Age: 89
End: 2025-06-17
Attending: FAMILY MEDICINE
Payer: MEDICARE

## 2025-06-17 ENCOUNTER — HOSPITAL ENCOUNTER (OUTPATIENT)
Dept: RADIOLOGY | Facility: HOSPITAL | Age: 89
Discharge: HOME | End: 2025-06-17
Attending: FAMILY MEDICINE
Payer: MEDICARE

## 2025-06-17 ENCOUNTER — RESULTS FOLLOW-UP (OUTPATIENT)
Dept: FAMILY MEDICINE | Facility: CLINIC | Age: 89
End: 2025-06-17

## 2025-06-17 DIAGNOSIS — R13.10 DYSPHAGIA, UNSPECIFIED TYPE: Primary | ICD-10-CM

## 2025-06-17 DIAGNOSIS — R13.10 DYSPHAGIA, UNSPECIFIED TYPE: ICD-10-CM

## 2025-06-17 PROCEDURE — 25500020 PHARM REV CODE 255: Mod: HCNC | Performed by: FAMILY MEDICINE

## 2025-06-17 PROCEDURE — 74230 X-RAY XM SWLNG FUNCJ C+: CPT | Mod: TC,HCNC

## 2025-06-17 PROCEDURE — 92611 MOTION FLUOROSCOPY/SWALLOW: CPT | Mod: HCNC

## 2025-06-17 PROCEDURE — 97535 SELF CARE MNGMENT TRAINING: CPT | Mod: HCNC

## 2025-06-17 PROCEDURE — A9698 NON-RAD CONTRAST MATERIALNOC: HCPCS | Mod: HCNC | Performed by: FAMILY MEDICINE

## 2025-06-17 RX ADMIN — BARIUM SULFATE 20 ML: 0.81 POWDER, FOR SUSPENSION ORAL at 12:06

## 2025-06-17 NOTE — PROGRESS NOTES
REHAB SERVICE VIDEO SWALLOW STUDY    Name: Kang Herbert  YOB: 1935  MRN:  [id number]  Referring Provider: Zeke Marrufo MD  [address] [place] [address]  [place] [id number]  Onset Date:  2023  SOC Date:  6/17/2025  Primary Diagnosis:  globus sensation     Functional Deficits Leading to Referral:  Pt referred by PCP for Outpatient MBSS as pt reports globus sensation and throat clearing all day long.   Last clinic note (5/23/25) from PCP: 89 years old male came to the clinic for dementia follow-up. Patient still able to do his activities of daily living. Patient with good compliance with his thyroid medicine. He is requesting physical therapy to help with bilateral shoulder and back pain. Patient previously with sciatica over the left side. Last A1c was stable. Diabetes under control. No flare ups of neuropathy. No Recent seizures.     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    Seizures     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/2013     Past Surgical History:   Procedure Laterality Date    CATARACT EXTRACTION  11-/    od/os    CHOLECYSTECTOMY      EPIDURAL STEROID INJECTION INTO LUMBAR SPINE N/A 11/30/2021    Procedure: Lumbar Epidural Steroid Injection L4-5 (No Sedation);  Surgeon: Sisi Lua Jr., MD;  Location: [facility] PAIN MGT;  Service: Pain Management;  Laterality: N/A;  Diabetic    EPIDURAL STEROID INJECTION INTO LUMBAR SPINE N/A 1/25/2022    Procedure: Lumbar Epidural Steroid Injection L4-5;  Surgeon: Sisi Lua Jr., MD;  Location: [facility] PAIN MGT;  Service: Pain Management;  Laterality: N/A;  diabetic     EPIDURAL STEROID INJECTION INTO LUMBAR SPINE N/A 11/15/2023    Procedure: INJECTION, STEROID, SPINE, LUMBAR, EPIDURAL;  Surgeon:  Mrai Mondragon DO;  Location: [facility] PAIN MANAGEMENT;  Service: Pain Management;  Laterality: N/A;    HERNIA REPAIR      INJECTION, SACROILIAC JOINT Left 8/23/2024    Procedure: Left SI Joint;  Surgeon: Miquel Clinton MD;  Location: [facility] PAIN MANAGEMENT;  Service: Pain Management;  Laterality: Left;  no sed no ac    INJECTION, SPINE, LUMBOSACRAL, TRANSFORAMINAL APPROACH Left 6/28/2024    Procedure: LT  L3-4 and L4-L5 TFESI;  Surgeon: Miquel Clinton MD;  Location: [facility] PAIN MANAGEMENT;  Service: Pain Management;  Laterality: Left;  20 mins       Prior Level of Function:  Independent with ADLs and medications   Social Cultural:  Swedish  Nutrition:  regular diet/ thin liquids PO but noted weight loss  Signs of Abuse:  No  Medication:  Medications Ordered Prior to Encounter[1]    Alertness/Attention:  Pt is alert, attentive, oriented, and indicated understanding of reason of visit.  Oral Motor:    Oral Musculature: WFL  Structure Abnormalities: WFL  Dentition: WFL  Secretion Management: WFL  Mucosal Quality: WFL  Mandibular Strength and Mobility: WFL  Oral Labial Strength and Mobility: WFL  Lingual Strength and Mobility: WFL  Velar Elevation: WFL  Buccal Strength and Mobility: WFL  Volitional Cough: good  Volitional Swallow: good  Voice Prior to PO Intake: good  Oral Musculature Comments: n/a    Patient Position:  Sitting  View:  Lateral  Presentations:    THIN:- self drank cup sip x1, straw sip x1, sequential straw sips x3   PUREE:- tsp bites of pudding with barium paste x3  DENTAL SOFT:- tsp bites of 3  SOLID:- 1 inch bite of anitha cracker with barium paste x1    Oral Preparation / Oral Phase   WFL - Pt with adequate bolus acceptance, containment, control and timely A-P transfer across consistencies   Adequate bolus formation and control   Mastication is WFL  No presence of oral residue post swallow   No presence of naso-pharyngeal reflux  Pharyngeal Phase   Pt essentially timely swallow initiation for age  Pt with  adequate hyolaryngeal elevation and excursion patterns  Pt with complete epiglottic inversion patterns  Pt with sensory response  Strategies attempted were effective in eliminating the occurrence of penetration/aspiration   Pt with adequate airway protection without penetration or aspiration with all consistencies   There was a minimal presence of pocketing below C6 with all consistencies trialed, multiple sips of thin liquids and saliva swallows cleared residue from pouch  UES appeared to accommodate all bolus types without stasis or retrograde movement observed     This is was noted at C6, fluid filled pouch that eventually cleared following multiple swallows.         Strategies/Compensatory Techniques Utilized:  chin tuck, effortful swallow, alternating 1 bite of food with a sip of thin liquid    Impressions:  Pt presents with pocketing in a small pouch below C6. Pt was able to clear residue with alternating small bites with sips of thin liquid. Pt spontaneously implemented compensatory strategies (chin tuck and effortful swallow) following a solid/anitha cracker with barium pudding. Pt expressed no feeling of globus sensation following all trials and consistencies. Throat clearing was noted before, during, and after the swallow study likely due to a possible habit.    Recommendations/Precautions:  Pt is recommended to continue with a regular diet with thin liquids. No further SLP needs. Follow up with PCP to review results.    Education: SLP educated pt on alternating bites and sips of thin liquid.       TIME RECORD  Date: 6/17/25  Start Time: 11:20  Stop Time:  11:50    PROCEDURES:  Total Timed Minutes:  30  Total Timed Units:  1  Total Untimed Units:  1  Charges Billed/# of units:  2               [1]   Current Outpatient Medications on File Prior to Visit   Medication Sig Dispense Refill    atorvastatin (LIPITOR) 10 MG tablet Take 1 tablet (10 mg total) by mouth once daily. 90 tablet 3    blood sugar  diagnostic (TRUE METRIX GLUCOSE TEST STRIP) Strp Inject 1 strip into the skin Daily. 100 strip 3    blood sugar diagnostic (TRUE METRIX GLUCOSE TEST STRIP) Strp Inject 1 strip into the skin Daily. 100 strip 3    blood-glucose meter (ACCU-CHEK ARUNA PLUS METER) Misc 1 Units by Misc.(Non-Drug; Combo Route) route once daily. 1 each 0    finasteride (PROSCAR) 5 mg tablet Take 1 tablet (5 mg total) by mouth once daily. 90 tablet 3    gabapentin (NEURONTIN) 800 MG tablet Take 1 tablet (800 mg total) by mouth 2 (two) times daily. 180 tablet 3    lancets (ONETOUCH ULTRASOFT LANCETS) Misc 1 lancet by Misc.(Non-Drug; Combo Route) route once daily. 100 each 3    levothyroxine (SYNTHROID) 50 MCG tablet Take 1 tablet (50 mcg total) by mouth before breakfast. 90 tablet 3    losartan (COZAAR) 25 MG tablet Take 1 tablet (25 mg total) by mouth once daily. 90 tablet 3    meloxicam (MOBIC) 15 MG tablet Take 1 tablet (15 mg total) by mouth daily as needed for Pain. 30 tablet 1    memantine (NAMENDA) 5 MG Tab Take 1 tablet (5 mg total) by mouth 2 (two) times daily. 180 tablet 3    metFORMIN (GLUCOPHAGE-XR) 500 MG ER 24hr tablet Take 1 tablet (500 mg total) by mouth 2 (two) times daily with meals. 180 tablet 3    omeprazole (PRILOSEC) 20 MG capsule Take 1 capsule (20 mg total) by mouth once daily. 90 capsule 3     No current facility-administered medications on file prior to visit.

## 2025-06-18 ENCOUNTER — CLINICAL SUPPORT (OUTPATIENT)
Dept: REHABILITATION | Facility: HOSPITAL | Age: 89
End: 2025-06-18
Payer: MEDICARE

## 2025-06-18 DIAGNOSIS — R29.898 DECREASED STRENGTH OF LOWER EXTREMITY: Primary | ICD-10-CM

## 2025-06-18 DIAGNOSIS — M53.86 DECREASED ROM OF LUMBAR SPINE: ICD-10-CM

## 2025-06-18 PROCEDURE — 97110 THERAPEUTIC EXERCISES: CPT | Mod: HCNC,PN

## 2025-06-18 PROCEDURE — 97112 NEUROMUSCULAR REEDUCATION: CPT | Mod: HCNC,PN

## 2025-06-18 NOTE — PROGRESS NOTES
"  Outpatient Rehab    Physical Therapy Visit    Patient Name: Kang Herbert  MRN: [id number]  YOB: 1935  Encounter Date: 6/18/2025    Therapy Diagnosis:   Encounter Diagnoses   Name Primary?    Decreased strength of lower extremity Yes    Decreased ROM of lumbar spine      Physician: Zeke Marrufo*    Physician Orders: Eval and Treat  Medical Diagnosis: Chronic pain of both shoulders  Chronic bilateral low back pain with left-sided sciatica  Surgical Diagnosis: Not applicable for this Episode   Surgical Date: Not applicable for this Episode  Days Since Last Surgery: Not applicable for this Episode    Visit # / Visits Authorized:  1 / 20  Insurance Authorization Period: 6/11/2025 to 8/18/2025  Date of Evaluation: 6/11/2025  Plan of Care Certification: 6/11/2025 to 7/11/2025      PT/PTA: PT   Number of PTA visits since last PT visit:0  Time In: 1400   Time Out: 1455  Total Time (in minutes): 55   Total Billable Time (in minutes): 55    FOTO:  Intake Score:  %  Survey Score 2:  %  Survey Score 3:  %    Precautions:       Subjective   pt reports compliance with HEP. mild stiffness today..  Pain reported as 6/10. low back and B shoulders    Objective            Treatment:  Therapeutic Exercise  TE 1: LTR 2'  TE 2: SKTC 10x10"  TE 4: STS 3x10  TE 5: nustep 6' L3 twin peaks  TE 6: chest press 2# 3x10  TE 7: shoulder flexion 2# 2x10  TE 8: seated lumbar flexion 3'  Balance/Neuromuscular Re-Education  NMR 1: clamshells 2x10 RTB  NMR 2: bridges 3x10  NMR 3: sciatic nerve glides 2x10  NMR 4: SLR 2x10    Time Entry(in minutes):  Neuromuscular Re-Education Time Entry: 30  Therapeutic Exercise Time Entry: 25    Assessment & Plan   Assessment: [place] is a 89 y.o. male referred to outpatient Physical Therapy with a medical diagnosis of M25.511,G89.29,M25.512 (ICD-10-CM) - Chronic pain of both shoulders G89.29,M54.42 (ICD-10-CM) - Chronic bilateral low back pain with left-sided sciatica. Patient " presents with low grade back and shoulder pain. Treatment focused on global strengthening of Upper extremities and Lower extremities. Patient with good tolerance with minimal pain throughout session. No increased pain post treatment.  Evaluation/Treatment Tolerance: Patient tolerated treatment well    The patient will continue to benefit from skilled outpatient physical therapy in order to address the deficits listed in the problem list on the initial evaluation, provide patient and family education, and maximize the patients level of independence in the home and community environments.     The patient's spiritual, cultural, and educational needs were considered, and the patient is agreeable to the plan of care and goals.           Plan: continue POC    Goals:   Active       Ambulation/movement       Patient will walk 5 minutes without rest break (Progressing)       Start:  06/11/25    Expected End:  07/11/25               Functional outcome       Patient will show a significant change in FOTO patient-reported outcome tool to demonstrate subjective improvement (Progressing)       Start:  06/11/25    Expected End:  07/11/25            Patient will demonstrate independence in home program for support of progression (Progressing)       Start:  06/11/25    Expected End:  07/11/25               Pain       Patient will report pain of 5/10 demonstrating a reduction of overall pain (Progressing)       Start:  06/11/25    Expected End:  07/11/25               Range of Motion       Patient will achieve right shoulder flexion of 160 degrees (Progressing)       Start:  06/11/25    Expected End:  07/11/25               Strength       Patient will achieve right shoulder abduction strength of 4+/5 (Progressing)       Start:  06/11/25    Expected End:  07/11/25            Patient will achieve bilateral hip abduction strength of 4+/5 (Progressing)       Start:  06/11/25    Expected End:  07/11/25                Aria Cueva PT,  DPT

## 2025-06-23 ENCOUNTER — CLINICAL SUPPORT (OUTPATIENT)
Dept: REHABILITATION | Facility: HOSPITAL | Age: 89
End: 2025-06-23
Payer: MEDICARE

## 2025-06-23 DIAGNOSIS — R29.898 DECREASED STRENGTH OF LOWER EXTREMITY: Primary | ICD-10-CM

## 2025-06-23 DIAGNOSIS — M53.86 DECREASED ROM OF LUMBAR SPINE: ICD-10-CM

## 2025-06-23 PROCEDURE — 97110 THERAPEUTIC EXERCISES: CPT | Mod: HCNC,PN,CQ

## 2025-06-23 PROCEDURE — 97530 THERAPEUTIC ACTIVITIES: CPT | Mod: HCNC,PN,CQ

## 2025-06-23 PROCEDURE — 97112 NEUROMUSCULAR REEDUCATION: CPT | Mod: HCNC,PN,CQ

## 2025-06-23 NOTE — PROGRESS NOTES
"  Outpatient Rehab    Physical Therapy Visit    Patient Name: Kang Herbert  MRN: [id number]  YOB: 1935  Encounter Date: 6/23/2025    Therapy Diagnosis:   Encounter Diagnoses   Name Primary?    Decreased strength of lower extremity Yes    Decreased ROM of lumbar spine      Physician: Zeke Marrufo*    Physician Orders: Eval and Treat  Medical Diagnosis: Chronic pain of both shoulders  Chronic bilateral low back pain with left-sided sciatica  Surgical Diagnosis: Not applicable for this Episode   Surgical Date: Not applicable for this Episode  Days Since Last Surgery: Not applicable for this Episode    Visit # / Visits Authorized:  2 / 20  Insurance Authorization Period: 6/11/2025 to 8/18/2025  Date of Evaluation: 6/11/2025  Plan of Care Certification: 6/11/2025 to 7/11/2025      PT/PTA: PTA   Number of PTA visits since last PT visit:1  Time In: 1500   Time Out: 1605  Total Time (in minutes): 65   Total Billable Time (in minutes): 40    FOTO:  Intake Score:  %  Survey Score 2:  %  Survey Score 3:  %    Precautions:       Subjective   Ongoing lower back ache and stiffness..  Pain reported as 7/10. low back and B shoulders    Objective            Treatment:  Therapeutic Exercise  TE 1: LTR 2'  TE 2: SKTC 10x10"  TE 6: chest press 2# 3x10  TE 7: shoulder flexion 2# 2x10  TE 8: seated lumbar flexion 3'  Balance/Neuromuscular Re-Education  NMR 1: clamshells 2x10 RTB  NMR 2: bridges 3x10  NMR 3: sciatic nerve glides 2x10  NMR 4: SLR 2x10  Therapeutic Activity  TA 1: STS 3x10  TA 2: Bilateral bisi raises 2 x 10  TA 3: nustep 6' L3 twin peaks    Time Entry(in minutes):  Neuromuscular Re-Education Time Entry: 15  Therapeutic Activity Time Entry: 15  Therapeutic Exercise Time Entry: 15    Assessment & Plan   Assessment: [place] is a 89 y.o. male referred to outpatient Physical Therapy with a medical diagnosis of M25.511,G89.29,M25.512 (ICD-10-CM) - Chronic pain of both shoulders G89.29,M54.42 " (ICD-10-CM) - Chronic bilateral low back pain with left-sided sciatica. Patient presents with moderate to high grade back and shoulder pain. Continued focus on global strengthening of Upper extremities and Lower extremities. Tolerated well with patient voicing decreased pain following lumbar mobility exercises. No increased pain post treatment.  Evaluation/Treatment Tolerance: Patient tolerated treatment well    The patient will continue to benefit from skilled outpatient physical therapy in order to address the deficits listed in the problem list on the initial evaluation, provide patient and family education, and maximize the patients level of independence in the home and community environments.     The patient's spiritual, cultural, and educational needs were considered, and the patient is agreeable to the plan of care and goals.           Plan: continue POC    Goals:   Active       Ambulation/movement       Patient will walk 5 minutes without rest break (Progressing)       Start:  06/11/25    Expected End:  07/11/25               Functional outcome       Patient will show a significant change in FOTO patient-reported outcome tool to demonstrate subjective improvement (Progressing)       Start:  06/11/25    Expected End:  07/11/25            Patient will demonstrate independence in home program for support of progression (Progressing)       Start:  06/11/25    Expected End:  07/11/25               Pain       Patient will report pain of 5/10 demonstrating a reduction of overall pain (Progressing)       Start:  06/11/25    Expected End:  07/11/25               Range of Motion       Patient will achieve right shoulder flexion of 160 degrees (Progressing)       Start:  06/11/25    Expected End:  07/11/25               Strength       Patient will achieve right shoulder abduction strength of 4+/5 (Progressing)       Start:  06/11/25    Expected End:  07/11/25            Patient will achieve bilateral hip abduction  strength of 4+/5 (Progressing)       Start:  06/11/25    Expected End:  07/11/25                Bhavana Dawn, PTA

## 2025-06-25 ENCOUNTER — CLINICAL SUPPORT (OUTPATIENT)
Dept: REHABILITATION | Facility: HOSPITAL | Age: 89
End: 2025-06-25
Payer: MEDICARE

## 2025-06-25 DIAGNOSIS — M53.86 DECREASED ROM OF LUMBAR SPINE: ICD-10-CM

## 2025-06-25 DIAGNOSIS — R29.898 DECREASED STRENGTH OF LOWER EXTREMITY: Primary | ICD-10-CM

## 2025-06-25 PROCEDURE — 97530 THERAPEUTIC ACTIVITIES: CPT | Mod: HCNC,PN,CQ

## 2025-06-25 PROCEDURE — 97110 THERAPEUTIC EXERCISES: CPT | Mod: HCNC,PN,CQ

## 2025-06-25 PROCEDURE — 97112 NEUROMUSCULAR REEDUCATION: CPT | Mod: HCNC,PN,CQ

## 2025-06-25 NOTE — PROGRESS NOTES
"  Outpatient Rehab    Physical Therapy Visit    Patient Name: Kang Herbert  MRN: [id number]  YOB: 1935  Encounter Date: 6/25/2025    Therapy Diagnosis:   Encounter Diagnoses   Name Primary?    Decreased strength of lower extremity Yes    Decreased ROM of lumbar spine      Physician: Zeke Marrufo*    Physician Orders: Eval and Treat  Medical Diagnosis: Chronic pain of both shoulders  Chronic bilateral low back pain with left-sided sciatica  Surgical Diagnosis: Not applicable for this Episode   Surgical Date: Not applicable for this Episode  Days Since Last Surgery: Not applicable for this Episode    Visit # / Visits Authorized:  3 / 20  Insurance Authorization Period: 6/11/2025 to 8/18/2025  Date of Evaluation: 6/11/2025  Plan of Care Certification: 6/11/2025 to 7/11/2025      PT/PTA: PTA   Number of PTA visits since last PT visit:2  Time In: 1450   Time Out: 1545  Total Time (in minutes): 55   Total Billable Time (in minutes): 40    FOTO:  Intake Score:  %  Survey Score 2:  %  Survey Score 3:  %    Precautions:       Subjective   Seeing some improvement in lower back pain..  Pain reported as 5/10. low back and B shoulders    Objective            Treatment:  Therapeutic Exercise  TE 1: LTR 2'  TE 2: SKTC 10x10"  Balance/Neuromuscular Re-Education  NMR 1: clamshells 2x10 RTB  NMR 2: bridges 3x10  NMR 3: sciatic nerve glides 2x10  NMR 4: SLR 2x10  NMR 5: SAPD 2 x 10 RTB  NMR 6: Rows 2 x 10 RTB  Therapeutic Activity  TA 1: STS 3x10  TA 2: Bilateral bisi raises 2 x 10  TA 3: nustep 6' L3 twin peaks  TA 4: Step up 4in step 2 x 10    Time Entry(in minutes):  Neuromuscular Re-Education Time Entry: 15  Therapeutic Activity Time Entry: 15  Therapeutic Exercise Time Entry: 10    Assessment & Plan   Assessment: [place] is a 89 y.o. male referred to outpatient Physical Therapy with a medical diagnosis of M25.511,G89.29,M25.512 (ICD-10-CM) - Chronic pain of both shoulders G89.29,M54.42 (ICD-10-CM) " - Chronic bilateral low back pain with left-sided sciatica. Patient presents with moderate level back and shoulder pain. Continued focus on global strengthening of Upper extremities and Lower extremities. Addition of more functional and lumbar stabilization exercises with good tolerance and no increased pain post treatment. Continue to progress as appropriate.   Evaluation/Treatment Tolerance: Patient tolerated treatment well    The patient will continue to benefit from skilled outpatient physical therapy in order to address the deficits listed in the problem list on the initial evaluation, provide patient and family education, and maximize the patients level of independence in the home and community environments.     The patient's spiritual, cultural, and educational needs were considered, and the patient is agreeable to the plan of care and goals.           Plan: continue POC    Goals:   Active       Ambulation/movement       Patient will walk 5 minutes without rest break (Progressing)       Start:  06/11/25    Expected End:  07/11/25               Functional outcome       Patient will show a significant change in FOTO patient-reported outcome tool to demonstrate subjective improvement (Progressing)       Start:  06/11/25    Expected End:  07/11/25            Patient will demonstrate independence in home program for support of progression (Progressing)       Start:  06/11/25    Expected End:  07/11/25               Pain       Patient will report pain of 5/10 demonstrating a reduction of overall pain (Progressing)       Start:  06/11/25    Expected End:  07/11/25               Range of Motion       Patient will achieve right shoulder flexion of 160 degrees (Progressing)       Start:  06/11/25    Expected End:  07/11/25               Strength       Patient will achieve right shoulder abduction strength of 4+/5 (Progressing)       Start:  06/11/25    Expected End:  07/11/25            Patient will achieve bilateral  hip abduction strength of 4+/5 (Progressing)       Start:  06/11/25    Expected End:  07/11/25                Bhavana Dawn, PTA

## 2025-06-30 ENCOUNTER — CLINICAL SUPPORT (OUTPATIENT)
Dept: REHABILITATION | Facility: HOSPITAL | Age: 89
End: 2025-06-30
Payer: MEDICARE

## 2025-06-30 DIAGNOSIS — M53.86 DECREASED ROM OF LUMBAR SPINE: ICD-10-CM

## 2025-06-30 DIAGNOSIS — R29.898 DECREASED STRENGTH OF LOWER EXTREMITY: Primary | ICD-10-CM

## 2025-06-30 PROCEDURE — 97112 NEUROMUSCULAR REEDUCATION: CPT | Mod: HCNC,PN

## 2025-06-30 PROCEDURE — 97530 THERAPEUTIC ACTIVITIES: CPT | Mod: HCNC,PN

## 2025-06-30 NOTE — PROGRESS NOTES
"  Outpatient Rehab    Physical Therapy Visit    Patient Name: Kang Herbert  MRN: [id number]  YOB: 1935  Encounter Date: 6/30/2025    Therapy Diagnosis:   Encounter Diagnoses   Name Primary?    Decreased strength of lower extremity Yes    Decreased ROM of lumbar spine      Physician: Zeke Marrufo*    Physician Orders: Eval and Treat  Medical Diagnosis: Chronic pain of both shoulders  Chronic bilateral low back pain with left-sided sciatica  Surgical Diagnosis: Not applicable for this Episode   Surgical Date: Not applicable for this Episode  Days Since Last Surgery: Not applicable for this Episode    Visit # / Visits Authorized:  4 / 20  Insurance Authorization Period: 6/11/2025 to 8/18/2025  Date of Evaluation: 6/11/2025  Plan of Care Certification: 6/11/2025 to 7/11/2025      PT/PTA: PT   Number of PTA visits since last PT visit:2  Time In: 1510   Time Out: 1600  Total Time (in minutes): 50   Total Billable Time (in minutes): 30    FOTO:  Intake Score:  %  Survey Score 2:  %  Survey Score 3:  %    Precautions:       Subjective   moderate back and shoulder pain today. cramping in medial calf last night..  Pain reported as 5/10. low back and B shoulders    Objective            Treatment:  Therapeutic Exercise  TE 1: LTR 2x20  TE 2: SKTC 10x10"  TE 5: bike 6' L2  TE 6: chest press 2# 3x10  TE 7: shoulder flexion 2# 2x10  TE 8: seated lumbar flexion 3'  TE 9: sidelying ER 2x10  Balance/Neuromuscular Re-Education  NMR 1: clamshells 2x10 RTB  NMR 2: bridges 3x10  NMR 3: sciatic nerve glides 2x10  Therapeutic Activity  TA 1: STS 3x10  TA 2: Bilateral bisi raises 2 x 10  TA 4: Step up 4in step 2 x 10    Time Entry(in minutes):  Neuromuscular Re-Education Time Entry: 15  Therapeutic Activity Time Entry: 15    Assessment & Plan   Assessment: [place] is a 89 y.o. male referred to outpatient Physical Therapy with a medical diagnosis of M25.511,G89.29,M25.512 (ICD-10-CM) - Chronic pain of both " shoulders G89.29,M54.42 (ICD-10-CM) - Chronic bilateral low back pain with left-sided sciatica. Patient presents with moderate level back and shoulder pain. Patient tolerated treatment well. He continues to require cuing for appropriate form and technique with the exercises but is able to perform increased repetitions demonstrating improving strength and activity tolerance. No increased pain with session.   Evaluation/Treatment Tolerance: Patient tolerated treatment well    The patient will continue to benefit from skilled outpatient physical therapy in order to address the deficits listed in the problem list on the initial evaluation, provide patient and family education, and maximize the patients level of independence in the home and community environments.     The patient's spiritual, cultural, and educational needs were considered, and the patient is agreeable to the plan of care and goals.           Plan: continue POC    Goals:   Active       Ambulation/movement       Patient will walk 5 minutes without rest break (Progressing)       Start:  06/11/25    Expected End:  07/11/25               Functional outcome       Patient will show a significant change in FOTO patient-reported outcome tool to demonstrate subjective improvement (Progressing)       Start:  06/11/25    Expected End:  07/11/25            Patient will demonstrate independence in home program for support of progression (Progressing)       Start:  06/11/25    Expected End:  07/11/25               Pain       Patient will report pain of 5/10 demonstrating a reduction of overall pain (Progressing)       Start:  06/11/25    Expected End:  07/11/25               Range of Motion       Patient will achieve right shoulder flexion of 160 degrees (Progressing)       Start:  06/11/25    Expected End:  07/11/25               Strength       Patient will achieve right shoulder abduction strength of 4+/5 (Progressing)       Start:  06/11/25    Expected End:   07/11/25            Patient will achieve bilateral hip abduction strength of 4+/5 (Progressing)       Start:  06/11/25    Expected End:  07/11/25                Aria Cueva PT, DPT

## 2025-07-02 ENCOUNTER — CLINICAL SUPPORT (OUTPATIENT)
Dept: REHABILITATION | Facility: HOSPITAL | Age: 89
End: 2025-07-02
Payer: MEDICARE

## 2025-07-02 DIAGNOSIS — M53.86 DECREASED ROM OF LUMBAR SPINE: ICD-10-CM

## 2025-07-02 DIAGNOSIS — R29.898 DECREASED STRENGTH OF LOWER EXTREMITY: Primary | ICD-10-CM

## 2025-07-02 PROCEDURE — 97530 THERAPEUTIC ACTIVITIES: CPT | Mod: HCNC,PN,CQ

## 2025-07-02 PROCEDURE — 97112 NEUROMUSCULAR REEDUCATION: CPT | Mod: HCNC,PN,CQ

## 2025-07-02 NOTE — PROGRESS NOTES
"  Outpatient Rehab    Physical Therapy Visit    Patient Name: Kang Herbert  MRN: [id number]  YOB: 1935  Encounter Date: 7/2/2025    Therapy Diagnosis:   Encounter Diagnoses   Name Primary?    Decreased strength of lower extremity Yes    Decreased ROM of lumbar spine      Physician: Zeke Marrfuo*    Physician Orders: Eval and Treat  Medical Diagnosis: Chronic pain of both shoulders  Chronic bilateral low back pain with left-sided sciatica  Surgical Diagnosis: Not applicable for this Episode   Surgical Date: Not applicable for this Episode  Days Since Last Surgery: Not applicable for this Episode    Visit # / Visits Authorized:  5 / 20  Insurance Authorization Period: 6/11/2025 to 8/18/2025  Date of Evaluation: 6/11/2025  Plan of Care Certification: 6/11/2025 to 7/11/2025      PT/PTA: PTA   Number of PTA visits since last PT visit:1  Time In: 1500   Time Out: 1555  Total Time (in minutes): 55   Total Billable Time (in minutes): 30    FOTO:  Intake Score:  %  Survey Score 2:  %  Survey Score 3:  %    Precautions:   Standard    Subjective   Symptoms about the same today, but no new complaints..  Pain reported as 4/10. low back and B shoulders    Objective            Treatment:  Therapeutic Exercise  TE 1: LTR 2x20  TE 2: SKTC 10x10"  TE 5: bike 6' L2  TE 6: chest press 2# 3x10  TE 7: shoulder flexion 2# 2x10  TE 8: seated lumbar flexion 3'  TE 9: sidelying ER 2x10  Balance/Neuromuscular Re-Education  NMR 1: clamshells 2x10 RTB  NMR 2: bridges 3x10  NMR 3: sciatic nerve glides 2x10  NMR 4: SLR 2x10  Therapeutic Activity  TA 1: STS 3x10  TA 2: Bilateral bisi raises 2 x 10  TA 4: Step up 4in step 2 x 10    Time Entry(in minutes):  Neuromuscular Re-Education Time Entry: 15  Therapeutic Activity Time Entry: 10  Therapeutic Exercise Time Entry: 5    Assessment & Plan   Assessment: [place] is a 89 y.o. male referred to outpatient Physical Therapy with a medical diagnosis of " M25.511,G89.29,M25.512 (ICD-10-CM) - Chronic pain of both shoulders G89.29,M54.42 (ICD-10-CM) - Chronic bilateral low back pain with left-sided sciatica. Patient presents with moderate level back and shoulder pain. Continued treatment session addressing shoulder and lower back symptoms. No increased pain with session.   Evaluation/Treatment Tolerance: Patient tolerated treatment well    The patient will continue to benefit from skilled outpatient physical therapy in order to address the deficits listed in the problem list on the initial evaluation, provide patient and family education, and maximize the patients level of independence in the home and community environments.     The patient's spiritual, cultural, and educational needs were considered, and the patient is agreeable to the plan of care and goals.           Plan: continue POC    Goals:   Active       Ambulation/movement       Patient will walk 5 minutes without rest break (Progressing)       Start:  06/11/25    Expected End:  07/11/25               Functional outcome       Patient will show a significant change in FOTO patient-reported outcome tool to demonstrate subjective improvement (Progressing)       Start:  06/11/25    Expected End:  07/11/25            Patient will demonstrate independence in home program for support of progression (Progressing)       Start:  06/11/25    Expected End:  07/11/25               Pain       Patient will report pain of 5/10 demonstrating a reduction of overall pain (Progressing)       Start:  06/11/25    Expected End:  07/11/25               Range of Motion       Patient will achieve right shoulder flexion of 160 degrees (Progressing)       Start:  06/11/25    Expected End:  07/11/25               Strength       Patient will achieve right shoulder abduction strength of 4+/5 (Progressing)       Start:  06/11/25    Expected End:  07/11/25            Patient will achieve bilateral hip abduction strength of 4+/5  (Progressing)       Start:  06/11/25    Expected End:  07/11/25                Bhavana Dawn, PTA

## 2025-07-07 ENCOUNTER — CLINICAL SUPPORT (OUTPATIENT)
Dept: REHABILITATION | Facility: HOSPITAL | Age: 89
End: 2025-07-07
Payer: MEDICARE

## 2025-07-07 DIAGNOSIS — R29.898 DECREASED STRENGTH OF LOWER EXTREMITY: Primary | ICD-10-CM

## 2025-07-07 DIAGNOSIS — M53.86 DECREASED ROM OF LUMBAR SPINE: ICD-10-CM

## 2025-07-07 PROCEDURE — 97530 THERAPEUTIC ACTIVITIES: CPT | Mod: HCNC,PN,CQ

## 2025-07-07 PROCEDURE — 97112 NEUROMUSCULAR REEDUCATION: CPT | Mod: HCNC,PN,CQ

## 2025-07-07 NOTE — PROGRESS NOTES
"  Outpatient Rehab    Physical Therapy Visit    Patient Name: Kang Herbert  MRN: [id number]  YOB: 1935  Encounter Date: 7/7/2025    Therapy Diagnosis:   Encounter Diagnoses   Name Primary?    Decreased strength of lower extremity Yes    Decreased ROM of lumbar spine      Physician: Zeke Marrufo*    Physician Orders: Eval and Treat  Medical Diagnosis: Chronic pain of both shoulders  Chronic bilateral low back pain with left-sided sciatica  Surgical Diagnosis: Not applicable for this Episode   Surgical Date: Not applicable for this Episode  Days Since Last Surgery: Not applicable for this Episode    Visit # / Visits Authorized:  6 / 20  Insurance Authorization Period: 6/11/2025 to 8/18/2025  Date of Evaluation: 6/11/2025  Plan of Care Certification: 6/11/2025 to 7/11/2025      PT/PTA: PTA   Number of PTA visits since last PT visit:2  Time In: 1000   Time Out: 1055  Total Time (in minutes): 55   Total Billable Time (in minutes): 30    FOTO:  Intake Score:  %  Survey Score 2:  %  Survey Score 3:  %    Precautions:       Subjective   Symptoms about the same today, but no new complaints..  Pain reported as 4/10. low back and B shoulders    Objective            Treatment:  Therapeutic Exercise  TE 1: LTR 2x20  TE 2: SKTC 10x10"  TE 6: chest press 3# 2x10  TE 7: shoulder flexion 3# 2x10  TE 8: seated lumbar flexion 3'  TE 9: sidelying ER 3x10  Balance/Neuromuscular Re-Education  NMR 1: clamshells 3x10 RTB  NMR 2: bridges 3x10  NMR 3: sciatic nerve glides 2x10  NMR 4: SLR 3x10  NMR 5: SAPD 2 x 10 RTB  NMR 6: Rows 2 x 10 RTB  Therapeutic Activity  TA 1: STS 3x10  TA 2: Bilateral bisi raises 2 x 10  TA 4: Step up 4in step 2 x 10    Time Entry(in minutes):  Neuromuscular Re-Education Time Entry: 15  Therapeutic Activity Time Entry: 10  Therapeutic Exercise Time Entry: 5    Assessment & Plan   Assessment: [place] is a 89 y.o. male referred to outpatient Physical Therapy with a medical diagnosis of " M25.511,G89.29,M25.512 (ICD-10-CM) - Chronic pain of both shoulders G89.29,M54.42 (ICD-10-CM) - Chronic bilateral low back pain with left-sided sciatica. Patient presents with moderate level back and shoulder pain. Tolerating exercises well, able to progress intensity today without aggravation of symptoms. Monitor response, progress as appropriate.        The patient will continue to benefit from skilled outpatient physical therapy in order to address the deficits listed in the problem list on the initial evaluation, provide patient and family education, and maximize the patients level of independence in the home and community environments.     The patient's spiritual, cultural, and educational needs were considered, and the patient is agreeable to the plan of care and goals.           Plan: continue POC    Goals:   Active       Ambulation/movement       Patient will walk 5 minutes without rest break (Progressing)       Start:  06/11/25    Expected End:  07/11/25               Functional outcome       Patient will show a significant change in FOTO patient-reported outcome tool to demonstrate subjective improvement (Progressing)       Start:  06/11/25    Expected End:  07/11/25            Patient will demonstrate independence in home program for support of progression (Progressing)       Start:  06/11/25    Expected End:  07/11/25               Pain       Patient will report pain of 5/10 demonstrating a reduction of overall pain (Progressing)       Start:  06/11/25    Expected End:  07/11/25               Range of Motion       Patient will achieve right shoulder flexion of 160 degrees (Progressing)       Start:  06/11/25    Expected End:  07/11/25               Strength       Patient will achieve right shoulder abduction strength of 4+/5 (Progressing)       Start:  06/11/25    Expected End:  07/11/25            Patient will achieve bilateral hip abduction strength of 4+/5 (Progressing)       Start:  06/11/25     Expected End:  07/11/25                Bhavana Dawn, PTA

## 2025-07-09 ENCOUNTER — CLINICAL SUPPORT (OUTPATIENT)
Dept: REHABILITATION | Facility: HOSPITAL | Age: 89
End: 2025-07-09
Payer: MEDICARE

## 2025-07-09 ENCOUNTER — OFFICE VISIT (OUTPATIENT)
Dept: GASTROENTEROLOGY | Facility: CLINIC | Age: 89
End: 2025-07-09
Payer: MEDICARE

## 2025-07-09 VITALS — WEIGHT: 112.19 LBS | BODY MASS INDEX: 20.52 KG/M2

## 2025-07-09 DIAGNOSIS — R13.10 DYSPHAGIA, UNSPECIFIED TYPE: ICD-10-CM

## 2025-07-09 DIAGNOSIS — M53.86 DECREASED ROM OF LUMBAR SPINE: ICD-10-CM

## 2025-07-09 DIAGNOSIS — R29.898 DECREASED STRENGTH OF LOWER EXTREMITY: Primary | ICD-10-CM

## 2025-07-09 PROCEDURE — 1159F MED LIST DOCD IN RCRD: CPT | Mod: CPTII,HCNC,S$GLB, | Performed by: NURSE PRACTITIONER

## 2025-07-09 PROCEDURE — 1126F AMNT PAIN NOTED NONE PRSNT: CPT | Mod: CPTII,HCNC,S$GLB, | Performed by: NURSE PRACTITIONER

## 2025-07-09 PROCEDURE — 97112 NEUROMUSCULAR REEDUCATION: CPT | Mod: HCNC,PN

## 2025-07-09 PROCEDURE — 1160F RVW MEDS BY RX/DR IN RCRD: CPT | Mod: CPTII,HCNC,S$GLB, | Performed by: NURSE PRACTITIONER

## 2025-07-09 PROCEDURE — 1101F PT FALLS ASSESS-DOCD LE1/YR: CPT | Mod: CPTII,HCNC,S$GLB, | Performed by: NURSE PRACTITIONER

## 2025-07-09 PROCEDURE — 97530 THERAPEUTIC ACTIVITIES: CPT | Mod: HCNC,PN

## 2025-07-09 PROCEDURE — 99214 OFFICE O/P EST MOD 30 MIN: CPT | Mod: HCNC,S$GLB,, | Performed by: NURSE PRACTITIONER

## 2025-07-09 PROCEDURE — 3288F FALL RISK ASSESSMENT DOCD: CPT | Mod: CPTII,HCNC,S$GLB, | Performed by: NURSE PRACTITIONER

## 2025-07-09 PROCEDURE — 99999 PR PBB SHADOW E&M-EST. PATIENT-LVL III: CPT | Mod: PBBFAC,HCNC,, | Performed by: NURSE PRACTITIONER

## 2025-07-09 NOTE — PATIENT INSTRUCTIONS
Upper endoscopy    Discussed following precautions with patient: Eat slowly and chew food completely.  Cut food up into small pieces. If food gets stuck and patient cannot clear food or if he has trouble breathing, go to the emergency room. Patient verbalized understanding.      Drink plenty of water with meals    Avoid lying down 3-4 hours after meals.             IMPORTANT INFORMATION TO KNOW BEFORE YOUR PROCEDURE    [facility] 2nd Floor         If your procedure requires the administration of anesthesia, it is necessary for a responsible adult to drive you home. (Medical Transportation, Uber, Lyft, Taxi, etc. may ONLY be used if a responsible adult is present to accompany you home.  The responsible adult CAN'T be the  of the service).      person must be available to return to pick you up within 15 minutes of being notified of discharge.       Please bring a picture ID, insurance card, & copayment      Take Medications as directed below:      If you begin taking any blood thinning medications or injectable weight loss/diabetes medications (other than insulin) , please contact the endoscopy scheduling department listed below as soon as possible.    If you are diabetic see the attached instruction sheet regarding your medication.     If you take HEART, BLOOD PRESSURE, SEIZURE, PAIN, LUNG (including inhalers/nebulizers), ANTI-REJECTION (transplant patients), or PSYCHIATRIC medications, please take at your regular times with a sip of water or as directed by the scheduling nurse.     Important contact information:    Endoscopy [phone] Hours of operation Monday-Friday 8:00-4:30pm.    Questions about insurance or financial obligations call [phone] or [phone].    If you have questions regarding the prep or need to reschedule, please call [phone]. After hours questions requiring immediate assistance, contact [facility] nurse line at [phone]  or [phone].   NOTE:     On occasion, unforeseen circumstances may cause a delay in your procedure start time. We respect your time and appreciate your patience during these circumstances.          Date of procedure: AUG/4/2025 with Dr. MCNULTY arrival time is 9am         [address] of Department[address] [place] [address]   Take the Elevators to 2nd Floor Endoscopy Procedural Area    How to prep:    Day Before Procedure:   · You may have a light evening meal.   · No solid food after 7:00 pm.   · Continue drinking clear liquids.       Day of the Procedure:      · You may have water/clear liquids until 4 hours before your procedure or as directed by the scheduling nurse. See below for list.    What You CANNOT do:   · Do not drink milk or anything colored red.  · Do not drink alcohol.  · No gum chewing or candy morning of procedure.    Liquids That Are OK to Drink:   · Water  · Sports drinks (Gatorade, Power-Aid)  · Coffee or tea (no cream or nondairy creamer)  · Clear juices without pulp (apple, white grape)  · Gelatin desserts (no fruit or toppings)  · Clear soda (sprite, coke, ginger ale)  · Chicken broth (until 12 midnight the night before procedure

## 2025-07-09 NOTE — PROGRESS NOTES
GASTROENTEROLOGY CLINIC NOTE    Chief Complaint: The encounter diagnosis was Dysphagia, unspecified type.  Referring provider/PCP: Zeke Marrufo MD    HPI  Kang Herbert is a 89 y.o. male who is a new patient to me who presents to clinic for dysphagia.   He is accompanied by his wife and daughter. Daughter is translating for patient during this office visit.   Referred to speech therapy for dysphagia. MBSS and esophagram performed. Esophagram with questionable esophageal diverticulum.     MBSS:   This is was noted at C6, fluid filled pouch that eventually cleared following multiple swallows.           Strategies/Compensatory Techniques Utilized:  chin tuck, effortful swallow, alternating 1 bite of food with a sip of thin liquid     Impressions:  Pt presents with pocketing in a small pouch below C6. Pt was able to clear residue with alternating small bites with sips of thin liquid. Pt spontaneously implemented compensatory strategies (chin tuck and effortful swallow) following a solid/anitha cracker with barium pudding. Pt expressed no feeling of globus sensation following all trials and consistencies. Throat clearing was noted before, during, and after the swallow study likely due to a possible habit.      Where is the food getting stuck:   Points to sternal notch    How Often / How Long   Ongoing for a few years  Occurs with every meal   Does it occur before or after swallowing     After swallowing     Does it occur with solids/liquids/medication   Solids and medications     Does it occur with certain types of solids   All solids regardless of consistency    How do you clear the food     Repeated Swallows    Nasal Regurgitation/Rumination/Regurgitation    No regurgitation no   Coughing or choking with eating    Some coughing with eating Coughing with eating   Reflux/Heartburn Controlled with omeprazole.     He does have frequent throat clearing and globus sensation     Unexplained weight loss  no   Odynophagia no   Current PPI Omeprazole 20mg daily         GLP-1s: No  NSAIDs: No  Anticoagulation or Antiplatelet: No    History of H.pylori:  H.pylori Treatment:  Prior Upper Endoscopy:  Prior Colonoscopy: 7/2024   Entire colon normal.   Family h/o Colon Cancer: No  Family h/o Crohn's Disease or Ulcerative Colitis: No  Family h/o Celiac Sprue: No      Review of Systems   Constitutional:  Negative for weight loss.   HENT:  Negative for sore throat.    Eyes:  Negative for blurred vision.   Respiratory:  Negative for cough.    Cardiovascular:  Negative for chest pain.   Gastrointestinal:  Negative for abdominal pain, blood in stool, constipation, diarrhea, heartburn, melena, nausea and vomiting.   Genitourinary:  Negative for dysuria.   Musculoskeletal:  Negative for myalgias.   Skin:  Negative for rash.   Neurological:  Negative for headaches.   Endo/Heme/Allergies:  Negative for environmental allergies.   Psychiatric/Behavioral:  Negative for suicidal ideas. The patient is not nervous/anxious.        Past Medical History: has a past medical history of Cataract, Diabetes mellitus, Diabetes mellitus, type 2, Hyperlipidemia, Hypertension, Hypertropia of left eye, Open angle with borderline findings and low glaucoma risk in both eyes, Seizures, and Spondylosis without myelopathy.    Past Surgical History: has a past surgical history that includes Cholecystectomy; Hernia repair; Cataract extraction (11-/); Epidural steroid injection into lumbar spine (N/A, 11/30/2021); Epidural steroid injection into lumbar spine (N/A, 1/25/2022); Epidural steroid injection into lumbar spine (N/A, 11/15/2023); injection, spine, lumbosacral, transforaminal approach (Left, 6/28/2024); and injection, sacroiliac joint (Left, 8/23/2024).    Family History:family history includes Cancer in his brother; Diabetes in his brother and sister; Glaucoma in his sister; No Known Problems in his daughter, father, maternal aunt, maternal  "grandfather, maternal grandmother, maternal uncle, mother, paternal aunt, paternal grandfather, paternal grandmother, paternal uncle, and son.    Allergies: Review of patient's allergies indicates:  No Known Allergies    Social History: reports that he has never smoked. He has never used smokeless tobacco. He reports that he does not drink alcohol and does not use drugs.    Home medications: Medications Ordered Prior to Encounter[1]    Vital signs:  Wt 50.9 kg (112 lb 3.4 oz)   BMI 20.52 kg/m²     Physical Exam  Vitals reviewed.   Constitutional:       Appearance: Normal appearance.   HENT:      Head: Normocephalic.   Pulmonary:      Effort: Pulmonary effort is normal. No respiratory distress.   Abdominal:      General: There is no distension.      Palpations: Abdomen is soft.      Tenderness: There is no abdominal tenderness.   Skin:     General: Skin is warm.   Neurological:      Mental Status: He is alert and oriented to person, place, and time.   Psychiatric:         Behavior: Behavior normal.         Thought Content: Thought content normal.         Routine labs:  Lab Results   Component Value Date    WBC 7.80 05/19/2025    HGB 14.2 05/19/2025    HCT 44.8 05/19/2025    MCV 93 05/19/2025     05/19/2025     No results found for: "INR"  No results found for: "IRON", "FERRITIN", "TIBC", "FESATURATED"  Lab Results   Component Value Date     05/19/2025    K 4.1 05/19/2025     05/19/2025    CO2 24 05/19/2025    BUN 19 05/19/2025    CREATININE 0.8 05/19/2025     Lab Results   Component Value Date    ALBUMIN 4.3 05/19/2025    ALT 25 05/19/2025    AST 24 05/19/2025    GGT 32 08/26/2024    ALKPHOS 110 05/19/2025    BILITOT 0.7 05/19/2025     No results found for: "GLUCOSE"  Lab Results   Component Value Date    TSH 6.341 (H) 08/20/2024     Lab Results   Component Value Date    CALCIUM 9.5 05/19/2025    PHOS 2.8 08/28/2023       Imaging:  Fl Modified Barium Swallow Speech  Narrative: EXAMINATION:  FL " MODIFIED BARIUM SWALLOW SPEECH STUDY    CLINICAL HISTORY:  Dysphagia, unspecified    TECHNIQUE:  Video fluoroscopic swallowing examination was performed in conjunction with the speech pathology department.  Barium containing liquid and/or solid food substances were used to assess swallowing.    Fluoroscopy Time: 3.1 minutes    COMPARISON:  None  Impression: Transit: Normal oral transit time.    Penetration/Aspiration: Single episode of penetration and aspiration on repetitive swallows of thin liquids.    Miscellaneous: Small outpouching which fills with contrast at anterolateral aspect of upper esophagus, just below cricopharyngeus and about C6-C7 level.  Partially clears on additional swallows.  Overall compatible with small esophageal diverticulum.    See speech pathology report for further details.    This report was flagged in Epic as abnormal.    Electronically signed by: Scot Ellison  Date:    06/17/2025  Time:    17:04      I have reviewed prior labs, imaging, and notes.      Assessment:  1. Dysphagia, unspecified type      Dysphagia. MBSS and esophagram completed. Esophagram with possible esophageal diverticulum.   Dysphagia accompanied by globus sensation and throat clearing. Denies heartburn/reflux, takes omeprazole 20mg daily.       Plan:  Orders Placed This Encounter    Case Request Endoscopy: EGD (ESOPHAGOGASTRODUODENOSCOPY)     EGD  Continue omeprazole as previously prescribed. Consider increasing to 40mg daily pending EGD results.     Discussed following precautions with patient: Eat slowly and chew food completely.  Cut food up into small pieces. If food gets stuck and patient cannot clear food or if he has trouble breathing, go to the emergency room. Patient verbalized understanding.      Drink plenty of water with meals    Avoid lying down 3-4 hours after meals.     Plan of care discussed with patient who is in agreement and verbalized understanding.     I have explained the planned procedures  to the patient.The risks, benefits and alternatives of the procedure were also explained in detail. Patient verbalized understanding, all questions were answered. The patient agrees to proceed as planned    Follow Up: JUANA Murguia, ASHLEY,FNP-BC  [facility]    (Portions of this note were dictated using voice recognition software and may contain dictation related errors in spelling/grammar/syntax not found on text review)         [1]   Current Outpatient Medications on File Prior to Visit   Medication Sig Dispense Refill    atorvastatin (LIPITOR) 10 MG tablet Take 1 tablet (10 mg total) by mouth once daily. 90 tablet 3    blood sugar diagnostic (TRUE METRIX GLUCOSE TEST STRIP) Strp Inject 1 strip into the skin Daily. 100 strip 3    blood sugar diagnostic (TRUE METRIX GLUCOSE TEST STRIP) Strp Inject 1 strip into the skin Daily. 100 strip 3    finasteride (PROSCAR) 5 mg tablet Take 1 tablet (5 mg total) by mouth once daily. 90 tablet 3    gabapentin (NEURONTIN) 800 MG tablet Take 1 tablet (800 mg total) by mouth 2 (two) times daily. 180 tablet 3    lancets (ONETOUCH ULTRASOFT LANCETS) Misc 1 lancet by Misc.(Non-Drug; Combo Route) route once daily. 100 each 3    levothyroxine (SYNTHROID) 50 MCG tablet Take 1 tablet (50 mcg total) by mouth before breakfast. 90 tablet 3    losartan (COZAAR) 25 MG tablet Take 1 tablet (25 mg total) by mouth once daily. 90 tablet 3    meloxicam (MOBIC) 15 MG tablet Take 1 tablet (15 mg total) by mouth daily as needed for Pain. 30 tablet 1    memantine (NAMENDA) 5 MG Tab Take 1 tablet (5 mg total) by mouth 2 (two) times daily. 180 tablet 3    metFORMIN (GLUCOPHAGE-XR) 500 MG ER 24hr tablet Take 1 tablet (500 mg total) by mouth 2 (two) times daily with meals. 180 tablet 3    omeprazole (PRILOSEC) 20 MG capsule Take 1 capsule (20 mg total) by mouth once daily. 90 capsule 3    blood-glucose meter (ACCU-CHEK ARUNA PLUS METER) Misc 1 Units by  Misc.(Non-Drug; Combo Route) route once daily. 1 each 0     No current facility-administered medications on file prior to visit.

## 2025-07-09 NOTE — PROGRESS NOTES
"  Outpatient Rehab    Physical Therapy Visit    Patient Name: Kang Herbert  MRN: [id number]  YOB: 1935  Encounter Date: 7/9/2025    Therapy Diagnosis:   Encounter Diagnoses   Name Primary?    Decreased strength of lower extremity Yes    Decreased ROM of lumbar spine      Physician: Zeke Marrufo*    Physician Orders: Eval and Treat  Medical Diagnosis: Chronic pain of both shoulders  Chronic bilateral low back pain with left-sided sciatica  Surgical Diagnosis: Not applicable for this Episode   Surgical Date: Not applicable for this Episode  Days Since Last Surgery: Not applicable for this Episode    Visit # / Visits Authorized:  7 / 20  Insurance Authorization Period: 6/11/2025 to 8/18/2025  Date of Evaluation: 6/11/2025  Plan of Care Certification: 6/11/2025 to 7/11/2025      PT/PTA: PT   Number of PTA visits since last PT visit:2  Time In: 1500   Time Out: 1555  Total Time (in minutes): 55   Total Billable Time (in minutes): 25    FOTO:  Intake Score:  %  Survey Score 2:  %  Survey Score 3:  %    Precautions:       Subjective   no shoulder pain, just some pain in his back today.    low back    Objective            Treatment:  Therapeutic Exercise  TE 1: LTR 2x20  TE 2: SKTC 10x10"  TE 4: STS 3x10  TE 6: chest press 3# 3x10  TE 7: shoulder flexion 3# 3x10  TE 8: seated lumbar flexion 3'  TE 9: sidelying ER 3x10 1#  Balance/Neuromuscular Re-Education  NMR 1: clamshells 3x10 RTB  NMR 2: bridges 3x10  NMR 3: sciatic nerve glides 2x10  NMR 4: SLR 3x10  NMR 5: SAPD 2 x 10 RTB  NMR 6: Rows 2 x 10 RTB  NMR 7: standing hip abd/extension 2x10  Therapeutic Activity  TA 1: STS 3x10  TA 2: Bilateral bisi raises 2 x 10  TA 4: Step up 4in step 2 x 10    Time Entry(in minutes):  Neuromuscular Re-Education Time Entry: 15  Therapeutic Activity Time Entry: 10    Assessment & Plan   Assessment: [place] is a 89 y.o. male referred to outpatient Physical Therapy with a medical diagnosis of " M25.511,G89.29,M25.512 (ICD-10-CM) - Chronic pain of both shoulders G89.29,M54.42 (ICD-10-CM) - Chronic bilateral low back pain with left-sided sciatica. Patient presents with moderate back pain without shoulder pain. No complaints of radicular pain today. Demonstrates overall good understanding of home exercise program with occasional cuing. Good tolerance for increased repetitions and progressions today. Monitor response, progress as appropriate.   Evaluation/Treatment Tolerance: Patient tolerated treatment well    The patient will continue to benefit from skilled outpatient physical therapy in order to address the deficits listed in the problem list on the initial evaluation, provide patient and family education, and maximize the patients level of independence in the home and community environments.     The patient's spiritual, cultural, and educational needs were considered, and the patient is agreeable to the plan of care and goals.           Plan: continue POC    Goals:   Active       Ambulation/movement       Patient will walk 5 minutes without rest break (Progressing)       Start:  06/11/25    Expected End:  07/11/25               Functional outcome       Patient will show a significant change in FOTO patient-reported outcome tool to demonstrate subjective improvement (Progressing)       Start:  06/11/25    Expected End:  07/11/25            Patient will demonstrate independence in home program for support of progression (Progressing)       Start:  06/11/25    Expected End:  07/11/25               Pain       Patient will report pain of 5/10 demonstrating a reduction of overall pain (Progressing)       Start:  06/11/25    Expected End:  07/11/25               Range of Motion       Patient will achieve right shoulder flexion of 160 degrees (Progressing)       Start:  06/11/25    Expected End:  07/11/25               Strength       Patient will achieve right shoulder abduction strength of 4+/5 (Progressing)        Start:  06/11/25    Expected End:  07/11/25            Patient will achieve bilateral hip abduction strength of 4+/5 (Progressing)       Start:  06/11/25    Expected End:  07/11/25                Aria Cueva PT, DPT

## 2025-07-16 ENCOUNTER — CLINICAL SUPPORT (OUTPATIENT)
Dept: REHABILITATION | Facility: HOSPITAL | Age: 89
End: 2025-07-16
Payer: MEDICARE

## 2025-07-16 DIAGNOSIS — M53.86 DECREASED ROM OF LUMBAR SPINE: ICD-10-CM

## 2025-07-16 DIAGNOSIS — R29.898 DECREASED STRENGTH OF LOWER EXTREMITY: Primary | ICD-10-CM

## 2025-07-16 PROCEDURE — 97112 NEUROMUSCULAR REEDUCATION: CPT | Mod: HCNC,PN

## 2025-07-16 PROCEDURE — 97110 THERAPEUTIC EXERCISES: CPT | Mod: HCNC,PN

## 2025-07-16 PROCEDURE — 97530 THERAPEUTIC ACTIVITIES: CPT | Mod: HCNC,PN

## 2025-07-17 NOTE — PROGRESS NOTES
Outpatient Rehab    Physical Therapy Progress Note : Updated Plan of Care    Patient Name: Kang Herbert  MRN: [id number]  YOB: 1935  Encounter Date: 7/16/2025    Therapy Diagnosis:   Encounter Diagnoses   Name Primary?    Decreased strength of lower extremity Yes    Decreased ROM of lumbar spine      Physician: Zeke Marrufo*    Physician Orders: Eval and Treat  Medical Diagnosis: Chronic pain of both shoulders  Chronic bilateral low back pain with left-sided sciatica  Surgical Diagnosis: Not applicable for this Episode   Surgical Date: Not applicable for this Episode  Days Since Last Surgery: Not applicable for this Episode    Visit # / Visits Authorized:  8 / 20  Insurance Authorization Period: 6/11/2025 to 8/18/2025  Date of Evaluation: 6/11/2025   Plan of Care Certification: 6/11/2025 to 7/11/2025, 8/15/25     PT/PTA: PT   Number of PTA visits since last PT visit:0  Time In: 1400   Time Out: 1458  Total Time (in minutes): 58   Total Billable Time (in minutes): 58    FOTO:  Intake Score: 52%  Survey Score 2: Not applicable for this Episode%  Survey Score 3: Not applicable for this Episode%    Precautions:  Additional Precautions and Protocol Details: Standard; type 2 diabetes and hypertension; history of seizures, dementia, fall risk, left humeral fracture 8/1/24    Subjective   pt reports Bilateral shoulder pain Right>Left along with ongoing back pain. pain is worst in the morning. overall gets moderate relief with exercise and therapy. consistent with HEP.  Pain reported as 6/10. low back and B shoulders    Objective      Shoulder Range of Motion  Right Shoulder   Active (deg) Passive (deg) Pain   Flexion 145 160     Extension         Scaption         ABduction 160 160     ADduction         Horizontal ABduction         Horizontal ADduction         External Rotation (Shoulder ABducted 0 degrees)         External Rotation (Shoulder ABducted 45 degrees)         External Rotation  (Shoulder ABducted 90 degrees)  (T5) 90     Internal Rotation (Shoulder ABducted 0 degrees)  (L1)   Yes   Internal Rotation (Shoulder ABducted 45 degrees)         Internal Rotation (Shoulder ABducted 90 degrees)   55       Left Shoulder   Active (deg) Passive (deg) Pain   Flexion 150 160     Extension         Scaption         ABduction 165 160     ADduction         Horizontal ABduction         Horizontal ADduction         External Rotation (Shoulder ABducted 0 degrees)         External Rotation (Shoulder ABducted 45 degrees)         External Rotation (Shoulder ABducted 90 degrees)  (T6) 90     Internal Rotation (Shoulder ABducted 0 degrees)  (L1)   Yes   Internal Rotation (Shoulder ABducted 45 degrees)         Internal Rotation (Shoulder ABducted 90 degrees)   60              Hip Range of Motion    Straight leg raise 40 deg with onset of back pain        Shoulder Strength - Planes of Motion   Right Strength Right Pain Left Strength Left  Pain   Flexion 5   5     Extension           ABduction 4+ Yes 5     ADduction           Horizontal ABduction           Horizontal ADduction           Internal Rotation 0° 4+ Yes 5     Internal Rotation 90°           External Rotation 0° 4+ Yes 4+     External Rotation 90°               Elbow Strength   Right Strength Right Pain Left Strength Left  Pain   Flexion (C6) 5   5     Extension (C7) 5   5               Hip Strength - Planes of Motion   Right Strength Right Pain Left Strength Left  Pain   Flexion (L2) 5   4+     Extension 4+   4+     ABduction 4+   4     ADduction 5   5     Internal Rotation 5   5     External Rotation 4+   4+         Knee Strength   Right Strength Right Pain Left Strength Left  Pain   Flexion (S2) 5   5     Prone Flexion           Extension (L3) 4+   4+            Ankle/Foot Strength - Planes of Motion   Right Strength Right Pain Left Strength Left  Pain   Dorsiflexion (L4) 5   4+     Plantar Flexion (S1) 5   5     Inversion           Eversion          "  Great Toe Flexion           Great Toe Extension (L5)           Lesser Toes Flexion           Lesser Toes Extension                     Sit to Stand Testing          Performs 40 sit to stand without upper extremity support        Gait Analysis  Base of Support: Wide  Gait Pattern: Antalgic            Gait Analysis Details  Unsteady gait with flexed knees, slight knee buckling         Treatment:  Therapeutic Exercise  TE 1: LTR 2x20  TE 2: SKTC 10x10"  TE 6: chest press 3# db 3x10  TE 7: shoulder flexion 3# 3x10  TE 8: seated lumbar flexion 3'  TE 9: sidelying ER 3x10 1#  Balance/Neuromuscular Re-Education  NMR 2: bridges 3x10  NMR 3: sciatic nerve glides 2x10  NMR 5: SAPD 2 x 10 RTB  NMR 6: Rows 2 x 10 RTB  NMR 7: standing hip abd/extension 2x10  Therapeutic Activity  TA 1: STS 4x10  TA 2: Bilateral bisi raises 2 x 10  TA 3: nustep 6' L3 twin peaks    Time Entry(in minutes):  Neuromuscular Re-Education Time Entry: 30  Therapeutic Activity Time Entry: 15  Therapeutic Exercise Time Entry: 13    Assessment & Plan   Assessment  [place] is a 89 y.o. male referred to outpatient Physical Therapy with a medical diagnosis of M25.511,G89.29,M25.512 (ICD-10-CM) - Chronic pain of both shoulders G89.29,M54.42 (ICD-10-CM) - Chronic bilateral low back pain with left-sided sciatica. Patient presents with moderate back pain with shoulder pain right greater than left today. Symptoms somewhat aggravates due to assisting dependent wife with dementia. Patient demonstrates improving strength of Lower extremities, shoulders, and improving functional mobility. He continues to ambulate with antalgic gait pattern, presenting without assistive device today with unsteady gait. Increased knee flexion and mild bucking noted with fatigue post treatment. Mild to moderate fall risk. Recommended rollator at home. Demonstrates overall good understanding of home exercise program with occasional cuing. He will benefit from 4 additional visits prior " to transition to home exercise program due to recent exacerbation.  Evaluation/Treatment Tolerance: Patient tolerated treatment well  Plan  From a physical therapy perspective, the patient would benefit from: Skilled Rehab Services    Planned therapy interventions include: Therapeutic exercise, Therapeutic activities, Neuromuscular re-education, Manual therapy, and Gait training.    Planned modalities to include: Cryotherapy (cold pack) and Thermotherapy (hot pack).        Visit Frequency: 2 times Per Week for 4 Weeks.       This plan was discussed with Patient and Therapy assistant.   Discussion participants: Agreed Upon Plan of Care             The patient will continue to benefit from skilled outpatient physical therapy in order to address the deficits listed in the problem list on the initial evaluation, provide patient and family education, and maximize the patients level of independence in the home and community environments.     The patient's spiritual, cultural, and educational needs were considered, and the patient is agreeable to the plan of care and goals.           Goals:   Active       Ambulation/movement       Patient will walk 5 minutes without rest break (Progressing)       Start:  06/11/25    Expected End:  07/11/25               Functional outcome       Patient will show a significant change in FOTO patient-reported outcome tool to demonstrate subjective improvement (Progressing)       Start:  06/11/25    Expected End:  07/11/25            Patient will demonstrate independence in home program for support of progression (Progressing)       Start:  06/11/25    Expected End:  07/11/25               Pain       Patient will report pain of 5/10 demonstrating a reduction of overall pain (Progressing)       Start:  06/11/25    Expected End:  07/11/25               Range of Motion       Patient will achieve right shoulder flexion of 160 degrees (Progressing)       Start:  06/11/25    Expected End:   07/11/25               Strength       Patient will achieve right shoulder abduction strength of 4+/5 (Met)       Start:  06/11/25    Expected End:  07/11/25    Resolved:  07/17/25         Patient will achieve bilateral hip abduction strength of 4+/5 (Progressing)       Start:  06/11/25    Expected End:  07/11/25                Aria Cueva PT, [id number]

## 2025-07-21 ENCOUNTER — CLINICAL SUPPORT (OUTPATIENT)
Dept: REHABILITATION | Facility: HOSPITAL | Age: 89
End: 2025-07-21
Payer: MEDICARE

## 2025-07-21 DIAGNOSIS — R29.898 DECREASED STRENGTH OF LOWER EXTREMITY: Primary | ICD-10-CM

## 2025-07-21 DIAGNOSIS — M53.86 DECREASED ROM OF LUMBAR SPINE: ICD-10-CM

## 2025-07-21 PROCEDURE — 97112 NEUROMUSCULAR REEDUCATION: CPT | Mod: HCNC,PN

## 2025-07-21 PROCEDURE — 97530 THERAPEUTIC ACTIVITIES: CPT | Mod: HCNC,PN

## 2025-07-21 NOTE — PROGRESS NOTES
"  Outpatient Rehab    Physical Therapy Visit    Patient Name: Kang Herbert  MRN: [id number]  YOB: 1935  Encounter Date: 7/21/2025    Therapy Diagnosis:   Encounter Diagnoses   Name Primary?    Decreased strength of lower extremity Yes    Decreased ROM of lumbar spine        Physician: Zeke Marrufo*    Physician Orders: Eval and Treat  Medical Diagnosis: Chronic pain of both shoulders  Chronic bilateral low back pain with left-sided sciatica  Surgical Diagnosis: Not applicable for this Episode   Surgical Date: Not applicable for this Episode  Days Since Last Surgery: Not applicable for this Episode    Visit # / Visits Authorized:  9 / 20  Insurance Authorization Period: 6/11/2025 to 8/18/2025  Date of Evaluation: 6/11/2025  Plan of Care Certification: 7/17/2025 to 8/15/2025      PT/PTA: PT   Number of PTA visits since last PT visit:0  Time In: 1500   Time Out: 1556  Total Time (in minutes): 56   Total Billable Time (in minutes): 30    FOTO:  Intake Score:  %  Survey Score 2:  %  Survey Score 3:  %    Precautions:       Subjective   moderate back pain today more than shoulders. does his HEP..  Pain reported as 5/10. low back and B shoulders    Objective            Treatment:  Therapeutic Exercise  TE 1: LTR 2x20  TE 2: SKTC 10x10"  TE 6: chest press 3# db 3x10  TE 7: shoulder flexion 3# 3x10  TE 8: seated lumbar flexion 3'  TE 9: sidelying ER 3x10 1#/ abd 2x10 1#  Balance/Neuromuscular Re-Education  NMR 1: clamshells 3x10 RTB  NMR 2: bridges 3x10  NMR 3: sciatic nerve glides 2x15  NMR 4: SLR 3x10  NMR 7: standing hip abd/extension 2x10 RTB knees  NMR 8: hip abd BTB 2x10, marches 2x10 BTB  Therapeutic Activity  TA 1: STS 3x10  TA 2: Bilateral bisi raises 2 x 10  TA 3: nustep 6' L3 twin peaks  TA 4: Step up 4in step 2 x 10    Time Entry(in minutes):  Neuromuscular Re-Education Time Entry: 15  Therapeutic Activity Time Entry: 15    Assessment & Plan   Assessment: [place] is a 89 y.o. male " referred to outpatient Physical Therapy with a medical diagnosis of M25.511,G89.29,M25.512 (ICD-10-CM) - Chronic pain of both shoulders G89.29,M54.42 (ICD-10-CM) - Chronic bilateral low back pain with left-sided sciatica. Patient presents with moderate back pain with mild shoulder pain today. He ambulates with antalgic gait pattern, with flexed knee position, moderate unsteadiness, reaching for furniture as he walks.  Mild to moderate fall risk. Good tolerance for progressions today. Demonstrates overall good understanding of home exercise program with occasional cuing. He will benefit from 3 additional visits prior to transition to home exercise program due to recent exacerbation.  Evaluation/Treatment Tolerance: Patient tolerated treatment well    The patient will continue to benefit from skilled outpatient physical therapy in order to address the deficits listed in the problem list on the initial evaluation, provide patient and family education, and maximize the patients level of independence in the home and community environments.     The patient's spiritual, cultural, and educational needs were considered, and the patient is agreeable to the plan of care and goals.           Plan: continue POC 3 visits    Goals:   Active       Ambulation/movement       Patient will walk 5 minutes without rest break (Progressing)       Start:  06/11/25    Expected End:  07/11/25               Functional outcome       Patient will show a significant change in FOTO patient-reported outcome tool to demonstrate subjective improvement (Progressing)       Start:  06/11/25    Expected End:  07/11/25            Patient will demonstrate independence in home program for support of progression (Progressing)       Start:  06/11/25    Expected End:  07/11/25               Pain       Patient will report pain of 5/10 demonstrating a reduction of overall pain (Progressing)       Start:  06/11/25    Expected End:  07/11/25               Range  of Motion       Patient will achieve right shoulder flexion of 160 degrees (Progressing)       Start:  06/11/25    Expected End:  07/11/25               Strength       Patient will achieve right shoulder abduction strength of 4+/5 (Met)       Start:  06/11/25    Expected End:  07/11/25    Resolved:  07/17/25         Patient will achieve bilateral hip abduction strength of 4+/5 (Progressing)       Start:  06/11/25    Expected End:  07/11/25                  Aria Cueva PT, DPT

## 2025-07-23 ENCOUNTER — CLINICAL SUPPORT (OUTPATIENT)
Dept: REHABILITATION | Facility: HOSPITAL | Age: 89
End: 2025-07-23
Payer: MEDICARE

## 2025-07-23 DIAGNOSIS — M53.86 DECREASED ROM OF LUMBAR SPINE: ICD-10-CM

## 2025-07-23 DIAGNOSIS — R29.898 DECREASED STRENGTH OF LOWER EXTREMITY: Primary | ICD-10-CM

## 2025-07-23 PROCEDURE — 97112 NEUROMUSCULAR REEDUCATION: CPT | Mod: HCNC,PN

## 2025-07-23 PROCEDURE — 97530 THERAPEUTIC ACTIVITIES: CPT | Mod: HCNC,PN

## 2025-07-23 NOTE — PROGRESS NOTES
"  Outpatient Rehab    Physical Therapy Visit    Patient Name: Kang Herbert  MRN: [id number]  YOB: 1935  Encounter Date: 7/23/2025    Therapy Diagnosis:   Encounter Diagnoses   Name Primary?    Decreased strength of lower extremity Yes    Decreased ROM of lumbar spine        Physician: Zeke Marrufo*    Physician Orders: Eval and Treat  Medical Diagnosis: Chronic pain of both shoulders  Chronic bilateral low back pain with left-sided sciatica  Surgical Diagnosis: Not applicable for this Episode   Surgical Date: Not applicable for this Episode  Days Since Last Surgery: Not applicable for this Episode    Visit # / Visits Authorized:  10 / 20  Insurance Authorization Period: 6/11/2025 to 8/18/2025  Date of Evaluation: 6/11/2025  Plan of Care Certification: 7/17/2025 to 8/15/2025      PT/PTA:     Number of PTA visits since last PT visit:   Time In: 1600   Time Out: 1658  Total Time (in minutes): 58   Total Billable Time (in minutes): 30    FOTO:  Intake Score:  %  Survey Score 2:  %  Survey Score 3:  %    Precautions:       Subjective   doint so-so today. pain into L LE today..  Pain reported as 5/10. low back and B shoulders    Objective            Treatment:  Therapeutic Exercise  TE 1: LTR 2x20  TE 2: SKTC 10x10"  TE 4: STS 3x10  TE 6: chest press 4# db 3x10  TE 7: shoulder flexion 3# 3x10 on wedge  TE 8: seated lumbar flexion 3'  TE 9: sidelying ER 3x10 1#/ abd 2x10 1#  Balance/Neuromuscular Re-Education  NMR 1: clamshells 3x10 RTB  NMR 2: bridges 3x10  NMR 3: sciatic nerve glides 2x15  NMR 4: SLR 3x10  NMR 5: SAPD 3 x 10 RTB  NMR 6: Rows 3 x 10 RTB  NMR 7: standing hip abd/(extension NP) 2x10 RTB knees  NMR 8: hip abd BTB 2x10 (NP), marches 3x10 BTB  Therapeutic Activity  TA 1: STS 3x10  TA 2: Bilateral bisi raises 2 x 10  TA 3: nustep 6' L3 twin peaks    Time Entry(in minutes):  Neuromuscular Re-Education Time Entry: 15  Therapeutic Activity Time Entry: 15    Assessment & Plan "   Assessment: [place] is a 89 y.o. male referred to outpatient Physical Therapy with a medical diagnosis of M25.511,G89.29,M25.512 (ICD-10-CM) - Chronic pain of both shoulders G89.29,M54.42 (ICD-10-CM) - Chronic bilateral low back pain with left-sided sciatica. Patient presents with moderate back pain with mild shoulder pain today. moderate unsteadiness in gait. Mild to moderate fall risk. Patient progressing upper extremity resistance and against gravity positions. Lower extremity strength improving as well with ongoing hip flexor and abduction weakness. Improved gait and lower extremity symptoms post treatment. He will benefit from 2 additional visits prior to transition to home exercise program due to recent exacerbation.  Evaluation/Treatment Tolerance: Patient tolerated treatment well    The patient will continue to benefit from skilled outpatient physical therapy in order to address the deficits listed in the problem list on the initial evaluation, provide patient and family education, and maximize the patients level of independence in the home and community environments.     The patient's spiritual, cultural, and educational needs were considered, and the patient is agreeable to the plan of care and goals.           Plan: continue POC 2 visits    Goals:   Active       Ambulation/movement       Patient will walk 5 minutes without rest break (Progressing)       Start:  06/11/25    Expected End:  07/11/25               Functional outcome       Patient will show a significant change in FOTO patient-reported outcome tool to demonstrate subjective improvement (Progressing)       Start:  06/11/25    Expected End:  07/11/25            Patient will demonstrate independence in home program for support of progression (Progressing)       Start:  06/11/25    Expected End:  07/11/25               Pain       Patient will report pain of 5/10 demonstrating a reduction of overall pain (Progressing)       Start:  06/11/25     Expected End:  07/11/25               Range of Motion       Patient will achieve right shoulder flexion of 160 degrees (Progressing)       Start:  06/11/25    Expected End:  07/11/25               Strength       Patient will achieve right shoulder abduction strength of 4+/5 (Met)       Start:  06/11/25    Expected End:  07/11/25    Resolved:  07/17/25         Patient will achieve bilateral hip abduction strength of 4+/5 (Progressing)       Start:  06/11/25    Expected End:  07/11/25                  Aria Cueva PT, DPT

## 2025-07-28 ENCOUNTER — CLINICAL SUPPORT (OUTPATIENT)
Dept: REHABILITATION | Facility: HOSPITAL | Age: 89
End: 2025-07-28
Payer: MEDICARE

## 2025-07-28 DIAGNOSIS — R29.898 DECREASED STRENGTH OF LOWER EXTREMITY: Primary | ICD-10-CM

## 2025-07-28 DIAGNOSIS — M53.86 DECREASED ROM OF LUMBAR SPINE: ICD-10-CM

## 2025-07-28 PROCEDURE — 97530 THERAPEUTIC ACTIVITIES: CPT | Mod: HCNC,PN

## 2025-07-28 PROCEDURE — 97112 NEUROMUSCULAR REEDUCATION: CPT | Mod: HCNC,PN

## 2025-07-28 PROCEDURE — 97110 THERAPEUTIC EXERCISES: CPT | Mod: HCNC,PN

## 2025-07-28 NOTE — PROGRESS NOTES
"  Outpatient Rehab    Physical Therapy Visit    Patient Name: Kang Herbert  MRN: [id number]  YOB: 1935  Encounter Date: 7/28/2025    Therapy Diagnosis:   Encounter Diagnoses   Name Primary?    Decreased strength of lower extremity Yes    Decreased ROM of lumbar spine        Physician: Zeke Marrufo*    Physician Orders: Eval and Treat  Medical Diagnosis: Chronic pain of both shoulders  Chronic bilateral low back pain with left-sided sciatica  Surgical Diagnosis: Not applicable for this Episode   Surgical Date: Not applicable for this Episode  Days Since Last Surgery: Not applicable for this Episode    Visit # / Visits Authorized:  11 / 20  Insurance Authorization Period: 6/11/2025 to 8/18/2025  Date of Evaluation: 6/11/2025  Plan of Care Certification: 7/17/2025 to 8/15/2025      PT/PTA: PT   Number of PTA visits since last PT visit:0  Time In: 1500   Time Out: 1558  Total Time (in minutes): 58   Total Billable Time (in minutes): 58    FOTO:  Intake Score:  %  Survey Score 2:  %  Survey Score 3:  %    Precautions:       Subjective   doing a little bit better today. feels he is walking with more upright posture. not much shoulder pain today..  Pain reported as 4/10. low back and B shoulders    Objective            Treatment:  Therapeutic Exercise  TE 1: LTR 2x20  TE 2: SKTC 10x10"  TE 5: bike 7' L3  TE 6: chest press 5# db 3x10  TE 7: shoulder flexion 5# 2x10  TE 9: ER RTB 2x10  TE 10: shoulder press 2# 2x10  Balance/Neuromuscular Re-Education  NMR 1: clamshells 3x10 RTB  NMR 2: bridges 3x10  NMR 4: SLR 3x10  NMR 5: SAPD 3 x 10 RTB  NMR 6: Rows 3 x 10 RTB  NMR 7: standing hip abd/extension  2x10 RTB knees  NMR 8: standing marches 3x10 RTB  Therapeutic Activity  TA 1: STS 3x10 4.4lb ball  TA 2: Bilateral bisi raises 3 x 10    Time Entry(in minutes):  Neuromuscular Re-Education Time Entry: 30  Therapeutic Activity Time Entry: 10  Therapeutic Exercise Time Entry: 18    Assessment & Plan "   Assessment: [place] is a 89 y.o. male referred to outpatient Physical Therapy with a medical diagnosis of M25.511,G89.29,M25.512 (ICD-10-CM) - Chronic pain of both shoulders G89.29,M54.42 (ICD-10-CM) - Chronic bilateral low back pain with left-sided sciatica. Patient presents with mild back pain without shoulder pain today. improved gait and upright posture compared to last 2 visits. Patient demonstrates improving activity tolerance. Progressed standing activities today. No rest breaks required during standing. Min cuing required throughout session. Continue to monitor and progress as tolerated.    Evaluation/Treatment Tolerance: Patient tolerated treatment well    The patient will continue to benefit from skilled outpatient physical therapy in order to address the deficits listed in the problem list on the initial evaluation, provide patient and family education, and maximize the patients level of independence in the home and community environments.     The patient's spiritual, cultural, and educational needs were considered, and the patient is agreeable to the plan of care and goals.           Plan: continue POC    Goals:   Active       Ambulation/movement       Patient will walk 5 minutes without rest break (Progressing)       Start:  06/11/25    Expected End:  08/13/25               Functional outcome       Patient will show a significant change in FOTO patient-reported outcome tool to demonstrate subjective improvement (Progressing)       Start:  06/11/25    Expected End:  08/13/25            Patient will demonstrate independence in home program for support of progression (Met)       Start:  06/11/25    Expected End:  07/11/25    Resolved:  07/28/25            Pain       Patient will report pain of 5/10 demonstrating a reduction of overall pain (Progressing)       Start:  06/11/25    Expected End:  08/13/25               Range of Motion       Patient will achieve right shoulder flexion of 160 degrees  (Progressing)       Start:  06/11/25    Expected End:  08/13/25               Strength       Patient will achieve right shoulder abduction strength of 4+/5 (Met)       Start:  06/11/25    Expected End:  07/11/25    Resolved:  07/17/25         Patient will achieve bilateral hip abduction strength of 4+/5 (Progressing)       Start:  06/11/25    Expected End:  08/13/25                  Aria Cueva PT, [id number]

## 2025-07-30 ENCOUNTER — CLINICAL SUPPORT (OUTPATIENT)
Dept: REHABILITATION | Facility: HOSPITAL | Age: 89
End: 2025-07-30
Payer: MEDICARE

## 2025-07-30 DIAGNOSIS — R29.898 DECREASED STRENGTH OF LOWER EXTREMITY: Primary | ICD-10-CM

## 2025-07-30 DIAGNOSIS — M53.86 DECREASED ROM OF LUMBAR SPINE: ICD-10-CM

## 2025-07-30 PROCEDURE — 97530 THERAPEUTIC ACTIVITIES: CPT | Mod: HCNC,PN,CQ

## 2025-07-30 PROCEDURE — 97112 NEUROMUSCULAR REEDUCATION: CPT | Mod: HCNC,PN,CQ

## 2025-07-30 NOTE — PROGRESS NOTES
"  Outpatient Rehab    Physical Therapy Visit    Patient Name: Kang Herbert  MRN: [id number]  YOB: 1935  Encounter Date: 7/30/2025    Therapy Diagnosis:   Encounter Diagnoses   Name Primary?    Decreased strength of lower extremity Yes    Decreased ROM of lumbar spine        Physician: Zeke Marrufo*    Physician Orders: Eval and Treat  Medical Diagnosis: Chronic pain of both shoulders  Chronic bilateral low back pain with left-sided sciatica  Surgical Diagnosis: Not applicable for this Episode   Surgical Date: Not applicable for this Episode  Days Since Last Surgery: Not applicable for this Episode    Visit # / Visits Authorized:  12 / 20  Insurance Authorization Period: 6/11/2025 to 8/18/2025  Date of Evaluation: 6/11/2025  Plan of Care Certification: 7/17/2025 to 8/15/2025      PT/PTA: PTA   Number of PTA visits since last PT visit:1  Time In: 1500   Time Out: 1555  Total Time (in minutes): 55   Total Billable Time (in minutes): 30    FOTO:  Intake Score:  %  Survey Score 2:  %  Survey Score 3:  %    Precautions:     Standard; type 2 diabetes and hypertension; history of seizures, dementia, fall risk, left humeral fracture 8/1/24      Subjective   Voicing improved walking and getting up from chairs..  Pain reported as 4/10. low back and B shoulders    Objective            Treatment:  Therapeutic Exercise  TE 1: LTR 2x20  TE 2: SKTC 10x10"  TE 5: bike 7' L3  TE 6: chest press 5# db 3x10  TE 7: shoulder flexion 5# 2x10  TE 9: ER RTB 2x10  TE 10: shoulder press 2# 2x10  Balance/Neuromuscular Re-Education  NMR 1: clamshells 3x10 RTB  NMR 2: bridges 3x10  NMR 4: SLR 3x10  NMR 5: SAPD 3 x 10 RTB  NMR 6: Rows 3 x 10 RTB  NMR 7: standing hip abd/extension  2x10 RTB knees  NMR 8: standing marches 3x10 RTB  Therapeutic Activity  TA 1: STS 3x10 4.4lb ball  TA 2: Bilateral bisi raises 3 x 10  TA 4: Step up 4in step 2 x 10    Time Entry(in minutes):  Neuromuscular Re-Education Time Entry: " 15  Therapeutic Activity Time Entry: 10  Therapeutic Exercise Time Entry: 5    Assessment & Plan   Assessment: [place] is a 89 y.o. male referred to outpatient Physical Therapy with a medical diagnosis of M25.511,G89.29,M25.512 (ICD-10-CM) - Chronic pain of both shoulders G89.29,M54.42 (ICD-10-CM) - Chronic bilateral low back pain with left-sided sciatica. Patient presents with mild back pain without shoulder pain today. Voicing improved functional strength and mobility with activities such as walking and standing up from chair. Tolerating standing activities well without exacerbation of symptoms. Continue to monitor and progress as tolerated.    Evaluation/Treatment Tolerance: Patient tolerated treatment well    The patient will continue to benefit from skilled outpatient physical therapy in order to address the deficits listed in the problem list on the initial evaluation, provide patient and family education, and maximize the patients level of independence in the home and community environments.     The patient's spiritual, cultural, and educational needs were considered, and the patient is agreeable to the plan of care and goals.           Plan: continue POC    Goals:   Active       Ambulation/movement       Patient will walk 5 minutes without rest break (Progressing)       Start:  06/11/25    Expected End:  08/13/25               Functional outcome       Patient will show a significant change in FOTO patient-reported outcome tool to demonstrate subjective improvement (Progressing)       Start:  06/11/25    Expected End:  08/13/25            Patient will demonstrate independence in home program for support of progression (Met)       Start:  06/11/25    Expected End:  07/11/25    Resolved:  07/28/25            Pain       Patient will report pain of 5/10 demonstrating a reduction of overall pain (Progressing)       Start:  06/11/25    Expected End:  08/13/25               Range of Motion       Patient will  achieve right shoulder flexion of 160 degrees (Progressing)       Start:  06/11/25    Expected End:  08/13/25               Strength       Patient will achieve right shoulder abduction strength of 4+/5 (Met)       Start:  06/11/25    Expected End:  07/11/25    Resolved:  07/17/25         Patient will achieve bilateral hip abduction strength of 4+/5 (Progressing)       Start:  06/11/25    Expected End:  08/13/25                  Bhavana Dawn, PTA

## 2025-07-31 ENCOUNTER — TELEPHONE (OUTPATIENT)
Dept: ENDOSCOPY | Facility: HOSPITAL | Age: 89
End: 2025-07-31
Payer: MEDICARE

## 2025-08-04 ENCOUNTER — ANESTHESIA (OUTPATIENT)
Dept: ENDOSCOPY | Facility: HOSPITAL | Age: 89
End: 2025-08-04
Payer: MEDICARE

## 2025-08-04 ENCOUNTER — TELEPHONE (OUTPATIENT)
Dept: ENDOSCOPY | Facility: HOSPITAL | Age: 89
End: 2025-08-04
Payer: MEDICARE

## 2025-08-04 ENCOUNTER — CLINICAL SUPPORT (OUTPATIENT)
Dept: REHABILITATION | Facility: HOSPITAL | Age: 89
End: 2025-08-04
Payer: MEDICARE

## 2025-08-04 ENCOUNTER — TELEPHONE (OUTPATIENT)
Dept: GASTROENTEROLOGY | Facility: CLINIC | Age: 89
End: 2025-08-04
Payer: MEDICARE

## 2025-08-04 ENCOUNTER — ANESTHESIA EVENT (OUTPATIENT)
Dept: ENDOSCOPY | Facility: HOSPITAL | Age: 89
End: 2025-08-04
Payer: MEDICARE

## 2025-08-04 DIAGNOSIS — R29.898 DECREASED STRENGTH OF LOWER EXTREMITY: Primary | ICD-10-CM

## 2025-08-04 DIAGNOSIS — M53.86 DECREASED ROM OF LUMBAR SPINE: ICD-10-CM

## 2025-08-04 PROCEDURE — 97110 THERAPEUTIC EXERCISES: CPT | Mod: HCNC,PN

## 2025-08-04 PROCEDURE — 97530 THERAPEUTIC ACTIVITIES: CPT | Mod: HCNC,PN

## 2025-08-04 PROCEDURE — 97112 NEUROMUSCULAR REEDUCATION: CPT | Mod: HCNC,PN

## 2025-08-04 NOTE — TELEPHONE ENCOUNTER
----- Message from Med Assistant Pina sent at 8/4/2025  2:02 PM CDT -----  Regarding: FW: cancelled EGD / reschedule    ----- Message -----  From: Rena Morales RN  Sent: 8/4/2025   9:43 AM CDT  To: [facility] Clinical Staff  Subject: cancelled EGD / reschedule                       Good morning,    Patient's daughter called to cancel EGD that was scheduled for today due to car trouble and would like a call back to reschedule.   Aislinn Alcaraz  [phone]    Thank you,  Rena Morales, RN

## 2025-08-04 NOTE — TELEPHONE ENCOUNTER
Patient's daughter called to cancel EGD that is scheduled for today because she is having car trouble.  She would like a call back to reschedule - message sent to [facility] to call for rescheduling.

## 2025-08-04 NOTE — PROGRESS NOTES
"  Outpatient Rehab    Physical Therapy Visit    Patient Name: Kang Herbert  MRN: [id number]  YOB: 1935  Encounter Date: 8/4/2025    Therapy Diagnosis:   Encounter Diagnoses   Name Primary?    Decreased strength of lower extremity Yes    Decreased ROM of lumbar spine        Physician: Zeke Marrufo*    Physician Orders: Eval and Treat  Medical Diagnosis: Chronic pain of both shoulders  Chronic bilateral low back pain with left-sided sciatica  Surgical Diagnosis: Not applicable for this Episode   Surgical Date: Not applicable for this Episode  Days Since Last Surgery: Not applicable for this Episode    Visit # / Visits Authorized:  13 / 20  Insurance Authorization Period: 6/11/2025 to 8/18/2025  Date of Evaluation: 6/11/2025  Plan of Care Certification: 7/17/2025 to 8/15/2025      PT/PTA: PT   Number of PTA visits since last PT visit:1  Time In: 1400   Time Out: 1455  Total Time (in minutes): 55   Total Billable Time (in minutes): 55    FOTO:  Intake Score: 52%  Survey Score 2: 52 (overall, postive improvement for majority)%  Survey Score 3:  %    Precautions:       Subjective   pt reports moderate pain in low back and down L LE. also reports difficulty sleeping at night due to R shoulder pain..  Family / care giver present for this visit:  (daughter present)  Pain reported as 5/10. low back and B shoulders    Objective            Treatment:  Therapeutic Exercise  TE 1: LTR 2x20  TE 2: SKTC 10x10"  TE 5: nustep 5' L3 hills  TE 6: chest press 5# db 3x10  TE 7: shoulder flexion 5# 2x10  TE 9: ER RTB 2x10  TE 10: shoulder press 3# 3x10  Balance/Neuromuscular Re-Education  NMR 2: bridges 2x10- back pain  NMR 3: sciatic nerve glides 2x15  NMR 4: SLR 3x10  NMR 5: SAPD 3 x 10 RTB  NMR 6: Rows 3 x 10 RTB  NMR 7: standing hip abd/extension  2x10 RTB knees  NMR 8: standing marches 3x10 RTB  Therapeutic Activity  TA 1: STS 3x10 4.4lb ball  TA 2: Bilateral bisi raises 3 x 10    Time Entry(in " minutes):  Neuromuscular Re-Education Time Entry: 25  Therapeutic Activity Time Entry: 10  Therapeutic Exercise Time Entry: 20    Assessment & Plan   Assessment: [place] is a 89 y.o. male referred to outpatient Physical Therapy with a medical diagnosis of M25.511,G89.29,M25.512 (ICD-10-CM) - Chronic pain of both shoulders G89.29,M54.42 (ICD-10-CM) - Chronic bilateral low back pain with left-sided sciatica. Patient presents with moderate pain in back and right shoulder today. Daughter present in session. Patient reported pain with bridges today, limited reps. Other exercises with relief. Minimal progressions due to moderate pain. Continue to monitor and progress as tolerated.    Evaluation/Treatment Tolerance: Patient tolerated treatment well    The patient will continue to benefit from skilled outpatient physical therapy in order to address the deficits listed in the problem list on the initial evaluation, provide patient and family education, and maximize the patients level of independence in the home and community environments.     The patient's spiritual, cultural, and educational needs were considered, and the patient is agreeable to the plan of care and goals.           Plan: continue POC    Goals:   Active       Ambulation/movement       Patient will walk 5 minutes without rest break (Progressing)       Start:  06/11/25    Expected End:  08/13/25               Functional outcome       Patient will show a significant change in FOTO patient-reported outcome tool to demonstrate subjective improvement (Progressing)       Start:  06/11/25    Expected End:  08/13/25            Patient will demonstrate independence in home program for support of progression (Met)       Start:  06/11/25    Expected End:  07/11/25    Resolved:  07/28/25            Pain       Patient will report pain of 5/10 demonstrating a reduction of overall pain (Progressing)       Start:  06/11/25    Expected End:  08/13/25               Range  of Motion       Patient will achieve right shoulder flexion of 160 degrees (Progressing)       Start:  06/11/25    Expected End:  08/13/25               Strength       Patient will achieve right shoulder abduction strength of 4+/5 (Met)       Start:  06/11/25    Expected End:  07/11/25    Resolved:  07/17/25         Patient will achieve bilateral hip abduction strength of 4+/5 (Progressing)       Start:  06/11/25    Expected End:  08/13/25                  Aria Cueva PT, DPT

## 2025-08-06 ENCOUNTER — TELEPHONE (OUTPATIENT)
Dept: ENDOSCOPY | Facility: HOSPITAL | Age: 89
End: 2025-08-06
Payer: MEDICARE

## 2025-08-06 ENCOUNTER — CLINICAL SUPPORT (OUTPATIENT)
Dept: REHABILITATION | Facility: HOSPITAL | Age: 89
End: 2025-08-06
Payer: MEDICARE

## 2025-08-06 DIAGNOSIS — M53.86 DECREASED ROM OF LUMBAR SPINE: ICD-10-CM

## 2025-08-06 DIAGNOSIS — R29.898 DECREASED STRENGTH OF LOWER EXTREMITY: Primary | ICD-10-CM

## 2025-08-06 PROCEDURE — 97112 NEUROMUSCULAR REEDUCATION: CPT | Mod: HCNC,PN

## 2025-08-06 PROCEDURE — 97530 THERAPEUTIC ACTIVITIES: CPT | Mod: HCNC,PN

## 2025-08-06 NOTE — PROGRESS NOTES
"  Outpatient Rehab    Physical Therapy Visit    Patient Name: Kang Herbert  MRN: [id number]  YOB: 1935  Encounter Date: 8/6/2025    Therapy Diagnosis:   Encounter Diagnoses   Name Primary?    Decreased strength of lower extremity Yes    Decreased ROM of lumbar spine        Physician: Zeke Marrufo*    Physician Orders: Eval and Treat  Medical Diagnosis: Chronic pain of both shoulders  Chronic bilateral low back pain with left-sided sciatica  Surgical Diagnosis: Not applicable for this Episode   Surgical Date: Not applicable for this Episode  Days Since Last Surgery: Not applicable for this Episode    Visit # / Visits Authorized:  14 / 20  Insurance Authorization Period: 6/11/2025 to 8/18/2025  Date of Evaluation: 6/11/2025  Plan of Care Certification: 7/17/2025 to 8/15/2025      PT/PTA: PT   Number of PTA visits since last PT visit:0  Time In: 1400   Time Out: 1458  Total Time (in minutes): 58   Total Billable Time (in minutes): 30    FOTO:  Intake Score:  %  Survey Score 2:  %  Survey Score 3:  %    Precautions:       Subjective   he has pain especially in the mornings. more difficulty with walking today. in more pain the last 2 days. low shoulder pain today..    Pain reported as 7/10. low back and B shoulders    Objective            Treatment:  Therapeutic Exercise  TE 1: LTR 2x20  TE 2: SKTC 10x10"  TE 4: STS 3x10  TE 5: nustep 5' L3 hills  TE 6: chest press 5# db 3x10  TE 7: shoulder flexion 5# 2x10  TE 8: seated lumbar flexion 3'  TE 9: ER RTB 2x10  TE 10: shoulder press 3# 3x10  Manual Therapy  MT 1: LAD B  Balance/Neuromuscular Re-Education  NMR 1: clamshells 3x10 RTB  NMR 2: bridges 1x10- back pain 8/10  NMR 3: sciatic nerve glides 2x15  NMR 4: SLR 3x10  NMR 5: SAPD 3 x 10 RTB  NMR 6: Rows 3 x 10 RTB  NMR 7: standing hip abd/extension  2x10 RTB knees  NMR 8: standing marches 3x10 RTB  Therapeutic Activity  TA 1: STS 3x10 4.4lb ball  TA 2: Bilateral bisi raises 3 x 10  TA 3: nustep " 6' L3 twin peaks  TA 4: Step up 4in step 2 x 10    Time Entry(in minutes):  Manual Therapy Time Entry: 5  Neuromuscular Re-Education Time Entry: 15  Therapeutic Activity Time Entry: 10    Assessment & Plan   Assessment: [place] is a 89 y.o. male referred to outpatient Physical Therapy with a medical diagnosis of M25.511,G89.29,M25.512 (ICD-10-CM) - Chronic pain of both shoulders G89.29,M54.42 (ICD-10-CM) - Chronic bilateral low back pain with left-sided sciatica. Patient presents with moderate pain in back today. Included LAD for pain relief. Reduced bridges again secondary to pain. All other exercises tolerated well with reduction in pain 3 pts.  Continue to monitor and progress as tolerated.    Evaluation/Treatment Tolerance: Patient tolerated treatment well    The patient will continue to benefit from skilled outpatient physical therapy in order to address the deficits listed in the problem list on the initial evaluation, provide patient and family education, and maximize the patients level of independence in the home and community environments.     The patient's spiritual, cultural, and educational needs were considered, and the patient is agreeable to the plan of care and goals.           Plan: continue POC    Goals:   Active       Ambulation/movement       Patient will walk 5 minutes without rest break (Progressing)       Start:  06/11/25    Expected End:  08/13/25               Functional outcome       Patient will show a significant change in FOTO patient-reported outcome tool to demonstrate subjective improvement (Progressing)       Start:  06/11/25    Expected End:  08/13/25            Patient will demonstrate independence in home program for support of progression (Met)       Start:  06/11/25    Expected End:  07/11/25    Resolved:  07/28/25            Pain       Patient will report pain of 5/10 demonstrating a reduction of overall pain (Progressing)       Start:  06/11/25    Expected End:  08/13/25                Range of Motion       Patient will achieve right shoulder flexion of 160 degrees (Progressing)       Start:  06/11/25    Expected End:  08/13/25               Strength       Patient will achieve right shoulder abduction strength of 4+/5 (Met)       Start:  06/11/25    Expected End:  07/11/25    Resolved:  07/17/25         Patient will achieve bilateral hip abduction strength of 4+/5 (Progressing)       Start:  06/11/25    Expected End:  08/13/25                  Aria Cueva PT, DPT

## 2025-08-06 NOTE — TELEPHONE ENCOUNTER
Left message instructing patient to call dept @ [phone] between 8am-4pm.    Arrival time to be given @ 1100.  (Message sent via [facility] portal)

## 2025-08-08 ENCOUNTER — HOSPITAL ENCOUNTER (OUTPATIENT)
Facility: HOSPITAL | Age: 89
Discharge: HOME | End: 2025-08-08
Attending: INTERNAL MEDICINE | Admitting: INTERNAL MEDICINE
Payer: MEDICARE

## 2025-08-08 VITALS
OXYGEN SATURATION: 95 % | HEART RATE: 92 BPM | TEMPERATURE: 98 F | DIASTOLIC BLOOD PRESSURE: 57 MMHG | HEIGHT: 64 IN | WEIGHT: 114 LBS | SYSTOLIC BLOOD PRESSURE: 115 MMHG | BODY MASS INDEX: 19.46 KG/M2 | RESPIRATION RATE: 14 BRPM

## 2025-08-08 DIAGNOSIS — R13.10 DYSPHAGIA: ICD-10-CM

## 2025-08-08 LAB
GLUCOSE SERPL-MCNC: 138 MG/DL (ref 70–110)
POCT GLUCOSE: 138 MG/DL (ref 70–110)

## 2025-08-08 PROCEDURE — 37000009 HC ANESTHESIA EA ADD 15 MINS: Mod: HCNC | Performed by: INTERNAL MEDICINE

## 2025-08-08 PROCEDURE — 63600175 PHARM REV CODE 636 W HCPCS: Mod: HCNC | Performed by: NURSE ANESTHETIST, CERTIFIED REGISTERED

## 2025-08-08 PROCEDURE — 25000003 PHARM REV CODE 250: Mod: HCNC | Performed by: NURSE ANESTHETIST, CERTIFIED REGISTERED

## 2025-08-08 PROCEDURE — 82962 GLUCOSE BLOOD TEST: CPT | Mod: HCNC | Performed by: INTERNAL MEDICINE

## 2025-08-08 PROCEDURE — 37000008 HC ANESTHESIA 1ST 15 MINUTES: Mod: HCNC | Performed by: INTERNAL MEDICINE

## 2025-08-08 PROCEDURE — 43235 EGD DIAGNOSTIC BRUSH WASH: CPT | Mod: HCNC | Performed by: INTERNAL MEDICINE

## 2025-08-08 PROCEDURE — 43235 EGD DIAGNOSTIC BRUSH WASH: CPT | Mod: HCNC,,, | Performed by: INTERNAL MEDICINE

## 2025-08-08 RX ORDER — SODIUM CHLORIDE 9 MG/ML
INJECTION, SOLUTION INTRAVENOUS CONTINUOUS
Status: DISCONTINUED | OUTPATIENT
Start: 2025-08-08 | End: 2025-08-08 | Stop reason: HOSPADM

## 2025-08-08 RX ORDER — LIDOCAINE HYDROCHLORIDE 20 MG/ML
INJECTION INTRAVENOUS
Status: DISCONTINUED | OUTPATIENT
Start: 2025-08-08 | End: 2025-08-08

## 2025-08-08 RX ORDER — PROPOFOL 10 MG/ML
VIAL (ML) INTRAVENOUS CONTINUOUS PRN
Status: DISCONTINUED | OUTPATIENT
Start: 2025-08-08 | End: 2025-08-08

## 2025-08-08 RX ADMIN — SODIUM CHLORIDE: 0.9 INJECTION, SOLUTION INTRAVENOUS at 01:08

## 2025-08-08 RX ADMIN — LIDOCAINE HYDROCHLORIDE 100 MG: 20 INJECTION, SOLUTION INTRAVENOUS at 01:08

## 2025-08-08 RX ADMIN — GLYCOPYRROLATE 0.1 MG: 0.2 INJECTION, SOLUTION INTRAMUSCULAR; INTRAVITREAL at 01:08

## 2025-08-08 RX ADMIN — PROPOFOL 50 MG: 10 INJECTION, EMULSION INTRAVENOUS at 01:08

## 2025-08-08 RX ADMIN — PROPOFOL 200 MCG/KG/MIN: 10 INJECTION, EMULSION INTRAVENOUS at 01:08

## 2025-08-08 NOTE — ANESTHESIA PREPROCEDURE EVALUATION
08/08/2025  Kang Herbert is a 89 y.o., male.      Pre-op Assessment     I have reviewed the Nursing Notes.    I have reviewed the Medications.     Review of Systems  Anesthesia Hx:  No problems with previous Anesthesia             Denies Family Hx of Anesthesia complications.     Social:  Non-Smoker, No Alcohol Use       Hematology/Oncology:  Hematology Normal   Oncology Normal                                   EENT/Dental:  EENT/Dental Normal           Cardiovascular:  Exercise tolerance: good   Hypertension                                    Hypertension         Pulmonary:  Pulmonary Normal                       Renal/:  Renal/ Normal                 Hepatic/GI:  Hepatic/GI Normal                    Musculoskeletal:  Musculoskeletal Normal                Neurological:    Neuromuscular Disease,   Seizures           Seizure Disorder                        Neuromuscular Disease   Endocrine:  Diabetes Hypothyroidism   Diabetes                   Hypothyroidism          Psych:  Psychiatric History              Physical Exam  General: Well nourished    Airway:  Mallampati: II / II  Mouth Opening: Normal  TM Distance: Normal  Tongue: Normal  Neck ROM: Normal ROM    Dental:  Intact    Anesthesia Plan  Type of Anesthesia, risks & benefits discussed:    Anesthesia Type: Gen Natural Airway  Intra-op Monitoring Plan: Standard ASA Monitors  Post Op Pain Control Plan: multimodal analgesia  Induction:  IV  Airway Plan: Direct, Post-Induction  Informed Consent: Informed consent signed with the Patient and all parties understand the risks and agree with anesthesia plan.  All questions answered.   ASA Score: 3    Ready For Surgery From Anesthesia Perspective.   .

## 2025-08-08 NOTE — TRANSFER OF CARE
"Anesthesia Transfer of Care Note    Patient: Kang Herbert    Procedure(s) Performed: Procedure(s) (LRB):  EGD (ESOPHAGOGASTRODUODENOSCOPY) (N/A)    Patient location: PACU    Anesthesia Type: general and MAC    Transport from OR: Transported from OR on room air with adequate spontaneous ventilation    Post pain: adequate analgesia    Post assessment: no apparent anesthetic complications and tolerated procedure well    Post vital signs: stable    Level of consciousness: lethargic    Nausea/Vomiting: no nausea/vomiting    Complications: none    Transfer of care protocol was followed      Last vitals: Visit Vitals  /63 (BP Location: Left arm, Patient Position: Lying)   Pulse 86   Temp 36.6 °C (97.9 °F) (Skin)   Resp 14   Ht 5' 4" (1.626 m)   Wt 51.7 kg (114 lb)   SpO2 (!) 94%   BMI 19.57 kg/m²     "

## 2025-08-09 NOTE — ANESTHESIA POSTPROCEDURE EVALUATION
Anesthesia Post Evaluation    Patient: Kang Herbert    Procedure(s) Performed: Procedure(s) (LRB):  EGD (ESOPHAGOGASTRODUODENOSCOPY) (N/A)    Final Anesthesia Type: general      Patient location during evaluation: GI PACU  Patient participation: Yes- Able to Participate  Level of consciousness: awake and alert  Post-procedure vital signs: reviewed and stable  Pain management: adequate  Airway patency: patent    PONV status at discharge: No PONV  Anesthetic complications: no      Cardiovascular status: blood pressure returned to baseline and hemodynamically stable  Respiratory status: unassisted  Hydration status: euvolemic  Follow-up not needed.          Vitals Value Taken Time   /57 08/08/25 13:51   Temp 36.6 °C (97.9 °F) 08/08/25 13:22   Pulse 92 08/08/25 13:51   Resp 14 08/08/25 13:51   SpO2 95 % 08/08/25 13:51         No case tracking events are documented in the log.      Pain/Karmen Score: Karmen Score: 10 (8/8/2025  1:51 PM)

## 2025-08-11 ENCOUNTER — CLINICAL SUPPORT (OUTPATIENT)
Dept: REHABILITATION | Facility: HOSPITAL | Age: 89
End: 2025-08-11
Payer: MEDICARE

## 2025-08-11 DIAGNOSIS — R29.898 DECREASED STRENGTH OF LOWER EXTREMITY: Primary | ICD-10-CM

## 2025-08-11 DIAGNOSIS — M53.86 DECREASED ROM OF LUMBAR SPINE: ICD-10-CM

## 2025-08-11 PROCEDURE — 97112 NEUROMUSCULAR REEDUCATION: CPT | Mod: HCNC,PN

## 2025-08-11 PROCEDURE — 97530 THERAPEUTIC ACTIVITIES: CPT | Mod: HCNC,PN

## 2025-08-12 DIAGNOSIS — E11.65 TYPE 2 DIABETES MELLITUS WITH HYPERGLYCEMIA, WITHOUT LONG-TERM CURRENT USE OF INSULIN: ICD-10-CM

## 2025-08-12 RX ORDER — DEXTROSE 4 G
1 TABLET,CHEWABLE ORAL DAILY
Qty: 1 EACH | Refills: 0 | Status: SHIPPED | OUTPATIENT
Start: 2025-08-12 | End: 2026-08-12

## 2025-08-14 ENCOUNTER — CLINICAL SUPPORT (OUTPATIENT)
Dept: REHABILITATION | Facility: HOSPITAL | Age: 89
End: 2025-08-14
Payer: MEDICARE

## 2025-08-14 DIAGNOSIS — R29.898 DECREASED STRENGTH OF LOWER EXTREMITY: Primary | ICD-10-CM

## 2025-08-14 DIAGNOSIS — M53.86 DECREASED ROM OF LUMBAR SPINE: ICD-10-CM

## 2025-08-14 PROCEDURE — 97530 THERAPEUTIC ACTIVITIES: CPT | Mod: HCNC,PN,CQ

## 2025-08-14 PROCEDURE — 97112 NEUROMUSCULAR REEDUCATION: CPT | Mod: HCNC,PN,CQ

## 2025-08-18 ENCOUNTER — CLINICAL SUPPORT (OUTPATIENT)
Dept: REHABILITATION | Facility: HOSPITAL | Age: 89
End: 2025-08-18
Payer: MEDICARE

## 2025-08-18 DIAGNOSIS — M53.86 DECREASED ROM OF LUMBAR SPINE: ICD-10-CM

## 2025-08-18 DIAGNOSIS — R29.898 DECREASED STRENGTH OF LOWER EXTREMITY: Primary | ICD-10-CM

## 2025-08-18 PROCEDURE — 97530 THERAPEUTIC ACTIVITIES: CPT | Mod: HCNC,PN

## 2025-08-18 PROCEDURE — 97112 NEUROMUSCULAR REEDUCATION: CPT | Mod: HCNC,PN

## 2025-08-22 ENCOUNTER — CLINICAL SUPPORT (OUTPATIENT)
Dept: REHABILITATION | Facility: HOSPITAL | Age: 89
End: 2025-08-22
Payer: MEDICARE

## 2025-08-22 DIAGNOSIS — R29.898 DECREASED STRENGTH OF LOWER EXTREMITY: Primary | ICD-10-CM

## 2025-08-22 DIAGNOSIS — M53.86 DECREASED ROM OF LUMBAR SPINE: ICD-10-CM

## 2025-08-22 PROCEDURE — 97530 THERAPEUTIC ACTIVITIES: CPT | Mod: HCNC,PN

## 2025-08-22 PROCEDURE — 97112 NEUROMUSCULAR REEDUCATION: CPT | Mod: HCNC,PN

## 2025-08-25 ENCOUNTER — CLINICAL SUPPORT (OUTPATIENT)
Dept: REHABILITATION | Facility: HOSPITAL | Age: 89
End: 2025-08-25
Payer: MEDICARE

## 2025-08-25 DIAGNOSIS — R29.898 DECREASED STRENGTH OF LOWER EXTREMITY: Primary | ICD-10-CM

## 2025-08-25 DIAGNOSIS — M53.86 DECREASED ROM OF LUMBAR SPINE: ICD-10-CM

## 2025-08-25 PROCEDURE — 97112 NEUROMUSCULAR REEDUCATION: CPT | Mod: HCNC,PN

## 2025-08-25 PROCEDURE — 97530 THERAPEUTIC ACTIVITIES: CPT | Mod: HCNC,PN

## 2025-08-27 ENCOUNTER — CLINICAL SUPPORT (OUTPATIENT)
Dept: REHABILITATION | Facility: HOSPITAL | Age: 89
End: 2025-08-27
Payer: MEDICARE

## 2025-08-27 DIAGNOSIS — M53.86 DECREASED ROM OF LUMBAR SPINE: ICD-10-CM

## 2025-08-27 DIAGNOSIS — R29.898 DECREASED STRENGTH OF LOWER EXTREMITY: Primary | ICD-10-CM

## 2025-08-27 PROCEDURE — 97530 THERAPEUTIC ACTIVITIES: CPT | Mod: HCNC,PN

## 2025-08-27 PROCEDURE — 97112 NEUROMUSCULAR REEDUCATION: CPT | Mod: HCNC,PN

## 2025-09-03 ENCOUNTER — CLINICAL SUPPORT (OUTPATIENT)
Dept: REHABILITATION | Facility: HOSPITAL | Age: 89
End: 2025-09-03
Payer: MEDICARE

## 2025-09-03 DIAGNOSIS — M53.86 DECREASED ROM OF LUMBAR SPINE: ICD-10-CM

## 2025-09-03 DIAGNOSIS — R29.898 DECREASED STRENGTH OF LOWER EXTREMITY: Primary | ICD-10-CM

## 2025-09-03 PROCEDURE — 97112 NEUROMUSCULAR REEDUCATION: CPT | Mod: HCNC,PN

## 2025-09-03 PROCEDURE — 97530 THERAPEUTIC ACTIVITIES: CPT | Mod: HCNC,PN

## 2025-09-03 PROCEDURE — 97110 THERAPEUTIC EXERCISES: CPT | Mod: HCNC,PN

## (undated) DEVICE — BANDAGE ADHESIVE

## (undated) DEVICE — DRESSING LEUKOPLAST FLEX 1X3IN